# Patient Record
Sex: MALE | Race: BLACK OR AFRICAN AMERICAN | NOT HISPANIC OR LATINO
[De-identification: names, ages, dates, MRNs, and addresses within clinical notes are randomized per-mention and may not be internally consistent; named-entity substitution may affect disease eponyms.]

---

## 2020-05-17 ENCOUNTER — TRANSCRIPTION ENCOUNTER (OUTPATIENT)
Age: 55
End: 2020-05-17

## 2020-05-26 ENCOUNTER — APPOINTMENT (OUTPATIENT)
Dept: PULMONOLOGY | Facility: CLINIC | Age: 55
End: 2020-05-26

## 2020-06-10 ENCOUNTER — TRANSCRIPTION ENCOUNTER (OUTPATIENT)
Age: 55
End: 2020-06-10

## 2020-06-25 ENCOUNTER — TRANSCRIPTION ENCOUNTER (OUTPATIENT)
Age: 55
End: 2020-06-25

## 2020-07-08 ENCOUNTER — TRANSCRIPTION ENCOUNTER (OUTPATIENT)
Age: 55
End: 2020-07-08

## 2020-08-09 ENCOUNTER — TRANSCRIPTION ENCOUNTER (OUTPATIENT)
Age: 55
End: 2020-08-09

## 2020-08-11 ENCOUNTER — APPOINTMENT (OUTPATIENT)
Dept: PULMONOLOGY | Facility: CLINIC | Age: 55
End: 2020-08-11
Payer: COMMERCIAL

## 2020-08-28 ENCOUNTER — TRANSCRIPTION ENCOUNTER (OUTPATIENT)
Age: 55
End: 2020-08-28

## 2020-09-17 ENCOUNTER — LABORATORY RESULT (OUTPATIENT)
Age: 55
End: 2020-09-17

## 2020-09-17 ENCOUNTER — APPOINTMENT (OUTPATIENT)
Dept: PULMONOLOGY | Facility: CLINIC | Age: 55
End: 2020-09-17
Payer: COMMERCIAL

## 2020-09-17 VITALS
DIASTOLIC BLOOD PRESSURE: 90 MMHG | BODY MASS INDEX: 33.04 KG/M2 | WEIGHT: 218 LBS | SYSTOLIC BLOOD PRESSURE: 165 MMHG | HEIGHT: 68 IN

## 2020-09-17 VITALS — RESPIRATION RATE: 16 BRPM | HEART RATE: 98 BPM | OXYGEN SATURATION: 90 %

## 2020-09-17 DIAGNOSIS — Z87.891 PERSONAL HISTORY OF NICOTINE DEPENDENCE: ICD-10-CM

## 2020-09-17 DIAGNOSIS — Z87.39 PERSONAL HISTORY OF OTHER DISEASES OF THE MUSCULOSKELETAL SYSTEM AND CONNECTIVE TISSUE: ICD-10-CM

## 2020-09-17 PROCEDURE — 99204 OFFICE O/P NEW MOD 45 MIN: CPT

## 2020-09-17 RX ORDER — ALLOPURINOL 200 MG/1
TABLET ORAL
Refills: 0 | Status: ACTIVE | COMMUNITY

## 2020-09-17 NOTE — PHYSICAL EXAM
[No Acute Distress] : no acute distress [Low Lying Soft Palate] : low lying soft palate [Enlarged Base of the Tongue] : enlarged base of the tongue [III] : Mallampati Class: III [Normal Appearance] : normal appearance [Neck Circumference: ___] : neck circumference: [unfilled] [No Neck Mass] : no neck mass [Normal Rate/Rhythm] : normal rate/rhythm [Normal S1, S2] : normal s1, s2 [No Murmurs] : no murmurs [No Resp Distress] : no resp distress [Clear to Auscultation Bilaterally] : clear to auscultation bilaterally [No Abnormalities] : no abnormalities [Benign] : benign [Normal Gait] : normal gait [No Clubbing] : no clubbing [No Cyanosis] : no cyanosis [No Edema] : no edema [FROM] : FROM [Normal Color/ Pigmentation] : normal color/ pigmentation [No Focal Deficits] : no focal deficits [Oriented x3] : oriented x3 [Normal Affect] : normal affect [TextBox_2] : Obese [TextBox_68] : diminished bs bilat.

## 2020-09-17 NOTE — ASSESSMENT
[FreeTextEntry1] : Patient has asthma which has been out of control. I suspect he may have moderate persistent asthma that has gradually gotten worse. For the moment I told him to use the Wixela twice daily and Spiriva once daily. I gave him a course of prednisone to take if he gets worse. Pulmonary function studies have been scheduled as have asthma blood work to include allergy testing, IgE level, eosinophil count.\par \par The patient will be scheduled for a sleep study. He is at risk for significant obstructive sleep apnea which can also make his asthma worse. I will see the patient back in 3 weeks to review the lab work and the pulmonary function studies.

## 2020-09-17 NOTE — HISTORY OF PRESENT ILLNESS
[TextBox_4] : The patient is a 55-year-old male complaining of asthma that has been out of control for the past 4 months. He has had asthma for about 30 years. He denies any seasonal aspect to it and he's been in the emergency room was about 3 times. Generally he's been treated with Wixela and Spiriva. He tells me that he's been using the Wixela in the mornings and Spiriva at night.\par \par In the past 4 months he's been in urgent care 6 times requiring steroids. The last time was 3 weeks ago. Denies nasal stuffiness or GERD. He smoked in the distant past.\par \par The patient snores heavily and reports some excessive daytime sleepiness. He does not know about apneas during sleep.

## 2020-09-17 NOTE — CONSULT LETTER
Report received from BAYRON Robert.   [Dear  ___] : Dear  [unfilled], [Consult Letter:] : I had the pleasure of evaluating your patient, [unfilled]. [Please see my note below.] : Please see my note below. [Consult Closing:] : Thank you very much for allowing me to participate in the care of this patient.  If you have any questions, please do not hesitate to contact me. [Sincerely,] : Sincerely, [FreeTextEntry3] : Janeth Benavidez MD FCCP\par D-ABSM\par ABIM board certified in  Pulmonary diseases, Sleep medicine\par Internal medicine\par

## 2020-09-18 LAB
A ALTERNATA IGE QN: <0.1 KUA/L
A FUMIGATUS IGE QN: <0.1 KUA/L
BASOPHILS # BLD AUTO: 0.07 K/UL
BASOPHILS NFR BLD AUTO: 0.8 %
C ALBICANS IGE QN: <0.1 KUA/L
C HERBARUM IGE QN: 0.13 KUA/L
CAT DANDER IGE QN: <0.1 KUA/L
COMMON RAGWEED IGE QN: <0.1 KUA/L
D FARINAE IGE QN: <0.1 KUA/L
D PTERONYSS IGE QN: <0.1 KUA/L
DEPRECATED A ALTERNATA IGE RAST QL: 0
DEPRECATED A FUMIGATUS IGE RAST QL: 0
DEPRECATED C ALBICANS IGE RAST QL: 0
DEPRECATED C HERBARUM IGE RAST QL: NORMAL
DEPRECATED CAT DANDER IGE RAST QL: 0
DEPRECATED COMMON RAGWEED IGE RAST QL: 0
DEPRECATED D FARINAE IGE RAST QL: 0
DEPRECATED D PTERONYSS IGE RAST QL: 0
DEPRECATED DOG DANDER IGE RAST QL: 0
DEPRECATED M RACEMOSUS IGE RAST QL: 0
DEPRECATED ROACH IGE RAST QL: 0
DEPRECATED TIMOTHY IGE RAST QL: 1
DEPRECATED WHITE OAK IGE RAST QL: 0
DOG DANDER IGE QN: <0.1 KUA/L
EOSINOPHIL # BLD AUTO: 1.02 K/UL
EOSINOPHIL NFR BLD AUTO: 11.6 %
HCT VFR BLD CALC: 48.5 %
HGB BLD-MCNC: 15.1 G/DL
IMM GRANULOCYTES NFR BLD AUTO: 0.2 %
LYMPHOCYTES # BLD AUTO: 3.31 K/UL
LYMPHOCYTES NFR BLD AUTO: 37.7 %
M RACEMOSUS IGE QN: <0.1 KUA/L
MAN DIFF?: NORMAL
MCHC RBC-ENTMCNC: 28.2 PG
MCHC RBC-ENTMCNC: 31.1 GM/DL
MCV RBC AUTO: 90.7 FL
MONOCYTES # BLD AUTO: 0.59 K/UL
MONOCYTES NFR BLD AUTO: 6.7 %
NEUTROPHILS # BLD AUTO: 3.77 K/UL
NEUTROPHILS NFR BLD AUTO: 43 %
PLATELET # BLD AUTO: 258 K/UL
RBC # BLD: 5.35 M/UL
RBC # FLD: 13.5 %
ROACH IGE QN: <0.1 KUA/L
TIMOTHY IGE QN: 0.56 KUA/L
WBC # FLD AUTO: 8.78 K/UL
WHITE OAK IGE QN: <0.1 KUA/L

## 2020-10-03 ENCOUNTER — APPOINTMENT (OUTPATIENT)
Dept: DISASTER EMERGENCY | Facility: CLINIC | Age: 55
End: 2020-10-03

## 2020-10-08 ENCOUNTER — APPOINTMENT (OUTPATIENT)
Dept: PULMONOLOGY | Facility: CLINIC | Age: 55
End: 2020-10-08

## 2020-11-09 ENCOUNTER — TRANSCRIPTION ENCOUNTER (OUTPATIENT)
Age: 55
End: 2020-11-09

## 2020-12-18 ENCOUNTER — TRANSCRIPTION ENCOUNTER (OUTPATIENT)
Age: 55
End: 2020-12-18

## 2021-01-05 ENCOUNTER — TRANSCRIPTION ENCOUNTER (OUTPATIENT)
Age: 56
End: 2021-01-05

## 2021-01-21 ENCOUNTER — TRANSCRIPTION ENCOUNTER (OUTPATIENT)
Age: 56
End: 2021-01-21

## 2021-02-20 ENCOUNTER — TRANSCRIPTION ENCOUNTER (OUTPATIENT)
Age: 56
End: 2021-02-20

## 2021-03-10 ENCOUNTER — TRANSCRIPTION ENCOUNTER (OUTPATIENT)
Age: 56
End: 2021-03-10

## 2021-03-30 ENCOUNTER — TRANSCRIPTION ENCOUNTER (OUTPATIENT)
Age: 56
End: 2021-03-30

## 2021-04-13 ENCOUNTER — TRANSCRIPTION ENCOUNTER (OUTPATIENT)
Age: 56
End: 2021-04-13

## 2021-04-14 ENCOUNTER — APPOINTMENT (OUTPATIENT)
Dept: ORTHOPEDIC SURGERY | Facility: CLINIC | Age: 56
End: 2021-04-14
Payer: COMMERCIAL

## 2021-04-14 VITALS
BODY MASS INDEX: 33.04 KG/M2 | HEART RATE: 93 BPM | HEIGHT: 68 IN | DIASTOLIC BLOOD PRESSURE: 107 MMHG | WEIGHT: 218 LBS | SYSTOLIC BLOOD PRESSURE: 185 MMHG

## 2021-04-14 DIAGNOSIS — Z78.9 OTHER SPECIFIED HEALTH STATUS: ICD-10-CM

## 2021-04-14 DIAGNOSIS — J45.909 UNSPECIFIED ASTHMA, UNCOMPLICATED: ICD-10-CM

## 2021-04-14 PROCEDURE — 99203 OFFICE O/P NEW LOW 30 MIN: CPT

## 2021-04-14 PROCEDURE — 99072 ADDL SUPL MATRL&STAF TM PHE: CPT

## 2021-04-16 PROBLEM — Z78.9 CURRENT NON-DRINKER OF ALCOHOL: Status: ACTIVE | Noted: 2021-04-16

## 2021-04-16 PROBLEM — Z78.9 DOES NOT USE ILLICIT DRUGS: Status: ACTIVE | Noted: 2021-04-16

## 2021-04-16 PROBLEM — J45.909 ASTHMA: Status: RESOLVED | Noted: 2021-04-16 | Resolved: 2021-04-16

## 2021-04-16 PROBLEM — Z78.9 NO PERTINENT FAMILY HISTORY: Status: ACTIVE | Noted: 2021-04-16

## 2021-04-21 ENCOUNTER — TRANSCRIPTION ENCOUNTER (OUTPATIENT)
Age: 56
End: 2021-04-21

## 2021-04-27 ENCOUNTER — APPOINTMENT (OUTPATIENT)
Dept: ORTHOPEDIC SURGERY | Facility: CLINIC | Age: 56
End: 2021-04-27
Payer: COMMERCIAL

## 2021-04-27 VITALS
HEIGHT: 68 IN | DIASTOLIC BLOOD PRESSURE: 117 MMHG | SYSTOLIC BLOOD PRESSURE: 194 MMHG | HEART RATE: 88 BPM | BODY MASS INDEX: 33.04 KG/M2 | WEIGHT: 218 LBS

## 2021-04-27 DIAGNOSIS — M77.8 OTHER ENTHESOPATHIES, NOT ELSEWHERE CLASSIFIED: ICD-10-CM

## 2021-04-27 DIAGNOSIS — M25.532 PAIN IN LEFT WRIST: ICD-10-CM

## 2021-04-27 PROCEDURE — 99213 OFFICE O/P EST LOW 20 MIN: CPT

## 2021-04-27 PROCEDURE — 73110 X-RAY EXAM OF WRIST: CPT | Mod: LT

## 2021-04-27 PROCEDURE — 99072 ADDL SUPL MATRL&STAF TM PHE: CPT

## 2021-05-11 ENCOUNTER — TRANSCRIPTION ENCOUNTER (OUTPATIENT)
Age: 56
End: 2021-05-11

## 2021-05-28 ENCOUNTER — TRANSCRIPTION ENCOUNTER (OUTPATIENT)
Age: 56
End: 2021-05-28

## 2021-06-13 ENCOUNTER — TRANSCRIPTION ENCOUNTER (OUTPATIENT)
Age: 56
End: 2021-06-13

## 2021-06-14 ENCOUNTER — APPOINTMENT (OUTPATIENT)
Dept: ULTRASOUND IMAGING | Facility: CLINIC | Age: 56
End: 2021-06-14

## 2021-06-15 ENCOUNTER — APPOINTMENT (OUTPATIENT)
Dept: ORTHOPEDIC SURGERY | Facility: CLINIC | Age: 56
End: 2021-06-15

## 2021-06-23 ENCOUNTER — TRANSCRIPTION ENCOUNTER (OUTPATIENT)
Age: 56
End: 2021-06-23

## 2021-08-10 ENCOUNTER — TRANSCRIPTION ENCOUNTER (OUTPATIENT)
Age: 56
End: 2021-08-10

## 2021-09-11 ENCOUNTER — TRANSCRIPTION ENCOUNTER (OUTPATIENT)
Age: 56
End: 2021-09-11

## 2021-10-01 ENCOUNTER — TRANSCRIPTION ENCOUNTER (OUTPATIENT)
Age: 56
End: 2021-10-01

## 2021-10-24 ENCOUNTER — TRANSCRIPTION ENCOUNTER (OUTPATIENT)
Age: 56
End: 2021-10-24

## 2021-11-06 ENCOUNTER — TRANSCRIPTION ENCOUNTER (OUTPATIENT)
Age: 56
End: 2021-11-06

## 2021-12-11 ENCOUNTER — TRANSCRIPTION ENCOUNTER (OUTPATIENT)
Age: 56
End: 2021-12-11

## 2022-01-03 ENCOUNTER — EMERGENCY (EMERGENCY)
Facility: HOSPITAL | Age: 57
LOS: 1 days | Discharge: HOME | End: 2022-01-03
Attending: EMERGENCY MEDICINE
Payer: COMMERCIAL

## 2022-01-03 VITALS
HEIGHT: 68 IN | SYSTOLIC BLOOD PRESSURE: 134 MMHG | RESPIRATION RATE: 20 BRPM | DIASTOLIC BLOOD PRESSURE: 78 MMHG | HEART RATE: 124 BPM | OXYGEN SATURATION: 96 % | TEMPERATURE: 98 F | WEIGHT: 220.02 LBS

## 2022-01-03 LAB — SARS-COV-2 RNA SPEC QL NAA+PROBE: DETECTED

## 2022-01-03 PROCEDURE — U0005: CPT

## 2022-01-03 PROCEDURE — 99283 EMERGENCY DEPT VISIT LOW MDM: CPT

## 2022-01-03 PROCEDURE — 99284 EMERGENCY DEPT VISIT MOD MDM: CPT

## 2022-01-03 PROCEDURE — 94640 AIRWAY INHALATION TREATMENT: CPT

## 2022-01-03 PROCEDURE — U0003: CPT

## 2022-01-03 RX ORDER — ALBUTEROL 90 UG/1
2 AEROSOL, METERED ORAL EVERY 6 HOURS
Refills: 0 | Status: DISCONTINUED | OUTPATIENT
Start: 2022-01-03 | End: 2022-01-07

## 2022-01-03 RX ADMIN — Medication 60 MILLIGRAM(S): at 10:31

## 2022-01-03 RX ADMIN — ALBUTEROL 2 PUFF(S): 90 AEROSOL, METERED ORAL at 10:31

## 2022-01-03 NOTE — ED PROVIDER NOTE - OBJECTIVE STATEMENT
55 yo male pmh asthma comes to ed with cough , nasal congestion; pt noted possible exposure to covid; denies fever chills; ; family with similar symptoms

## 2022-01-03 NOTE — ED PROVIDER NOTE - NSFOLLOWUPINSTRUCTIONS_ED_ALL_ED_FT
prednisone 20mg by mouth 2 times a day for 5 days  proventil mdi  2 puffs every 6 hours  follow up with doctor in 3 - 5 days

## 2022-01-03 NOTE — ED PROVIDER NOTE - PATIENT PORTAL LINK FT
You can access the FollowMyHealth Patient Portal offered by [facility] by registering at the following website: [email or web address]. By joining [facility]’s FollowMyHealth portal, you will also be able to view your health information using other applications (apps) compatible with our system.

## 2022-01-13 ENCOUNTER — TRANSCRIPTION ENCOUNTER (OUTPATIENT)
Age: 57
End: 2022-01-13

## 2022-02-07 ENCOUNTER — EMERGENCY (EMERGENCY)
Facility: HOSPITAL | Age: 57
LOS: 1 days | Discharge: HOME | End: 2022-02-07
Attending: EMERGENCY MEDICINE
Payer: COMMERCIAL

## 2022-02-07 VITALS
RESPIRATION RATE: 16 BRPM | OXYGEN SATURATION: 96 % | DIASTOLIC BLOOD PRESSURE: 94 MMHG | TEMPERATURE: 98 F | SYSTOLIC BLOOD PRESSURE: 153 MMHG | HEART RATE: 96 BPM

## 2022-02-07 VITALS
RESPIRATION RATE: 16 BRPM | DIASTOLIC BLOOD PRESSURE: 82 MMHG | SYSTOLIC BLOOD PRESSURE: 158 MMHG | HEIGHT: 68 IN | HEART RATE: 112 BPM | TEMPERATURE: 99 F | OXYGEN SATURATION: 96 % | WEIGHT: 214.95 LBS

## 2022-02-07 PROCEDURE — 93971 EXTREMITY STUDY: CPT | Mod: 26,RT

## 2022-02-07 PROCEDURE — 99284 EMERGENCY DEPT VISIT MOD MDM: CPT | Mod: 25

## 2022-02-07 PROCEDURE — 99284 EMERGENCY DEPT VISIT MOD MDM: CPT

## 2022-02-07 PROCEDURE — 94640 AIRWAY INHALATION TREATMENT: CPT

## 2022-02-07 PROCEDURE — 93971 EXTREMITY STUDY: CPT

## 2022-02-07 PROCEDURE — 73564 X-RAY EXAM KNEE 4 OR MORE: CPT

## 2022-02-07 PROCEDURE — 73564 X-RAY EXAM KNEE 4 OR MORE: CPT | Mod: 26,RT

## 2022-02-07 RX ORDER — IBUPROFEN 200 MG
600 TABLET ORAL ONCE
Refills: 0 | Status: COMPLETED | OUTPATIENT
Start: 2022-02-07 | End: 2022-02-07

## 2022-02-07 RX ORDER — ALBUTEROL 90 UG/1
2 AEROSOL, METERED ORAL ONCE
Refills: 0 | Status: COMPLETED | OUTPATIENT
Start: 2022-02-07 | End: 2022-02-07

## 2022-02-07 RX ORDER — AMLODIPINE BESYLATE 2.5 MG/1
10 TABLET ORAL ONCE
Refills: 0 | Status: COMPLETED | OUTPATIENT
Start: 2022-02-07 | End: 2022-02-07

## 2022-02-07 RX ADMIN — ALBUTEROL 2 PUFF(S): 90 AEROSOL, METERED ORAL at 20:04

## 2022-02-07 RX ADMIN — Medication 600 MILLIGRAM(S): at 20:04

## 2022-02-07 RX ADMIN — AMLODIPINE BESYLATE 10 MILLIGRAM(S): 2.5 TABLET ORAL at 20:04

## 2022-02-07 NOTE — ED PROVIDER NOTE - OBJECTIVE STATEMENT
55 y/o M with PMHx arthritis, asthma, gout, HTN presents to ED c/o right knee pain for several days. Pt unable to recall specific injury but does lift and bend for work. Pt took Tylenol for pain with minimal relief.    Pt also requesting home meds (albuterol and amlodipine) because he forgot to take this morning.

## 2022-02-07 NOTE — ED PROVIDER NOTE - PATIENT PORTAL LINK FT
You can access the FollowMyHealth Patient Portal offered by [facility] by registering at the following website: [email or web address]. By joining [facility]’s FollowMyHealth portal, you will also be able to view your health information using other applications (apps) compatible with our system.

## 2022-02-07 NOTE — ED PROVIDER NOTE - CLINICAL SUMMARY MEDICAL DECISION MAKING FREE TEXT BOX
55 y/o M with PMHx arthritis, asthma, gout, HTN presents to ED c/o right knee pain for several days  -Xray knee, US duplex, motrin for pain, pt home meds for BP/asthma

## 2022-02-07 NOTE — ED PROVIDER NOTE - NSFOLLOWUPINSTRUCTIONS_ED_ALL_ED_FT
- Please follow up with your Primary Care Doctor in 1 - 2 days. If you cannot follow-up with your primary care doctor please return to the Emergency Department for any urgent issues.  - Seek immediate medical care for any new, worsening or concerning signs or symptoms.   - You were given copies of all your test results, please bring to your primary care doctor for review of any abnormal test results  - If you have difficulty following up, please call: [phone] or go to [email or web address] to obtain a [facility] doctor or specialist who takes your insurance in your area.    Feel better!       Knee Pain    WHAT YOU NEED TO KNOW:    Knee pain may start suddenly, or it may be a long-term problem. You may have pain on the side, front, or back of your knee. You may have knee stiffness and swelling. You may hear popping sounds or feel like your knee is giving way or locking up as you walk. You may feel pain when you sit, stand, walk, or climb up and down stairs. Knee pain can be caused by conditions such as obesity, inflammation, or strains or tears in ligaments or tendons.     DISCHARGE INSTRUCTIONS:    Return to the emergency department if:   •Your pain is worse, even after treatment.       •You cannot bend or straighten your leg completely.       •The swelling around your knee does not go down even with treatment.      •Your knee is painful and hot to the touch.       Contact your healthcare provider if:   •You have questions or concerns about your condition or care.           Medicines: You may need any of the following:   •NSAIDs help decrease swelling and pain or fever. This medicine is available with or without a doctor's order. NSAIDs can cause stomach bleeding or kidney problems in certain people. If you take blood thinner medicine, always ask your healthcare provider if NSAIDs are safe for you. Always read the medicine label and follow directions.      •Acetaminophen decreases pain and fever. It is available without a doctor's order. Ask how much to take and how often to take it. Follow directions. Read the labels of all other medicines you are using to see if they also contain acetaminophen, or ask your doctor or pharmacist. Acetaminophen can cause liver damage if not taken correctly. Do not use more than 4 grams (4,000 milligrams) total of acetaminophen in one day.       •Prescription pain medicine may be given. Ask your healthcare provider how to take this medicine safely. Some prescription pain medicines contain acetaminophen. Do not take other medicines that contain acetaminophen without talking to your healthcare provider. Too much acetaminophen may cause liver damage. Prescription pain medicine may cause constipation. Ask your healthcare provider how to prevent or treat constipation.       •Take your medicine as directed. Contact your healthcare provider if you think your medicine is not helping or if you have side effects. Tell him or her if you are allergic to any medicine. Keep a list of the medicines, vitamins, and herbs you take. Include the amounts, and when and why you take them. Bring the list or the pill bottles to follow-up visits. Carry your medicine list with you in case of an emergency.      What you can do to manage your symptoms:   •Rest your knee so it can heal. Limit activities that increase your pain. Do low-impact exercises, such as walking or swimming.       •Apply ice to help reduce swelling and pain. Use an ice pack, or put crushed ice in a plastic bag. Cover it with a towel before you apply it to your knee. Apply ice for 15 to 20 minutes every hour, or as directed.      •Apply compression to help reduce swelling. Use a brace or bandage only as directed.      •Elevate your knee to help decrease pain and swelling. Elevate your knee while you are sitting or lying down. Prop your leg on pillows to keep your knee above the level of your heart.      •Prevent your knee from moving as directed. Your healthcare provider may put on a cast or splint. You may need to wear a leg brace to stabilize your knee. A leg brace can be adjusted to increase your range of motion as your knee heals.  Hinged Knee Braces            What you can do to prevent knee pain:   •Maintain a healthy weight. Extra weight increases your risk for knee pain. Ask your healthcare provider how much you should weigh. He or she can help you create a safe weight loss plan if you need to lose weight.      •Exercise or train properly. Use the correct equipment for sports. Wear shoes that provide good support. Check your posture often as you exercise, play sports, or train for an event. This can help prevent stress and strain on your knees. Rest between sessions so you do not overwork your knees.      Follow up with your healthcare provider within 24 hours or as directed: You may need follow-up treatments, such as steroid injections to decrease pain. Write down your questions so you remember to ask them during your visits.     - Your blood pressure reading was high while in ED, please monitor your blood pressure at home and follow up with your Primary Doctor    Hypertension, commonly called high blood pressure, is when the force of blood pumping through your arteries is too strong. Hypertension forces your heart to work harder to pump blood. Your arteries may become narrow or stiff. Having untreated or uncontrolled hypertension for a long period of time can cause heart attack, stroke, kidney disease, and other problems. If started on a medication, take exactly as prescribed by your health care professional.     SEEK IMMEDIATE MEDICAL CARE IF YOU HAVE ANY OF THE FOLLOWING SYMPTOMS: severe headache, changes in your vision, confusion, chest pain, abdominal pain, vomiting, or shortness of breath.

## 2022-02-07 NOTE — ED PROVIDER NOTE - CARE PROVIDER_API CALL
El Tierney (DO)  Orthopaedic Surgery  [address] [place] [address], [address]  [place] [address]  Phone: [phone]  Fax: [phone]  Follow Up Time: 4-6 Days

## 2022-02-07 NOTE — ED PROVIDER NOTE - PHYSICAL EXAMINATION
Vital signs noted, see flowsheet.  General: NAD, well appearing and non-toxic.  HEENT: NC/AT. MMM. Conjunctiva and sclera clear b/l.  EOMI. PERRL.  Neck: Soft and supple  Cardiac: RRR. +S1/S2. Peripheral pulses 2+ and symmetric b/l. No LE edema.  Respiratory: Speaking in full sentences, no evidence of respiratory distress. Lungs mild exp wheeze   Abdomen: BSx4. Soft, NTND. No guarding or rebound tenderness. No suprapubic tenderness.  Back: Spine midline and non-tender. No CVAT.  Skin: Normal color for race, no evidence of rash, ecchymosis, cyanosis or jaundice.   Neuro: Awake, alert and oriented to person/place/time/situation. Moves all extremities spontaneously and symmetrically.  No focal deficit, sensation intact, strength good   Psych: Normal affect   RLE: No deformity, able to range extremity, no effusion, tenderness to right lateral knee and posterior knee, no gross ligamentous instability, able to SLR but has pain

## 2022-02-10 ENCOUNTER — APPOINTMENT (OUTPATIENT)
Dept: ORTHOPEDIC SURGERY | Facility: CLINIC | Age: 57
End: 2022-02-10
Payer: COMMERCIAL

## 2022-02-10 VITALS
DIASTOLIC BLOOD PRESSURE: 74 MMHG | WEIGHT: 220 LBS | SYSTOLIC BLOOD PRESSURE: 137 MMHG | BODY MASS INDEX: 33.34 KG/M2 | HEART RATE: 103 BPM | HEIGHT: 68 IN

## 2022-02-10 DIAGNOSIS — M25.561 PAIN IN RIGHT KNEE: ICD-10-CM

## 2022-02-10 DIAGNOSIS — M17.11 UNILATERAL PRIMARY OSTEOARTHRITIS, RIGHT KNEE: ICD-10-CM

## 2022-02-10 PROBLEM — I10 ESSENTIAL (PRIMARY) HYPERTENSION: Chronic | Status: ACTIVE | Noted: 2022-02-07

## 2022-02-10 PROCEDURE — 99214 OFFICE O/P EST MOD 30 MIN: CPT | Mod: 25

## 2022-02-10 PROCEDURE — 20610 DRAIN/INJ JOINT/BURSA W/O US: CPT | Mod: RT

## 2022-02-10 NOTE — HISTORY OF PRESENT ILLNESS
[de-identified] : 56-year-old male here today for evaluation of right knee pain has been going for the past 1 to 2 weeks.  Patient states that he was working at the [employer] overnight when he was walking around a lot.  Woke up the next morning with swelling and pain in his right knee.  States he been taking Tylenol for the pain however he was having severe pain.  Went to the emergency room where they took an x-ray and ultrasound showed no fractures.  States has been taking ibuprofen for the past 5 days and this has provided good relief of the pain in his knee.  Still has swelling and limitations in range of motion.  Denies any trauma.  Denies mechanical symptoms such as locking popping or buckling.

## 2022-02-10 NOTE — DISCUSSION/SUMMARY
[de-identified] : Patient is a 56-year-old male with degenerative arthritis of the right knee presenting today with an acute arthritis exacerbation.  I recommended conservative treatment at this time.  I gave him an injection of cortisone which he tolerated well.  Have also recommended physical therapy.  I given prescription for meloxicam 15 mg daily as needed for pain.  I will see him back in 6 to 8 weeks for repeat evaluation.  All questions were asked and answered.  He was advised he may be a candidate for total knee in the future.  We will also discuss gel injections if his pain does not improve.

## 2022-02-10 NOTE — PROCEDURE
[de-identified] : I injected his right knee.\par I discussed at length with the patient the planned steroid and lidocaine injection. The risks, benefits, convalescence and alternatives were reviewed. The possible side effects discussed included but were not limited to: pain, swelling, heat, bleeding, and redness. Symptoms are generally mild but if they are extensive then contact the office. Giving pain relievers by mouth such as NSAIDs or Tylenol can generally treat the reactions to steroid and lidocaine. Rare cases of infection have been noted. Rash, hives and itching may occur post injection. If you have muscle pain or cramps, flushing and or swelling of the face, rapid heart beat, nausea, dizziness, fever, chills, headache, difficulty breathing, swelling in the arms or legs, or have a prickly feeling of your skin, contact a health care provider immediately. Following this discussion, the knee was prepped with Alcohol and under sterile condition the 80 mg Depo-Medrol and 6 cc Lidocaine injection was performed with a 20 gauge needle through a superolateral injection site. The needle was introduced into the joint, aspiration was performed to ensure intra-articular placement and the medication was injected. Upon withdrawal of the needle the site was cleaned with alcohol and a band aid applied. The patient tolerated the injection well and there were no adverse effects. Post injection instructions included no strenuous activity for 24 hours, cryotherapy and if there are any adverse effects to contact the office.\par

## 2022-02-10 NOTE — PHYSICAL EXAM
[de-identified] : GENERAL APPEARANCE: Well nourished and hydrated, pleasant, alert, and oriented x 3. Appears their stated age. \par HEENT: Normocephalic, extraocular eye motion intact. Nasal septum midline. Oral cavity clear. External auditory canal clear. \par RESPIRATORY: Breath sounds clear and audible in all lobes. No wheezing, No accessory muscle use.\par CARDIOVASCULAR: No apparent abnormalities. No lower leg edema. No varicosities. Pedal pulses are palpable.\par NEUROLOGIC: Sensation is normal, no muscle weakness in the upper or lower extremities.\par DERMATOLOGIC: No apparent skin lesions, moist, warm, no rash.\par SPINE: Cervical spine appears normal and moves freely; thoracic spine appears normal and moves freely; lumbosacral spine appears normal and moves freely, normal, nontender.\par MUSCULOSKELETAL: Hands, wrists, and elbows are normal and move freely, shoulders are normal and move freely. \par Psychiatric: Oriented to person, place, and time, insight and judgement were intact and the affect was normal. \par Musculoskeletal:. Right knee exam shows moderate effusion, ROM is 5-95 degrees, no instability, no pain with [place], medial and lateral joint line tenderness. \par 5/5 motor strength in bilateral lower extremities. Sensory: Intact in bilateral lower extremities. DTRs: Biceps, brachioradialis, triceps, patellar, ankle and plantar 2+ and symmetric bilaterally. Pulses: dorsalis pedis, posterior tibial, femoral, popliteal, and radial 2+ and symmetric bilaterally. \par Constitutional: Alert and in no acute distress, but well-appearing. \par  [de-identified] : 3 views of the right knee brought in from the emergency room show no acute fractures or dislocations.  There is severe tricompartmental degenerative changes.

## 2022-02-11 ENCOUNTER — TRANSCRIPTION ENCOUNTER (OUTPATIENT)
Age: 57
End: 2022-02-11

## 2022-02-28 ENCOUNTER — TRANSCRIPTION ENCOUNTER (OUTPATIENT)
Age: 57
End: 2022-02-28

## 2022-03-15 ENCOUNTER — TRANSCRIPTION ENCOUNTER (OUTPATIENT)
Age: 57
End: 2022-03-15

## 2022-03-24 ENCOUNTER — APPOINTMENT (OUTPATIENT)
Dept: ORTHOPEDIC SURGERY | Facility: CLINIC | Age: 57
End: 2022-03-24

## 2022-04-16 ENCOUNTER — TRANSCRIPTION ENCOUNTER (OUTPATIENT)
Age: 57
End: 2022-04-16

## 2022-06-21 ENCOUNTER — NON-APPOINTMENT (OUTPATIENT)
Age: 57
End: 2022-06-21

## 2022-07-08 ENCOUNTER — NON-APPOINTMENT (OUTPATIENT)
Age: 57
End: 2022-07-08

## 2022-08-29 ENCOUNTER — NON-APPOINTMENT (OUTPATIENT)
Age: 57
End: 2022-08-29

## 2022-09-15 ENCOUNTER — APPOINTMENT (OUTPATIENT)
Dept: PULMONOLOGY | Facility: CLINIC | Age: 57
End: 2022-09-15

## 2022-09-15 VITALS — SYSTOLIC BLOOD PRESSURE: 140 MMHG | WEIGHT: 209 LBS | DIASTOLIC BLOOD PRESSURE: 70 MMHG | BODY MASS INDEX: 31.78 KG/M2

## 2022-09-15 VITALS — RESPIRATION RATE: 16 BRPM | OXYGEN SATURATION: 97 % | HEART RATE: 87 BPM

## 2022-09-15 PROCEDURE — 99214 OFFICE O/P EST MOD 30 MIN: CPT

## 2022-09-15 RX ORDER — DICLOFENAC SODIUM 1% 10 MG/G
1 GEL TOPICAL
Qty: 1 | Refills: 0 | Status: COMPLETED | COMMUNITY
Start: 2021-04-14 | End: 2022-09-15

## 2022-09-15 RX ORDER — TIOTROPIUM BROMIDE 18 UG/1
18 CAPSULE ORAL; RESPIRATORY (INHALATION)
Refills: 0 | Status: COMPLETED | COMMUNITY
End: 2022-09-15

## 2022-09-15 RX ORDER — MELOXICAM 15 MG/1
15 TABLET ORAL DAILY
Qty: 14 | Refills: 0 | Status: COMPLETED | COMMUNITY
Start: 2021-04-27 | End: 2022-09-15

## 2022-09-15 RX ORDER — MELOXICAM 15 MG/1
15 TABLET ORAL
Qty: 30 | Refills: 0 | Status: COMPLETED | COMMUNITY
Start: 2022-02-10 | End: 2022-09-15

## 2022-09-15 NOTE — HISTORY OF PRESENT ILLNESS
[TextBox_4] : The patient is a 57-year-old male last seen by me about 2 years ago.  At the time he had had issues with his asthma.  He has been on Wixela, Singulair, and albuterol.  He has been using the albuterol several times daily.  He was given some prednisone by his primary physician the other day.  The patient was suspicious for sleep apnea and sleep studies were ordered but he never went because of the pandemic.  He did have asthma profile and some blood work done.

## 2022-09-15 NOTE — ASSESSMENT
[FreeTextEntry1] : Patient has significant asthma that is not well controlled.  He shows eosinophilia with normal IgE level.  I am giving him a continued course of prednisone.  Wixela and Singulair will be continued.  Albuterol as needed.  Pulmonary function studies were scheduled.  Also sleep study was rescheduled.  I will see the patient back to review everything in a few weeks.  He may be a candidate for biological therapy such as Fasenra in view of his eosinophilia.

## 2022-09-21 ENCOUNTER — OUTPATIENT (OUTPATIENT)
Dept: OUTPATIENT SERVICES | Facility: HOSPITAL | Age: 57
LOS: 1 days | End: 2022-09-21
Payer: COMMERCIAL

## 2022-09-21 DIAGNOSIS — G47.33 OBSTRUCTIVE SLEEP APNEA (ADULT) (PEDIATRIC): ICD-10-CM

## 2022-09-21 PROCEDURE — 95800 SLP STDY UNATTENDED: CPT

## 2022-12-14 ENCOUNTER — APPOINTMENT (OUTPATIENT)
Dept: PULMONOLOGY | Facility: CLINIC | Age: 57
End: 2022-12-14

## 2022-12-25 ENCOUNTER — NON-APPOINTMENT (OUTPATIENT)
Age: 57
End: 2022-12-25

## 2023-01-14 ENCOUNTER — NON-APPOINTMENT (OUTPATIENT)
Age: 58
End: 2023-01-14

## 2023-03-06 ENCOUNTER — APPOINTMENT (OUTPATIENT)
Dept: PULMONOLOGY | Facility: CLINIC | Age: 58
End: 2023-03-06
Payer: COMMERCIAL

## 2023-03-06 VITALS — DIASTOLIC BLOOD PRESSURE: 80 MMHG | HEART RATE: 80 BPM | OXYGEN SATURATION: 94 % | SYSTOLIC BLOOD PRESSURE: 142 MMHG

## 2023-03-06 VITALS — WEIGHT: 218 LBS | HEIGHT: 67 IN | BODY MASS INDEX: 34.21 KG/M2

## 2023-03-06 DIAGNOSIS — J45.909 UNSPECIFIED ASTHMA, UNCOMPLICATED: ICD-10-CM

## 2023-03-06 DIAGNOSIS — G47.33 OBSTRUCTIVE SLEEP APNEA (ADULT) (PEDIATRIC): ICD-10-CM

## 2023-03-06 DIAGNOSIS — J45.50 SEVERE PERSISTENT ASTHMA, UNCOMPLICATED: ICD-10-CM

## 2023-03-06 PROCEDURE — 94010 BREATHING CAPACITY TEST: CPT

## 2023-03-06 PROCEDURE — 99215 OFFICE O/P EST HI 40 MIN: CPT | Mod: 25

## 2023-03-06 PROCEDURE — 94729 DIFFUSING CAPACITY: CPT

## 2023-03-06 PROCEDURE — 85018 HEMOGLOBIN: CPT | Mod: QW

## 2023-03-06 PROCEDURE — 94727 GAS DIL/WSHOT DETER LNG VOL: CPT

## 2023-03-06 RX ORDER — BENRALIZUMAB 30 MG/ML
30 INJECTION, SOLUTION SUBCUTANEOUS
Qty: 1 | Refills: 5 | Status: DISCONTINUED | COMMUNITY
Start: 2023-03-06 | End: 2023-03-06

## 2023-03-06 RX ORDER — MONTELUKAST SODIUM 10 MG/1
TABLET, FILM COATED ORAL
Refills: 0 | Status: DISCONTINUED | COMMUNITY
End: 2023-03-06

## 2023-03-06 RX ORDER — PREDNISONE 10 MG/1
10 TABLET ORAL
Qty: 30 | Refills: 3 | Status: DISCONTINUED | COMMUNITY
Start: 2020-09-17 | End: 2023-03-06

## 2023-03-06 NOTE — HISTORY OF PRESENT ILLNESS
[TextBox_4] : Patient with asthma with eosinophilia but normal IgE and allergy profile.  Has been on Wixela and has been complaining recently of increasing asthma symptoms with increasing need for rescue inhaler.  Came in today for pulmonary function studies and to review his sleep study. [Obstructive Sleep Apnea] : obstructive sleep apnea [Home] : home [TextBox_100] : 9/22 [TextBox_108] : 30 [TextBox_112] : 95 [TextBox_116] : 84 [TextBox_165] : I reviewed the patient's sleep study with the patient.\par

## 2023-03-06 NOTE — ASSESSMENT
[FreeTextEntry1] : Is demonstrating severe persistent asthma despite Wixela.  He has eosinophilia without environmental allergies.  He is a good candidate for Fasenra and we will start the paperwork.  In the meantime a course of prednisone was added.\par \par The patient demonstrates severe obstructive sleep apnea which may be worsening his asthma as well.  AutoPap at 6-16 cm H2O  was ordered for the patient and will be delivered to the patient's house.  Appropriate use parameters were discussed with the patient.  Patient will return after being on AutoPap for 6 to 8 weeks so that we can review compliance and efficacy and address any problems the patient may have with AutoPAP.

## 2023-03-06 NOTE — CONSULT LETTER
[Dear  ___] : Dear  [unfilled], [Courtesy Letter:] : I had the pleasure of seeing your patient, [unfilled], in my office today. [Please see my note below.] : Please see my note below. [Consult Closing:] : Thank you very much for allowing me to participate in the care of this patient.  If you have any questions, please do not hesitate to contact me. [Sincerely,] : Sincerely, [FreeTextEntry3] : Janeth Benavidez MD FCCP\par D-ABSM\par ABIM board certified in  Pulmonary diseases, Sleep medicine\par Internal medicine\par \par [facility] at [place]\par

## 2023-03-06 NOTE — PROCEDURE
[FreeTextEntry1] : Pulmonary function studies demonstrate moderate to severe obstruction with FEV1 54% predicted and vital capacity 78%.  Air-trapping is noted.  Diffusing capacity is normal.\par \par

## 2023-03-06 NOTE — PHYSICAL EXAM
[No Acute Distress] : no acute distress [Low Lying Soft Palate] : low lying soft palate [Enlarged Base of the Tongue] : enlarged base of the tongue [III] : Mallampati Class: III [Normal Appearance] : normal appearance [Neck Circumference: ___] : neck circumference: [unfilled] [No Neck Mass] : no neck mass [Normal Rate/Rhythm] : normal rate/rhythm [Normal S1, S2] : normal s1, s2 [No Murmurs] : no murmurs [No Resp Distress] : no resp distress [No Abnormalities] : no abnormalities [Benign] : benign [Normal Gait] : normal gait [No Clubbing] : no clubbing [No Cyanosis] : no cyanosis [No Edema] : no edema [FROM] : FROM [Normal Color/ Pigmentation] : normal color/ pigmentation [No Focal Deficits] : no focal deficits [Oriented x3] : oriented x3 [Normal Affect] : normal affect [TextBox_2] : Obese [TextBox_68] : diminished bs scattered expiratory wheeze

## 2023-03-20 ENCOUNTER — LABORATORY RESULT (OUTPATIENT)
Age: 58
End: 2023-03-20

## 2023-03-21 LAB
BASOPHILS # BLD AUTO: 0.03 K/UL
BASOPHILS NFR BLD AUTO: 0.3 %
EOSINOPHIL # BLD AUTO: 0.01 K/UL
EOSINOPHIL NFR BLD AUTO: 0.1 %
HCT VFR BLD CALC: 44.7 %
HGB BLD-MCNC: 14.4 G/DL
IMM GRANULOCYTES NFR BLD AUTO: 0.2 %
LYMPHOCYTES # BLD AUTO: 3.02 K/UL
LYMPHOCYTES NFR BLD AUTO: 29.5 %
MAN DIFF?: NORMAL
MCHC RBC-ENTMCNC: 28.6 PG
MCHC RBC-ENTMCNC: 32.2 GM/DL
MCV RBC AUTO: 88.7 FL
MONOCYTES # BLD AUTO: 0.76 K/UL
MONOCYTES NFR BLD AUTO: 7.4 %
NEUTROPHILS # BLD AUTO: 6.38 K/UL
NEUTROPHILS NFR BLD AUTO: 62.5 %
PLATELET # BLD AUTO: 239 K/UL
RBC # BLD: 5.04 M/UL
RBC # FLD: 13.4 %
WBC # FLD AUTO: 10.22 K/UL

## 2023-03-22 LAB
A ALTERNATA IGE QN: <0.1 KUA/L
A FUMIGATUS IGE QN: <0.1 KUA/L
C ALBICANS IGE QN: <0.1 KUA/L
C HERBARUM IGE QN: <0.1 KUA/L
CAT DANDER IGE QN: <0.1 KUA/L
COMMON RAGWEED IGE QN: <0.1 KUA/L
D FARINAE IGE QN: <0.1 KUA/L
D PTERONYSS IGE QN: <0.1 KUA/L
DEPRECATED A ALTERNATA IGE RAST QL: 0
DEPRECATED A FUMIGATUS IGE RAST QL: 0
DEPRECATED C ALBICANS IGE RAST QL: 0
DEPRECATED C HERBARUM IGE RAST QL: 0
DEPRECATED CAT DANDER IGE RAST QL: 0
DEPRECATED COMMON RAGWEED IGE RAST QL: 0
DEPRECATED D FARINAE IGE RAST QL: 0
DEPRECATED D PTERONYSS IGE RAST QL: 0
DEPRECATED DOG DANDER IGE RAST QL: 0
DEPRECATED M RACEMOSUS IGE RAST QL: 0
DEPRECATED ROACH IGE RAST QL: 0
DEPRECATED TIMOTHY IGE RAST QL: 0
DEPRECATED WHITE OAK IGE RAST QL: 0
DOG DANDER IGE QN: <0.1 KUA/L
M RACEMOSUS IGE QN: <0.1 KUA/L
ROACH IGE QN: <0.1 KUA/L
TIMOTHY IGE QN: <0.1 KUA/L
WHITE OAK IGE QN: <0.1 KUA/L

## 2023-04-10 RX ORDER — BENRALIZUMAB 30 MG/ML
30 INJECTION, SOLUTION SUBCUTANEOUS
Qty: 1 | Refills: 11 | Status: DISCONTINUED | COMMUNITY
Start: 2023-03-06 | End: 2023-04-10

## 2023-04-12 ENCOUNTER — NON-APPOINTMENT (OUTPATIENT)
Age: 58
End: 2023-04-12

## 2023-04-14 ENCOUNTER — NON-APPOINTMENT (OUTPATIENT)
Age: 58
End: 2023-04-14

## 2023-05-02 NOTE — PHYSICAL EXAM
[No Acute Distress] : no acute distress [Low Lying Soft Palate] : low lying soft palate [Enlarged Base of the Tongue] : enlarged base of the tongue [III] : Mallampati Class: III [Normal Appearance] : normal appearance [Neck Circumference: ___] : neck circumference: [unfilled] [No Neck Mass] : no neck mass [Normal Rate/Rhythm] : normal rate/rhythm [Normal S1, S2] : normal s1, s2 [No Murmurs] : no murmurs [No Resp Distress] : no resp distress 2 seconds or less [No Abnormalities] : no abnormalities [Benign] : benign [Normal Gait] : normal gait [No Clubbing] : no clubbing [No Cyanosis] : no cyanosis [No Edema] : no edema [FROM] : FROM [Normal Color/ Pigmentation] : normal color/ pigmentation [No Focal Deficits] : no focal deficits [Oriented x3] : oriented x3 [Normal Affect] : normal affect [TextBox_2] : Obese [TextBox_68] : diminished bs bilat. exp wheezes.

## 2023-05-16 ENCOUNTER — NON-APPOINTMENT (OUTPATIENT)
Age: 58
End: 2023-05-16

## 2023-07-15 ENCOUNTER — NON-APPOINTMENT (OUTPATIENT)
Age: 58
End: 2023-07-15

## 2023-08-03 ENCOUNTER — NON-APPOINTMENT (OUTPATIENT)
Age: 58
End: 2023-08-03

## 2023-08-13 ENCOUNTER — NON-APPOINTMENT (OUTPATIENT)
Age: 58
End: 2023-08-13

## 2023-09-28 ENCOUNTER — EMERGENCY (EMERGENCY)
Facility: HOSPITAL | Age: 58
LOS: 1 days | Discharge: HOME | End: 2023-09-28
Attending: EMERGENCY MEDICINE
Payer: COMMERCIAL

## 2023-09-28 VITALS
HEART RATE: 98 BPM | TEMPERATURE: 98 F | RESPIRATION RATE: 20 BRPM | WEIGHT: 220.02 LBS | HEIGHT: 68 IN | DIASTOLIC BLOOD PRESSURE: 78 MMHG | OXYGEN SATURATION: 99 % | SYSTOLIC BLOOD PRESSURE: 194 MMHG

## 2023-09-28 VITALS — DIASTOLIC BLOOD PRESSURE: 100 MMHG | SYSTOLIC BLOOD PRESSURE: 175 MMHG

## 2023-09-28 PROCEDURE — 99283 EMERGENCY DEPT VISIT LOW MDM: CPT

## 2023-09-28 PROCEDURE — 99284 EMERGENCY DEPT VISIT MOD MDM: CPT

## 2023-09-28 RX ORDER — METHOCARBAMOL 500 MG/1
1 TABLET, FILM COATED ORAL
Qty: 9 | Refills: 0
Start: 2023-09-28 | End: 2023-09-30

## 2023-09-28 RX ORDER — IBUPROFEN 200 MG
600 TABLET ORAL ONCE
Refills: 0 | Status: COMPLETED | OUTPATIENT
Start: 2023-09-28 | End: 2023-09-28

## 2023-09-28 RX ORDER — IBUPROFEN 200 MG
1 TABLET ORAL
Qty: 20 | Refills: 0
Start: 2023-09-28 | End: 2023-10-02

## 2023-09-28 RX ORDER — LIDOCAINE 4 G/100G
1 CREAM TOPICAL ONCE
Refills: 0 | Status: COMPLETED | OUTPATIENT
Start: 2023-09-28 | End: 2023-09-28

## 2023-09-28 RX ORDER — AMLODIPINE BESYLATE 2.5 MG/1
5 TABLET ORAL ONCE
Refills: 0 | Status: COMPLETED | OUTPATIENT
Start: 2023-09-28 | End: 2023-09-28

## 2023-09-28 RX ORDER — LISINOPRIL 2.5 MG/1
10 TABLET ORAL DAILY
Refills: 0 | Status: DISCONTINUED | OUTPATIENT
Start: 2023-09-28 | End: 2023-10-06

## 2023-09-28 RX ORDER — METHOCARBAMOL 500 MG/1
1000 TABLET, FILM COATED ORAL ONCE
Refills: 0 | Status: COMPLETED | OUTPATIENT
Start: 2023-09-28 | End: 2023-09-28

## 2023-09-28 RX ADMIN — LISINOPRIL 10 MILLIGRAM(S): 2.5 TABLET ORAL at 20:42

## 2023-09-28 RX ADMIN — Medication 600 MILLIGRAM(S): at 19:57

## 2023-09-28 RX ADMIN — LIDOCAINE 1 PATCH: 4 CREAM TOPICAL at 19:57

## 2023-09-28 RX ADMIN — METHOCARBAMOL 1000 MILLIGRAM(S): 500 TABLET, FILM COATED ORAL at 19:56

## 2023-09-28 RX ADMIN — AMLODIPINE BESYLATE 5 MILLIGRAM(S): 2.5 TABLET ORAL at 20:00

## 2023-09-28 NOTE — ED PROVIDER NOTE - NS ED ATTENDING STATEMENT MOD
This was a shared visit with the OLINDA. I reviewed and verified the documentation and independently performed the documented:

## 2023-09-28 NOTE — ED PROVIDER NOTE - OBJECTIVE STATEMENT
57yo M pmh htn presents to ED c/o progressive L sided lower back pain x3d. pain worse with standing/sitting from laying down position. no relief with Tylenol and Excedrin. denies injury/trauma/fall, fever, radiation of pain, numbness, weakness, dysuria, hematuria, abdominal pain, CP, SOB.

## 2023-09-28 NOTE — ED ADULT TRIAGE NOTE - CHIEF COMPLAINT QUOTE
lower back pain, more so on the left since yesterday. denies any injury or trauma. ambulating into ED

## 2023-09-28 NOTE — ED PROVIDER NOTE - ATTENDING APP SHARED VISIT CONTRIBUTION OF CARE
57 yo M presents to ED c/o atraumatic lower back pain which started 3 days ago.  Pt noted to have elevated BP and admits to running out of his BP meds 1 week ago. No assoc visual changes, CP, SOB, abd pain, N/V, focal weakness, sensory changes or syncope  On exam pt awake and alert in NAD, + L sided paraspinal L/S tenderness to palp, no m/l tenderness bony stepoff or deformity.  will medicate for elevated BP and pain and re-eval       pmh htn presents to ED c/o progressive L sided lower back pain x3d. pain worse with standing/sitting from laying down position. no relief with Tylenol and Excedrin. denies injury/trauma/fall, fever, radiation of pain, numbness, weakness, dysuria, hematuria, abdominal pain, CP, SOB.

## 2023-09-28 NOTE — ED PROVIDER NOTE - CLINICAL SUMMARY MEDICAL DECISION MAKING FREE TEXT BOX
57yo M p/w progressive L sided lower back pain x3d. no urinary sx. on eval, well appearing in no acute distress, no spinal ttp, ambulatory with limp/discomfort, neuro intact, remaind erof PE WNL. BP elevated during ED visit. pt reports running out of HTN meds- last took lotrel 3d ago. pt reports having PCP follow up and agrees to make apt for BP. ordered antihypertensive meds, robaxin, IBU, lidoacine patch- will reassess    discussed supportive care measures and return precautions. advised to f.u with PCP. pt verbalized understanding and agreement 59yo M p/w progressive L sided lower back pain x3d. no urinary sx. on eval, well appearing in no acute distress, no spinal ttp, ambulatory with limp/discomfort, neuro intact, remaind erof PE WNL. BP elevated during ED visit. pt reports running out of HTN meds- last took lotrel 3d ago. pt reports having PCP follow up and agrees to make apt for BP. ordered antihypertensive meds, robaxin, IBU, lidoacine patch- reported improvement of pain. discussed supportive care measures and return precautions. advised to f.u with PCP. pt verbalized understanding and agreement

## 2023-09-28 NOTE — ED PROVIDER NOTE - NSFOLLOWUPINSTRUCTIONS_ED_ALL_ED_FT
- Please bring all documentation from your ED visit to any related future follow up appointment.  - Please call to schedule follow up appointment with your primary care physician within 24-48 hours.  - Please seek immediate medical attention or return to the ED for any new/worsening, signs/symptoms, or concerns    Back Pain    Back pain is very common in adults. The cause of back pain is rarely dangerous and the pain often gets better over time. The cause of your back pain may not be known and may include strain of muscles or ligaments, degeneration of the spinal disks, or arthritis. Occasionally the pain may radiate down your leg(s). Over-the-counter medicines to reduce pain and inflammation are often the most helpful. Stretching and remaining active frequently helps the healing process.     SEEK IMMEDIATE MEDICAL CARE IF YOU HAVE ANY OF THE FOLLOWING SYMPTOMS: bowel or bladder control problems, unusual weakness or numbness in your arms or legs, nausea or vomiting, abdominal pain, fever, dizziness/lightheadedness.

## 2023-09-28 NOTE — ED ADULT NURSE NOTE - OBJECTIVE STATEMENT
Patient is alert and oriented X4 from home. Patient comes into the ER c/o lower back pain, more so on the left since yesterday. Patient denies any injury or trauma at this time.

## 2023-09-28 NOTE — ED PROVIDER NOTE - PATIENT PORTAL LINK FT
You can access the FollowMyHealth Patient Portal offered by [facility] by registering at the following website: [email or web address]. By joining [employer]’s FollowMyHealth portal, you will also be able to view your health information using other applications (apps) compatible with our system.

## 2023-09-28 NOTE — ED PROVIDER NOTE - PHYSICAL EXAMINATION
General: non-toxic appearing, in no acute distress, A+Ox3  CV: RRR, nl s1/s2.  Resp: CTAB, normal rate and effort  GI: Abdomen soft, NT, ND. No rebound, no guarding  : No CVAT  Neuro: sensorimotor intact without deficits   MSK: No spine tenderness to palpation, Full ROM and strength ext x 4. ambuating with limp  Skin: No rashes, lacerations, abrasions, ecchymosis. intact and perfused.

## 2023-11-06 ENCOUNTER — NON-APPOINTMENT (OUTPATIENT)
Age: 58
End: 2023-11-06

## 2024-03-13 ENCOUNTER — INPATIENT (INPATIENT)
Facility: HOSPITAL | Age: 59
LOS: 5 days | Discharge: HOME | DRG: 202 | End: 2024-03-19
Attending: STUDENT IN AN ORGANIZED HEALTH CARE EDUCATION/TRAINING PROGRAM
Payer: COMMERCIAL

## 2024-03-13 ENCOUNTER — RESULT REVIEW (OUTPATIENT)
Age: 59
End: 2024-03-13

## 2024-03-13 VITALS
OXYGEN SATURATION: 95 % | RESPIRATION RATE: 20 BRPM | TEMPERATURE: 98 F | WEIGHT: 230.16 LBS | HEART RATE: 108 BPM | SYSTOLIC BLOOD PRESSURE: 203 MMHG | DIASTOLIC BLOOD PRESSURE: 104 MMHG | HEIGHT: 67 IN

## 2024-03-13 DIAGNOSIS — R94.31 ABNORMAL ELECTROCARDIOGRAM [ECG] [EKG]: ICD-10-CM

## 2024-03-13 DIAGNOSIS — I16.1 HYPERTENSIVE EMERGENCY: ICD-10-CM

## 2024-03-13 DIAGNOSIS — I16.0 HYPERTENSIVE URGENCY: ICD-10-CM

## 2024-03-13 LAB
A1C WITH ESTIMATED AVERAGE GLUCOSE RESULT: 7 % — HIGH (ref 4–5.6)
ALBUMIN SERPL ELPH-MCNC: 3.3 G/DL — SIGNIFICANT CHANGE UP (ref 3.3–5.2)
ALP SERPL-CCNC: 64 U/L — SIGNIFICANT CHANGE UP (ref 40–120)
ALT FLD-CCNC: 43 U/L — HIGH
ANION GAP SERPL CALC-SCNC: 15 MMOL/L — SIGNIFICANT CHANGE UP (ref 5–17)
AST SERPL-CCNC: 43 U/L — HIGH
BASOPHILS # BLD AUTO: 0.02 K/UL — SIGNIFICANT CHANGE UP (ref 0–0.2)
BASOPHILS NFR BLD AUTO: 0.1 % — SIGNIFICANT CHANGE UP (ref 0–2)
BILIRUB SERPL-MCNC: 0.2 MG/DL — LOW (ref 0.4–2)
BUN SERPL-MCNC: 23.6 MG/DL — HIGH (ref 8–20)
CALCIUM SERPL-MCNC: 8.7 MG/DL — SIGNIFICANT CHANGE UP (ref 8.4–10.5)
CHLORIDE SERPL-SCNC: 102 MMOL/L — SIGNIFICANT CHANGE UP (ref 96–108)
CK MB CFR SERPL CALC: 9.8 NG/ML — HIGH (ref 0–6.7)
CK SERPL-CCNC: 845 U/L — HIGH (ref 30–200)
CO2 SERPL-SCNC: 24 MMOL/L — SIGNIFICANT CHANGE UP (ref 22–29)
CREAT SERPL-MCNC: 1.43 MG/DL — HIGH (ref 0.5–1.3)
EGFR: 56 ML/MIN/1.73M2 — LOW
EOSINOPHIL # BLD AUTO: 0 K/UL — SIGNIFICANT CHANGE UP (ref 0–0.5)
EOSINOPHIL NFR BLD AUTO: 0 % — SIGNIFICANT CHANGE UP (ref 0–6)
ESTIMATED AVERAGE GLUCOSE: 154 MG/DL — HIGH (ref 68–114)
GAS PNL BLDA: SIGNIFICANT CHANGE UP
GLUCOSE SERPL-MCNC: 159 MG/DL — HIGH (ref 70–99)
HCT VFR BLD CALC: 42.4 % — SIGNIFICANT CHANGE UP (ref 39–50)
HGB BLD-MCNC: 13.5 G/DL — SIGNIFICANT CHANGE UP (ref 13–17)
HMPV RNA SPEC QL NAA+PROBE: DETECTED
IMM GRANULOCYTES NFR BLD AUTO: 1.9 % — HIGH (ref 0–0.9)
LACTATE BLDV-MCNC: 4.6 MMOL/L — CRITICAL HIGH (ref 0.5–2)
LYMPHOCYTES # BLD AUTO: 1.16 K/UL — SIGNIFICANT CHANGE UP (ref 1–3.3)
LYMPHOCYTES # BLD AUTO: 6.2 % — LOW (ref 13–44)
MCHC RBC-ENTMCNC: 27.8 PG — SIGNIFICANT CHANGE UP (ref 27–34)
MCHC RBC-ENTMCNC: 31.8 GM/DL — LOW (ref 32–36)
MCV RBC AUTO: 87.4 FL — SIGNIFICANT CHANGE UP (ref 80–100)
MONOCYTES # BLD AUTO: 1.35 K/UL — HIGH (ref 0–0.9)
MONOCYTES NFR BLD AUTO: 7.2 % — SIGNIFICANT CHANGE UP (ref 2–14)
NEUTROPHILS # BLD AUTO: 15.88 K/UL — HIGH (ref 1.8–7.4)
NEUTROPHILS NFR BLD AUTO: 84.6 % — HIGH (ref 43–77)
NT-PROBNP SERPL-SCNC: 143 PG/ML — SIGNIFICANT CHANGE UP (ref 0–300)
PLATELET # BLD AUTO: 218 K/UL — SIGNIFICANT CHANGE UP (ref 150–400)
POTASSIUM SERPL-MCNC: 4.3 MMOL/L — SIGNIFICANT CHANGE UP (ref 3.5–5.3)
POTASSIUM SERPL-SCNC: 4.3 MMOL/L — SIGNIFICANT CHANGE UP (ref 3.5–5.3)
PROT SERPL-MCNC: 6.2 G/DL — LOW (ref 6.6–8.7)
RAPID RVP RESULT: DETECTED
RBC # BLD: 4.85 M/UL — SIGNIFICANT CHANGE UP (ref 4.2–5.8)
RBC # FLD: 15.4 % — HIGH (ref 10.3–14.5)
SARS-COV-2 RNA SPEC QL NAA+PROBE: SIGNIFICANT CHANGE UP
SODIUM SERPL-SCNC: 141 MMOL/L — SIGNIFICANT CHANGE UP (ref 135–145)
TROPONIN T, HIGH SENSITIVITY RESULT: 18 NG/L — SIGNIFICANT CHANGE UP (ref 0–51)
TROPONIN T, HIGH SENSITIVITY RESULT: 19 NG/L — SIGNIFICANT CHANGE UP (ref 0–51)
TROPONIN T, HIGH SENSITIVITY RESULT: 20 NG/L — SIGNIFICANT CHANGE UP (ref 0–51)
TSH SERPL-MCNC: 0.8 UIU/ML — SIGNIFICANT CHANGE UP (ref 0.27–4.2)
WBC # BLD: 18.76 K/UL — HIGH (ref 3.8–10.5)
WBC # FLD AUTO: 18.76 K/UL — HIGH (ref 3.8–10.5)

## 2024-03-13 PROCEDURE — 93010 ELECTROCARDIOGRAM REPORT: CPT

## 2024-03-13 PROCEDURE — 99223 1ST HOSP IP/OBS HIGH 75: CPT

## 2024-03-13 PROCEDURE — 93306 TTE W/DOPPLER COMPLETE: CPT | Mod: 26

## 2024-03-13 PROCEDURE — 99285 EMERGENCY DEPT VISIT HI MDM: CPT

## 2024-03-13 PROCEDURE — 71045 X-RAY EXAM CHEST 1 VIEW: CPT | Mod: 26

## 2024-03-13 RX ORDER — NITROGLYCERIN 6.5 MG
0.4 CAPSULE, EXTENDED RELEASE ORAL ONCE
Refills: 0 | Status: COMPLETED | OUTPATIENT
Start: 2024-03-13 | End: 2024-03-13

## 2024-03-13 RX ORDER — HYDRALAZINE HCL 50 MG
25 TABLET ORAL EVERY 8 HOURS
Refills: 0 | Status: DISCONTINUED | OUTPATIENT
Start: 2024-03-13 | End: 2024-03-15

## 2024-03-13 RX ORDER — INSULIN LISPRO 100/ML
VIAL (ML) SUBCUTANEOUS
Refills: 0 | Status: DISCONTINUED | OUTPATIENT
Start: 2024-03-13 | End: 2024-03-15

## 2024-03-13 RX ORDER — GLUCAGON INJECTION, SOLUTION 0.5 MG/.1ML
1 INJECTION, SOLUTION SUBCUTANEOUS ONCE
Refills: 0 | Status: DISCONTINUED | OUTPATIENT
Start: 2024-03-13 | End: 2024-03-19

## 2024-03-13 RX ORDER — HYDRALAZINE HCL 50 MG
10 TABLET ORAL ONCE
Refills: 0 | Status: COMPLETED | OUTPATIENT
Start: 2024-03-13 | End: 2024-03-13

## 2024-03-13 RX ORDER — BUDESONIDE AND FORMOTEROL FUMARATE DIHYDRATE 160; 4.5 UG/1; UG/1
2 AEROSOL RESPIRATORY (INHALATION)
Refills: 0 | Status: DISCONTINUED | OUTPATIENT
Start: 2024-03-13 | End: 2024-03-19

## 2024-03-13 RX ORDER — ONDANSETRON 8 MG/1
4 TABLET, FILM COATED ORAL EVERY 8 HOURS
Refills: 0 | Status: DISCONTINUED | OUTPATIENT
Start: 2024-03-13 | End: 2024-03-19

## 2024-03-13 RX ORDER — AMLODIPINE BESYLATE 2.5 MG/1
10 TABLET ORAL DAILY
Refills: 0 | Status: DISCONTINUED | OUTPATIENT
Start: 2024-03-13 | End: 2024-03-16

## 2024-03-13 RX ORDER — DEXTROSE 50 % IN WATER 50 %
25 SYRINGE (ML) INTRAVENOUS ONCE
Refills: 0 | Status: DISCONTINUED | OUTPATIENT
Start: 2024-03-13 | End: 2024-03-19

## 2024-03-13 RX ORDER — SODIUM CHLORIDE 9 MG/ML
1000 INJECTION, SOLUTION INTRAVENOUS
Refills: 0 | Status: DISCONTINUED | OUTPATIENT
Start: 2024-03-13 | End: 2024-03-19

## 2024-03-13 RX ORDER — IPRATROPIUM/ALBUTEROL SULFATE 18-103MCG
3 AEROSOL WITH ADAPTER (GRAM) INHALATION ONCE
Refills: 0 | Status: COMPLETED | OUTPATIENT
Start: 2024-03-13 | End: 2024-03-13

## 2024-03-13 RX ORDER — MAGNESIUM SULFATE 500 MG/ML
2 VIAL (ML) INJECTION ONCE
Refills: 0 | Status: COMPLETED | OUTPATIENT
Start: 2024-03-13 | End: 2024-03-13

## 2024-03-13 RX ORDER — DEXTROSE 50 % IN WATER 50 %
15 SYRINGE (ML) INTRAVENOUS ONCE
Refills: 0 | Status: DISCONTINUED | OUTPATIENT
Start: 2024-03-13 | End: 2024-03-19

## 2024-03-13 RX ORDER — DEXTROSE 50 % IN WATER 50 %
12.5 SYRINGE (ML) INTRAVENOUS ONCE
Refills: 0 | Status: DISCONTINUED | OUTPATIENT
Start: 2024-03-13 | End: 2024-03-19

## 2024-03-13 RX ORDER — MONTELUKAST 4 MG/1
10 TABLET, CHEWABLE ORAL DAILY
Refills: 0 | Status: DISCONTINUED | OUTPATIENT
Start: 2024-03-13 | End: 2024-03-19

## 2024-03-13 RX ORDER — ACETAMINOPHEN 500 MG
650 TABLET ORAL EVERY 6 HOURS
Refills: 0 | Status: DISCONTINUED | OUTPATIENT
Start: 2024-03-13 | End: 2024-03-19

## 2024-03-13 RX ORDER — HEPARIN SODIUM 5000 [USP'U]/ML
5000 INJECTION INTRAVENOUS; SUBCUTANEOUS EVERY 12 HOURS
Refills: 0 | Status: DISCONTINUED | OUTPATIENT
Start: 2024-03-13 | End: 2024-03-19

## 2024-03-13 RX ORDER — ALBUTEROL 90 UG/1
2.5 AEROSOL, METERED ORAL
Refills: 0 | Status: DISCONTINUED | OUTPATIENT
Start: 2024-03-13 | End: 2024-03-14

## 2024-03-13 RX ORDER — IPRATROPIUM/ALBUTEROL SULFATE 18-103MCG
3 AEROSOL WITH ADAPTER (GRAM) INHALATION EVERY 4 HOURS
Refills: 0 | Status: DISCONTINUED | OUTPATIENT
Start: 2024-03-13 | End: 2024-03-15

## 2024-03-13 RX ORDER — FUROSEMIDE 40 MG
40 TABLET ORAL ONCE
Refills: 0 | Status: COMPLETED | OUTPATIENT
Start: 2024-03-13 | End: 2024-03-13

## 2024-03-13 RX ORDER — LANOLIN ALCOHOL/MO/W.PET/CERES
3 CREAM (GRAM) TOPICAL AT BEDTIME
Refills: 0 | Status: DISCONTINUED | OUTPATIENT
Start: 2024-03-13 | End: 2024-03-19

## 2024-03-13 RX ADMIN — Medication 10 MILLIGRAM(S): at 20:07

## 2024-03-13 RX ADMIN — Medication 3 MILLILITER(S): at 20:20

## 2024-03-13 RX ADMIN — Medication 3 MILLILITER(S): at 16:14

## 2024-03-13 RX ADMIN — Medication 150 GRAM(S): at 16:06

## 2024-03-13 RX ADMIN — Medication 25 MILLIGRAM(S): at 22:16

## 2024-03-13 RX ADMIN — Medication 0.4 MILLIGRAM(S): at 16:03

## 2024-03-13 RX ADMIN — Medication 40 MILLIGRAM(S): at 22:16

## 2024-03-13 RX ADMIN — Medication 125 MILLIGRAM(S): at 16:06

## 2024-03-13 RX ADMIN — Medication 3 MILLILITER(S): at 17:02

## 2024-03-13 RX ADMIN — Medication 3 MILLILITER(S): at 16:44

## 2024-03-13 RX ADMIN — ALBUTEROL 2.5 MILLIGRAM(S): 90 AEROSOL, METERED ORAL at 20:19

## 2024-03-13 RX ADMIN — BUDESONIDE AND FORMOTEROL FUMARATE DIHYDRATE 2 PUFF(S): 160; 4.5 AEROSOL RESPIRATORY (INHALATION) at 22:16

## 2024-03-13 RX ADMIN — Medication 40 MILLIGRAM(S): at 16:03

## 2024-03-13 NOTE — H&P ADULT - HISTORY OF PRESENT ILLNESS
58 yr old male with hypertension, gout, severe persistent asthma, SHERI presented with complaints of 1 week of shortness of breath. States he went to urgent care last week and was given Azithromycin and a Medrol dose pack, which he completed. He persistently was wheezing after nebs at home and hence came in for evaluation. Reported his wife had a URI. He denies fever, chills. States he feels tight in his chest, no cough. He feels he has gained 15 lbs in the past 2 months from an unhealthy diet. He missed a few days of anti hypertensives last week. No leg swelling. Reports some exertional shortness of breath for the past few months on exertion. No chest pain. Denies smoking, no pets at home. Never intubated.

## 2024-03-13 NOTE — CONSULT NOTE ADULT - PROBLEM SELECTOR RECOMMENDATION 9
Admit to Tele for optimization and medical management  - check CBC, CMP, TFTs, trend CEx3, ECG and Head CT to r/o end organ findings, A1C and FLP in AM  - TTE to assess functional and structural status  - severe asymptomatic HTN occurs frequently among patients who have been nonadherent with either their chronic antihypertensive drug regimen or low Na diet  - r/o SHERI, renal parenchymal disease, 1' aldosteronism, renal artery stenosis  - end organ damage might be the sign of acute vs. subacute kidney injury  - treatment w/ short-term BP target, MAP should not be lowered by more than 25 to 30 % over the first several hours  - therapy includes Thiazide diuretics, CCB and if still not at goal start BB (Labetalol, Carvedilol)  - lifestyle modifications, DASH diet, low Na diet, daily exercise and medication adherence

## 2024-03-13 NOTE — CONSULT NOTE ADULT - NS ATTEND AMEND GEN_ALL_CORE FT
-      58M hx  HTN, severe persistent Asthma, SHERI presents to [facility] ER with complaints of shortness of breath for the past week despite using his inhaler.  Reports tightness in his chest. In ED, BP was elevated, Cardiology called for HTN urgency    Hypertensive urgency, malignant.   Chest Pain  - hs-Gemma: 18, 20, 19  - ECG: NSR w/ septal Qs  - PBNP: 143       - Telemetry monitoring  - TTE and if normal proceed with NST  - Monitor BP, avoid rapid fluctuations (MAP should not be lowered by more than 25 to 30 % over the first several hours)  - Cont meds

## 2024-03-13 NOTE — ED PROVIDER NOTE - CLINICAL SUMMARY MEDICAL DECISION MAKING FREE TEXT BOX
Has severely elevated blood pressure with shortness of breath and exertional dyspnea will rule out acute heart failure control of blood pressure and also treat for asthma and rule out pneumonia.  Will also do a troponin and proBNP

## 2024-03-13 NOTE — ED PROVIDER NOTE - PROGRESS NOTE DETAILS
Blood pressure is in the range of 160 systolic now and patient has no rales but he still has wheezing and he feels symptomatically better.  Cardiology consult called for hypertensive emergency and suspected heart failure given the weight gain.  Patient will be admitted to medicine for acute asthma and hypertensive emergency.

## 2024-03-13 NOTE — CONSULT NOTE ADULT - NS ATTEND OPT1A GEN_ALL_CORE
[facility]    Progress Note    Maynor Brasher Patient Status:  Inpatient    1937 MRN [id number]   Location [facility] 4W/SW/SE Attending Prabhakar Fernández MD   [facility] Day # 16 PCP Sheila Chiu        Subjective:   Maynor Brasher 3 in 1 TPN, , Intravenous, Continuous TPN    •  [] adult 3 in 1 TPN, , Intravenous, Continuous TPN    •  [COMPLETED] potassium chloride 40 mEq in sodium chloride 0.9% 250 mL IVPB, 40 mEq, Intravenous, Once    •  dextrose 10 % infusion, , Intravenous Oral, Daily    •  [COMPLETED] HYDROmorphone HCl (DILAUDID) 1 MG/ML injection 0.3 mg, 0.3 mg, Intravenous, Once    •  [COMPLETED] dextrose 50 % injection, , ,     •  dextrose 5 %-0.45 % NaCl infusion, , Intravenous, Continuous    •  Dorzolamide HCl-Timolol height 65\", weight 134 lb 4.8 oz (60.9 kg), SpO2 95 %, not currently breastfeeding. I/O last 3 completed shifts:  In: -   Out: 900 [Urine:900]    General: No acute distress. Awake. HEENT: Moist mucous membranes.    Abdomen: Soft, nontender, mildly dis 10. Status post cholecystectomy. 11. Hiatal hernia. Dictated by (CST): Kyle Fernández MD on 9/23/2020 at 2:16 PM     Finalized by (CST):  Kyle Fernández MD on 9/23/2020 at 2:20 PM          Xr Abdomen Obstructive Series Routine(2 Vw)(cpt=74019)    R History/Exam/Medical decision making

## 2024-03-13 NOTE — ED ADULT NURSE NOTE - NSFALLUNIVINTERV_ED_ALL_ED
Bed/Stretcher in lowest position, wheels locked, appropriate side rails in place/Call bell, personal items and telephone in reach/Instruct patient to call for assistance before getting out of bed/chair/stretcher/Non-slip footwear applied when patient is off stretcher/[place] to call system/Physically safe environment - no spills, clutter or unnecessary equipment/Purposeful proactive rounding/Room/bathroom lighting operational, light cord in reach

## 2024-03-13 NOTE — CONSULT NOTE ADULT - PROBLEM SELECTOR RECOMMENDATION 2
ECG w/ septal Qs (no old study to compare)    - Tele monitoring for acute arrhythmias, check CE x3, ECG  - continue home CCB and follow low cholesterol diet  - will schedule for TTE to assess functional/structural status  - Exercise stress test in AM to r/o ischemic source    - keep NPO after MN  - pain management as needed (NTG/MSO4)    case d/w Dr. Escoto

## 2024-03-13 NOTE — ED PROVIDER NOTE - RESPIRATORY CHEST EXAM
[FreeTextEntry1] : I last saw Ms. 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 Washington Diggs.\par \par She has not had any recent falls but has had some lightheadedness.\par  There have been no episodes of waking up with tongue bites, urinary incontinence, or unexplained muscle soreness. There have been no staring spell or lapses in time that she cannot account for.\par She does smell coffee or cake when no one else smells it.\par \par She says that her memory is the same.\par \par 
normal

## 2024-03-13 NOTE — CHART NOTE - NSCHARTNOTEFT_GEN_A_CORE
Called by RN for pt's BP of 186/93 P 88.  Pt admitted for hypertensive emergency; asymptomatic; in no distress.  Hydralazine 10mg IVPx1 ordered.  Monitor and reassess prn. Called by RN for pt's BP of 186/93 P 88.  Pt admitted for hypertensive emergency; asymptomatic; in no distress.  Hydralazine 10mg IVPx1 ordered.  Monitor and reassess prn.    20:30  Called by RN w/ critical lactate value of 4.6.  Pt admitted w/ Asthma exacerbation & +hMPV; received steroids at home and on admission, likely contributed to leukocytosis.  Pt is hypertensive currently, otherwise in no distress.  Lactate elevation likely contributed from respiratory distress.  Will hold off on antibiotics for now.  Repeat lactate ordered in am.  RN to monitor and escalate for any change in pt's status.

## 2024-03-13 NOTE — H&P ADULT - ASSESSMENT
58 yr old male with hypertension, gout, severe persistent asthma, SHERI presented with complaints of 1 week of shortness of breath. Noted to be hypertensive in ED, wheezing. RVP positive for hMPV, mild TYESHA, leucocytosis.  EKG NSR 98.    1. Acute asthma exacerbation:  Start IV steroids  leucocytosis from recent steroid use  ATC Duoneb for now  pulmonary eval  Supplemental oxygen prn  ABG noted.  monitor pulse ox    2. Hypertensive urgency:  BP improved post intervention in ED  not volume overloaded on exam  Continue Amlodipine  hold ACEI  for now given elevated creatinine  start Hydralazine for now.  check TTE, cardiology consulted by ED.    3. New diagnosis of diabetes mellitus:  A1C 7  sliding scale coverage  will need basal bolus based on FS, also on steroids    4. Gout:  Reviewed med list  not on any regimen at present    5. TYESHA likely ATN:  Monitor BP  hold ACEI  defer fluids for now  monitor BMP    6. DVT ppx:  Heparin    Discussed with patient.

## 2024-03-13 NOTE — PATIENT PROFILE ADULT - FALL HARM RISK - UNIVERSAL INTERVENTIONS
Bed in lowest position, wheels locked, appropriate side rails in place/Call bell, personal items and telephone in reach/Instruct patient to call for assistance before getting out of bed or chair/Non-slip footwear when patient is out of bed/[place] to call system/Physically safe environment - no spills, clutter or unnecessary equipment/Purposeful Proactive Rounding/Room/bathroom lighting operational, light cord in reach

## 2024-03-13 NOTE — ED PROVIDER NOTE - OBJECTIVE STATEMENT
58-year-old male with history of asthma hypertension gout presents with persistent shortness of breath and wheezing for the last 2 weeks which is progressively getting worse.  Patient has exertional dyspnea and also has increased abdominal distention.  Patient has recently gained 15 pounds he does not monitor his blood pressure at home and was found to be having high blood pressure despite taking his medications at home.  Patient is non-smoker.  Patient has never been intubated. Patient was recently given steroids and nebs without any relief and has persistent symptoms

## 2024-03-13 NOTE — ED ADULT NURSE NOTE - OBJECTIVE STATEMENT
patient who reports a PMH of HTN and asthma presents to ED reporting worsening asthma exacerbation x1 week. patient endorses using his home albuterol with no relief. patient presents hypertensive but endorses compliance with BP medication. patient denies cp, heart palpitations, fevers, chills, head ache n/v

## 2024-03-13 NOTE — H&P ADULT - NSICDXPASTSURGICALHX_GEN_ALL_CORE_FT
DISCHARGE PLANNING     Discharge to home or other facility with appropriate resources Progressing        GASTROINTESTINAL - ADULT     Minimal or absence of nausea and/or vomiting Progressing     Maintains or returns to baseline bowel function Progressing     Maintains adequate nutritional intake Progressing        HEMATOLOGIC - ADULT     Maintains hematologic stability Progressing        INFECTION - ADULT     Absence or prevention of progression during hospitalization Progressing     Absence of fever/infection during neutropenic period Progressing        Knowledge Deficit     Patient/family/caregiver demonstrates understanding of disease process, treatment plan, medications, and discharge instructions Progressing        METABOLIC, FLUID AND ELECTROLYTES - ADULT     Electrolytes maintained within normal limits Progressing     Fluid balance maintained Progressing        MUSCULOSKELETAL - ADULT     Maintain or return mobility to safest level of function Progressing     Maintain proper alignment of affected body part Progressing        PAIN - ADULT     Verbalizes/displays adequate comfort level or baseline comfort level Progressing        Potential for Falls     Patient will remain free of falls Progressing        SAFETY ADULT     Maintain or return to baseline ADL function Progressing     Maintain or return mobility status to optimal level Progressing PAST SURGICAL HISTORY:  No significant past surgical history

## 2024-03-13 NOTE — ED PROVIDER NOTE - GASTROINTESTINAL NEGATIVE STATEMENT, MLM
no abdominal pain, no bloating, no constipation, no diarrhea, no nausea and no vomiting. increased abd girth

## 2024-03-13 NOTE — ED ADULT TRIAGE NOTE - CHIEF COMPLAINT QUOTE
Pt c/o asthma exacerbation x 1 week, went to doctor yesterday prescribed steroids and breathing treatments, states he still feels tight, took BP medications this morning

## 2024-03-14 ENCOUNTER — RESULT REVIEW (OUTPATIENT)
Age: 59
End: 2024-03-14

## 2024-03-14 LAB
A1C WITH ESTIMATED AVERAGE GLUCOSE RESULT: 7.1 % — HIGH (ref 4–5.6)
ALBUMIN SERPL ELPH-MCNC: 3.5 G/DL — SIGNIFICANT CHANGE UP (ref 3.3–5.2)
ALP SERPL-CCNC: 57 U/L — SIGNIFICANT CHANGE UP (ref 40–120)
ALT FLD-CCNC: 46 U/L — HIGH
ANION GAP SERPL CALC-SCNC: 22 MMOL/L — HIGH (ref 5–17)
AST SERPL-CCNC: 39 U/L — SIGNIFICANT CHANGE UP
BASOPHILS # BLD AUTO: 0 K/UL — SIGNIFICANT CHANGE UP (ref 0–0.2)
BASOPHILS NFR BLD AUTO: 0 % — SIGNIFICANT CHANGE UP (ref 0–2)
BILIRUB SERPL-MCNC: 0.3 MG/DL — LOW (ref 0.4–2)
BUN SERPL-MCNC: 26.1 MG/DL — HIGH (ref 8–20)
CALCIUM SERPL-MCNC: 8.6 MG/DL — SIGNIFICANT CHANGE UP (ref 8.4–10.5)
CHLORIDE SERPL-SCNC: 99 MMOL/L — SIGNIFICANT CHANGE UP (ref 96–108)
CHOLEST SERPL-MCNC: 180 MG/DL — SIGNIFICANT CHANGE UP
CO2 SERPL-SCNC: 21 MMOL/L — LOW (ref 22–29)
CREAT SERPL-MCNC: 1.31 MG/DL — HIGH (ref 0.5–1.3)
CRP SERPL-MCNC: <4 MG/L — SIGNIFICANT CHANGE UP
EGFR: 62 ML/MIN/1.73M2 — SIGNIFICANT CHANGE UP
EOSINOPHIL # BLD AUTO: 0 K/UL — SIGNIFICANT CHANGE UP (ref 0–0.5)
EOSINOPHIL NFR BLD AUTO: 0 % — SIGNIFICANT CHANGE UP (ref 0–6)
ESTIMATED AVERAGE GLUCOSE: 157 MG/DL — HIGH (ref 68–114)
GLUCOSE BLDC GLUCOMTR-MCNC: 151 MG/DL — HIGH (ref 70–99)
GLUCOSE BLDC GLUCOMTR-MCNC: 167 MG/DL — HIGH (ref 70–99)
GLUCOSE BLDC GLUCOMTR-MCNC: 179 MG/DL — HIGH (ref 70–99)
GLUCOSE SERPL-MCNC: 179 MG/DL — HIGH (ref 70–99)
HCT VFR BLD CALC: 44.8 % — SIGNIFICANT CHANGE UP (ref 39–50)
HCV AB S/CO SERPL IA: 0.08 S/CO — SIGNIFICANT CHANGE UP (ref 0–0.99)
HCV AB SERPL-IMP: SIGNIFICANT CHANGE UP
HDLC SERPL-MCNC: 108 MG/DL — SIGNIFICANT CHANGE UP
HGB BLD-MCNC: 14.5 G/DL — SIGNIFICANT CHANGE UP (ref 13–17)
LACTATE BLDV-MCNC: 5.6 MMOL/L — CRITICAL HIGH (ref 0.5–2)
LACTATE BLDV-MCNC: 7.1 MMOL/L — CRITICAL HIGH (ref 0.5–2)
LACTATE SERPL-SCNC: 5.3 MMOL/L — CRITICAL HIGH (ref 0.5–2)
LACTATE SERPL-SCNC: 6.3 MMOL/L — CRITICAL HIGH (ref 0.5–2)
LIPID PNL WITH DIRECT LDL SERPL: 50 MG/DL — SIGNIFICANT CHANGE UP
LYMPHOCYTES # BLD AUTO: 1.11 K/UL — SIGNIFICANT CHANGE UP (ref 1–3.3)
LYMPHOCYTES # BLD AUTO: 6.1 % — LOW (ref 13–44)
MANUAL SMEAR VERIFICATION: SIGNIFICANT CHANGE UP
MCHC RBC-ENTMCNC: 28.2 PG — SIGNIFICANT CHANGE UP (ref 27–34)
MCHC RBC-ENTMCNC: 32.4 GM/DL — SIGNIFICANT CHANGE UP (ref 32–36)
MCV RBC AUTO: 87 FL — SIGNIFICANT CHANGE UP (ref 80–100)
MONOCYTES # BLD AUTO: 0.47 K/UL — SIGNIFICANT CHANGE UP (ref 0–0.9)
MONOCYTES NFR BLD AUTO: 2.6 % — SIGNIFICANT CHANGE UP (ref 2–14)
NEUTROPHILS # BLD AUTO: 16.31 K/UL — HIGH (ref 1.8–7.4)
NEUTROPHILS NFR BLD AUTO: 89.6 % — HIGH (ref 43–77)
NON HDL CHOLESTEROL: 72 MG/DL — SIGNIFICANT CHANGE UP
PLAT MORPH BLD: NORMAL — SIGNIFICANT CHANGE UP
PLATELET # BLD AUTO: 233 K/UL — SIGNIFICANT CHANGE UP (ref 150–400)
POLYCHROMASIA BLD QL SMEAR: SIGNIFICANT CHANGE UP
POTASSIUM SERPL-MCNC: 4.3 MMOL/L — SIGNIFICANT CHANGE UP (ref 3.5–5.3)
POTASSIUM SERPL-SCNC: 4.3 MMOL/L — SIGNIFICANT CHANGE UP (ref 3.5–5.3)
PROCALCITONIN SERPL-MCNC: 0.07 NG/ML — SIGNIFICANT CHANGE UP (ref 0.02–0.1)
PROT SERPL-MCNC: 6.7 G/DL — SIGNIFICANT CHANGE UP (ref 6.6–8.7)
RBC # BLD: 5.15 M/UL — SIGNIFICANT CHANGE UP (ref 4.2–5.8)
RBC # FLD: 15.6 % — HIGH (ref 10.3–14.5)
RBC BLD AUTO: ABNORMAL
SODIUM SERPL-SCNC: 141 MMOL/L — SIGNIFICANT CHANGE UP (ref 135–145)
TRIGL SERPL-MCNC: 110 MG/DL — SIGNIFICANT CHANGE UP
VARIANT LYMPHS # BLD: 1.7 % — SIGNIFICANT CHANGE UP (ref 0–6)
WBC # BLD: 18.2 K/UL — HIGH (ref 3.8–10.5)
WBC # FLD AUTO: 18.2 K/UL — HIGH (ref 3.8–10.5)

## 2024-03-14 PROCEDURE — 93018 CV STRESS TEST I&R ONLY: CPT

## 2024-03-14 PROCEDURE — 99233 SBSQ HOSP IP/OBS HIGH 50: CPT

## 2024-03-14 PROCEDURE — 78451 HT MUSCLE IMAGE SPECT SING: CPT | Mod: 26

## 2024-03-14 PROCEDURE — 99222 1ST HOSP IP/OBS MODERATE 55: CPT

## 2024-03-14 PROCEDURE — 93016 CV STRESS TEST SUPVJ ONLY: CPT

## 2024-03-14 RX ORDER — SODIUM CHLORIDE 9 MG/ML
1000 INJECTION, SOLUTION INTRAVENOUS ONCE
Refills: 0 | Status: COMPLETED | OUTPATIENT
Start: 2024-03-14 | End: 2024-03-14

## 2024-03-14 RX ORDER — SODIUM CHLORIDE 9 MG/ML
1000 INJECTION INTRAMUSCULAR; INTRAVENOUS; SUBCUTANEOUS ONCE
Refills: 0 | Status: COMPLETED | OUTPATIENT
Start: 2024-03-14 | End: 2024-03-14

## 2024-03-14 RX ORDER — SODIUM CHLORIDE 9 MG/ML
1000 INJECTION INTRAMUSCULAR; INTRAVENOUS; SUBCUTANEOUS ONCE
Refills: 0 | Status: DISCONTINUED | OUTPATIENT
Start: 2024-03-14 | End: 2024-03-14

## 2024-03-14 RX ORDER — SODIUM CHLORIDE 9 MG/ML
1000 INJECTION INTRAMUSCULAR; INTRAVENOUS; SUBCUTANEOUS
Refills: 0 | Status: DISCONTINUED | OUTPATIENT
Start: 2024-03-14 | End: 2024-03-15

## 2024-03-14 RX ORDER — ALBUTEROL 90 UG/1
2.5 AEROSOL, METERED ORAL
Refills: 0 | Status: DISCONTINUED | OUTPATIENT
Start: 2024-03-14 | End: 2024-03-15

## 2024-03-14 RX ORDER — PANTOPRAZOLE SODIUM 20 MG/1
40 TABLET, DELAYED RELEASE ORAL
Refills: 0 | Status: DISCONTINUED | OUTPATIENT
Start: 2024-03-14 | End: 2024-03-19

## 2024-03-14 RX ADMIN — Medication 25 MILLIGRAM(S): at 05:52

## 2024-03-14 RX ADMIN — Medication 3 MILLILITER(S): at 16:09

## 2024-03-14 RX ADMIN — HEPARIN SODIUM 5000 UNIT(S): 5000 INJECTION INTRAVENOUS; SUBCUTANEOUS at 05:52

## 2024-03-14 RX ADMIN — SODIUM CHLORIDE 1000 MILLILITER(S): 9 INJECTION INTRAMUSCULAR; INTRAVENOUS; SUBCUTANEOUS at 03:57

## 2024-03-14 RX ADMIN — Medication 25 MILLIGRAM(S): at 21:21

## 2024-03-14 RX ADMIN — Medication 3 MILLILITER(S): at 20:21

## 2024-03-14 RX ADMIN — Medication 1: at 17:02

## 2024-03-14 RX ADMIN — Medication 25 MILLIGRAM(S): at 14:08

## 2024-03-14 RX ADMIN — AMLODIPINE BESYLATE 10 MILLIGRAM(S): 2.5 TABLET ORAL at 05:52

## 2024-03-14 RX ADMIN — SODIUM CHLORIDE 100 MILLILITER(S): 9 INJECTION INTRAMUSCULAR; INTRAVENOUS; SUBCUTANEOUS at 14:08

## 2024-03-14 RX ADMIN — Medication 40 MILLIGRAM(S): at 14:08

## 2024-03-14 RX ADMIN — Medication 40 MILLIGRAM(S): at 05:52

## 2024-03-14 RX ADMIN — Medication 40 MILLIGRAM(S): at 21:21

## 2024-03-14 RX ADMIN — Medication 1: at 12:14

## 2024-03-14 RX ADMIN — Medication 3 MILLILITER(S): at 03:48

## 2024-03-14 RX ADMIN — HEPARIN SODIUM 5000 UNIT(S): 5000 INJECTION INTRAVENOUS; SUBCUTANEOUS at 17:03

## 2024-03-14 RX ADMIN — Medication 3 MILLILITER(S): at 12:21

## 2024-03-14 RX ADMIN — ALBUTEROL 2.5 MILLIGRAM(S): 90 AEROSOL, METERED ORAL at 04:20

## 2024-03-14 RX ADMIN — MONTELUKAST 10 MILLIGRAM(S): 4 TABLET, CHEWABLE ORAL at 14:08

## 2024-03-14 RX ADMIN — SODIUM CHLORIDE 1000 MILLILITER(S): 9 INJECTION, SOLUTION INTRAVENOUS at 10:47

## 2024-03-14 RX ADMIN — BUDESONIDE AND FORMOTEROL FUMARATE DIHYDRATE 2 PUFF(S): 160; 4.5 AEROSOL RESPIRATORY (INHALATION) at 20:20

## 2024-03-14 NOTE — CONSULT NOTE ADULT - ASSESSMENT
57yo M w/ HTN, Gout, severe persistent Asthma, SHERI presents to [facility] ER with complaints of shortness of breath for the past week despite using his inhaler.  Had minimal resolution of symptoms, but since yesterday has persistent wheezing after neb use and so came in for evaluation.  Reports tightness in his chest, but denies HA, cough, fever, palpitations, weakness, slurred speech, abdominal pain or leg edema.  However, reports 15 lbs weight gain in the past 2 months and missed a few days of anti hypertensive meds as well.   + shortness of breath on exertion    Denies tobacco use and no prior cardiology evaluation.  Cardiology called for HTN urgency and CAD risk assessment.  hsT-T: 18-->20-->19   ECG: NSR w/ septal Qs    pro-BNP: 143     A1C: 7.0     BP:187/108
59y/o male  non smoker    1- asthma  exacerbation with   hMVP   cxr with   left atelectasis  likely slow to improve  2- SHERI on cpap   BMI  36  3- DM/HTN/ TYESHA     REcommendations  1- IV steroids   q  8    2- procalcitonin and  crp   3- f/u  cxr   4- agrees  to overnight   BIPAP     5- blood pressure control   6- finger stick glucose and coverage  7- GI and DVT prophylaxis  8- symbicort  two inh bid  rinse after use and add spacer  9-  albuterol neb  q   4 hourprn   10 - asthma profile    allergy dion       Thank you kindly for allowing us to participate in this patients care.    Please  feel free to reach out with any questions or suggestions    CHE RODRIGUEZ    PULMONARY and CRITICAL CARE   [facility] Physician [facility]     [phone]

## 2024-03-14 NOTE — CHART NOTE - NSCHARTNOTEFT_GEN_A_CORE
PA NOTE-MEDICINE    Called by RN with repeat Lactate-7.1 from 4.6 on admission.  RN states No WOB. Pt not given fluids for previous Lactate of 4.6  M/L due to Hypertensive crisis.  Pt BP Now 160/74   Ordered 1 L NS Bolus with repeat Lactate for 7 AM    Continue to Monitor Pt   Recall PA for any changes in Pt status   Will sign out to AM Team to Follow

## 2024-03-14 NOTE — CONSULT NOTE ADULT - SUBJECTIVE AND OBJECTIVE BOX
PULMONARY CONSULT NOTE     ZAINA MILLER  MRN   [id number]    Patient is a 58y old  Male who presents with a chief complaint of shortness of breath (13 Mar 2024 21:05)      BRIEF HOSPITAL COURSE: *59y/o  pleasant male  non smoker  work  post office was  followed   in our  office by   Dr Juarez -   has  had asthma  x 20 years on wixela   and albuterol   SHERI  with cpap  -   did not follow up   in office -   presented  with one  week  sob  seen in urgent care   treated with    azithromycin and  medrol mo     wheezing persisted with sob  cough    and came to ED  with  bp  203/104    afebrile  sats 95%  on room air     -cardiology   -   seen for high bp   ECG NSR  with  septal  Qs    -a1c  7    -+ hMPV  -abg   7.47/40/75   on room air   -cr 1.43  -  -WBC 18 000  post steroids   -lactic acid    7  from  4.6  IVF   given  repeat     5.6   -treated  with  I V  steroids and    bp meds  - currenlty feeling better   -taken  for  stress test ad found to be wheezing  so aborted  - still with cough     REVIEW OF SYSTEMS     CONSTITUTIONAL   no fevers  no loss of appetite  no weight loss   HEENT  no sore throat   no ringing in ears  NECK   no pain   RESPIRATORY  see HPI   CARDIOVASCULAR  no chest  pain no palpitations   GASTROINTESTINAL no vomiting  no diarrhea    no   gerd   MUSCULOSKELETAL  no joint pains    no  back pain   SKIN   no rash   no itchiness   GENITOURINARY  no dysuria   HEME    no bleeding or bruising   ENDOCRINE     no   warmth   no  sweating   no  cold intolerance   NEUROLOGIC  no tremors  no seizures  no    weakness    PSYCHIATRIC   no mood disorder    no delirium    **    Events last 24 hours: ***    PAST MEDICAL & SURGICAL HISTORY:  Gout      Asthma      Arthritis      HTN (hypertension)      No significant past surgical history            Medications:    amLODIPine   Tablet 10 milliGRAM(s) Oral daily  hydrALAZINE 25 milliGRAM(s) Oral every 8 hours    albuterol    0.083% 2.5 milliGRAM(s) Nebulizer every 2 hours PRN  albuterol/ipratropium for Nebulization 3 milliLiter(s) Nebulizer every 4 hours  budesonide 160 MICROgram(s)/formoterol 4.5 MICROgram(s) Inhaler 2 Puff(s) Inhalation two times a day  montelukast 10 milliGRAM(s) Oral daily    acetaminophen     Tablet .. 650 milliGRAM(s) Oral every 6 hours PRN  melatonin 3 milliGRAM(s) Oral at bedtime PRN  ondansetron Injectable 4 milliGRAM(s) IV Push every 8 hours PRN      heparin   Injectable 5000 Unit(s) SubCutaneous every 12 hours    aluminum hydroxide/magnesium hydroxide/simethicone Suspension 30 milliLiter(s) Oral every 4 hours PRN      dextrose 50% Injectable 25 Gram(s) IV Push once  dextrose 50% Injectable 25 Gram(s) IV Push once  dextrose 50% Injectable 12.5 Gram(s) IV Push once  dextrose Oral Gel 15 Gram(s) Oral once PRN  glucagon  Injectable 1 milliGRAM(s) IntraMuscular once  insulin lispro (ADMELOG) corrective regimen sliding scale   SubCutaneous three times a day before meals  methylPREDNISolone sodium succinate Injectable 40 milliGRAM(s) IV Push every 8 hours    dextrose 5%. 1000 milliLiter(s) IV Continuous <Continuous>  dextrose 5%. 1000 milliLiter(s) IV Continuous <Continuous>                ICU Vital Signs Last 24 Hrs  T(C): 36.7 (14 Mar 2024 11:39), Max: 36.7 (13 Mar 2024 14:50)  T(F): 98.1 (14 Mar 2024 11:39), Max: 98.1 (14 Mar 2024 02:12)  HR: 95 (14 Mar 2024 11:39) (88 - 108)  BP: 161/78 (14 Mar 2024 11:39) (155/70 - 203/104)  BP(mean): --  ABP: --  ABP(mean): --  RR: 18 (14 Mar 2024 10:06) (16 - 20)  SpO2: 95% (14 Mar 2024 10:06) (91% - 97%)    O2 Parameters below as of 14 Mar 2024 10:06  Patient On (Oxygen Delivery Method): room air            ABG - ( 13 Mar 2024 17:50 )  pH, Arterial: 7.470 pH, Blood: x     /  pCO2: 40    /  pO2: 75    / HCO3: 29    / Base Excess: 5.4   /  SaO2: 96.3                I&O's Detail    13 Mar 2024 07:01  -  14 Mar 2024 07:00  --------------------------------------------------------  IN:  Total IN: 0 mL    OUT:    Voided (mL): 350 mL  Total OUT: 350 mL    Total NET: -350 mL            LABS:                        14.5   18.20 )-----------( 233      ( 14 Mar 2024 02:35 )             44.8     03-14    141  |  99  |  26.1<H>  ----------------------------<  179<H>  4.3   |  21.0<L>  |  1.31<H>    Ca    8.6      14 Mar 2024 02:35    TPro  6.7  /  Alb  3.5  /  TBili  0.3<L>  /  DBili  x   /  AST  39  /  ALT  46<H>  /  AlkPhos  57  03-14      CARDIAC MARKERS ( 13 Mar 2024 15:45 )  x     / x     / 845 U/L / x     / 9.8 ng/mL      CAPILLARY BLOOD GLUCOSE      POCT Blood Glucose.: 151 mg/dL (14 Mar 2024 12:02)      Urinalysis Basic - ( 14 Mar 2024 02:35 )    Color: x / Appearance: x / SG: x / pH: x  Gluc: 179 mg/dL / Ketone: x  / Bili: x / Urobili: x   Blood: x / Protein: x / Nitrite: x   Leuk Esterase: x / RBC: x / WBC x   Sq Epi: x / Non Sq Epi: x / Bacteria: x      CULTURES:  Rapid RVP Result: Detected (03-13 @ 15:45)      Physical Examination:    General: No acute distress.   speaking full sentences       HEENT: Pupils equal, reactive to light.  Symmetric. no thrush     PULM:  bilateral air entry  wheezing   NECK: Supple, no lymphadenopathy, trachea midline    CVS: Regular rate and rhythm, no murmurs, rubs, or gallops    ABD: Soft, nondistended, nontender, normoactive bowel sounds, no masses    EXT: No edema, nontender    SKIN: Warm and well perfused, no rashes noted.    NEURO: Alert, oriented, interactive, nonfocal    DEVICES:     RADIOLOGY: *ROCEDURE DATE:  03/13/2024          INTERPRETATION:  An AP portable chest x-ray was performed for shortness   of breath.    Comparison is made to 12/7/2015.    There is increased predominantly linear density in the left lower lobe,   with atelectasis favored over infiltrate. The remaining lungs are clear.   There is no pneumothorax. There are no pleural effusions. There is no   hilar or mediastinal widening. The cardiac silhouette is not enlarged.   There is no CHF. The bony thorax is grossly intact.    IMPRESSION: Atelectasis favored over infiltrate in the left lower lobe,   while the remaining lungs are clear.    --- End of Report ---            MONCHO POTTS MD; Attending Radiologist  This document has been electronically signed. Mar 13 2024  4:52PM  **    CRITICAL CARE TIME SPENT: ***  
                                             [facility]                                              CARDIOLOGY AT [facility]                                                   [address], [place] [address]                                             Telephone: [phone]. Fax:[phone]                                                       CARDIOLOGY CONSULTATION NOTE                                                                                             History obtained by: Patient and medical record  Community Cardiologist: None   obtained: Yes [  ] No [  ]  Reason for Consultation: SOB and HTN  Available out pt records reviewed: Yes [x] No [  ]    Chief complaint:  shortness of breath    HPI:  Patient is a 59yo M w/ PMHx of HTN, Gout, severe persistent Asthma, SHERI presents to [facility] ER with complaints of shortness of breath for the past week despite using his inhaler. Reports his wife had URI symptoms last week and over weekend he started exhibiting same symptoms.  He went to Urgent care last week and started on Azithromycin and Medrol pack.  Had minimal resolution of symptoms, but since yesterday has persistent wheezing after neb use and so came in for evaluation.  Reports tightness in his chest, but denies HA, cough, fever, palpitations, weakness, slurred speech, abdominal pain or leg edema.  However, reports 15 lbs weight gain in the past 2 months and missed a few days of anti hypertensive meds as well.   + shortness of breath on exertion    Denies tobacco use and no prior cardiology evaluation.  Cardiology called for HTN urgency and CAD risk assessment.  hsT-T: 18-->20-->19   ECG: NSR w/ septal Qs    pro-BNP: 143     A1C: 7.0     BP:187/108    CARDIAC TESTING   ECHO:  Pen  STRESS:  CATH:   ELECTROPHYSIOLOGY:     PAST MEDICAL HISTORY  Gout  Asthma  Arthritis  HTN (hypertension)    PAST SURGICAL HISTORY  No significant past surgical history    SOCIAL HISTORY:  Denies smoking/alcohol/drugs    FAMILY HISTORY:  Family history of diabetes mellitus type II  Family history of lung cancer    Family History of Cardiovascular Disease:  Yes [  ] No [  ]  Coronary Artery Disease in first degree relative: Yes [  ] No [  ]  Sudden Cardiac Death in First degree relative: Yes [  ] No [  ]    HOME MEDICATIONS:  amlodipine-benazepril 10 mg-20 mg oral capsule: 1 cap(s) orally once a day (13 Mar 2024 18:51)  montelukast 10 mg oral tablet: 1 tab(s) orally once a day (13 Mar 2024 18:51)  Wixela Inhub 500 mcg-50 mcg inhalation powder: 1 puff(s) inhaled once a day (13 Mar 2024 18:51)    CURRENT CARDIAC MEDICATIONS:  amLODIPine   Tablet 10 milliGRAM(s) Oral daily  hydrALAZINE 25 milliGRAM(s) Oral every 8 hours    CURRENT OTHER MEDICATIONS:  albuterol    0.083% 2.5 milliGRAM(s) Nebulizer every 2 hours  albuterol/ipratropium for Nebulization 3 milliLiter(s) Nebulizer every 4 hours  budesonide 160 MICROgram(s)/formoterol 4.5 MICROgram(s) Inhaler 2 Puff(s) Inhalation two times a day  montelukast 10 milliGRAM(s) Oral daily  acetaminophen     Tablet .. 650 milliGRAM(s) Oral every 6 hours PRN Temp greater or equal to 38C (100.4F), Mild Pain (1 - 3)  melatonin 3 milliGRAM(s) Oral at bedtime PRN Insomnia  ondansetron Injectable 4 milliGRAM(s) IV Push every 8 hours PRN Nausea and/or Vomiting  aluminum hydroxide/magnesium hydroxide/simethicone Suspension 30 milliLiter(s) Oral every 4 hours PRN Dyspepsia  dextrose Oral Gel 15 Gram(s) Oral once, Stop order after: 1 Doses PRN Blood Glucose LESS THAN 70 milliGRAM(s)/deciliter  glucagon  Injectable 1 milliGRAM(s) IntraMuscular once, Stop order after: 1 Doses  heparin   Injectable 5000 Unit(s) SubCutaneous every 12 hours  insulin lispro (ADMELOG) corrective regimen sliding scale   SubCutaneous three times a day before meals  methylPREDNISolone sodium succinate Injectable 40 milliGRAM(s) IV Push every 8 hours    ALLERGIES: No Known Allergies    REVIEW OF SYMPTOMS:   CONSTITUTIONAL: No fever, no chills, + weight gain, + fatigue   ENMT: No sinus or throat pain  NECK: No pain or stiffness  CARDIOVASCULAR: No chest pain, + dyspnea, no syncope/presyncope, no palpitations, no dizziness, no Orthopnea, no Paroxsymal nocturnal dyspnea  RESPIRATORY: + shortness of breath, no cough, no wheezing  GI: no nausea, no diarrhea, no constipation, no abdominal pain   NEURO: No headache, no slurred speech   MUSCULOSKELETAL: No joint pain or swelling  PSYCH: No agitation, no anxiety    ALL OTHER REVIEW OF SYSTEMS ARE NEGATIVE.    VITAL SIGNS:  T(C): 36.7 (03-13-24 @ 19:33), Max: 36.7 (03-13-24 @ 14:50)  T(F): 98 (03-13-24 @ 19:33), Max: 98 (03-13-24 @ 14:50)  HR: 101 (03-13-24 @ 20:28) (88 - 108)  BP: 172/88 (03-13-24 @ 20:28) (155/70 - 203/104)  RR: 18 (03-13-24 @ 20:28) (16 - 20)  SpO2: 94% (03-13-24 @ 20:28) (92% - 95%)    INTAKE AND OUTPUT:     PHYSICAL EXAM:  Constitutional: Comfortable, no acute distress   HEENT: Atraumatic, neck is supple, no JVD  CNS: A&Ox3, motor 5/5 b/l and sensory grossly intact, speech fluent, no focal deficits  Respiratory: + expiratory wheezing at apices, unlabored   Cardiovascular: RRR, S1S2, no murmur or rubs  Gastrointestinal: Soft, non-tender   Extremities: 2+ Peripheral Pulses, no cyanosis, or edema  Psychiatric: Calm, no agitation   Skin: Warm and dry, no ulcers on extremities     LABS:  ( 13 Mar 2024 15:45 )  Troponin T  X    ,  CPK  845<H>, CKMB  X    , BNP X                            13.5   18.76 )-----------( 218      ( 13 Mar 2024 15:45 )             42.4     03-13    141  |  102  |  23.6<H>  ----------------------------<  159<H>  4.3   |  24.0  |  1.43<H>    Ca    8.7      13 Mar 2024 15:45    TPro  6.2<L>  /  Alb  3.3  /  TBili  0.2<L>  /  DBili  x   /  AST  43<H>  /  ALT  43<H>  /  AlkPhos  64  03-13    Urinalysis Basic - ( 13 Mar 2024 15:45 )    Color: x / Appearance: x / SG: x / pH: x  Gluc: 159 mg/dL / Ketone: x  / Bili: x / Urobili: x   Blood: x / Protein: x / Nitrite: x   Leuk Esterase: x / RBC: x / WBC x   Sq Epi: x / Non Sq Epi: x / Bacteria: x    Thyroid Stimulating Hormone, Serum: 0.80 uIU/mL (03-13-24 @ 15:45)    INTERPRETATION OF TELEMETRY: SR  ECG: NSR@98bpm, Qs in V1-V3 (no old to compare)  Prior ECG: Yes [  ] No [x]    RADIOLOGY & ADDITIONAL STUDIES:   X-ray: IMPRESSION: Atelectasis favored over infiltrate in the left lower lobe, while the remaining lungs are clear.

## 2024-03-14 NOTE — PROGRESS NOTE ADULT - ASSESSMENT
58 yr old male with hypertension, gout, severe persistent asthma, SHERI presented with complaints of 1 week of shortness of breath. Noted to be hypertensive in ED, wheezing. RVP positive for hMPV, mild TYESHA, leucocytosis.  EKG NSR 98.    Acute asthma exacerbation 2/2 viral infection (hMPV)  - RVP noted  - Pulm consult appreciated  - Continue Solumedrol 40mg IV q8H, GI ppx  - Continue bronchodilators  - Check procalcitonin, CRP  - Supplemental oxygen PRN, currently tolerating RA    HTN urgency  - Improved  - Continue Norvasc, Hydralazine  - Holding ACEi  - Cardio consult appreciated  - NST deferred today due to wheezing  - TTE Pending    New diagnosis of diabetes mellitus  - A1C: 7.1  - SS  - DM education  - Will consider Metformin on discharge    TYESHA likely ATN  - Monitor BP  - hold ACEI  - IVFs  - Monitor BMP, correct electrolytes as needed    Lactic acidosis  - 4.6 -> 7.1 -> 5.6 -> 6.3  - likely elevated due to continuos inhaled beta agonist use  - no signs of hypoperfusion, liver dysfunction  - monitor    DVT ppx: Heparin SQ    Discussed with patient

## 2024-03-15 DIAGNOSIS — G47.33 OBSTRUCTIVE SLEEP APNEA (ADULT) (PEDIATRIC): ICD-10-CM

## 2024-03-15 DIAGNOSIS — J45.901 UNSPECIFIED ASTHMA WITH (ACUTE) EXACERBATION: ICD-10-CM

## 2024-03-15 LAB
ALBUMIN SERPL ELPH-MCNC: 3.3 G/DL — SIGNIFICANT CHANGE UP (ref 3.3–5.2)
ALBUMIN SERPL ELPH-MCNC: 3.3 G/DL — SIGNIFICANT CHANGE UP (ref 3.3–5.2)
ALP SERPL-CCNC: 54 U/L — SIGNIFICANT CHANGE UP (ref 40–120)
ALP SERPL-CCNC: 64 U/L — SIGNIFICANT CHANGE UP (ref 40–120)
ALT FLD-CCNC: 44 U/L — HIGH
ALT FLD-CCNC: 47 U/L — HIGH
ANION GAP SERPL CALC-SCNC: 19 MMOL/L — HIGH (ref 5–17)
ANION GAP SERPL CALC-SCNC: 22 MMOL/L — HIGH (ref 5–17)
AST SERPL-CCNC: 44 U/L — HIGH
AST SERPL-CCNC: 44 U/L — HIGH
BASOPHILS # BLD AUTO: 0.04 K/UL — SIGNIFICANT CHANGE UP (ref 0–0.2)
BASOPHILS NFR BLD AUTO: 0.2 % — SIGNIFICANT CHANGE UP (ref 0–2)
BILIRUB SERPL-MCNC: 0.3 MG/DL — LOW (ref 0.4–2)
BILIRUB SERPL-MCNC: 0.5 MG/DL — SIGNIFICANT CHANGE UP (ref 0.4–2)
BUN SERPL-MCNC: 28.7 MG/DL — HIGH (ref 8–20)
BUN SERPL-MCNC: 31.1 MG/DL — HIGH (ref 8–20)
CALCIUM SERPL-MCNC: 8.1 MG/DL — LOW (ref 8.4–10.5)
CALCIUM SERPL-MCNC: 8.2 MG/DL — LOW (ref 8.4–10.5)
CHLORIDE SERPL-SCNC: 100 MMOL/L — SIGNIFICANT CHANGE UP (ref 96–108)
CHLORIDE SERPL-SCNC: 103 MMOL/L — SIGNIFICANT CHANGE UP (ref 96–108)
CO2 SERPL-SCNC: 16 MMOL/L — LOW (ref 22–29)
CO2 SERPL-SCNC: 18 MMOL/L — LOW (ref 22–29)
CREAT SERPL-MCNC: 1.2 MG/DL — SIGNIFICANT CHANGE UP (ref 0.5–1.3)
CREAT SERPL-MCNC: 1.34 MG/DL — HIGH (ref 0.5–1.3)
CRP SERPL-MCNC: <4 MG/L — SIGNIFICANT CHANGE UP
EGFR: 61 ML/MIN/1.73M2 — SIGNIFICANT CHANGE UP
EGFR: 69 ML/MIN/1.73M2 — SIGNIFICANT CHANGE UP
EOSINOPHIL # BLD AUTO: 0 K/UL — SIGNIFICANT CHANGE UP (ref 0–0.5)
EOSINOPHIL NFR BLD AUTO: 0 % — SIGNIFICANT CHANGE UP (ref 0–6)
GAS PNL BLDV: SIGNIFICANT CHANGE UP
GLUCOSE BLDC GLUCOMTR-MCNC: 168 MG/DL — HIGH (ref 70–99)
GLUCOSE BLDC GLUCOMTR-MCNC: 176 MG/DL — HIGH (ref 70–99)
GLUCOSE BLDC GLUCOMTR-MCNC: 233 MG/DL — HIGH (ref 70–99)
GLUCOSE BLDC GLUCOMTR-MCNC: 237 MG/DL — HIGH (ref 70–99)
GLUCOSE SERPL-MCNC: 179 MG/DL — HIGH (ref 70–99)
GLUCOSE SERPL-MCNC: 228 MG/DL — HIGH (ref 70–99)
HCT VFR BLD CALC: 45.1 % — SIGNIFICANT CHANGE UP (ref 39–50)
HGB BLD-MCNC: 14.2 G/DL — SIGNIFICANT CHANGE UP (ref 13–17)
IMM GRANULOCYTES NFR BLD AUTO: 2.1 % — HIGH (ref 0–0.9)
LACTATE SERPL-SCNC: 5.5 MMOL/L — CRITICAL HIGH (ref 0.5–2)
LYMPHOCYTES # BLD AUTO: 0.88 K/UL — LOW (ref 1–3.3)
LYMPHOCYTES # BLD AUTO: 5.4 % — LOW (ref 13–44)
MAGNESIUM SERPL-MCNC: 2.6 MG/DL — SIGNIFICANT CHANGE UP (ref 1.6–2.6)
MCHC RBC-ENTMCNC: 27.5 PG — SIGNIFICANT CHANGE UP (ref 27–34)
MCHC RBC-ENTMCNC: 31.5 GM/DL — LOW (ref 32–36)
MCV RBC AUTO: 87.4 FL — SIGNIFICANT CHANGE UP (ref 80–100)
MONOCYTES # BLD AUTO: 0.73 K/UL — SIGNIFICANT CHANGE UP (ref 0–0.9)
MONOCYTES NFR BLD AUTO: 4.5 % — SIGNIFICANT CHANGE UP (ref 2–14)
MRSA PCR RESULT.: SIGNIFICANT CHANGE UP
NEUTROPHILS # BLD AUTO: 14.28 K/UL — HIGH (ref 1.8–7.4)
NEUTROPHILS NFR BLD AUTO: 87.8 % — HIGH (ref 43–77)
PHOSPHATE SERPL-MCNC: 4.1 MG/DL — SIGNIFICANT CHANGE UP (ref 2.4–4.7)
PLATELET # BLD AUTO: 217 K/UL — SIGNIFICANT CHANGE UP (ref 150–400)
POTASSIUM SERPL-MCNC: 4.6 MMOL/L — SIGNIFICANT CHANGE UP (ref 3.5–5.3)
POTASSIUM SERPL-MCNC: 4.7 MMOL/L — SIGNIFICANT CHANGE UP (ref 3.5–5.3)
POTASSIUM SERPL-SCNC: 4.6 MMOL/L — SIGNIFICANT CHANGE UP (ref 3.5–5.3)
POTASSIUM SERPL-SCNC: 4.7 MMOL/L — SIGNIFICANT CHANGE UP (ref 3.5–5.3)
PROCALCITONIN SERPL-MCNC: 0.08 NG/ML — SIGNIFICANT CHANGE UP (ref 0.02–0.1)
PROT SERPL-MCNC: 6 G/DL — LOW (ref 6.6–8.7)
PROT SERPL-MCNC: 6.5 G/DL — LOW (ref 6.6–8.7)
RBC # BLD: 5.16 M/UL — SIGNIFICANT CHANGE UP (ref 4.2–5.8)
RBC # FLD: 15.7 % — HIGH (ref 10.3–14.5)
S AUREUS DNA NOSE QL NAA+PROBE: SIGNIFICANT CHANGE UP
SODIUM SERPL-SCNC: 138 MMOL/L — SIGNIFICANT CHANGE UP (ref 135–145)
SODIUM SERPL-SCNC: 140 MMOL/L — SIGNIFICANT CHANGE UP (ref 135–145)
WBC # BLD: 16.27 K/UL — HIGH (ref 3.8–10.5)
WBC # FLD AUTO: 16.27 K/UL — HIGH (ref 3.8–10.5)

## 2024-03-15 PROCEDURE — 71250 CT THORAX DX C-: CPT | Mod: 26

## 2024-03-15 PROCEDURE — 99233 SBSQ HOSP IP/OBS HIGH 50: CPT

## 2024-03-15 PROCEDURE — 99232 SBSQ HOSP IP/OBS MODERATE 35: CPT

## 2024-03-15 RX ORDER — ALBUTEROL 90 UG/1
1 AEROSOL, METERED ORAL EVERY 4 HOURS
Refills: 0 | Status: DISCONTINUED | OUTPATIENT
Start: 2024-03-15 | End: 2024-03-19

## 2024-03-15 RX ORDER — LEVALBUTEROL 1.25 MG/.5ML
1.25 SOLUTION, CONCENTRATE RESPIRATORY (INHALATION) EVERY 6 HOURS
Refills: 0 | Status: COMPLETED | OUTPATIENT
Start: 2024-03-15 | End: 2024-03-17

## 2024-03-15 RX ORDER — HYDRALAZINE HCL 50 MG
10 TABLET ORAL ONCE
Refills: 0 | Status: COMPLETED | OUTPATIENT
Start: 2024-03-15 | End: 2024-03-15

## 2024-03-15 RX ORDER — IPRATROPIUM BROMIDE 0.2 MG/ML
500 SOLUTION, NON-ORAL INHALATION EVERY 6 HOURS
Refills: 0 | Status: DISCONTINUED | OUTPATIENT
Start: 2024-03-15 | End: 2024-03-19

## 2024-03-15 RX ORDER — IPRATROPIUM/ALBUTEROL SULFATE 18-103MCG
3 AEROSOL WITH ADAPTER (GRAM) INHALATION EVERY 6 HOURS
Refills: 0 | Status: DISCONTINUED | OUTPATIENT
Start: 2024-03-15 | End: 2024-03-15

## 2024-03-15 RX ORDER — CEFTRIAXONE 500 MG/1
1000 INJECTION, POWDER, FOR SOLUTION INTRAMUSCULAR; INTRAVENOUS EVERY 24 HOURS
Refills: 0 | Status: DISCONTINUED | OUTPATIENT
Start: 2024-03-15 | End: 2024-03-15

## 2024-03-15 RX ORDER — HYDRALAZINE HCL 50 MG
50 TABLET ORAL EVERY 8 HOURS
Refills: 0 | Status: DISCONTINUED | OUTPATIENT
Start: 2024-03-15 | End: 2024-03-17

## 2024-03-15 RX ORDER — ALBUTEROL 90 UG/1
2.5 AEROSOL, METERED ORAL EVERY 6 HOURS
Refills: 0 | Status: DISCONTINUED | OUTPATIENT
Start: 2024-03-15 | End: 2024-03-15

## 2024-03-15 RX ORDER — SODIUM BICARBONATE 1 MEQ/ML
0.03 SYRINGE (ML) INTRAVENOUS
Qty: 50 | Refills: 0 | Status: DISCONTINUED | OUTPATIENT
Start: 2024-03-15 | End: 2024-03-15

## 2024-03-15 RX ORDER — INSULIN LISPRO 100/ML
VIAL (ML) SUBCUTANEOUS
Refills: 0 | Status: DISCONTINUED | OUTPATIENT
Start: 2024-03-15 | End: 2024-03-19

## 2024-03-15 RX ORDER — CEFTRIAXONE 500 MG/1
1000 INJECTION, POWDER, FOR SOLUTION INTRAMUSCULAR; INTRAVENOUS EVERY 24 HOURS
Refills: 0 | Status: COMPLETED | OUTPATIENT
Start: 2024-03-15 | End: 2024-03-19

## 2024-03-15 RX ADMIN — AMLODIPINE BESYLATE 10 MILLIGRAM(S): 2.5 TABLET ORAL at 05:21

## 2024-03-15 RX ADMIN — Medication 3 MILLILITER(S): at 08:58

## 2024-03-15 RX ADMIN — MONTELUKAST 10 MILLIGRAM(S): 4 TABLET, CHEWABLE ORAL at 12:02

## 2024-03-15 RX ADMIN — LEVALBUTEROL 1.25 MILLIGRAM(S): 1.25 SOLUTION, CONCENTRATE RESPIRATORY (INHALATION) at 15:30

## 2024-03-15 RX ADMIN — SODIUM CHLORIDE 100 MILLILITER(S): 9 INJECTION INTRAMUSCULAR; INTRAVENOUS; SUBCUTANEOUS at 08:19

## 2024-03-15 RX ADMIN — PANTOPRAZOLE SODIUM 40 MILLIGRAM(S): 20 TABLET, DELAYED RELEASE ORAL at 05:21

## 2024-03-15 RX ADMIN — Medication 40 MILLIGRAM(S): at 21:11

## 2024-03-15 RX ADMIN — Medication 3 MILLILITER(S): at 04:26

## 2024-03-15 RX ADMIN — CEFTRIAXONE 1000 MILLIGRAM(S): 500 INJECTION, POWDER, FOR SOLUTION INTRAMUSCULAR; INTRAVENOUS at 17:51

## 2024-03-15 RX ADMIN — Medication 40 MILLIGRAM(S): at 13:36

## 2024-03-15 RX ADMIN — Medication 50 MILLIGRAM(S): at 21:11

## 2024-03-15 RX ADMIN — HEPARIN SODIUM 5000 UNIT(S): 5000 INJECTION INTRAVENOUS; SUBCUTANEOUS at 05:21

## 2024-03-15 RX ADMIN — BUDESONIDE AND FORMOTEROL FUMARATE DIHYDRATE 2 PUFF(S): 160; 4.5 AEROSOL RESPIRATORY (INHALATION) at 20:28

## 2024-03-15 RX ADMIN — Medication 2: at 17:09

## 2024-03-15 RX ADMIN — Medication 50 MILLIGRAM(S): at 13:36

## 2024-03-15 RX ADMIN — Medication 3 MILLILITER(S): at 00:02

## 2024-03-15 RX ADMIN — HEPARIN SODIUM 5000 UNIT(S): 5000 INJECTION INTRAVENOUS; SUBCUTANEOUS at 17:52

## 2024-03-15 RX ADMIN — BUDESONIDE AND FORMOTEROL FUMARATE DIHYDRATE 2 PUFF(S): 160; 4.5 AEROSOL RESPIRATORY (INHALATION) at 08:58

## 2024-03-15 RX ADMIN — Medication 40 MILLIGRAM(S): at 05:21

## 2024-03-15 RX ADMIN — Medication 10 MILLIGRAM(S): at 06:35

## 2024-03-15 RX ADMIN — Medication 2: at 12:03

## 2024-03-15 RX ADMIN — Medication 500 MICROGRAM(S): at 20:20

## 2024-03-15 RX ADMIN — LEVALBUTEROL 1.25 MILLIGRAM(S): 1.25 SOLUTION, CONCENTRATE RESPIRATORY (INHALATION) at 20:20

## 2024-03-15 RX ADMIN — Medication 500 MICROGRAM(S): at 15:30

## 2024-03-15 RX ADMIN — Medication 25 MILLIGRAM(S): at 05:21

## 2024-03-15 RX ADMIN — Medication 1: at 08:18

## 2024-03-15 NOTE — DIETITIAN INITIAL EVALUATION ADULT - OTHER INFO
58 yr old male with hypertension, gout, severe persistent asthma, SHERI presented with complaints of 1 week of shortness of breath. States he went to urgent care last week and was given Azithromycin and a Medrol dose pack, which he completed. He persistently was wheezing after nebs at home and hence came in for evaluation. Reported his wife had a URI. He denies fever, chills. States he feels tight in his chest, no cough. He feels he has gained 15 lbs in the past 2 months from an unhealthy diet. He missed a few days of anti hypertensives last week. No leg swelling. Reports some exertional shortness of breath for the past few months on exertion

## 2024-03-15 NOTE — PROGRESS NOTE ADULT - PROBLEM SELECTOR PLAN 2
droppled b/p and hr during stress test  Will consider [facility]  No chest pain  trops were negative droppled b/p and hr during stress test  No chest pain  trops were negative  Cardiac CTA as out patient  Needs to follow up in office    Will sign off, please call for questions Attending Only

## 2024-03-15 NOTE — PROGRESS NOTE ADULT - PROBLEM SELECTOR PLAN 1
b/p still elevated 160/80  increase hydralazine to 50 q8h  Norvasc 10mg qd  Low na diet discussed  Discussion regarding taking meds on a regular basis

## 2024-03-15 NOTE — PROGRESS NOTE ADULT - ASSESSMENT
59yo M w/ HTN, Gout, Severe persistent Asthma, SHERI presents to [facility] ER with complaints of shortness of breath for the past week despite using his inhaler.  Had minimal resolution of symptoms, but since yesterday has persistent wheezing after neb use and so came in for evaluation.  Reports tightness in his chest, but denies HA, cough, fever, palpitations, weakness, slurred speech, abdominal pain or leg edema.  However, reports 15 lbs weight gain in the past 2 months and missed a few days of anti hypertensive meds as well.   + shortness of breath on exertion    Denies tobacco use and no prior cardiology evaluation.  Cardiology called for HTN urgency and CAD risk assessment.  hsT-T: 18-->20-->19   ECG: NSR w/ septal Qs    pro-BNP: 143     A1C: 7.0     BP:187/108

## 2024-03-15 NOTE — DIETITIAN INITIAL EVALUATION ADULT - ENTER TO (G/KG)
1 Consent (Nose)/Introductory Paragraph: The rationale for Mohs was explained to the patient and consent was obtained. The risks, benefits and alternatives to therapy were discussed in detail. Specifically, the risks of nasal deformity, changes in the flow of air through the nose, infection, scarring, bleeding, prolonged wound healing, incomplete removal, allergy to anesthesia, nerve injury and recurrence were addressed. Prior to the procedure, the treatment site was clearly identified and confirmed by the patient. All components of Universal Protocol/PAUSE Rule completed.

## 2024-03-15 NOTE — DIETITIAN INITIAL EVALUATION ADULT - PERTINENT MEDS FT
MEDICATIONS  (STANDING):  amLODIPine   Tablet 10 milliGRAM(s) Oral daily  budesonide 160 MICROgram(s)/formoterol 4.5 MICROgram(s) Inhaler 2 Puff(s) Inhalation two times a day  dextrose 50% Injectable 12.5 Gram(s) IV Push once  glucagon  Injectable 1 milliGRAM(s) IntraMuscular once  heparin   Injectable 5000 Unit(s) SubCutaneous every 12 hours  hydrALAZINE 25 milliGRAM(s) Oral every 8 hours  insulin lispro (ADMELOG) corrective regimen sliding scale   SubCutaneous three times a day before meals  methylPREDNISolone sodium succinate Injectable 40 milliGRAM(s) IV Push every 8 hours  montelukast 10 milliGRAM(s) Oral daily  pantoprazole    Tablet 40 milliGRAM(s) Oral before breakfast  sodium chloride 0.9%. 1000 milliLiter(s) (100 mL/Hr) IV Continuous <Continuous>    MEDICATIONS  (PRN):  acetaminophen     Tablet .. 650 milliGRAM(s) Oral every 6 hours PRN Temp greater or equal to 38C (100.4F), Mild Pain (1 - 3)  aluminum hydroxide/magnesium hydroxide/simethicone Suspension 30 milliLiter(s) Oral every 4 hours PRN Dyspepsia  dextrose Oral Gel 15 Gram(s) Oral once PRN Blood Glucose LESS THAN 70 milliGRAM(s)/deciliter  melatonin 3 milliGRAM(s) Oral at bedtime PRN Insomnia  ondansetron Injectable 4 milliGRAM(s) IV Push every 8 hours PRN Nausea and/or Vomiting

## 2024-03-15 NOTE — PROGRESS NOTE ADULT - TIME BILLING
greater than 50% of time spent reviewing labs, notes, orders and radiographs, coordinating care  discussed with pt and family at bedside greater than 50% of time spent reviewing labs, notes, orders and radiographs, coordinating care  discussed with pt and family at bedside, Med team called

## 2024-03-15 NOTE — PROGRESS NOTE ADULT - ASSESSMENT
Asthma exacerbation, viral positive, allergic phenotype by hx  Severe SHERI ( AHI 30), not on CPAP  Slowly improving  add standing nebs  CT chest non contrast r/o pna  empiric abx  CPAP trial nocturnal, keep HOB elevated  Cardiology input noted Asthma exacerbation, viral positive, allergic phenotype by hx  Severe SHERI ( AHI 30), not on CPAP  Slowly improving  add standing nebs  CT chest non contrast r/o pna  empiric abx  Suspect lactic acidemia Type B, on beta agonists  CPAP trial nocturnal, keep HOB elevated  Cardiology input noted Asthma exacerbation, viral positive, allergic phenotype by hx  Severe persistent asthma on last PFT 3/23 office  Severe SHERI ( AHI 30), not on CPAP  Slowly improving  add standing nebs  CT chest non contrast r/o pna  empiric abx  Suspect lactic acidemia Type B, on beta agonists  CPAP trial nocturnal, keep HOB elevated  Cardiology input noted Asthma exacerbation, viral positive, allergic phenotype by hx  Severe persistent asthma on last PFT 3/23 office  Severe SHERI ( AHI 30), not on CPAP  Slowly improving  add standing nebs  CT chest non contrast r/o pna  empiric abx  Suspect lactic acidemia Type B, on beta agonists  Check venous pH  CPAP trial nocturnal, keep HOB elevated  Cardiology input noted

## 2024-03-15 NOTE — PROGRESS NOTE ADULT - ASSESSMENT
The patient is a 58 year old with a past medical history of hypertension, gout severe persistent asthma, SHERI presented with complaints of 1 week of shortness of breath. Noted to be hypertensive in ED, wheezing. RVP positive for hMPV, mild TYESHA, leucocytosis.EKG NSR 98. TTE with hyperdynamic LVSF. Started on norvasc and hydralazine for hypertension. Cardiology consulted, was unable to tolerate NST due to wheezing    Assessment/Plan:    1. Acute asthma exacerbation 2/2 viral infection (hMPV)  - Chest xray on admission with left lower lobe infiltrate  - Start empiric IV Rocephin  - CT chest non contrast  - Acidotic this morning, Check Stat VBG  - Start Xoponex with atrovent Q6 hours in the setting of tachycardia  - IV Solu medrol Q12 hours  - Pulmonary following   - Supplemental oxygen PRN, currently tolerating RA    2. HTN urgency  - BP remains elevated  - Hydralazine increased to 50mg PO Q8 hours  - On norvasc 10mg PO OD  - ACEI held for tyesha  - No BB in the setting of bronchospasm   - TTE with hyperdynamic LV  - Cardiology following was unable to tolerate NST due to wheezing     3, New diagnosis of diabetes mellitus  - A1C: 7.1  - Change to Moderate Insulin sliding scale  - May need to add lantus while on steroids  - Monitor BSL closely   - DM education  - Will consider Metformin on discharge    4. TYESHA likely ATN  - Improving  - Remains acidotic with lactic acidosis  - Check stat VBG with lytes   - hold ACEI  - Monitor BMP, correct electrolytes as needed    5. Lactic acidosis  - likely elevated due to continuos inhaled beta agonist use  - no signs of hypoperfusion, liver dysfunction  - monitor    DVT ppx: Heparin SQ       The patient is a 58 year old with a past medical history of hypertension, gout severe persistent asthma, SHERI presented with complaints of 1 week of shortness of breath. Noted to be hypertensive in ED, wheezing. RVP positive for hMPV, mild TYESHA, leucocytosis.EKG NSR 98. TTE with hyperdynamic LVSF. Started on norvasc and hydralazine for hypertension. Cardiology consulted, was unable to tolerate NST due to wheezing    Assessment/Plan:    1. Acute asthma exacerbation 2/2 viral infection (hMPV)  - Chest xray on admission with left lower lobe infiltrate  - Start empiric IV Rocephin  - CT chest non contrast  - Acidotic this morning, Check Stat VBG  - Start Xoponex with atrovent Q6 hours in the setting of tachycardia  - IV Solu medrol Q8 hours  - Singulair  - Symbicort BID   - Pulmonary following   - Supplemental oxygen PRN, currently tolerating RA    2. HTN urgency  - BP remains elevated  - Hydralazine increased to 50mg PO Q8 hours  - On norvasc 10mg PO OD  - ACEI held for tyesha  - No BB in the setting of bronchospasm   - TTE with hyperdynamic LV  - Cardiology following was unable to tolerate NST due to wheezing     3, New diagnosis of diabetes mellitus  - A1C: 7.1  - Change to Moderate Insulin sliding scale  - May need to add lantus while on steroids  - Monitor BSL closely   - DM education  - Will consider Metformin on discharge    4. TYESHA likely ATN  - Improving  - Remains acidotic with lactic acidosis  - Check stat VBG with lytes   - hold ACEI  - Monitor BMP, correct electrolytes as needed    5. Lactic acidosis  - likely elevated due to continuos inhaled beta agonist use  - no signs of hypoperfusion, liver dysfunction  - monitor    DVT ppx: Heparin SQ  GI: PPI

## 2024-03-15 NOTE — DIETITIAN INITIAL EVALUATION ADULT - PERTINENT LABORATORY DATA
03-15    138  |  100  |  28.7<H>  ----------------------------<  179<H>  4.7   |  16.0<L>  |  1.20    Ca    8.2<L>      15 Mar 2024 06:18  Phos  4.1     03-15  Mg     2.6     03-15    TPro  6.5<L>  /  Alb  3.3  /  TBili  0.5  /  DBili  x   /  AST  44<H>  /  ALT  44<H>  /  AlkPhos  54  03-15  POCT Blood Glucose.: 168 mg/dL (03-15-24 @ 08:00)  A1C with Estimated Average Glucose Result: 7.1 % (03-14-24 @ 02:35)  A1C with Estimated Average Glucose Result: 7.0 % (03-13-24 @ 15:45)

## 2024-03-15 NOTE — CHART NOTE - NSCHARTNOTEFT_GEN_A_CORE
D/w MD lactate and VBG. Lactate likely elevated due to continuos inhaled beta agonist use, pH normal. F/u repeat im AM. Reecheck prior to AM if Pt becomes unstable. RN to continue to observe and escalate as necessary.

## 2024-03-15 NOTE — DIETITIAN INITIAL EVALUATION ADULT - ORAL INTAKE PTA/DIET HISTORY
Pt reports having a good appetite in house and PTA, denies following any dietary restrictions, interested in nutrition education 2/2 newly diagnosed DM (Hgb A1c 7.1%). Pt states having gained ~15lbs due to poor food choices, confirms current weight 230lbs. Pt works overnight, eats a large meal typically before bed but little PO intake throughout the rest of his shift. Discussed importance of consistent CHO intake and meal timing, CHO counting, pairing CHO with protein/fat for improved glycemic control, high fiber, My[employer] Guidelines. Pt receptive and understanding, RD contact information provided for additional questions in house, encouraged follow-up with outpatient RD upon d/c.

## 2024-03-16 LAB
ALBUMIN SERPL ELPH-MCNC: 3.5 G/DL — SIGNIFICANT CHANGE UP (ref 3.3–5.2)
ALP SERPL-CCNC: 63 U/L — SIGNIFICANT CHANGE UP (ref 40–120)
ALT FLD-CCNC: 50 U/L — HIGH
ANION GAP SERPL CALC-SCNC: 19 MMOL/L — HIGH (ref 5–17)
AST SERPL-CCNC: 42 U/L — HIGH
BASOPHILS # BLD AUTO: 0 K/UL — SIGNIFICANT CHANGE UP (ref 0–0.2)
BASOPHILS NFR BLD AUTO: 0 % — SIGNIFICANT CHANGE UP (ref 0–2)
BILIRUB SERPL-MCNC: 0.4 MG/DL — SIGNIFICANT CHANGE UP (ref 0.4–2)
BUN SERPL-MCNC: 27.6 MG/DL — HIGH (ref 8–20)
CALCIUM SERPL-MCNC: 8.3 MG/DL — LOW (ref 8.4–10.5)
CHLORIDE SERPL-SCNC: 102 MMOL/L — SIGNIFICANT CHANGE UP (ref 96–108)
CO2 SERPL-SCNC: 19 MMOL/L — LOW (ref 22–29)
CREAT SERPL-MCNC: 1.1 MG/DL — SIGNIFICANT CHANGE UP (ref 0.5–1.3)
EGFR: 77 ML/MIN/1.73M2 — SIGNIFICANT CHANGE UP
EOSINOPHIL # BLD AUTO: 0 K/UL — SIGNIFICANT CHANGE UP (ref 0–0.5)
EOSINOPHIL NFR BLD AUTO: 0 % — SIGNIFICANT CHANGE UP (ref 0–6)
GLUCOSE BLDC GLUCOMTR-MCNC: 150 MG/DL — HIGH (ref 70–99)
GLUCOSE BLDC GLUCOMTR-MCNC: 153 MG/DL — HIGH (ref 70–99)
GLUCOSE BLDC GLUCOMTR-MCNC: 156 MG/DL — HIGH (ref 70–99)
GLUCOSE BLDC GLUCOMTR-MCNC: 241 MG/DL — HIGH (ref 70–99)
GLUCOSE SERPL-MCNC: 185 MG/DL — HIGH (ref 70–99)
HCT VFR BLD CALC: 45.2 % — SIGNIFICANT CHANGE UP (ref 39–50)
HGB BLD-MCNC: 14.6 G/DL — SIGNIFICANT CHANGE UP (ref 13–17)
LACTATE SERPL-SCNC: 4.6 MMOL/L — CRITICAL HIGH (ref 0.5–2)
LYMPHOCYTES # BLD AUTO: 0.31 K/UL — LOW (ref 1–3.3)
LYMPHOCYTES # BLD AUTO: 1.7 % — LOW (ref 13–44)
MCHC RBC-ENTMCNC: 28.1 PG — SIGNIFICANT CHANGE UP (ref 27–34)
MCHC RBC-ENTMCNC: 32.3 GM/DL — SIGNIFICANT CHANGE UP (ref 32–36)
MCV RBC AUTO: 87.1 FL — SIGNIFICANT CHANGE UP (ref 80–100)
MONOCYTES # BLD AUTO: 0.47 K/UL — SIGNIFICANT CHANGE UP (ref 0–0.9)
MONOCYTES NFR BLD AUTO: 2.6 % — SIGNIFICANT CHANGE UP (ref 2–14)
NEUTROPHILS # BLD AUTO: 17.13 K/UL — HIGH (ref 1.8–7.4)
NEUTROPHILS NFR BLD AUTO: 93.9 % — HIGH (ref 43–77)
PLATELET # BLD AUTO: 185 K/UL — SIGNIFICANT CHANGE UP (ref 150–400)
POTASSIUM SERPL-MCNC: 4.5 MMOL/L — SIGNIFICANT CHANGE UP (ref 3.5–5.3)
POTASSIUM SERPL-SCNC: 4.5 MMOL/L — SIGNIFICANT CHANGE UP (ref 3.5–5.3)
PROT SERPL-MCNC: 6.4 G/DL — LOW (ref 6.6–8.7)
RBC # BLD: 5.19 M/UL — SIGNIFICANT CHANGE UP (ref 4.2–5.8)
RBC # FLD: 15.6 % — HIGH (ref 10.3–14.5)
SODIUM SERPL-SCNC: 140 MMOL/L — SIGNIFICANT CHANGE UP (ref 135–145)
WBC # BLD: 18.24 K/UL — HIGH (ref 3.8–10.5)
WBC # FLD AUTO: 18.24 K/UL — HIGH (ref 3.8–10.5)

## 2024-03-16 PROCEDURE — 99233 SBSQ HOSP IP/OBS HIGH 50: CPT

## 2024-03-16 PROCEDURE — 99232 SBSQ HOSP IP/OBS MODERATE 35: CPT

## 2024-03-16 RX ORDER — HYDRALAZINE HCL 50 MG
10 TABLET ORAL ONCE
Refills: 0 | Status: COMPLETED | OUTPATIENT
Start: 2024-03-16 | End: 2024-03-16

## 2024-03-16 RX ORDER — LOSARTAN POTASSIUM 100 MG/1
50 TABLET, FILM COATED ORAL DAILY
Refills: 0 | Status: DISCONTINUED | OUTPATIENT
Start: 2024-03-16 | End: 2024-03-18

## 2024-03-16 RX ORDER — AMLODIPINE BESYLATE 2.5 MG/1
10 TABLET ORAL DAILY
Refills: 0 | Status: DISCONTINUED | OUTPATIENT
Start: 2024-03-16 | End: 2024-03-19

## 2024-03-16 RX ADMIN — LEVALBUTEROL 1.25 MILLIGRAM(S): 1.25 SOLUTION, CONCENTRATE RESPIRATORY (INHALATION) at 08:53

## 2024-03-16 RX ADMIN — AMLODIPINE BESYLATE 10 MILLIGRAM(S): 2.5 TABLET ORAL at 05:50

## 2024-03-16 RX ADMIN — HEPARIN SODIUM 5000 UNIT(S): 5000 INJECTION INTRAVENOUS; SUBCUTANEOUS at 17:47

## 2024-03-16 RX ADMIN — LEVALBUTEROL 1.25 MILLIGRAM(S): 1.25 SOLUTION, CONCENTRATE RESPIRATORY (INHALATION) at 20:20

## 2024-03-16 RX ADMIN — Medication 40 MILLIGRAM(S): at 21:29

## 2024-03-16 RX ADMIN — Medication 50 MILLIGRAM(S): at 12:45

## 2024-03-16 RX ADMIN — Medication 2: at 12:42

## 2024-03-16 RX ADMIN — Medication 500 MICROGRAM(S): at 20:20

## 2024-03-16 RX ADMIN — LOSARTAN POTASSIUM 50 MILLIGRAM(S): 100 TABLET, FILM COATED ORAL at 08:31

## 2024-03-16 RX ADMIN — Medication 40 MILLIGRAM(S): at 12:43

## 2024-03-16 RX ADMIN — Medication 50 MILLIGRAM(S): at 21:29

## 2024-03-16 RX ADMIN — Medication 500 MICROGRAM(S): at 02:38

## 2024-03-16 RX ADMIN — Medication 40 MILLIGRAM(S): at 05:50

## 2024-03-16 RX ADMIN — BUDESONIDE AND FORMOTEROL FUMARATE DIHYDRATE 2 PUFF(S): 160; 4.5 AEROSOL RESPIRATORY (INHALATION) at 08:53

## 2024-03-16 RX ADMIN — BUDESONIDE AND FORMOTEROL FUMARATE DIHYDRATE 2 PUFF(S): 160; 4.5 AEROSOL RESPIRATORY (INHALATION) at 20:22

## 2024-03-16 RX ADMIN — PANTOPRAZOLE SODIUM 40 MILLIGRAM(S): 20 TABLET, DELAYED RELEASE ORAL at 05:51

## 2024-03-16 RX ADMIN — Medication 50 MILLIGRAM(S): at 05:50

## 2024-03-16 RX ADMIN — LEVALBUTEROL 1.25 MILLIGRAM(S): 1.25 SOLUTION, CONCENTRATE RESPIRATORY (INHALATION) at 15:24

## 2024-03-16 RX ADMIN — Medication 500 MICROGRAM(S): at 08:53

## 2024-03-16 RX ADMIN — MONTELUKAST 10 MILLIGRAM(S): 4 TABLET, CHEWABLE ORAL at 12:42

## 2024-03-16 RX ADMIN — HEPARIN SODIUM 5000 UNIT(S): 5000 INJECTION INTRAVENOUS; SUBCUTANEOUS at 05:50

## 2024-03-16 RX ADMIN — Medication 500 MICROGRAM(S): at 15:24

## 2024-03-16 RX ADMIN — LEVALBUTEROL 1.25 MILLIGRAM(S): 1.25 SOLUTION, CONCENTRATE RESPIRATORY (INHALATION) at 02:38

## 2024-03-16 RX ADMIN — CEFTRIAXONE 1000 MILLIGRAM(S): 500 INJECTION, POWDER, FOR SOLUTION INTRAMUSCULAR; INTRAVENOUS at 17:47

## 2024-03-16 RX ADMIN — Medication 10 MILLIGRAM(S): at 04:44

## 2024-03-16 RX ADMIN — Medication 600 MILLIGRAM(S): at 17:47

## 2024-03-16 RX ADMIN — Medication 2: at 08:30

## 2024-03-16 NOTE — PROVIDER CONTACT NOTE (CRITICAL VALUE NOTIFICATION) - NAME OF MD/NP/PA/DO NOTIFIED:
TEODORO Castellanos
Dr Slaughter
Jenna
Dolly Galindo
MD Slaughter
Nocturnal LIP
TEODORO Hanson
Dolly Galindo

## 2024-03-16 NOTE — PROGRESS NOTE ADULT - TIME BILLING
greater than 50% of time spent reviewing labs, notes, orders and radiographs, coordinating care  discussed with pt at bedside, nursing

## 2024-03-16 NOTE — PROGRESS NOTE ADULT - ASSESSMENT
The patient is a 58 year old with a past medical history of hypertension, gout severe persistent asthma, SHERI presented with complaints of 1 week of shortness of breath. Noted to be hypertensive in ED, wheezing. RVP positive for hMPV, mild TYESHA, leucocytosis.EKG NSR 98. TTE with hyperdynamic LVSF. Started on norvasc and hydralazine for hypertension. Cardiology consulted, was unable to tolerate NST due to wheezing. Cardiology recommended outpatient CCTA and following for management of Elevated BP.    Assessment/Plan:    1. Acute asthma exacerbation 2/2 viral infection (hMPV)  - Chest xray on admission with left lower lobe infiltrate  - C/w empiric IV Rocephin  - CT chest non contrast- No pneumonia. and Left lower lobe bandlike atelectasis.  - Lactic acidosis likely due beta agonist side effect  - C/ w Xoponex with atrovent Q6 hours in the setting of tachycardia  - IV Solu medrol Q8 hours  - Singulair  - Symbicort BID   - Pulmonary following   - Supplemental oxygen PRN, currently tolerating RA    2. HTN urgency  - BP remains elevated  - Hydralazine increased to 50mg PO Q8 hours  - On norvasc 10mg PO OD  - Started on Losartan 50mg QD  - No BB in the setting of bronchospasm   - TTE with hyperdynamic LV  - Cardiology following was unable to tolerate NST due to wheezing and recs CCTA outpatient    3, New diagnosis of diabetes mellitus  - A1C: 7.1  - Change to Moderate Insulin sliding scale  - May need to add lantus while on steroids  - Monitor BSL closely   - DM education  - Will consider Metformin on discharge    4. TYESHA likely prerenal from poor oral intake (Resolved)  - Improving  - Lactic acidosis from beta agonist effect   - Monitor BMP, correct electrolytes as needed    5. Lactic acidosis  - likely elevated due to continuos inhaled beta agonist use  - no signs of hypoperfusion, liver dysfunction  - monitor    DVT ppx: Heparin SQ  GI: PPI    D/w patient.

## 2024-03-16 NOTE — PROVIDER CONTACT NOTE (CRITICAL VALUE NOTIFICATION) - PERSON GIVING RESULT:
lab: anais reed
Brooke SALMON
Lab
Damian Alvarado/Lab
Latonya INIGUEZ
Saúl, chemistry lab
Fabrice Medina/Jg

## 2024-03-16 NOTE — PROGRESS NOTE ADULT - ASSESSMENT
57yo M w/ HTN, Gout, Severe persistent Asthma, SHERI presents to [facility] ER with complaints of shortness of breath for the past week despite using his inhaler.  Had minimal resolution of symptoms, but since yesterday has persistent wheezing after neb use and so came in for evaluation.  Reports tightness in his chest, but denies HA, cough, fever, palpitations, weakness, slurred speech, abdominal pain or leg edema.  However, reports 15 lbs weight gain in the past 2 months and missed a few days of anti hypertensive meds as well.   + shortness of breath on exertion    Denies tobacco use and no prior cardiology evaluation.  Cardiology called for HTN urgency and CAD risk assessment.  hsT-T: 18-->20-->19   ECG: NSR w/ septal Qs    pro-BNP: 143     A1C: 7.0     BP:187/108

## 2024-03-16 NOTE — PROGRESS NOTE ADULT - ASSESSMENT
Asthma exacerbation, viral positive, allergic phenotype by hx  Severe persistent asthma on last PFT 3/23 office,Severe SHERI ( AHI 30),   Slowly improving  back on  nebs, continue medrol  CT chest no pna, thickened airways, mucus, add mucinex, short course of abx  Suspect lactic acidemia Type B, on beta agonists, pH normal, abd benign, Co2 improving- case reviewed with renal  Tolerated CPAP trial, continue nocturnal, keep HOB elevated  Cardiology input noted  OOB Asthma exacerbation, viral positive, allergic phenotype by hx  Severe persistent asthma on last PFT 3/23 office,Severe SHERI ( AHI 30),   Slowly improving  back on  nebs, continue medrol  CT chest no pna, thickened airways, mucus, add mucinex, short course of abx  Suspect lactic acidemia Type B, on beta agonists, pH normal, abd benign, Co2 improving- case reviewed with renal  Tolerated CPAP trial, continue nocturnal, keep HOB elevated  Cardiology input noted, Non contrast CT scan without sig coronary artery calcifications, will discuss with them  ? R thyroid nodule, needs sonogram can be done as out pt  OOB

## 2024-03-16 NOTE — PROGRESS NOTE ADULT - PROBLEM SELECTOR PLAN 1
.  - remains with SBP in 180s  - continue hydralazine 50mg PO q8h, will re-evaluate due to tachycardia.   - continue Norvasc 10mg qd  - added losartan 50mg PO daily  - appears euvolemic on exam, ideally would like to start chlorthalidone, however patient here with infection and also with elevated lactate, would maintain euvolemia   - Low na diet discussed  - Discussion regarding taking meds on a regular basis, patient reports non-compliance but understands risks of noncompliance  - will also need OP follow up, CCTA as OP .  - remains with SBP in 180s  - continue hydralazine 50mg PO q8h, will re-evaluate due to tachycardia.   - continue Norvasc 10mg qd  - added losartan 50mg PO daily  - appears euvolemic on exam  - will consider chlorthalidone if needed. Lactate due to inhaled corticosteroid use  - Low na diet discussed  - Discussion regarding taking meds on a regular basis, patient reports non-compliance but understands risks of noncompliance  - will also need OP follow up, CCTA as OP

## 2024-03-17 LAB
ALBUMIN SERPL ELPH-MCNC: 3.3 G/DL — SIGNIFICANT CHANGE UP (ref 3.3–5.2)
ALP SERPL-CCNC: 57 U/L — SIGNIFICANT CHANGE UP (ref 40–120)
ALT FLD-CCNC: 57 U/L — HIGH
ANION GAP SERPL CALC-SCNC: 16 MMOL/L — SIGNIFICANT CHANGE UP (ref 5–17)
AST SERPL-CCNC: 49 U/L — HIGH
BASOPHILS # BLD AUTO: 0.05 K/UL — SIGNIFICANT CHANGE UP (ref 0–0.2)
BASOPHILS NFR BLD AUTO: 0.3 % — SIGNIFICANT CHANGE UP (ref 0–2)
BILIRUB SERPL-MCNC: 0.4 MG/DL — SIGNIFICANT CHANGE UP (ref 0.4–2)
BUN SERPL-MCNC: 29.1 MG/DL — HIGH (ref 8–20)
CALCIUM SERPL-MCNC: 8.1 MG/DL — LOW (ref 8.4–10.5)
CHLORIDE SERPL-SCNC: 100 MMOL/L — SIGNIFICANT CHANGE UP (ref 96–108)
CO2 SERPL-SCNC: 22 MMOL/L — SIGNIFICANT CHANGE UP (ref 22–29)
CODFISH IGE QN: <0.1 KUA/L — SIGNIFICANT CHANGE UP
CREAT SERPL-MCNC: 1.16 MG/DL — SIGNIFICANT CHANGE UP (ref 0.5–1.3)
DEPRECATED A FUMIGATUS IGE RAST QL: 0 — SIGNIFICANT CHANGE UP (ref 0–0)
DEPRECATED CODFISH IGE RAST QL: 0 — SIGNIFICANT CHANGE UP (ref 0–0)
DEPRECATED ROACH IGE RAST QL: 0 — SIGNIFICANT CHANGE UP (ref 0–0)
DEPRECATED SOYBEAN IGE RAST QL: 0 — SIGNIFICANT CHANGE UP (ref 0–0)
EGFR: 72 ML/MIN/1.73M2 — SIGNIFICANT CHANGE UP
EOSINOPHIL # BLD AUTO: 0 K/UL — SIGNIFICANT CHANGE UP (ref 0–0.5)
EOSINOPHIL NFR BLD AUTO: 0 % — SIGNIFICANT CHANGE UP (ref 0–6)
GLUCOSE BLDC GLUCOMTR-MCNC: 152 MG/DL — HIGH (ref 70–99)
GLUCOSE BLDC GLUCOMTR-MCNC: 160 MG/DL — HIGH (ref 70–99)
GLUCOSE BLDC GLUCOMTR-MCNC: 160 MG/DL — HIGH (ref 70–99)
GLUCOSE BLDC GLUCOMTR-MCNC: 180 MG/DL — HIGH (ref 70–99)
GLUCOSE SERPL-MCNC: 189 MG/DL — HIGH (ref 70–99)
HCT VFR BLD CALC: 42.4 % — SIGNIFICANT CHANGE UP (ref 39–50)
HGB BLD-MCNC: 13.6 G/DL — SIGNIFICANT CHANGE UP (ref 13–17)
IMM GRANULOCYTES NFR BLD AUTO: 3.3 % — HIGH (ref 0–0.9)
LYMPHOCYTES # BLD AUTO: 0.86 K/UL — LOW (ref 1–3.3)
LYMPHOCYTES # BLD AUTO: 5.5 % — LOW (ref 13–44)
MAGNESIUM SERPL-MCNC: 2.7 MG/DL — HIGH (ref 1.6–2.6)
MCHC RBC-ENTMCNC: 27.9 PG — SIGNIFICANT CHANGE UP (ref 27–34)
MCHC RBC-ENTMCNC: 32.1 GM/DL — SIGNIFICANT CHANGE UP (ref 32–36)
MCV RBC AUTO: 87.1 FL — SIGNIFICANT CHANGE UP (ref 80–100)
MOLD ALLERG MIX A IGE QL: <0.1 KUA/L — SIGNIFICANT CHANGE UP
MONOCYTES # BLD AUTO: 0.64 K/UL — SIGNIFICANT CHANGE UP (ref 0–0.9)
MONOCYTES NFR BLD AUTO: 4.1 % — SIGNIFICANT CHANGE UP (ref 2–14)
NEUTROPHILS # BLD AUTO: 13.46 K/UL — HIGH (ref 1.8–7.4)
NEUTROPHILS NFR BLD AUTO: 86.8 % — HIGH (ref 43–77)
PHOSPHATE SERPL-MCNC: 3.8 MG/DL — SIGNIFICANT CHANGE UP (ref 2.4–4.7)
PLATELET # BLD AUTO: 175 K/UL — SIGNIFICANT CHANGE UP (ref 150–400)
POTASSIUM SERPL-MCNC: 4.6 MMOL/L — SIGNIFICANT CHANGE UP (ref 3.5–5.3)
POTASSIUM SERPL-SCNC: 4.6 MMOL/L — SIGNIFICANT CHANGE UP (ref 3.5–5.3)
PROT SERPL-MCNC: 6 G/DL — LOW (ref 6.6–8.7)
RBC # BLD: 4.87 M/UL — SIGNIFICANT CHANGE UP (ref 4.2–5.8)
RBC # FLD: 15.6 % — HIGH (ref 10.3–14.5)
ROACH IGE QN: <0.1 KUA/L — SIGNIFICANT CHANGE UP
SODIUM SERPL-SCNC: 138 MMOL/L — SIGNIFICANT CHANGE UP (ref 135–145)
SOYBEAN IGE QN: <0.1 KUA/L — SIGNIFICANT CHANGE UP
TOTAL IGE SMQN RAST: 18 KU/L — SIGNIFICANT CHANGE UP
TOTAL IGE SMQN RAST: 18 KU/L — SIGNIFICANT CHANGE UP
WBC # BLD: 15.53 K/UL — HIGH (ref 3.8–10.5)
WBC # FLD AUTO: 15.53 K/UL — HIGH (ref 3.8–10.5)

## 2024-03-17 PROCEDURE — 99233 SBSQ HOSP IP/OBS HIGH 50: CPT

## 2024-03-17 PROCEDURE — 99232 SBSQ HOSP IP/OBS MODERATE 35: CPT

## 2024-03-17 RX ORDER — HYDRALAZINE HCL 50 MG
75 TABLET ORAL EVERY 8 HOURS
Refills: 0 | Status: DISCONTINUED | OUTPATIENT
Start: 2024-03-17 | End: 2024-03-18

## 2024-03-17 RX ADMIN — Medication 2: at 17:13

## 2024-03-17 RX ADMIN — LOSARTAN POTASSIUM 50 MILLIGRAM(S): 100 TABLET, FILM COATED ORAL at 06:11

## 2024-03-17 RX ADMIN — LEVALBUTEROL 1.25 MILLIGRAM(S): 1.25 SOLUTION, CONCENTRATE RESPIRATORY (INHALATION) at 08:07

## 2024-03-17 RX ADMIN — Medication 600 MILLIGRAM(S): at 17:11

## 2024-03-17 RX ADMIN — Medication 40 MILLIGRAM(S): at 17:11

## 2024-03-17 RX ADMIN — Medication 500 MICROGRAM(S): at 20:45

## 2024-03-17 RX ADMIN — Medication 600 MILLIGRAM(S): at 06:10

## 2024-03-17 RX ADMIN — CEFTRIAXONE 1000 MILLIGRAM(S): 500 INJECTION, POWDER, FOR SOLUTION INTRAMUSCULAR; INTRAVENOUS at 17:11

## 2024-03-17 RX ADMIN — BUDESONIDE AND FORMOTEROL FUMARATE DIHYDRATE 2 PUFF(S): 160; 4.5 AEROSOL RESPIRATORY (INHALATION) at 08:08

## 2024-03-17 RX ADMIN — Medication 50 MILLIGRAM(S): at 06:10

## 2024-03-17 RX ADMIN — BUDESONIDE AND FORMOTEROL FUMARATE DIHYDRATE 2 PUFF(S): 160; 4.5 AEROSOL RESPIRATORY (INHALATION) at 20:45

## 2024-03-17 RX ADMIN — Medication 500 MICROGRAM(S): at 14:51

## 2024-03-17 RX ADMIN — HEPARIN SODIUM 5000 UNIT(S): 5000 INJECTION INTRAVENOUS; SUBCUTANEOUS at 06:11

## 2024-03-17 RX ADMIN — Medication 2: at 07:50

## 2024-03-17 RX ADMIN — PANTOPRAZOLE SODIUM 40 MILLIGRAM(S): 20 TABLET, DELAYED RELEASE ORAL at 06:10

## 2024-03-17 RX ADMIN — AMLODIPINE BESYLATE 10 MILLIGRAM(S): 2.5 TABLET ORAL at 06:11

## 2024-03-17 RX ADMIN — MONTELUKAST 10 MILLIGRAM(S): 4 TABLET, CHEWABLE ORAL at 07:50

## 2024-03-17 RX ADMIN — Medication 500 MICROGRAM(S): at 05:52

## 2024-03-17 RX ADMIN — Medication 75 MILLIGRAM(S): at 14:07

## 2024-03-17 RX ADMIN — Medication 2: at 12:09

## 2024-03-17 RX ADMIN — Medication 500 MICROGRAM(S): at 08:06

## 2024-03-17 RX ADMIN — LEVALBUTEROL 1.25 MILLIGRAM(S): 1.25 SOLUTION, CONCENTRATE RESPIRATORY (INHALATION) at 05:52

## 2024-03-17 RX ADMIN — Medication 75 MILLIGRAM(S): at 21:06

## 2024-03-17 RX ADMIN — Medication 40 MILLIGRAM(S): at 06:11

## 2024-03-17 NOTE — PROGRESS NOTE ADULT - ASSESSMENT
A/P: 57yo M w/ HTN, Gout, Severe persistent Asthma, SHERI presents to [facility] ER with complaints of shortness of breath for the past week despite using his inhaler.  Had minimal resolution of symptoms, but since yesterday has persistent wheezing after neb use and so came in for evaluation.  Reports tightness in his chest, but denies HA, cough, fever, palpitations, weakness, slurred speech, abdominal pain or leg edema.  However, reports 15 lbs weight gain in the past 2 months and missed a few days of anti hypertensive meds as well.   + shortness of breath on exertion    Denies tobacco use and no prior cardiology evaluation.  Cardiology called for HTN urgency and CAD risk assessment.  hsT-T: 18-->20-->19   ECG: NSR w/ septal Qs    pro-BNP: 143     A1C: 7.0     BP:187/108

## 2024-03-17 NOTE — PROGRESS NOTE ADULT - PROBLEM SELECTOR PLAN 1
- SBP continues to improve, but not at goal.  - Increase hydralazine 75mg PO q8, tachycardia improving. Likely due to nebulizers and viral prodrome.  - Continue Norvasc 10mg qd  - Continue losartan 50mg PO daily  - appears euvolemic on exam  - Low na diet discussed  - Discussion regarding taking meds on a regular basis, patient reports non-compliance but understands risks of noncompliance

## 2024-03-17 NOTE — PROGRESS NOTE ADULT - ASSESSMENT
Asthma exacerbation, viral positive, allergic phenotype by hx  Severe persistent asthma on last PFT 3/23 office,Severe SHERI ( AHI 30),   CT chest no pna, thickened airways, mucus, add mucinex, short course of abx  Much improved, continue nebs, wean medrol  Suspect lactic acidemia Type B, on beta agonists, pH normal, serum CO2 now normal  Tolerated CPAP trial, continue nocturnal, keep HOB elevated- will need sleep MD follow up in office  Cardiology input noted, Non contrast CT scan without sig coronary artery calcifications but defer w/u to them   ? R thyroid nodule, can be done as out pt, discussed with pt  OOB daily  Slow prednisone taper at d/c, add Spiriva to his [place], has home neb- pulmonary f/u out pt

## 2024-03-17 NOTE — PROGRESS NOTE ADULT - ASSESSMENT
The patient is a 58 year old with a past medical history of hypertension, gout severe persistent asthma, SHERI presented with complaints of 1 week of shortness of breath. Noted to be hypertensive in ED, wheezing. RVP positive for hMPV, mild TYESHA, leucocytosis.EKG NSR 98. TTE with hyperdynamic LVSF. Started on norvasc and hydralazine for hypertension. Cardiology consulted, was unable to tolerate NST due to wheezing. Cardiology recommended outpatient CCTA and following for management of Elevated BP.    Assessment/Plan:    1. Acute asthma exacerbation 2/2 viral infection (hMPV)  - Chest xray on admission with left lower lobe infiltrate  - C/w empiric IV Rocephin  - CT chest non contrast- No pneumonia. and Left lower lobe bandlike atelectasis.  - Lactic acidosis likely due beta agonist side effect  - C/ w Xoponex with atrovent Q6 hours in the setting of tachycardia  - IV Solu medrol Q8 hours  - Singulair  - Symbicort BID   - Pulmonary following   - Supplemental oxygen PRN, currently tolerating RA    2. HTN urgency  - BP remains better controlled today  - Hydralazine increased to 50mg PO Q8 hours  - On norvasc 10mg PO OD  - C/w Losartan 50mg QD  - No BB in the setting of bronchospasm   - TTE with hyperdynamic LV  - Cardiology following was unable to tolerate NST due to wheezing and recs CCTA outpatient    3, New diagnosis of diabetes mellitus  - A1C: 7.1  - Change to Moderate Insulin sliding scale  - May need to add lantus while on steroids  - Monitor BSL closely   - DM education  - Will consider Metformin on discharge    4. TYESHA likely prerenal from poor oral intake (Resolved)  - Improving  - Lactic acidosis from beta agonist effect   - Monitor BMP, correct electrolytes as needed    5. Lactic acidosis  - likely elevated due to continuos inhaled beta agonist use  - no signs of hypoperfusion, liver dysfunction  - monitor    DVT ppx: Heparin SQ  GI: PPI    Dispo: Pending improvement in SOB/wheeze    D/w patient

## 2024-03-18 DIAGNOSIS — I16.0 HYPERTENSIVE URGENCY: ICD-10-CM

## 2024-03-18 LAB
ALBUMIN SERPL ELPH-MCNC: 3.5 G/DL — SIGNIFICANT CHANGE UP (ref 3.3–5.2)
ALMOND IGE QN: <0.1 KUA/L — SIGNIFICANT CHANGE UP
ALP SERPL-CCNC: 58 U/L — SIGNIFICANT CHANGE UP (ref 40–120)
ALT FLD-CCNC: 80 U/L — HIGH
ANION GAP SERPL CALC-SCNC: 15 MMOL/L — SIGNIFICANT CHANGE UP (ref 5–17)
AST SERPL-CCNC: 63 U/L — HIGH
BILIRUB SERPL-MCNC: 0.5 MG/DL — SIGNIFICANT CHANGE UP (ref 0.4–2)
BRAZIL NUT IGE QN: 0 — SIGNIFICANT CHANGE UP (ref 0–0)
BRAZIL NUT IGE QN: <0.1 KUA/L — SIGNIFICANT CHANGE UP
BUN SERPL-MCNC: 29.2 MG/DL — HIGH (ref 8–20)
C ALBICANS IGE QN: <0.1 KUA/L — SIGNIFICANT CHANGE UP
CALCIUM SERPL-MCNC: 8.4 MG/DL — SIGNIFICANT CHANGE UP (ref 8.4–10.5)
CASHEW NUT IGE QN: <0.1 KUA/L — SIGNIFICANT CHANGE UP
CAT DANDER IGE QN: <0.1 KUA/L — SIGNIFICANT CHANGE UP
CHLORIDE SERPL-SCNC: 100 MMOL/L — SIGNIFICANT CHANGE UP (ref 96–108)
CLADOSPORIUM IGE QN: 0 — SIGNIFICANT CHANGE UP (ref 0–0)
CLADOSPORIUM IGE QN: <0.1 KUA/L — SIGNIFICANT CHANGE UP
CO2 SERPL-SCNC: 21 MMOL/L — LOW (ref 22–29)
COMMON RAGWEED IGE QN: <0.1 KUA/L — SIGNIFICANT CHANGE UP
CREAT SERPL-MCNC: 1.08 MG/DL — SIGNIFICANT CHANGE UP (ref 0.5–1.3)
D FARINAE IGE QN: <0.1 KUA/L — SIGNIFICANT CHANGE UP
D PTERONYSS IGE QN: <0.1 KUA/L — SIGNIFICANT CHANGE UP
DEPRECATED A ALTERNATA IGE RAST QL: <0.1 KUA/L — SIGNIFICANT CHANGE UP
DEPRECATED ALMOND IGE RAST QL: 0 — SIGNIFICANT CHANGE UP (ref 0–0)
DEPRECATED ALTERNARIA IGE RAST QL: 0 — SIGNIFICANT CHANGE UP (ref 0–0)
DEPRECATED C ALBICANS IGE RAST QL: 0 — SIGNIFICANT CHANGE UP
DEPRECATED CASHEW NUT IGE RAST QL: 0 — SIGNIFICANT CHANGE UP (ref 0–0)
DEPRECATED CAT DANDER IGE RAST QL: 0 — SIGNIFICANT CHANGE UP (ref 0–0)
DEPRECATED COMMON RAGWEED IGE RAST QL: 0 — SIGNIFICANT CHANGE UP (ref 0–0)
DEPRECATED D FARINAE IGE RAST QL: 0 — SIGNIFICANT CHANGE UP (ref 0–0)
DEPRECATED EGG WHITE IGE RAST QL: 0 — SIGNIFICANT CHANGE UP (ref 0–0)
DEPRECATED HAZELNUT IGE RAST QL: 0 — SIGNIFICANT CHANGE UP (ref 0–0)
DEPRECATED M RACEMOSUS IGE RAST QL: 0 — SIGNIFICANT CHANGE UP
DEPRECATED MILK IGE RAST QL: 0 — SIGNIFICANT CHANGE UP (ref 0–0)
DEPRECATED PEANUT IGE RAST QL: 0 — SIGNIFICANT CHANGE UP (ref 0–0)
DEPRECATED SALMON IGE RAST QL: 0 — SIGNIFICANT CHANGE UP (ref 0–0)
DEPRECATED SALMON IGE RAST QL: <0.1 KUA/L — SIGNIFICANT CHANGE UP
DEPRECATED SCALLOP IGE RAST QL: 0 — SIGNIFICANT CHANGE UP (ref 0–0)
DEPRECATED SESAME SEED IGE RAST QL: 0 — SIGNIFICANT CHANGE UP (ref 0–0)
DEPRECATED SHRIMP IGE RAST QL: 0 — SIGNIFICANT CHANGE UP (ref 0–0)
DEPRECATED TIMOTHY IGE RAST QL: 0 — SIGNIFICANT CHANGE UP (ref 0–0)
DEPRECATED WALNUT IGE RAST QL: 0 — SIGNIFICANT CHANGE UP (ref 0–0)
DEPRECATED WHEAT IGE RAST QL: 0 — SIGNIFICANT CHANGE UP (ref 0–0)
DOG DANDER IGE QN: 0 — SIGNIFICANT CHANGE UP (ref 0–0)
DOG DANDER IGE QN: <0.1 KUA/L — SIGNIFICANT CHANGE UP
DUST ALLERG MIX2 IGE RAST: 0 — SIGNIFICANT CHANGE UP (ref 0–0)
EGFR: 79 ML/MIN/1.73M2 — SIGNIFICANT CHANGE UP
EGG WHITE IGE QN: <0.1 KUA/L — SIGNIFICANT CHANGE UP
GLUCOSE BLDC GLUCOMTR-MCNC: 131 MG/DL — HIGH (ref 70–99)
GLUCOSE BLDC GLUCOMTR-MCNC: 137 MG/DL — HIGH (ref 70–99)
GLUCOSE BLDC GLUCOMTR-MCNC: 140 MG/DL — HIGH (ref 70–99)
GLUCOSE BLDC GLUCOMTR-MCNC: 147 MG/DL — HIGH (ref 70–99)
GLUCOSE SERPL-MCNC: 137 MG/DL — HIGH (ref 70–99)
HAZELNUT IGE QN: <0.1 KUA/L — SIGNIFICANT CHANGE UP
HCT VFR BLD CALC: 46.7 % — SIGNIFICANT CHANGE UP (ref 39–50)
HGB BLD-MCNC: 14.9 G/DL — SIGNIFICANT CHANGE UP (ref 13–17)
MCHC RBC-ENTMCNC: 27.6 PG — SIGNIFICANT CHANGE UP (ref 27–34)
MCHC RBC-ENTMCNC: 31.9 GM/DL — LOW (ref 32–36)
MCV RBC AUTO: 86.5 FL — SIGNIFICANT CHANGE UP (ref 80–100)
MILK IGE QN: <0.1 KUA/L — SIGNIFICANT CHANGE UP
MOLD ALLERG MIX6 IGG QL: <0.1 KUA/L — SIGNIFICANT CHANGE UP
PEANUT IGE QN: <0.1 KUA/L — SIGNIFICANT CHANGE UP
PLATELET # BLD AUTO: 186 K/UL — SIGNIFICANT CHANGE UP (ref 150–400)
POTASSIUM SERPL-MCNC: 4.9 MMOL/L — SIGNIFICANT CHANGE UP (ref 3.5–5.3)
POTASSIUM SERPL-SCNC: 4.9 MMOL/L — SIGNIFICANT CHANGE UP (ref 3.5–5.3)
PROT SERPL-MCNC: 6.5 G/DL — LOW (ref 6.6–8.7)
RBC # BLD: 5.4 M/UL — SIGNIFICANT CHANGE UP (ref 4.2–5.8)
RBC # FLD: 15.6 % — HIGH (ref 10.3–14.5)
SCALLOP IGE QN: <0.1 KUA/L — SIGNIFICANT CHANGE UP
SESAME SEED IGE QN: <0.1 KUA/L — SIGNIFICANT CHANGE UP
SHRIMP IGE QN: <0.1 KUA/L — SIGNIFICANT CHANGE UP
SODIUM SERPL-SCNC: 136 MMOL/L — SIGNIFICANT CHANGE UP (ref 135–145)
TIMOTHY IGE QN: <0.1 KUA/L — SIGNIFICANT CHANGE UP
TOTAL IGE SMQN RAST: SIGNIFICANT CHANGE UP
TREE ALLERG MIX1 IGE QL: <0.1 KUA/L — SIGNIFICANT CHANGE UP
TREE ALLERG MIX1 IGE RAST: 0 — SIGNIFICANT CHANGE UP (ref 0–0)
TUNA CLASS: 0 — SIGNIFICANT CHANGE UP (ref 0–0)
TUNA IGE QN: <0.1 KUA/L — SIGNIFICANT CHANGE UP
WALNUT IGE QN: <0.1 KUA/L — SIGNIFICANT CHANGE UP
WBC # BLD: 15.3 K/UL — HIGH (ref 3.8–10.5)
WBC # FLD AUTO: 15.3 K/UL — HIGH (ref 3.8–10.5)
WHEAT IGE QN: <0.1 KUA/L — SIGNIFICANT CHANGE UP

## 2024-03-18 PROCEDURE — 99233 SBSQ HOSP IP/OBS HIGH 50: CPT

## 2024-03-18 PROCEDURE — 99232 SBSQ HOSP IP/OBS MODERATE 35: CPT

## 2024-03-18 RX ORDER — LOSARTAN POTASSIUM 100 MG/1
50 TABLET, FILM COATED ORAL ONCE
Refills: 0 | Status: COMPLETED | OUTPATIENT
Start: 2024-03-18 | End: 2024-03-18

## 2024-03-18 RX ORDER — LOSARTAN POTASSIUM 100 MG/1
50 TABLET, FILM COATED ORAL DAILY
Refills: 0 | Status: DISCONTINUED | OUTPATIENT
Start: 2024-03-18 | End: 2024-03-18

## 2024-03-18 RX ORDER — LOSARTAN POTASSIUM 100 MG/1
100 TABLET, FILM COATED ORAL DAILY
Refills: 0 | Status: DISCONTINUED | OUTPATIENT
Start: 2024-03-19 | End: 2024-03-19

## 2024-03-18 RX ADMIN — Medication 600 MILLIGRAM(S): at 05:03

## 2024-03-18 RX ADMIN — PANTOPRAZOLE SODIUM 40 MILLIGRAM(S): 20 TABLET, DELAYED RELEASE ORAL at 05:04

## 2024-03-18 RX ADMIN — LOSARTAN POTASSIUM 50 MILLIGRAM(S): 100 TABLET, FILM COATED ORAL at 05:04

## 2024-03-18 RX ADMIN — LOSARTAN POTASSIUM 50 MILLIGRAM(S): 100 TABLET, FILM COATED ORAL at 09:17

## 2024-03-18 RX ADMIN — CEFTRIAXONE 1000 MILLIGRAM(S): 500 INJECTION, POWDER, FOR SOLUTION INTRAMUSCULAR; INTRAVENOUS at 17:17

## 2024-03-18 RX ADMIN — AMLODIPINE BESYLATE 10 MILLIGRAM(S): 2.5 TABLET ORAL at 05:03

## 2024-03-18 RX ADMIN — MONTELUKAST 10 MILLIGRAM(S): 4 TABLET, CHEWABLE ORAL at 11:14

## 2024-03-18 RX ADMIN — HEPARIN SODIUM 5000 UNIT(S): 5000 INJECTION INTRAVENOUS; SUBCUTANEOUS at 05:03

## 2024-03-18 RX ADMIN — Medication 40 MILLIGRAM(S): at 05:03

## 2024-03-18 RX ADMIN — HEPARIN SODIUM 5000 UNIT(S): 5000 INJECTION INTRAVENOUS; SUBCUTANEOUS at 17:17

## 2024-03-18 RX ADMIN — BUDESONIDE AND FORMOTEROL FUMARATE DIHYDRATE 2 PUFF(S): 160; 4.5 AEROSOL RESPIRATORY (INHALATION) at 09:36

## 2024-03-18 RX ADMIN — Medication 500 MICROGRAM(S): at 15:10

## 2024-03-18 RX ADMIN — Medication 600 MILLIGRAM(S): at 17:17

## 2024-03-18 RX ADMIN — BUDESONIDE AND FORMOTEROL FUMARATE DIHYDRATE 2 PUFF(S): 160; 4.5 AEROSOL RESPIRATORY (INHALATION) at 20:31

## 2024-03-18 RX ADMIN — Medication 500 MICROGRAM(S): at 09:35

## 2024-03-18 RX ADMIN — Medication 500 MICROGRAM(S): at 20:30

## 2024-03-18 RX ADMIN — Medication 500 MICROGRAM(S): at 05:08

## 2024-03-18 RX ADMIN — Medication 75 MILLIGRAM(S): at 05:04

## 2024-03-18 NOTE — PROGRESS NOTE ADULT - ASSESSMENT
59yo M w/ HTN, Gout, Severe persistent Asthma, SHERI presents to [facility] ER with complaints of shortness of breath for the past week despite using his inhaler.  Had minimal resolution of symptoms, but since yesterday has persistent wheezing after neb use and so came in for evaluation.  Reports tightness in his chest, but denies HA, cough, fever, palpitations, weakness, slurred speech, abdominal pain or leg edema.  However, reports 15 lbs weight gain in the past 2 months and missed a few days of anti hypertensive meds as well.   + shortness of breath on exertion    Denies tobacco use and no prior cardiology evaluation.  Cardiology called for HTN urgency and CAD risk assessment.

## 2024-03-18 NOTE — PROGRESS NOTE ADULT - PROBLEM SELECTOR PLAN 1
-Improved since admission  -Hydralazine stopped  -Losartan increased  -Added HCTZ  -SBP may be affected by breathing difficulty and IV steroids -Improved since admission  -Hydralazine stopped  -Losartan increased  -Added HCTZ  -SBP may be affected by breathing difficulty and IV steroids    Signing off

## 2024-03-18 NOTE — PROGRESS NOTE ADULT - PROBLEM SELECTOR PROBLEM 1
Acute asthma exacerbation
Hypertensive urgency
Hypertensive urgency, malignant
Acute asthma exacerbation
Hypertensive urgency, malignant
Hypertensive urgency, malignant
Acute asthma exacerbation

## 2024-03-18 NOTE — PROGRESS NOTE ADULT - SUBJECTIVE AND OBJECTIVE BOX
SUBJECTIVE / OVERNIGHT EVENTS: Seen and examined. Getting nebulizer treatment. Still has sob. BP better controlled. Denies chest pain, abd pain, N/V, fever, chills, dysuria.     MEDICATIONS  (STANDING):  albuterol    90 MICROgram(s) HFA Inhaler 1 Puff(s) Inhalation every 4 hours  amLODIPine   Tablet 10 milliGRAM(s) Oral daily  budesonide 160 MICROgram(s)/formoterol 4.5 MICROgram(s) Inhaler 2 Puff(s) Inhalation two times a day  cefTRIAXone Injectable. 1000 milliGRAM(s) IV Push every 24 hours  dextrose 5%. 1000 milliLiter(s) (100 mL/Hr) IV Continuous <Continuous>  dextrose 5%. 1000 milliLiter(s) (50 mL/Hr) IV Continuous <Continuous>  dextrose 50% Injectable 25 Gram(s) IV Push once  dextrose 50% Injectable 25 Gram(s) IV Push once  dextrose 50% Injectable 12.5 Gram(s) IV Push once  glucagon  Injectable 1 milliGRAM(s) IntraMuscular once  guaiFENesin  milliGRAM(s) Oral every 12 hours  heparin   Injectable 5000 Unit(s) SubCutaneous every 12 hours  hydrALAZINE 50 milliGRAM(s) Oral every 8 hours  insulin lispro (ADMELOG) corrective regimen sliding scale   SubCutaneous three times a day before meals  ipratropium    for Nebulization 500 MICROGram(s) Nebulizer every 6 hours  losartan 50 milliGRAM(s) Oral daily  methylPREDNISolone sodium succinate Injectable 40 milliGRAM(s) IV Push every 8 hours  montelukast 10 milliGRAM(s) Oral daily  pantoprazole    Tablet 40 milliGRAM(s) Oral before breakfast    MEDICATIONS  (PRN):  acetaminophen     Tablet .. 650 milliGRAM(s) Oral every 6 hours PRN Temp greater or equal to 38C (100.4F), Mild Pain (1 - 3)  aluminum hydroxide/magnesium hydroxide/simethicone Suspension 30 milliLiter(s) Oral every 4 hours PRN Dyspepsia  dextrose Oral Gel 15 Gram(s) Oral once PRN Blood Glucose LESS THAN 70 milliGRAM(s)/deciliter  melatonin 3 milliGRAM(s) Oral at bedtime PRN Insomnia  ondansetron Injectable 4 milliGRAM(s) IV Push every 8 hours PRN Nausea and/or Vomiting        I&O's Summary      PHYSICAL EXAM:  Vital Signs Last 24 Hrs  T(C): 36.4 (17 Mar 2024 05:14), Max: 37.1 (16 Mar 2024 09:50)  T(F): 97.6 (17 Mar 2024 05:14), Max: 98.8 (16 Mar 2024 09:50)  HR: 106 (17 Mar 2024 08:37) (102 - 124)  BP: 148/74 (17 Mar 2024 05:14) (148/74 - 164/74)  BP(mean): 109 (16 Mar 2024 21:22) (104 - 109)  RR: 18 (17 Mar 2024 05:14) (17 - 20)  SpO2: 96% (17 Mar 2024 08:37) (93% - 98%)    Parameters below as of 17 Mar 2024 08:37  Patient On (Oxygen Delivery Method): room air        PHYSICAL EXAM:    GENERAL: NAD, AOX3, obese  HEAD:  Atraumatic, Normocephalic  EYES: conjunctiva and sclera clear  ENMT: Moist mucous membranes  CHEST/LUNG: Bilateral expiratory wheezing   HEART: Tachycardic; No murmurs, rubs, or gallops  ABDOMEN: Soft, Nontender, Nondistended; Bowel sounds present  EXTREMITIES:  2+ Peripheral Pulses, No clubbing, cyanosis, or edema    LABS:                        13.6   15.53 )-----------( 175      ( 17 Mar 2024 04:44 )             42.4     03-17    138  |  100  |  29.1<H>  ----------------------------<  189<H>  4.6   |  22.0  |  1.16    Ca    8.1<L>      17 Mar 2024 04:44  Phos  3.8     03-17  Mg     2.7     03-17    TPro  6.0<L>  /  Alb  3.3  /  TBili  0.4  /  DBili  x   /  AST  49<H>  /  ALT  57<H>  /  AlkPhos  57  03-17          Urinalysis Basic - ( 17 Mar 2024 04:44 )    Color: x / Appearance: x / SG: x / pH: x  Gluc: 189 mg/dL / Ketone: x  / Bili: x / Urobili: x   Blood: x / Protein: x / Nitrite: x   Leuk Esterase: x / RBC: x / WBC x   Sq Epi: x / Non Sq Epi: x / Bacteria: x        CAPILLARY BLOOD GLUCOSE      POCT Blood Glucose.: 160 mg/dL (17 Mar 2024 07:35)  POCT Blood Glucose.: 241 mg/dL (16 Mar 2024 21:35)  POCT Blood Glucose.: 150 mg/dL (16 Mar 2024 16:38)  POCT Blood Glucose.: 156 mg/dL (16 Mar 2024 12:41)        RADIOLOGY & ADDITIONAL TESTS:  Results Reviewed:   Imaging Personally Reviewed:  Electrocardiogram Personally Reviewed:      
PULMONARY PROGRESS NOTE      ZAINA MILLER  MRN-[id number]    Patient is a 58y old  Male who presents with a chief complaint of shortness of breath (18 Mar 2024 10:53)        INTERVAL HPI/OVERNIGHT EVENTS:  Pt seen and examined at the bedside. He feels improved, able to expectorate more sputum, and feels his lungs are 'opening up.' Denying fevers or chills or chest pain.     MEDICATIONS  (STANDING):  albuterol    90 MICROgram(s) HFA Inhaler 1 Puff(s) Inhalation every 4 hours  amLODIPine   Tablet 10 milliGRAM(s) Oral daily  budesonide 160 MICROgram(s)/formoterol 4.5 MICROgram(s) Inhaler 2 Puff(s) Inhalation two times a day  cefTRIAXone Injectable. 1000 milliGRAM(s) IV Push every 24 hours  dextrose 5%. 1000 milliLiter(s) (100 mL/Hr) IV Continuous <Continuous>  dextrose 5%. 1000 milliLiter(s) (50 mL/Hr) IV Continuous <Continuous>  dextrose 50% Injectable 25 Gram(s) IV Push once  dextrose 50% Injectable 12.5 Gram(s) IV Push once  dextrose 50% Injectable 25 Gram(s) IV Push once  glucagon  Injectable 1 milliGRAM(s) IntraMuscular once  guaiFENesin  milliGRAM(s) Oral every 12 hours  heparin   Injectable 5000 Unit(s) SubCutaneous every 12 hours  hydrochlorothiazide 25 milliGRAM(s) Oral daily  insulin lispro (ADMELOG) corrective regimen sliding scale   SubCutaneous three times a day before meals  ipratropium    for Nebulization 500 MICROGram(s) Nebulizer every 6 hours  methylPREDNISolone sodium succinate Injectable 40 milliGRAM(s) IV Push every 12 hours  montelukast 10 milliGRAM(s) Oral daily  pantoprazole    Tablet 40 milliGRAM(s) Oral before breakfast    MEDICATIONS  (PRN):  acetaminophen     Tablet .. 650 milliGRAM(s) Oral every 6 hours PRN Temp greater or equal to 38C (100.4F), Mild Pain (1 - 3)  aluminum hydroxide/magnesium hydroxide/simethicone Suspension 30 milliLiter(s) Oral every 4 hours PRN Dyspepsia  dextrose Oral Gel 15 Gram(s) Oral once PRN Blood Glucose LESS THAN 70 milliGRAM(s)/deciliter  melatonin 3 milliGRAM(s) Oral at bedtime PRN Insomnia  ondansetron Injectable 4 milliGRAM(s) IV Push every 8 hours PRN Nausea and/or Vomiting    Allergies    No Known Allergies    Intolerances      PAST MEDICAL & SURGICAL HISTORY:  Gout      Asthma      Arthritis      HTN (hypertension)      No significant past surgical history            REVIEW OF SYSTEMS:  Negative except as noted in HPI      Vital Signs Last 24 Hrs  T(C): 36.5 (18 Mar 2024 11:11), Max: 36.9 (18 Mar 2024 04:55)  T(F): 97.7 (18 Mar 2024 11:11), Max: 98.4 (18 Mar 2024 04:55)  HR: 99 (18 Mar 2024 11:11) (88 - 112)  BP: 162/79 (18 Mar 2024 11:11) (144/78 - 174/92)  BP(mean): --  RR: 18 (18 Mar 2024 11:11) (18 - 20)  SpO2: 95% (18 Mar 2024 11:11) (95% - 100%)    Parameters below as of 18 Mar 2024 11:11  Patient On (Oxygen Delivery Method): room air          PHYSICAL EXAMINATION:  GEN: In no apparent distress  HEENT: NC/AT  NECK: Supple  CV: +S1, S2  RESPIRATORY: Mild B/L expiratory wheeze; no accessory muscle use  ABDOMEN: Soft, non-tender  EXTREMITIES: No pedal edema B/L  SKIN: No open wounds  PSYCH: Normal affect      LABS:                        14.9   15.30 )-----------( 186      ( 18 Mar 2024 06:03 )             46.7     03-18    136  |  100  |  29.2<H>  ----------------------------<  137<H>  4.9   |  21.0<L>  |  1.08    Ca    8.4      18 Mar 2024 06:03  Phos  3.8     03-17  Mg     2.7     03-17    TPro  6.5<L>  /  Alb  3.5  /  TBili  0.5  /  DBili  x   /  AST  63<H>  /  ALT  80<H>  /  AlkPhos  58  03-18      Urinalysis Basic - ( 18 Mar 2024 06:03 )    Color: x / Appearance: x / SG: x / pH: x  Gluc: 137 mg/dL / Ketone: x  / Bili: x / Urobili: x   Blood: x / Protein: x / Nitrite: x   Leuk Esterase: x / RBC: x / WBC x   Sq Epi: x / Non Sq Epi: x / Bacteria: x                      MICROBIOLOGY:  Respiratory Viral Panel with COVID-19 by JESSIKA (03.13.24 @ 15:45)    Rapid RVP Result: Detected   SARS-CoV-2: NotDetec: This Respiratory Panel uses polymerase chain reaction (PCR) to detect for  adenovirus; coronavirus (HKU1, NL63, 229E, OC43); human metapneumovirus  (hMPV); human enterovirus/rhinovirus (Entero/RV); influenza A; influenza  A/H1; influenza A/H3; influenza A/H1-2009; influenza B; parainfluenza  viruses 1, 2, 3, 4; respiratory syncytial virus; Mycoplasma pneumoniae;  Chlamydophila pneumoniae; and SARS-CoV-2.        RADIOLOGY & ADDITIONAL STUDIES:  < from: CT Chest No Cont (03.15.24 @ 18:28) >    IMPRESSION:  No pneumonia.    Left lower lobe bandlike atelectasis.    --- End of Report ---            CHARLA VILLEGAS M.D., Attending Radiologist  This document has been electronically signed. Mar 15 2024  8:17PM    < end of copied text >      ECHO:  < from: TTE W or WO Ultrasound Enhancing Agent (03.13.24 @ 21:00) >  _______________________________________________________________________________________     CONCLUSIONS:      1. Mild left ventricular hypertrophy.   2. Left ventricular cavity is normal in size. Left ventricular systolic function is hyperdynamic with an ejection fraction of 71 % by Cheney's method of disks with an ejection fraction visually estimated at 70 to 75 %. There are no regional wall motion abnormalities seen.   3. Normal right ventricular cavity size and normal systolic function.   4. Trileaflet aortic valve with normal systolic excursion. mild calcification of the aortic valve leaflets.   5. Structurally normal mitral valve with normal leaflet excursion.   6. The right atrium is normal in size.   7. The left atrium is normal.   8. No pericardial effusion seen.    ________________________________________________________________________________________    < end of copied text >  
                                                         [facility]                                                         CARDIOLOGY AT [facility]                                                                  [address] [facility], [place] [address]                                                         Telephone: [phone]. Fax:[phone]                                                                             PROGRESS NOTE    Reason for follow up: Hypertensive urgency  Update: remains hypertensive, added losartan 50 for further BP control. also with tachycardia likely due to neb treatments/infectious process      Review of symptoms:   Cardiac:  No chest pain. No dyspnea. No palpitations.  Respiratory: +cough. + dyspnea  Gastrointestinal: No diarrhea. No abdominal pain. No bleeding.   Neuro: No focal neuro complaints.    Vitals:  T(C): 36.4 (03-16-24 @ 04:52), Max: 36.7 (03-15-24 @ 12:34)  HR: 115 (03-16-24 @ 08:55) (81 - 115)  BP: 176/86 (03-16-24 @ 08:30) (160/84 - 194/104)  RR: 18 (03-16-24 @ 04:52) (18 - 20)  SpO2: 95% (03-16-24 @ 04:52) (93% - 99%)  Wt(kg): --  I&O's Summary    15 Mar 2024 07:01  -  16 Mar 2024 07:00  --------------------------------------------------------  IN: 900 mL / OUT: 0 mL / NET: 900 mL      Weight (kg): 104.4 (03-13 @ 14:50)    PHYSICAL EXAM:  Appearance: Comfortable. No acute distress  HEENT:  Atraumatic. Normocephalic.  Normal oral mucosa  Neurologic: A & O x 3, no gross focal deficits.  Cardiovascular: RRR, tahcycardic  S1 S2, No murmur, no rubs/gallops. No JVD  Respiratory: expiratory wheezing   Gastrointestinal:  Soft, Non-tender, + BS  Lower Extremities: 2+ Peripheral Pulses, No clubbing, cyanosis, or edema  Psychiatry: Patient is calm. No agitation.   Skin: warm and dry.    CURRENT CARDIAC MEDICATIONS:  amLODIPine   Tablet 10 milliGRAM(s) Oral daily  hydrALAZINE 50 milliGRAM(s) Oral every 8 hours  losartan 50 milliGRAM(s) Oral daily      CURRENT OTHER MEDICATIONS:  albuterol    90 MICROgram(s) HFA Inhaler 1 Puff(s) Inhalation every 4 hours  budesonide 160 MICROgram(s)/formoterol 4.5 MICROgram(s) Inhaler 2 Puff(s) Inhalation two times a day  ipratropium    for Nebulization 500 MICROGram(s) Nebulizer every 6 hours  levalbuterol Inhalation 1.25 milliGRAM(s) Inhalation every 6 hours  montelukast 10 milliGRAM(s) Oral daily  cefTRIAXone Injectable. 1000 milliGRAM(s) IV Push every 24 hours  acetaminophen     Tablet .. 650 milliGRAM(s) Oral every 6 hours PRN Temp greater or equal to 38C (100.4F), Mild Pain (1 - 3)  melatonin 3 milliGRAM(s) Oral at bedtime PRN Insomnia  ondansetron Injectable 4 milliGRAM(s) IV Push every 8 hours PRN Nausea and/or Vomiting  aluminum hydroxide/magnesium hydroxide/simethicone Suspension 30 milliLiter(s) Oral every 4 hours PRN Dyspepsia  pantoprazole    Tablet 40 milliGRAM(s) Oral before breakfast  dextrose 50% Injectable 25 Gram(s) IV Push once, Stop order after: 1 Doses  dextrose 50% Injectable 25 Gram(s) IV Push once, Stop order after: 1 Doses  dextrose 50% Injectable 12.5 Gram(s) IV Push once, Stop order after: 1 Doses  dextrose Oral Gel 15 Gram(s) Oral once, Stop order after: 1 Doses PRN Blood Glucose LESS THAN 70 milliGRAM(s)/deciliter  glucagon  Injectable 1 milliGRAM(s) IntraMuscular once, Stop order after: 1 Doses  insulin lispro (ADMELOG) corrective regimen sliding scale   SubCutaneous three times a day before meals  methylPREDNISolone sodium succinate Injectable 40 milliGRAM(s) IV Push every 8 hours  dextrose 5%. 1000 milliLiter(s) (50 mL/Hr) IV Continuous <Continuous>  dextrose 5%. 1000 milliLiter(s) (100 mL/Hr) IV Continuous <Continuous>  heparin   Injectable 5000 Unit(s) SubCutaneous every 12 hours      LABS:	 	  ( 13 Mar 2024 15:45 )  Troponin T  X    ,  CPK  845<H>, CKMB  X    , BNP X                                  14.6   18.24 )-----------( 185      ( 16 Mar 2024 05:00 )             45.2     03-16    140  |  102  |  27.6<H>  ----------------------------<  185<H>  4.5   |  19.0<L>  |  1.10    Ca    8.3<L>      16 Mar 2024 05:00  Phos  4.1     03-15  Mg     2.6     03-15    TPro  6.4<L>  /  Alb  3.5  /  TBili  0.4  /  DBili  x   /  AST  42<H>  /  ALT  50<H>  /  AlkPhos  63  03-16      Lipid Profile: Date: 03-14 @ 02:35  Total cholesterol 180; Direct LDL: --; HDL: 108; Triglycerides:110    HgA1c:   TSH: Thyroid Stimulating Hormone, Serum: 0.80 uIU/mL      TELEMETRY: ST 120s  ECG:    DIAGNOSTIC TESTING:  [ ] Echocardiogram: < from: TTE W or WO Ultrasound Enhancing Agent (03.13.24 @ 21:00) >  TRANSTHORACIC ECHOCARDIOGRAM REPORT  ________________________________________________________________________________                                      _______       Pt. Name:       ZAINA MILLER Study Date:    3/13/2024  MRN:      [id number]                  YOB: 1965  Accession #:    [id number]                   Age:           58 years  Account#:       [id number]                  Gender:        M  Heart Rate:                                 Height:        67.00 in (170.18 cm)  Rhythm:                                     Weight:        229.00 lb (103.87 kg)  Blood Pressure: 186/93 mmHg                 BSA/BMI:       2.14 m² / 35.87 kg/m²  ________________________________________________________________________________________  Referring Physician:    Nicole Aguila  Interpreting Physician: Henry Kemp MD  Primary Sonographer:    Mariam Caban    CPT:                ECHO TTE WITH CON COMP W DOPP - .m;DEFINITY ECHO                      CONTRAST PER ML - .m  Indication(s):      Heart failure, unspecified - I50.9  Procedure:          Transthoracic echocardiogram with 2-D, M-mode and complete                      spectral and color flow Doppler.  Ordering Location:  [facility]  Admission Status:ED  Contrast Injection: Verbal consent was obtained for injection of Ultrasonic                      Enhancing Agent following a discussion of risks and                      benefits.                      Endocardial visualization enhanced with 2 ml of Definity                      Ultrasound enhancing agent (Lot#:[id number] Exp.Date:02/2025).  UEA Reaction:       Patient had no adverse reaction after injection of                      Ultrasound Enhancing Agent.    _______________________________________________________________________________________     CONCLUSIONS:      1. Mild left ventricular hypertrophy.   2. Left ventricular cavity is normal in size. Left ventricular systolic function is hyperdynamic with an ejection fraction of 71 % by Cheney's method of disks with an ejection fraction visually estimated at 70 to 75 %. There are no regional wall motion abnormalities seen.   3. Normal right ventricular cavity size and normal systolic function.   4. Trileaflet aortic valve with normal systolic excursion. mild calcification of the aortic valve leaflets.   5. Structurally normal mitral valve with normal leaflet excursion.   6. The right atrium is normal in size.   7. The left atrium is normal.   8. No pericardial effusion seen.    < end of copied text >    [ ]  Catheterization:  [ ] Stress Test:    OTHER: 	      
PULMONARY PROGRESS NOTE      AMANDA MILLER-[id number]    Patient is a 58y old  Male who presents with a chief complaint of shortness of breath (15 Mar 2024 10:31)      INTERVAL HPI/OVERNIGHT EVENTS:  mild improvement noted  still feels chest congestion  uses [place] at home, has home neb  No active rhinitis, GERD  MEDICATIONS  (STANDING):  albuterol    90 MICROgram(s) HFA Inhaler 1 Puff(s) Inhalation every 4 hours  amLODIPine   Tablet 10 milliGRAM(s) Oral daily  budesonide 160 MICROgram(s)/formoterol 4.5 MICROgram(s) Inhaler 2 Puff(s) Inhalation two times a day  dextrose 5%. 1000 milliLiter(s) (50 mL/Hr) IV Continuous <Continuous>  dextrose 5%. 1000 milliLiter(s) (100 mL/Hr) IV Continuous <Continuous>  dextrose 50% Injectable 25 Gram(s) IV Push once  dextrose 50% Injectable 25 Gram(s) IV Push once  dextrose 50% Injectable 12.5 Gram(s) IV Push once  glucagon  Injectable 1 milliGRAM(s) IntraMuscular once  heparin   Injectable 5000 Unit(s) SubCutaneous every 12 hours  hydrALAZINE 50 milliGRAM(s) Oral every 8 hours  insulin lispro (ADMELOG) corrective regimen sliding scale   SubCutaneous three times a day before meals  methylPREDNISolone sodium succinate Injectable 40 milliGRAM(s) IV Push every 8 hours  montelukast 10 milliGRAM(s) Oral daily  pantoprazole    Tablet 40 milliGRAM(s) Oral before breakfast  sodium chloride 0.9%. 1000 milliLiter(s) (100 mL/Hr) IV Continuous <Continuous>      MEDICATIONS  (PRN):  acetaminophen     Tablet .. 650 milliGRAM(s) Oral every 6 hours PRN Temp greater or equal to 38C (100.4F), Mild Pain (1 - 3)  albuterol    0.083% 2.5 milliGRAM(s) Nebulizer every 6 hours PRN Shortness of Breath and/or Wheezing  aluminum hydroxide/magnesium hydroxide/simethicone Suspension 30 milliLiter(s) Oral every 4 hours PRN Dyspepsia  dextrose Oral Gel 15 Gram(s) Oral once PRN Blood Glucose LESS THAN 70 milliGRAM(s)/deciliter  melatonin 3 milliGRAM(s) Oral at bedtime PRN Insomnia  ondansetron Injectable 4 milliGRAM(s) IV Push every 8 hours PRN Nausea and/or Vomiting      Allergies    No Known Allergies    Intolerances        PAST MEDICAL & SURGICAL HISTORY:  Gout      Asthma      Arthritis      HTN (hypertension)      No significant past surgical history          SOCIAL HISTORY  Smoking History:       REVIEW OF SYSTEMS:    CONSTITUTIONAL:  No distress    HEENT:  Eyes:  No diplopia or blurred vision. ENT:  No earache, sore throat or runny nose.    CARDIOVASCULAR:  No pressure, squeezing, tightness, heaviness or aching about the chest; no palpitations.    RESPIRATORY:  see HPI    GASTROINTESTINAL:  No nausea, vomiting or diarrhea.    GENITOURINARY:  No dysuria, frequency or urgency.    NEUROLOGIC:  No paresthesias, fasciculations, seizures or weakness.    PSYCHIATRIC:  No disorder of thought or mood.    Vital Signs Last 24 Hrs  T(C): 37 (15 Mar 2024 09:13), Max: 37 (15 Mar 2024 09:13)  T(F): 98.6 (15 Mar 2024 09:13), Max: 98.6 (15 Mar 2024 09:13)  HR: 114 (15 Mar 2024 11:02) (63 - 117)  BP: 161/80 (15 Mar 2024 09:13) (147/76 - 178/89)  BP(mean): --  RR: 20 (15 Mar 2024 09:13) (18 - 20)  SpO2: 93% (15 Mar 2024 11:02) (92% - 98%)    Parameters below as of 15 Mar 2024 11:02  Patient On (Oxygen Delivery Method): room air        PHYSICAL EXAMINATION:    GENERAL: The patient is awake and alert in no apparent distress.     HEENT: Head is normocephalic and atraumatic. Extraocular muscles are intact. Mucous membranes are moist.    NECK: Supple.    LUNGS: Mod air entry bilat with exp rhonchi; respirations unlabored    HEART: Regular rate and rhythm without murmur.    ABDOMEN: Soft, nontender, and nondistended.      EXTREMITIES: Without any cyanosis, clubbing, rash, lesions or edema.    NEUROLOGIC: Grossly intact.    LABS:                        14.2   16.27 )-----------( 217      ( 15 Mar 2024 06:18 )             45.1     03-15    138  |  100  |  28.7<H>  ----------------------------<  179<H>  4.7   |  16.0<L>  |  1.20    Ca    8.2<L>      15 Mar 2024 06:18  Phos  4.1     03-15  Mg     2.6     03-15    TPro  6.5<L>  /  Alb  3.3  /  TBili  0.5  /  DBili  x   /  AST  44<H>  /  ALT  44<H>  /  AlkPhos  54  03-15      Urinalysis Basic - ( 15 Mar 2024 06:18 )    Color: x / Appearance: x / SG: x / pH: x  Gluc: 179 mg/dL / Ketone: x  / Bili: x / Urobili: x   Blood: x / Protein: x / Nitrite: x   Leuk Esterase: x / RBC: x / WBC x   Sq Epi: x / Non Sq Epi: x / Bacteria: x      ABG - ( 13 Mar 2024 17:50 )  pH, Arterial: 7.470 pH, Blood: x     /  pCO2: 40    /  pO2: 75    / HCO3: 29    / Base Excess: 5.4   /  SaO2: 96.3              CARDIAC MARKERS ( 13 Mar 2024 15:45 )  x     / x     / 845 U/L / x     / 9.8 ng/mL          Lactate, Blood: 5.5 mmol/L (03-15-24 @ 06:18)  Lactate, Blood: 5.3 mmol/L (03-14-24 @ 19:50)    Procalcitonin, Serum: 0.08 ng/mL (03-15-24 @ 06:18)  Procalcitonin, Serum: 0.07 ng/mL (03-14-24 @ 19:50)      MICROBIOLOGY:    RADIOLOGY & ADDITIONAL STUDIES:  CXR, Office notes reviewed
CC: Follow up     INTERVAL HPI/OVERNIGHT EVENTS: Patient seen and examined, off oxygen this morning. Still has significant wheezing with cough       Vital Signs Last 24 Hrs  T(C): 37 (15 Mar 2024 09:13), Max: 37 (15 Mar 2024 09:13)  T(F): 98.6 (15 Mar 2024 09:13), Max: 98.6 (15 Mar 2024 09:13)  HR: 114 (15 Mar 2024 11:02) (63 - 117)  BP: 161/80 (15 Mar 2024 09:13) (147/76 - 178/89)  BP(mean): --  RR: 20 (15 Mar 2024 09:13) (18 - 20)  SpO2: 93% (15 Mar 2024 11:02) (92% - 98%)    Parameters below as of 15 Mar 2024 11:02  Patient On (Oxygen Delivery Method): room air        PHYSICAL EXAM:    GENERAL: NAD, AOX3 obese  HEAD:  Atraumatic, Normocephalic  EYES: conjunctiva and sclera clear  ENMT: Moist mucous membranes  CHEST/LUNG: Bilateral expiratory wheezing   HEART: Regular rate and rhythm; No murmurs, rubs, or gallops  ABDOMEN: Soft, Nontender, Nondistended; Bowel sounds present  EXTREMITIES:  2+ Peripheral Pulses, No clubbing, cyanosis, or edema        MEDICATIONS  (STANDING):  albuterol    90 MICROgram(s) HFA Inhaler 1 Puff(s) Inhalation every 4 hours  albuterol/ipratropium for Nebulization 3 milliLiter(s) Nebulizer every 6 hours  amLODIPine   Tablet 10 milliGRAM(s) Oral daily  budesonide 160 MICROgram(s)/formoterol 4.5 MICROgram(s) Inhaler 2 Puff(s) Inhalation two times a day  cefTRIAXone   IVPB 1000 milliGRAM(s) IV Intermittent every 24 hours  dextrose 5%. 1000 milliLiter(s) (50 mL/Hr) IV Continuous <Continuous>  dextrose 5%. 1000 milliLiter(s) (100 mL/Hr) IV Continuous <Continuous>  dextrose 50% Injectable 25 Gram(s) IV Push once  dextrose 50% Injectable 12.5 Gram(s) IV Push once  dextrose 50% Injectable 25 Gram(s) IV Push once  glucagon  Injectable 1 milliGRAM(s) IntraMuscular once  heparin   Injectable 5000 Unit(s) SubCutaneous every 12 hours  hydrALAZINE 50 milliGRAM(s) Oral every 8 hours  insulin lispro (ADMELOG) corrective regimen sliding scale   SubCutaneous three times a day before meals  methylPREDNISolone sodium succinate Injectable 40 milliGRAM(s) IV Push every 8 hours  montelukast 10 milliGRAM(s) Oral daily  pantoprazole    Tablet 40 milliGRAM(s) Oral before breakfast  sodium bicarbonate  Infusion 0.029 mEq/kG/Hr (60 mL/Hr) IV Continuous <Continuous>    MEDICATIONS  (PRN):  acetaminophen     Tablet .. 650 milliGRAM(s) Oral every 6 hours PRN Temp greater or equal to 38C (100.4F), Mild Pain (1 - 3)  albuterol    0.083% 2.5 milliGRAM(s) Nebulizer every 6 hours PRN Shortness of Breath and/or Wheezing  aluminum hydroxide/magnesium hydroxide/simethicone Suspension 30 milliLiter(s) Oral every 4 hours PRN Dyspepsia  dextrose Oral Gel 15 Gram(s) Oral once PRN Blood Glucose LESS THAN 70 milliGRAM(s)/deciliter  melatonin 3 milliGRAM(s) Oral at bedtime PRN Insomnia  ondansetron Injectable 4 milliGRAM(s) IV Push every 8 hours PRN Nausea and/or Vomiting      Allergies    No Known Allergies    Intolerances          LABS:                          14.2   16.27 )-----------( 217      ( 15 Mar 2024 06:18 )             45.1     03-15    138  |  100  |  28.7<H>  ----------------------------<  179<H>  4.7   |  16.0<L>  |  1.20    Ca    8.2<L>      15 Mar 2024 06:18  Phos  4.1     03-15  Mg     2.6     03-15    TPro  6.5<L>  /  Alb  3.3  /  TBili  0.5  /  DBili  x   /  AST  44<H>  /  ALT  44<H>  /  AlkPhos  54  03-15      Urinalysis Basic - ( 15 Mar 2024 06:18 )    Color: x / Appearance: x / SG: x / pH: x  Gluc: 179 mg/dL / Ketone: x  / Bili: x / Urobili: x   Blood: x / Protein: x / Nitrite: x   Leuk Esterase: x / RBC: x / WBC x   Sq Epi: x / Non Sq Epi: x / Bacteria: x        RADIOLOGY & ADDITIONAL TESTS:  
                                                         [facility]                                                         CARDIOLOGY AT [facility]                                                                  [address] [facility], [place] [address]                                                         Telephone: [phone]. Fax:[phone]                                                                             PROGRESS NOTE    Reason for follow up: HTN  Update: Still with wheezing  No cp sob or palp    Review of symptoms:   Cardiac:  No chest pain. No dyspnea. No palpitations.  Respiratory: no cough. No dyspnea  Gastrointestinal: No diarrhea. No abdominal pain. No bleeding.   Neuro: No focal neuro complaints.      Vitals:  T(C): 37 (03-15-24 @ 09:13), Max: 37 (03-15-24 @ 09:13)  HR: 117 (03-15-24 @ 09:13) (63 - 117)  BP: 161/80 (03-15-24 @ 09:13) (147/76 - 178/89)  RR: 20 (03-15-24 @ 09:13) (18 - 20)  SpO2: 93% (03-15-24 @ 09:13) (92% - 98%)  Wt(kg): --  I&O's Summary    14 Mar 2024 07:01  -  15 Mar 2024 07:00  --------------------------------------------------------  IN: 1120 mL / OUT: 300 mL / NET: 820 mL  Weight (kg): 104.4 (03-13 @ 14:50)    PHYSICAL EXAM:  Appearance: Comfortable. No acute distress  HEENT:  Atraumatic. Normocephalic.  Normal oral mucosa  Neurologic: A & O x 3, no gross focal deficits.  Cardiovascular: RRR S1 S2, No murmur, no rubs/gallops. No JVD  Respiratory: Lungs wheeze bilaterally  Gastrointestinal:  Soft, Non-tender, + BS  Lower Extremities: No edema  Psychiatry: Patient is calm. No agitation.   Skin: warm and dry.      CURRENT MEDICATIONS:  MEDICATIONS  (STANDING):  albuterol    90 MICROgram(s) HFA Inhaler 1 Puff(s) Inhalation every 4 hours  amLODIPine   Tablet 10 milliGRAM(s) Oral daily  budesonide 160 MICROgram(s)/formoterol 4.5 MICROgram(s) Inhaler 2 Puff(s) Inhalation two times a day  Heparin   Injectable 5000 Unit(s) SubCutaneous every 12 hours  hydrALAZINE 25 milliGRAM(s) Oral every 8 hours  insulin lispro (ADMELOG) corrective regimen sliding scale   SubCutaneous three times a day before meals  methylPREDNISolone sodium succinate Injectable 40 milliGRAM(s) IV Push every 8 hours  montelukast 10 milliGRAM(s) Oral daily  pantoprazole    Tablet 40 milliGRAM(s) Oral before breakfast  sodium chloride 0.9%. 1000 milliLiter(s) (100 mL/Hr) IV Continuous <Continuous>    LABS:	 	  CARDIAC MARKERS ( 13 Mar 2024 15:45 )  x     / x     / 845 U/L / x     / 9.8 ng/mL  p-BNP 13 Mar 2024 15:45: x                              14.2   16.27 )-----------( 217      ( 15 Mar 2024 06:18 )             45.1     03-15    138  |  100  |  28.7<H>  ----------------------------<  179<H>  4.7   |  16.0<L>  |  1.20    Ca    8.2<L>      15 Mar 2024 06:18  Phos  4.1     03-15  Mg     2.6     03-15    TPro  6.5<L>  /  Alb  3.3  /  TBili  0.5  /  DBili  x   /  AST  44<H>  /  ALT  44<H>  /  AlkPhos  54  03-15  proBNP:   Lipid Profile: Date: 03-14 @ 02:35  Total cholesterol 180; Direct LDL: --; HDL: 108; Triglycerides:110  HgA1c:   TSH: Thyroid Stimulating Hormone, Serum: 0.80 uIU/mL    TELEMETRY: Sinus rhythmn  ECG:  	    DIAGNOSTIC TESTING:  [ ] Echocardiogram:  < from: TTE W or WO Ultrasound Enhancing Agent (03.13.24 @ 21:00) >      1. Mild left ventricular hypertrophy.   2. Left ventricular cavity is normal in size. Left ventricular systolic function is hyperdynamic with an ejection fraction of 71 % by Cheney's method of disks with an ejection fraction visually estimated at 70 to 75 %. There are no regional wall motion abnormalities seen.   3. Normal right ventricular cavity size and normal systolic function.   4. Trileaflet aortic valve with normal systolic excursion. mild calcification of the aortic valve leaflets.   5. Structurally normal mitral valve with normal leaflet excursion.   6. The right atrium is normal in size.   7. The left atrium is normal.   8. No pericardial effusion seen.    
                                                         [facility]                                                         CARDIOLOGY AT [facility]                                                                  [address] [facility], [facility] [place] [address]                                                         Telephone: [phone]. Fax:[phone]                                                                             PROGRESS NOTE    Reason for follow up: HTN Urgency  Update: Still not at goal BP. Patient's HR and wheezing have improved.       Review of symptoms:   Cardiac:  No chest pain. No dyspnea. No palpitations.  Respiratory: + cough. + dyspnea  Gastrointestinal: No diarrhea. No abdominal pain. No bleeding.   Neuro: No focal neuro complaints.    Vitals:  T(C): 36.4 (03-17-24 @ 05:14), Max: 37.1 (03-16-24 @ 09:50)  HR: 106 (03-17-24 @ 08:37) (102 - 124)  BP: 148/74 (03-17-24 @ 05:14) (148/74 - 164/74)  RR: 18 (03-17-24 @ 05:14) (17 - 20)  SpO2: 96% (03-17-24 @ 08:37) (93% - 98%)  Wt(kg): --  I&O's Summary    Weight (kg): 104.4 (03-13 @ 14:50)    PHYSICAL EXAM:  Appearance: Comfortable. No acute distress  HEENT:  Atraumatic. Normocephalic.  Normal oral mucosa  Neurologic: A & O x 3, no gross focal deficits.  Cardiovascular: Tachycardic S1 S2, No murmur, no rubs/gallops. No JVD  Respiratory: Diffuse wheezing bilaterally   Gastrointestinal:  Soft, Non-tender, + BS  Lower Extremities: 2+ Peripheral Pulses, No clubbing, cyanosis, or edema  Psychiatry: Patient is calm. No agitation.   Skin: warm and dry.    CURRENT CARDIAC MEDICATIONS:  amLODIPine   Tablet 10 milliGRAM(s) Oral daily  hydrALAZINE 75 milliGRAM(s) Oral every 8 hours  losartan 50 milliGRAM(s) Oral daily      CURRENT OTHER MEDICATIONS:  albuterol    90 MICROgram(s) HFA Inhaler 1 Puff(s) Inhalation every 4 hours  budesonide 160 MICROgram(s)/formoterol 4.5 MICROgram(s) Inhaler 2 Puff(s) Inhalation two times a day  guaiFENesin  milliGRAM(s) Oral every 12 hours  ipratropium    for Nebulization 500 MICROGram(s) Nebulizer every 6 hours  montelukast 10 milliGRAM(s) Oral daily  cefTRIAXone Injectable. 1000 milliGRAM(s) IV Push every 24 hours  acetaminophen     Tablet .. 650 milliGRAM(s) Oral every 6 hours PRN Temp greater or equal to 38C (100.4F), Mild Pain (1 - 3)  melatonin 3 milliGRAM(s) Oral at bedtime PRN Insomnia  ondansetron Injectable 4 milliGRAM(s) IV Push every 8 hours PRN Nausea and/or Vomiting  aluminum hydroxide/magnesium hydroxide/simethicone Suspension 30 milliLiter(s) Oral every 4 hours PRN Dyspepsia  pantoprazole    Tablet 40 milliGRAM(s) Oral before breakfast  dextrose 50% Injectable 25 Gram(s) IV Push once, Stop order after: 1 Doses  dextrose 50% Injectable 25 Gram(s) IV Push once, Stop order after: 1 Doses  dextrose 50% Injectable 12.5 Gram(s) IV Push once, Stop order after: 1 Doses  dextrose Oral Gel 15 Gram(s) Oral once, Stop order after: 1 Doses PRN Blood Glucose LESS THAN 70 milliGRAM(s)/deciliter  glucagon  Injectable 1 milliGRAM(s) IntraMuscular once, Stop order after: 1 Doses  insulin lispro (ADMELOG) corrective regimen sliding scale   SubCutaneous three times a day before meals  methylPREDNISolone sodium succinate Injectable 40 milliGRAM(s) IV Push every 8 hours  dextrose 5%. 1000 milliLiter(s) (50 mL/Hr) IV Continuous <Continuous>  dextrose 5%. 1000 milliLiter(s) (100 mL/Hr) IV Continuous <Continuous>  heparin   Injectable 5000 Unit(s) SubCutaneous every 12 hours      LABS:	 	  ( 13 Mar 2024 15:45 )  Troponin T  X    ,  CPK  845<H>, CKMB  X    , BNP X                                  13.6   15.53 )-----------( 175      ( 17 Mar 2024 04:44 )             42.4     03-17    138  |  100  |  29.1<H>  ----------------------------<  189<H>  4.6   |  22.0  |  1.16    Ca    8.1<L>      17 Mar 2024 04:44  Phos  3.8     03-17  Mg     2.7     03-17    TPro  6.0<L>  /  Alb  3.3  /  TBili  0.4  /  DBili  x   /  AST  49<H>  /  ALT  57<H>  /  AlkPhos  57  03-17      Lipid Profile: Date: 03-14 @ 02:35  Total cholesterol 180; Direct LDL: --; HDL: 108; Triglycerides:110    HgA1c:   TSH: Thyroid Stimulating Hormone, Serum: 0.80 uIU/mL      TELEMETRY: SR, ST  ECG:    DIAGNOSTIC TESTING:  [ ] Echocardiogram:   < from: TTE W or WO Ultrasound Enhancing Agent (03.13.24 @ 21:00) >  CONCLUSIONS:      1. Mild left ventricular hypertrophy.   2. Left ventricular cavity is normal in size. Left ventricular systolic function is hyperdynamic with an ejection fraction of 71 % by Cheeny's method of disks with an ejection fraction visually estimated at 70 to 75 %. There are no regional wall motion abnormalities seen.   3. Normal right ventricular cavity size and normal systolic function.   4. Trileaflet aortic valve with normal systolic excursion. mild calcification of the aortic valve leaflets.   5. Structurally normal mitral valve with normal leaflet excursion.   6. The right atrium is normal in size.   7. The left atrium is normal.   8. No pericardial effusion seen.      < end of copied text >      [ ]  Catheterization:  [ ] Stress Test:    OTHER: 	      
  SUBJECTIVE / OVERNIGHT EVENTS: Seen and examined. BP elevated this am with tachycardia. Called cardiology for follow up. feels well however still has significant wheezing. Denies chest pain,  abd pain, N/V, fever, chills, dysuria or any other complaints. All remainder ROS negative.     MEDICATIONS  (STANDING):  albuterol    90 MICROgram(s) HFA Inhaler 1 Puff(s) Inhalation every 4 hours  amLODIPine   Tablet 10 milliGRAM(s) Oral daily  budesonide 160 MICROgram(s)/formoterol 4.5 MICROgram(s) Inhaler 2 Puff(s) Inhalation two times a day  cefTRIAXone Injectable. 1000 milliGRAM(s) IV Push every 24 hours  glucagon  Injectable 1 milliGRAM(s) IntraMuscular once  guaiFENesin  milliGRAM(s) Oral every 12 hours  heparin   Injectable 5000 Unit(s) SubCutaneous every 12 hours  hydrALAZINE 50 milliGRAM(s) Oral every 8 hours  insulin lispro (ADMELOG) corrective regimen sliding scale   SubCutaneous three times a day before meals  ipratropium    for Nebulization 500 MICROGram(s) Nebulizer every 6 hours  levalbuterol Inhalation 1.25 milliGRAM(s) Inhalation every 6 hours  losartan 50 milliGRAM(s) Oral daily  methylPREDNISolone sodium succinate Injectable 40 milliGRAM(s) IV Push every 8 hours  montelukast 10 milliGRAM(s) Oral daily  pantoprazole    Tablet 40 milliGRAM(s) Oral before breakfast    MEDICATIONS  (PRN):  acetaminophen     Tablet .. 650 milliGRAM(s) Oral every 6 hours PRN Temp greater or equal to 38C (100.4F), Mild Pain (1 - 3)  aluminum hydroxide/magnesium hydroxide/simethicone Suspension 30 milliLiter(s) Oral every 4 hours PRN Dyspepsia  dextrose Oral Gel 15 Gram(s) Oral once PRN Blood Glucose LESS THAN 70 milliGRAM(s)/deciliter  melatonin 3 milliGRAM(s) Oral at bedtime PRN Insomnia  ondansetron Injectable 4 milliGRAM(s) IV Push every 8 hours PRN Nausea and/or Vomiting        I&O's Summary    15 Mar 2024 07:01  -  16 Mar 2024 07:00  --------------------------------------------------------  IN: 900 mL / OUT: 0 mL / NET: 900 mL        PHYSICAL EXAM:  Vital Signs Last 24 Hrs  T(C): 37.1 (16 Mar 2024 09:50), Max: 37.1 (16 Mar 2024 09:50)  T(F): 98.8 (16 Mar 2024 09:50), Max: 98.8 (16 Mar 2024 09:50)  HR: 124 (16 Mar 2024 09:50) (81 - 124)  BP: 152/66 (16 Mar 2024 09:50) (152/66 - 194/104)  BP(mean): --  RR: 18 (16 Mar 2024 09:50) (18 - 20)  SpO2: 95% (16 Mar 2024 09:50) (93% - 99%)    Parameters below as of 16 Mar 2024 09:50  Patient On (Oxygen Delivery Method): room air        PHYSICAL EXAM:    GENERAL: NAD, AOX3, obese  HEAD:  Atraumatic, Normocephalic  EYES: conjunctiva and sclera clear  ENMT: Moist mucous membranes  CHEST/LUNG: Bilateral expiratory wheezing   HEART: Tachycardic; No murmurs, rubs, or gallops  ABDOMEN: Soft, Nontender, Nondistended; Bowel sounds present  EXTREMITIES:  2+ Peripheral Pulses, No clubbing, cyanosis, or edema    LABS:                        14.6   18.24 )-----------( 185      ( 16 Mar 2024 05:00 )             45.2     03-16    140  |  102  |  27.6<H>  ----------------------------<  185<H>  4.5   |  19.0<L>  |  1.10    Ca    8.3<L>      16 Mar 2024 05:00  Phos  4.1     03-15  Mg     2.6     03-15    TPro  6.4<L>  /  Alb  3.5  /  TBili  0.4  /  DBili  x   /  AST  42<H>  /  ALT  50<H>  /  AlkPhos  63  03-16          Urinalysis Basic - ( 16 Mar 2024 05:00 )    Color: x / Appearance: x / SG: x / pH: x  Gluc: 185 mg/dL / Ketone: x  / Bili: x / Urobili: x   Blood: x / Protein: x / Nitrite: x   Leuk Esterase: x / RBC: x / WBC x   Sq Epi: x / Non Sq Epi: x / Bacteria: x        CAPILLARY BLOOD GLUCOSE      POCT Blood Glucose.: 153 mg/dL (16 Mar 2024 08:29)  POCT Blood Glucose.: 237 mg/dL (15 Mar 2024 21:10)  POCT Blood Glucose.: 176 mg/dL (15 Mar 2024 17:06)  POCT Blood Glucose.: 233 mg/dL (15 Mar 2024 11:57)        RADIOLOGY & ADDITIONAL TESTS:  Results Reviewed:   Imaging Personally Reviewed:  Electrocardiogram Personally Reviewed:      
  SUBJECTIVE / OVERNIGHT EVENTS: Seen and examined. Feels better today. sitting comfortably in chair. Still wheezing but better appearing. Denies chest pain, abd pain, N/V, fever, chills, dysuria.     MEDICATIONS  (STANDING):  albuterol    90 MICROgram(s) HFA Inhaler 1 Puff(s) Inhalation every 4 hours  amLODIPine   Tablet 10 milliGRAM(s) Oral daily  budesonide 160 MICROgram(s)/formoterol 4.5 MICROgram(s) Inhaler 2 Puff(s) Inhalation two times a day  cefTRIAXone Injectable. 1000 milliGRAM(s) IV Push every 24 hours  dextrose 5%. 1000 milliLiter(s) (50 mL/Hr) IV Continuous <Continuous>  dextrose 5%. 1000 milliLiter(s) (100 mL/Hr) IV Continuous <Continuous>  dextrose 50% Injectable 25 Gram(s) IV Push once  dextrose 50% Injectable 25 Gram(s) IV Push once  dextrose 50% Injectable 12.5 Gram(s) IV Push once  glucagon  Injectable 1 milliGRAM(s) IntraMuscular once  guaiFENesin  milliGRAM(s) Oral every 12 hours  heparin   Injectable 5000 Unit(s) SubCutaneous every 12 hours  hydrochlorothiazide 25 milliGRAM(s) Oral daily  insulin lispro (ADMELOG) corrective regimen sliding scale   SubCutaneous three times a day before meals  ipratropium    for Nebulization 500 MICROGram(s) Nebulizer every 6 hours  methylPREDNISolone sodium succinate Injectable 40 milliGRAM(s) IV Push every 12 hours  montelukast 10 milliGRAM(s) Oral daily  pantoprazole    Tablet 40 milliGRAM(s) Oral before breakfast    MEDICATIONS  (PRN):  acetaminophen     Tablet .. 650 milliGRAM(s) Oral every 6 hours PRN Temp greater or equal to 38C (100.4F), Mild Pain (1 - 3)  aluminum hydroxide/magnesium hydroxide/simethicone Suspension 30 milliLiter(s) Oral every 4 hours PRN Dyspepsia  dextrose Oral Gel 15 Gram(s) Oral once PRN Blood Glucose LESS THAN 70 milliGRAM(s)/deciliter  melatonin 3 milliGRAM(s) Oral at bedtime PRN Insomnia  ondansetron Injectable 4 milliGRAM(s) IV Push every 8 hours PRN Nausea and/or Vomiting        I&O's Summary      PHYSICAL EXAM:  Vital Signs Last 24 Hrs  T(C): 36.5 (18 Mar 2024 08:43), Max: 36.9 (18 Mar 2024 04:55)  T(F): 97.7 (18 Mar 2024 08:43), Max: 98.4 (18 Mar 2024 04:55)  HR: 102 (18 Mar 2024 09:37) (81 - 112)  BP: 144/78 (18 Mar 2024 08:43) (144/78 - 175/85)  BP(mean): --  RR: 18 (18 Mar 2024 08:43) (18 - 20)  SpO2: 97% (18 Mar 2024 09:37) (94% - 100%)    Parameters below as of 18 Mar 2024 09:37  Patient On (Oxygen Delivery Method): room air        PHYSICAL EXAM:    GENERAL: NAD, AOX3, obese  HEAD:  Atraumatic, Normocephalic  EYES: conjunctiva and sclera clear  ENMT: Moist mucous membranes  CHEST/LUNG: Bilateral expiratory wheezing   HEART: Tachycardic; No murmurs, rubs, or gallops  ABDOMEN: Soft, Nontender, Nondistended; Bowel sounds present  EXTREMITIES:  2+ Peripheral Pulses, No clubbing, cyanosis, or edema    LABS:                        14.9   15.30 )-----------( 186      ( 18 Mar 2024 06:03 )             46.7     03-18    136  |  100  |  29.2<H>  ----------------------------<  137<H>  4.9   |  21.0<L>  |  1.08    Ca    8.4      18 Mar 2024 06:03  Phos  3.8     03-17  Mg     2.7     03-17    TPro  6.5<L>  /  Alb  3.5  /  TBili  0.5  /  DBili  x   /  AST  63<H>  /  ALT  80<H>  /  AlkPhos  58  03-18          Urinalysis Basic - ( 18 Mar 2024 06:03 )    Color: x / Appearance: x / SG: x / pH: x  Gluc: 137 mg/dL / Ketone: x  / Bili: x / Urobili: x   Blood: x / Protein: x / Nitrite: x   Leuk Esterase: x / RBC: x / WBC x   Sq Epi: x / Non Sq Epi: x / Bacteria: x        CAPILLARY BLOOD GLUCOSE      POCT Blood Glucose.: 131 mg/dL (18 Mar 2024 08:04)  POCT Blood Glucose.: 180 mg/dL (17 Mar 2024 21:09)  POCT Blood Glucose.: 152 mg/dL (17 Mar 2024 16:42)  POCT Blood Glucose.: 160 mg/dL (17 Mar 2024 11:53)        RADIOLOGY & ADDITIONAL TESTS:  Results Reviewed:   Imaging Personally Reviewed:  Electrocardiogram Personally Reviewed:      
[facility] Division of Hospital Medicine  Adalid Slaughter MD    Chief Complaint:  Patient is a 58y old  Male who presents with a chief complaint of shortness of breath (14 Mar 2024 12:24)      SUBJECTIVE / OVERNIGHT EVENTS:  Patient seen and examined at bedside. No acute events reported overnight. Complaining of wheezing.     MEDICATIONS  (STANDING):  albuterol/ipratropium for Nebulization 3 milliLiter(s) Nebulizer every 4 hours  amLODIPine   Tablet 10 milliGRAM(s) Oral daily  budesonide 160 MICROgram(s)/formoterol 4.5 MICROgram(s) Inhaler 2 Puff(s) Inhalation two times a day  dextrose 5%. 1000 milliLiter(s) (50 mL/Hr) IV Continuous <Continuous>  dextrose 5%. 1000 milliLiter(s) (100 mL/Hr) IV Continuous <Continuous>  dextrose 50% Injectable 25 Gram(s) IV Push once  dextrose 50% Injectable 12.5 Gram(s) IV Push once  dextrose 50% Injectable 25 Gram(s) IV Push once  glucagon  Injectable 1 milliGRAM(s) IntraMuscular once  heparin   Injectable 5000 Unit(s) SubCutaneous every 12 hours  hydrALAZINE 25 milliGRAM(s) Oral every 8 hours  insulin lispro (ADMELOG) corrective regimen sliding scale   SubCutaneous three times a day before meals  methylPREDNISolone sodium succinate Injectable 40 milliGRAM(s) IV Push every 8 hours  montelukast 10 milliGRAM(s) Oral daily  pantoprazole    Tablet 40 milliGRAM(s) Oral before breakfast  sodium chloride 0.9%. 1000 milliLiter(s) (100 mL/Hr) IV Continuous <Continuous>    MEDICATIONS  (PRN):  acetaminophen     Tablet .. 650 milliGRAM(s) Oral every 6 hours PRN Temp greater or equal to 38C (100.4F), Mild Pain (1 - 3)  albuterol    0.083% 2.5 milliGRAM(s) Nebulizer every 2 hours PRN Shortness of Breath and/or Wheezing  aluminum hydroxide/magnesium hydroxide/simethicone Suspension 30 milliLiter(s) Oral every 4 hours PRN Dyspepsia  dextrose Oral Gel 15 Gram(s) Oral once PRN Blood Glucose LESS THAN 70 milliGRAM(s)/deciliter  melatonin 3 milliGRAM(s) Oral at bedtime PRN Insomnia  ondansetron Injectable 4 milliGRAM(s) IV Push every 8 hours PRN Nausea and/or Vomiting        I&O's Summary    13 Mar 2024 07:01  -  14 Mar 2024 07:00  --------------------------------------------------------  IN: 0 mL / OUT: 350 mL / NET: -350 mL        PHYSICAL EXAM:  Vital Signs Last 24 Hrs  T(C): 36.7 (14 Mar 2024 11:39), Max: 36.7 (13 Mar 2024 14:50)  T(F): 98.1 (14 Mar 2024 11:39), Max: 98.1 (14 Mar 2024 02:12)  HR: 95 (14 Mar 2024 11:39) (88 - 108)  BP: 161/78 (14 Mar 2024 11:39) (155/70 - 203/104)  BP(mean): --  RR: 18 (14 Mar 2024 10:06) (16 - 20)  SpO2: 95% (14 Mar 2024 10:06) (91% - 97%)    Parameters below as of 14 Mar 2024 10:06  Patient On (Oxygen Delivery Method): room air    CONSTITUTIONAL: NAD  HEENT: NC/AT, PERRL, no JVD  RESPIRATORY: B/l wheezing   CARDIOVASCULAR: RRR, S1/S2+, no m/g/r  ABDOMEN: Nontender to palpation, normoactive bowel sounds, no rebound/guarding  MUSCULOSKELETAL: No edema, cyanosis or deformities.  PSYCH: Calm, affect appropriate.  NEUROLOGY: Awake, alert, no focal neurological deficits.   SKIN: No rashes; no palpable lesions  VASC: Distal pulses palpable    LABS:                        14.5   18.20 )-----------( 233      ( 14 Mar 2024 02:35 )             44.8     03-14    141  |  99  |  26.1<H>  ----------------------------<  179<H>  4.3   |  21.0<L>  |  1.31<H>    Ca    8.6      14 Mar 2024 02:35    TPro  6.7  /  Alb  3.5  /  TBili  0.3<L>  /  DBili  x   /  AST  39  /  ALT  46<H>  /  AlkPhos  57  03-14      CARDIAC MARKERS ( 13 Mar 2024 15:45 )  x     / x     / 845 U/L / x     / 9.8 ng/mL      Urinalysis Basic - ( 14 Mar 2024 02:35 )    Color: x / Appearance: x / SG: x / pH: x  Gluc: 179 mg/dL / Ketone: x  / Bili: x / Urobili: x   Blood: x / Protein: x / Nitrite: x   Leuk Esterase: x / RBC: x / WBC x   Sq Epi: x / Non Sq Epi: x / Bacteria: x        CAPILLARY BLOOD GLUCOSE      POCT Blood Glucose.: 151 mg/dL (14 Mar 2024 12:02)        RADIOLOGY & ADDITIONAL TESTS:  Results Reviewed:   Imaging Personally Reviewed:  Electrocardiogram Personally Reviewed:                                          
                                                         [facility]                                                         CARDIOLOGY AT [facility]                                                                  [address] [facility] [place] [address]                                                         Telephone: [phone]. Fax:[phone]                                                                             PROGRESS NOTE    Reason for follow up: HTN urgency  Update: SBP improved since admission      Review of symptoms:   Cardiac:  No chest pain. No dyspnea. No palpitations.  Respiratory: no cough. No dyspnea  Gastrointestinal: No diarrhea. No abdominal pain. No bleeding.   Neuro: No focal neuro complaints.    Vitals:  T(C): 36.5 (03-18-24 @ 08:43), Max: 36.9 (03-18-24 @ 04:55)  HR: 102 (03-18-24 @ 09:37) (81 - 112)  BP: 144/78 (03-18-24 @ 08:43) (144/78 - 175/85)  RR: 18 (03-18-24 @ 08:43) (18 - 20)  SpO2: 97% (03-18-24 @ 09:37) (94% - 100%)  Wt(kg): --  I&O's Summary    Weight (kg): 104.4 (03-13 @ 14:50)    PHYSICAL EXAM:  Appearance: Comfortable. No acute distress  HEENT:  Atraumatic. Normocephalic.  Normal oral mucosa  Neurologic: A & O x 3, no gross focal deficits.  Cardiovascular: RRR S1 S2, No murmur, no rubs/gallops. No JVD  Respiratory: Lungs wheeze to auscultation, unlabored   Gastrointestinal:  Soft, Non-tender, + BS  Lower Extremities: 2+ Peripheral Pulses, No clubbing, cyanosis, or edema  Psychiatry: Patient is calm. No agitation.   Skin: warm and dry.    CURRENT CARDIAC MEDICATIONS:  amLODIPine   Tablet 10 milliGRAM(s) Oral daily  hydrochlorothiazide 25 milliGRAM(s) Oral daily      CURRENT OTHER MEDICATIONS:  albuterol    90 MICROgram(s) HFA Inhaler 1 Puff(s) Inhalation every 4 hours  budesonide 160 MICROgram(s)/formoterol 4.5 MICROgram(s) Inhaler 2 Puff(s) Inhalation two times a day  guaiFENesin  milliGRAM(s) Oral every 12 hours  ipratropium    for Nebulization 500 MICROGram(s) Nebulizer every 6 hours  montelukast 10 milliGRAM(s) Oral daily  cefTRIAXone Injectable. 1000 milliGRAM(s) IV Push every 24 hours  acetaminophen     Tablet .. 650 milliGRAM(s) Oral every 6 hours PRN Temp greater or equal to 38C (100.4F), Mild Pain (1 - 3)  melatonin 3 milliGRAM(s) Oral at bedtime PRN Insomnia  ondansetron Injectable 4 milliGRAM(s) IV Push every 8 hours PRN Nausea and/or Vomiting  aluminum hydroxide/magnesium hydroxide/simethicone Suspension 30 milliLiter(s) Oral every 4 hours PRN Dyspepsia  pantoprazole    Tablet 40 milliGRAM(s) Oral before breakfast  dextrose 50% Injectable 25 Gram(s) IV Push once, Stop order after: 1 Doses  dextrose 50% Injectable 12.5 Gram(s) IV Push once, Stop order after: 1 Doses  dextrose 50% Injectable 25 Gram(s) IV Push once, Stop order after: 1 Doses  dextrose Oral Gel 15 Gram(s) Oral once, Stop order after: 1 Doses PRN Blood Glucose LESS THAN 70 milliGRAM(s)/deciliter  glucagon  Injectable 1 milliGRAM(s) IntraMuscular once, Stop order after: 1 Doses  insulin lispro (ADMELOG) corrective regimen sliding scale   SubCutaneous three times a day before meals  methylPREDNISolone sodium succinate Injectable 40 milliGRAM(s) IV Push every 12 hours  dextrose 5%. 1000 milliLiter(s) (100 mL/Hr) IV Continuous <Continuous>  dextrose 5%. 1000 milliLiter(s) (50 mL/Hr) IV Continuous <Continuous>  heparin   Injectable 5000 Unit(s) SubCutaneous every 12 hours      LABS:	 	  ( 13 Mar 2024 15:45 )  Troponin T  X    ,  CPK  845<H>, CKMB  X    , BNP X                                  14.9   15.30 )-----------( 186      ( 18 Mar 2024 06:03 )             46.7     03-18    136  |  100  |  29.2<H>  ----------------------------<  137<H>  4.9   |  21.0<L>  |  1.08    Ca    8.4      18 Mar 2024 06:03  Phos  3.8     03-17  Mg     2.7     03-17    TPro  6.5<L>  /  Alb  3.5  /  TBili  0.5  /  DBili  x   /  AST  63<H>  /  ALT  80<H>  /  AlkPhos  58  03-18      Lipid Profile: Date: 03-14 @ 02:35  Total cholesterol 180; Direct LDL: --; HDL: 108; Triglycerides:110    HgA1c:   TSH: Thyroid Stimulating Hormone, Serum: 0.80 uIU/mL      TELEMETRY: SR      DIAGNOSTIC TESTING:  [ ] Echocardiogram:   < from: TTE W or WO Ultrasound Enhancing Agent (03.13.24 @ 21:00) >  CONCLUSIONS:      1. Mild left ventricular hypertrophy.   2. Left ventricular cavity is normal in size. Left ventricular systolic function is hyperdynamic with an ejection fraction of 71 % by Cheney's method of disks with an ejection fraction visually estimated at 70 to 75 %. There are no regional wall motion abnormalities seen.   3. Normal right ventricular cavity size and normal systolic function.   4. Trileaflet aortic valve with normal systolic excursion. mild calcification of the aortic valve leaflets.   5. Structurally normal mitral valve with normal leaflet excursion.   6. The right atrium is normal in size.   7. The left atrium is normal.   8. No pericardial effusion seen.    < end of copied text >        [ ] Stress Test:    < from: Nuclear Stress Test-Pharmacologic.. (03.14.24 @ 08:14) >  Conclusions:   1. Myocardial Perfusion: Uninterpretible Dobutamine stress test. Resting images were only performed. Test was aborted due to patient symptoms and hypotension. Stress imaging was not done.   2. The patient underwent stress testing using pharmacological IV dobutamine protocol.      The test was stopped due to drop in HR and fall in blood pressure.    < end of copied text >        
PULMONARY PROGRESS NOTE      AMANDA MILLER-[id number]    Patient is a 58y old  Male who presents with a chief complaint of shortness of breath (15 Mar 2024 12:24)      INTERVAL HPI/OVERNIGHT EVENTS:  feels better  tolerated cpap last pm  no abd pain  no cp  eating well  anxious to get OOB  MEDICATIONS  (STANDING):  albuterol    90 MICROgram(s) HFA Inhaler 1 Puff(s) Inhalation every 4 hours  amLODIPine   Tablet 10 milliGRAM(s) Oral daily  budesonide 160 MICROgram(s)/formoterol 4.5 MICROgram(s) Inhaler 2 Puff(s) Inhalation two times a day  cefTRIAXone Injectable. 1000 milliGRAM(s) IV Push every 24 hours  dextrose 5%. 1000 milliLiter(s) (50 mL/Hr) IV Continuous <Continuous>  dextrose 5%. 1000 milliLiter(s) (100 mL/Hr) IV Continuous <Continuous>  dextrose 50% Injectable 25 Gram(s) IV Push once  dextrose 50% Injectable 25 Gram(s) IV Push once  dextrose 50% Injectable 12.5 Gram(s) IV Push once  glucagon  Injectable 1 milliGRAM(s) IntraMuscular once  heparin   Injectable 5000 Unit(s) SubCutaneous every 12 hours  hydrALAZINE 50 milliGRAM(s) Oral every 8 hours  insulin lispro (ADMELOG) corrective regimen sliding scale   SubCutaneous three times a day before meals  ipratropium    for Nebulization 500 MICROGram(s) Nebulizer every 6 hours  levalbuterol Inhalation 1.25 milliGRAM(s) Inhalation every 6 hours  losartan 50 milliGRAM(s) Oral daily  methylPREDNISolone sodium succinate Injectable 40 milliGRAM(s) IV Push every 8 hours  montelukast 10 milliGRAM(s) Oral daily  pantoprazole    Tablet 40 milliGRAM(s) Oral before breakfast      MEDICATIONS  (PRN):  acetaminophen     Tablet .. 650 milliGRAM(s) Oral every 6 hours PRN Temp greater or equal to 38C (100.4F), Mild Pain (1 - 3)  aluminum hydroxide/magnesium hydroxide/simethicone Suspension 30 milliLiter(s) Oral every 4 hours PRN Dyspepsia  dextrose Oral Gel 15 Gram(s) Oral once PRN Blood Glucose LESS THAN 70 milliGRAM(s)/deciliter  melatonin 3 milliGRAM(s) Oral at bedtime PRN Insomnia  ondansetron Injectable 4 milliGRAM(s) IV Push every 8 hours PRN Nausea and/or Vomiting      Allergies    No Known Allergies    Intolerances        PAST MEDICAL & SURGICAL HISTORY:  Gout      Asthma      Arthritis      HTN (hypertension)      No significant past surgical history          SOCIAL HISTORY  Smoking History:       REVIEW OF SYSTEMS:    CONSTITUTIONAL:  No distress    HEENT:  Eyes:  No diplopia or blurred vision. ENT:  No earache, sore throat or runny nose.    CARDIOVASCULAR:  No pressure, squeezing, tightness, heaviness or aching about the chest; no palpitations.    RESPIRATORY:  see HPI    GASTROINTESTINAL:  No nausea, vomiting or diarrhea.    GENITOURINARY:  No dysuria, frequency or urgency.    NEUROLOGIC:  No paresthesias, fasciculations, seizures or weakness.    PSYCHIATRIC:  No disorder of thought or mood.    Vital Signs Last 24 Hrs  T(C): 36.4 (16 Mar 2024 04:52), Max: 36.7 (15 Mar 2024 12:34)  T(F): 97.5 (16 Mar 2024 04:52), Max: 98.1 (15 Mar 2024 15:30)  HR: 115 (16 Mar 2024 08:55) (81 - 115)  BP: 176/86 (16 Mar 2024 08:30) (160/84 - 194/104)  BP(mean): --  RR: 18 (16 Mar 2024 04:52) (18 - 20)  SpO2: 95% (16 Mar 2024 04:52) (93% - 99%)    Parameters below as of 16 Mar 2024 08:55  Patient On (Oxygen Delivery Method): room air        PHYSICAL EXAMINATION:    GENERAL: The patient is awake and alert in no apparent distress.     HEENT: Head is normocephalic and atraumatic. Extraocular muscles are intact. Mucous membranes are moist.    NECK: Supple.    LUNGS: mod air entry with exp rhonchi; respirations unlabored    HEART: Regular rate and rhythm without murmur.    ABDOMEN: Soft, nontender, and nondistended.      EXTREMITIES: Without any cyanosis, clubbing, rash, lesions or edema.    NEUROLOGIC: Grossly intact.    LABS:                        14.6   18.24 )-----------( 185      ( 16 Mar 2024 05:00 )             45.2     03-16    140  |  102  |  27.6<H>  ----------------------------<  185<H>  4.5   |  19.0<L>  |  1.10    Ca    8.3<L>      16 Mar 2024 05:00  Phos  4.1     03-15  Mg     2.6     03-15    TPro  6.4<L>  /  Alb  3.5  /  TBili  0.4  /  DBili  x   /  AST  42<H>  /  ALT  50<H>  /  AlkPhos  63  03-16      Urinalysis Basic - ( 16 Mar 2024 05:00 )    Color: x / Appearance: x / SG: x / pH: x  Gluc: 185 mg/dL / Ketone: x  / Bili: x / Urobili: x   Blood: x / Protein: x / Nitrite: x   Leuk Esterase: x / RBC: x / WBC x   Sq Epi: x / Non Sq Epi: x / Bacteria: x                Lactate, Blood: 4.6 mmol/L (03-16-24 @ 05:00)    Procalcitonin, Serum: 0.08 ng/mL (03-15-24 @ 06:18)  Procalcitonin, Serum: 0.07 ng/mL (03-14-24 @ 19:50)      MICROBIOLOGY:    RADIOLOGY & ADDITIONAL STUDIES:  CT scan reviewed
PULMONARY PROGRESS NOTE      AMANDA MILLER-[id number]    Patient is a 58y old  Male who presents with a chief complaint of shortness of breath (17 Mar 2024 09:26)      INTERVAL HPI/OVERNIGHT EVENTS:  Feels sig better  no CP  dyspnea much improved  MEDICATIONS  (STANDING):  albuterol    90 MICROgram(s) HFA Inhaler 1 Puff(s) Inhalation every 4 hours  amLODIPine   Tablet 10 milliGRAM(s) Oral daily  budesonide 160 MICROgram(s)/formoterol 4.5 MICROgram(s) Inhaler 2 Puff(s) Inhalation two times a day  cefTRIAXone Injectable. 1000 milliGRAM(s) IV Push every 24 hours  dextrose 5%. 1000 milliLiter(s) (50 mL/Hr) IV Continuous <Continuous>  dextrose 5%. 1000 milliLiter(s) (100 mL/Hr) IV Continuous <Continuous>  dextrose 50% Injectable 25 Gram(s) IV Push once  dextrose 50% Injectable 25 Gram(s) IV Push once  dextrose 50% Injectable 12.5 Gram(s) IV Push once  glucagon  Injectable 1 milliGRAM(s) IntraMuscular once  guaiFENesin  milliGRAM(s) Oral every 12 hours  heparin   Injectable 5000 Unit(s) SubCutaneous every 12 hours  hydrALAZINE 75 milliGRAM(s) Oral every 8 hours  insulin lispro (ADMELOG) corrective regimen sliding scale   SubCutaneous three times a day before meals  ipratropium    for Nebulization 500 MICROGram(s) Nebulizer every 6 hours  losartan 50 milliGRAM(s) Oral daily  methylPREDNISolone sodium succinate Injectable 40 milliGRAM(s) IV Push every 8 hours  montelukast 10 milliGRAM(s) Oral daily  pantoprazole    Tablet 40 milliGRAM(s) Oral before breakfast      MEDICATIONS  (PRN):  acetaminophen     Tablet .. 650 milliGRAM(s) Oral every 6 hours PRN Temp greater or equal to 38C (100.4F), Mild Pain (1 - 3)  aluminum hydroxide/magnesium hydroxide/simethicone Suspension 30 milliLiter(s) Oral every 4 hours PRN Dyspepsia  dextrose Oral Gel 15 Gram(s) Oral once PRN Blood Glucose LESS THAN 70 milliGRAM(s)/deciliter  melatonin 3 milliGRAM(s) Oral at bedtime PRN Insomnia  ondansetron Injectable 4 milliGRAM(s) IV Push every 8 hours PRN Nausea and/or Vomiting      Allergies    No Known Allergies    Intolerances        PAST MEDICAL & SURGICAL HISTORY:  Gout      Asthma      Arthritis      HTN (hypertension)      No significant past surgical history          SOCIAL HISTORY  Smoking History:       REVIEW OF SYSTEMS:    CONSTITUTIONAL:  No distress    HEENT:  Eyes:  No diplopia or blurred vision. ENT:  No earache, sore throat or runny nose.    CARDIOVASCULAR:  No pressure, squeezing, tightness, heaviness or aching about the chest; no palpitations.    RESPIRATORY:  see HPI    GASTROINTESTINAL:  No nausea, vomiting or diarrhea.    GENITOURINARY:  No dysuria, frequency or urgency.    NEUROLOGIC:  No paresthesias, fasciculations, seizures or weakness.    PSYCHIATRIC:  No disorder of thought or mood.    Vital Signs Last 24 Hrs  T(C): 36.4 (17 Mar 2024 05:14), Max: 37.1 (16 Mar 2024 09:50)  T(F): 97.6 (17 Mar 2024 05:14), Max: 98.8 (16 Mar 2024 09:50)  HR: 106 (17 Mar 2024 08:37) (102 - 124)  BP: 148/74 (17 Mar 2024 05:14) (148/74 - 164/74)  BP(mean): 109 (16 Mar 2024 21:22) (104 - 109)  RR: 18 (17 Mar 2024 05:14) (17 - 20)  SpO2: 96% (17 Mar 2024 08:37) (93% - 98%)    Parameters below as of 17 Mar 2024 08:37  Patient On (Oxygen Delivery Method): room air        PHYSICAL EXAMINATION:    GENERAL: The patient is awake and alert in no apparent distress.     HEENT: Head is normocephalic and atraumatic. Extraocular muscles are intact. Mucous membranes are moist.    NECK: Supple.    LUNGS: mod air entry bilat; respirations unlabored    HEART: Regular rate and rhythm without murmur.    ABDOMEN: Soft, nontender, and nondistended.      EXTREMITIES: Without any cyanosis, clubbing, rash, lesions or edema.    NEUROLOGIC: Grossly intact.    LABS:                        13.6   15.53 )-----------( 175      ( 17 Mar 2024 04:44 )             42.4     03-17    138  |  100  |  29.1<H>  ----------------------------<  189<H>  4.6   |  22.0  |  1.16    Ca    8.1<L>      17 Mar 2024 04:44  Phos  3.8     03-17  Mg     2.7     03-17    TPro  6.0<L>  /  Alb  3.3  /  TBili  0.4  /  DBili  x   /  AST  49<H>  /  ALT  57<H>  /  AlkPhos  57  03-17      Urinalysis Basic - ( 17 Mar 2024 04:44 )    Color: x / Appearance: x / SG: x / pH: x  Gluc: 189 mg/dL / Ketone: x  / Bili: x / Urobili: x   Blood: x / Protein: x / Nitrite: x   Leuk Esterase: x / RBC: x / WBC x   Sq Epi: x / Non Sq Epi: x / Bacteria: x                  Procalcitonin, Serum: 0.08 ng/mL (03-15-24 @ 06:18)  Procalcitonin, Serum: 0.07 ng/mL (03-14-24 @ 19:50)      MICROBIOLOGY:    RADIOLOGY & ADDITIONAL STUDIES:

## 2024-03-18 NOTE — PROGRESS NOTE ADULT - NS ATTEND AMEND GEN_ALL_CORE FT
As above, BP in way to get better, we will continue optimizing antihypertensive regimen.
As above, we will continue to optimize medical regimen as tolerated.
seen with above,    58M history significant for obesity, SHERI not compliant with CPAP, severe persistent asthma, HTN admitted with asthma exacerbation and with chest discomfort, Echo normal but unable to complete pharm stress Echo  -serial Trop normal, no plan for inpatient ischemic workup given still being treated for asthma exacerbation  -Cr. normalized, will stop hydralazine and transition back to 1st line antihypertensives with losartan, HCTZ and amlodipine  -reconsult if new cardiac issue arise, establish care in the office         Hesham Ribeiro DO, [facility]  Faculty Non-Invasive Cardiologist  [phone]
AS above, pt with uncontrolled HTN now better range on oral meds, with borderline abnormal ECG and subsequent stress  echo that is resulted as inconclusive. Suggest cont optimal BP regimen and have CCTA as outpt.

## 2024-03-18 NOTE — PROGRESS NOTE ADULT - ASSESSMENT
The patient is a 58 year old with a past medical history of hypertension, gout severe persistent asthma, SHERI presented with complaints of 1 week of shortness of breath. Noted to be hypertensive in ED, wheezing. RVP positive for hMPV, mild TYESHA, leucocytosis.EKG NSR 98. TTE with hyperdynamic LVSF. Started on norvasc and hydralazine for hypertension. Cardiology consulted, was unable to tolerate NST due to wheezing. Cardiology recommended outpatient CCTA and following for management of Elevated BP.    Assessment/Plan:    1. Acute asthma exacerbation 2/2 viral infection (hMPV)  - Chest xray on admission with left lower lobe infiltrate  - C/w empiric IV Rocephin  - CT chest non contrast- No pneumonia. and Left lower lobe bandlike atelectasis.  - Lactic acidosis likely due beta agonist side effect  - C/ w Xoponex with atrovent Q6 hours in the setting of tachycardia  - IV Solu medrol Q8 hours--> Q12H  - Singulair  - Symbicort BID   - Pulmonary following   - Supplemental oxygen PRN, currently tolerating RA    2. HTN urgency  - BP remains better controlled today  - Stopped Hydralazine and switched to HCTZ  - On norvasc 10mg PO OD  - C/w Losartan 50mg QD--> increased 100mg  - No BB in the setting of bronchospasm   - TTE with hyperdynamic LV  - Cardiology following was unable to tolerate NST due to wheezing and recs CCTA outpatient    3, New diagnosis of diabetes mellitus  - A1C: 7.1  - Change to Moderate Insulin sliding scale  - May need to add lantus while on steroids  - Monitor BSL closely   - DM education  - Will consider Metformin on discharge    4. TYESHA likely prerenal from poor oral intake (Resolved)  - Improving  - Lactic acidosis from beta agonist effect   - Monitor BMP, correct electrolytes as needed    5. Lactic acidosis  - likely elevated due to continuos inhaled beta agonist use  - no signs of hypoperfusion, liver dysfunction  - monitor    DVT ppx: Heparin SQ  GI: PPI    Dispo: Pending improvement in SOB/wheezing. 1-2 days    D/w patient

## 2024-03-18 NOTE — PROGRESS NOTE ADULT - PROVIDER SPECIALTY LIST ADULT
Hospitalist
Hospitalist
Pulmonology
Pulmonology
Cardiology
Hospitalist
Pulmonology
Pulmonology
Cardiology

## 2024-03-18 NOTE — PROGRESS NOTE ADULT - ASSESSMENT
Asthma - with acute exacerbation  Human metapneumovirus respiratory infection  SHERI    Recommendations:  - Wean to Prednisone 40mg starting tomorrow and taper by 10mg every 3 days  - Can D/C antibiotics  - OOBTC, ambulate, PT  - Singulair, Symbicort - continue  - F/u in pulmonary office with me within 2-4 weeks of discharge    Antonino Rene M.D.  , Pulmonary & Critical Care Medicine  [facility]  Pulmonary and Sleep Medicine at [place]  [address]  [place] [address]  T: [phone]  F: [phone]

## 2024-03-18 NOTE — PROGRESS NOTE ADULT - PROBLEM SELECTOR PROBLEM 2
Obstructive sleep apnea
R/O CAD (coronary artery disease)
ECG abnormality
Obstructive sleep apnea
ECG abnormality
Obstructive sleep apnea

## 2024-03-19 ENCOUNTER — TRANSCRIPTION ENCOUNTER (OUTPATIENT)
Age: 59
End: 2024-03-19

## 2024-03-19 VITALS
OXYGEN SATURATION: 97 % | TEMPERATURE: 100 F | DIASTOLIC BLOOD PRESSURE: 80 MMHG | HEART RATE: 111 BPM | RESPIRATION RATE: 18 BRPM | SYSTOLIC BLOOD PRESSURE: 161 MMHG

## 2024-03-19 LAB
GLUCOSE BLDC GLUCOMTR-MCNC: 123 MG/DL — HIGH (ref 70–99)
GLUCOSE BLDC GLUCOMTR-MCNC: 197 MG/DL — HIGH (ref 70–99)

## 2024-03-19 PROCEDURE — 83605 ASSAY OF LACTIC ACID: CPT

## 2024-03-19 PROCEDURE — 82330 ASSAY OF CALCIUM: CPT

## 2024-03-19 PROCEDURE — 84443 ASSAY THYROID STIM HORMONE: CPT

## 2024-03-19 PROCEDURE — 84100 ASSAY OF PHOSPHORUS: CPT

## 2024-03-19 PROCEDURE — 82947 ASSAY GLUCOSE BLOOD QUANT: CPT

## 2024-03-19 PROCEDURE — 80061 LIPID PANEL: CPT

## 2024-03-19 PROCEDURE — 82550 ASSAY OF CK (CPK): CPT

## 2024-03-19 PROCEDURE — 85018 HEMOGLOBIN: CPT

## 2024-03-19 PROCEDURE — 84484 ASSAY OF TROPONIN QUANT: CPT

## 2024-03-19 PROCEDURE — C8929: CPT

## 2024-03-19 PROCEDURE — 36415 COLL VENOUS BLD VENIPUNCTURE: CPT

## 2024-03-19 PROCEDURE — 99239 HOSP IP/OBS DSCHRG MGMT >30: CPT

## 2024-03-19 PROCEDURE — 71250 CT THORAX DX C-: CPT | Mod: MC

## 2024-03-19 PROCEDURE — 71045 X-RAY EXAM CHEST 1 VIEW: CPT

## 2024-03-19 PROCEDURE — 86140 C-REACTIVE PROTEIN: CPT

## 2024-03-19 PROCEDURE — 85027 COMPLETE CBC AUTOMATED: CPT

## 2024-03-19 PROCEDURE — 84132 ASSAY OF SERUM POTASSIUM: CPT

## 2024-03-19 PROCEDURE — 84295 ASSAY OF SERUM SODIUM: CPT

## 2024-03-19 PROCEDURE — 87641 MR-STAPH DNA AMP PROBE: CPT

## 2024-03-19 PROCEDURE — 0225U NFCT DS DNA&RNA 21 SARSCOV2: CPT

## 2024-03-19 PROCEDURE — 85025 COMPLETE CBC W/AUTO DIFF WBC: CPT

## 2024-03-19 PROCEDURE — 78451 HT MUSCLE IMAGE SPECT SING: CPT | Mod: MC

## 2024-03-19 PROCEDURE — 86803 HEPATITIS C AB TEST: CPT

## 2024-03-19 PROCEDURE — 96375 TX/PRO/DX INJ NEW DRUG ADDON: CPT

## 2024-03-19 PROCEDURE — 82435 ASSAY OF BLOOD CHLORIDE: CPT

## 2024-03-19 PROCEDURE — 94640 AIRWAY INHALATION TREATMENT: CPT

## 2024-03-19 PROCEDURE — 80053 COMPREHEN METABOLIC PANEL: CPT

## 2024-03-19 PROCEDURE — 86003 ALLG SPEC IGE CRUDE XTRC EA: CPT

## 2024-03-19 PROCEDURE — 96374 THER/PROPH/DIAG INJ IV PUSH: CPT

## 2024-03-19 PROCEDURE — 83880 ASSAY OF NATRIURETIC PEPTIDE: CPT

## 2024-03-19 PROCEDURE — 84145 PROCALCITONIN (PCT): CPT

## 2024-03-19 PROCEDURE — 82962 GLUCOSE BLOOD TEST: CPT

## 2024-03-19 PROCEDURE — 87640 STAPH A DNA AMP PROBE: CPT

## 2024-03-19 PROCEDURE — 99285 EMERGENCY DEPT VISIT HI MDM: CPT

## 2024-03-19 PROCEDURE — 82785 ASSAY OF IGE: CPT

## 2024-03-19 PROCEDURE — 93005 ELECTROCARDIOGRAM TRACING: CPT

## 2024-03-19 PROCEDURE — 83036 HEMOGLOBIN GLYCOSYLATED A1C: CPT

## 2024-03-19 PROCEDURE — 82803 BLOOD GASES ANY COMBINATION: CPT

## 2024-03-19 PROCEDURE — 93017 CV STRESS TEST TRACING ONLY: CPT

## 2024-03-19 PROCEDURE — 94660 CPAP INITIATION&MGMT: CPT

## 2024-03-19 PROCEDURE — 85014 HEMATOCRIT: CPT

## 2024-03-19 PROCEDURE — 83735 ASSAY OF MAGNESIUM: CPT

## 2024-03-19 PROCEDURE — 82553 CREATINE MB FRACTION: CPT

## 2024-03-19 PROCEDURE — A9500: CPT

## 2024-03-19 RX ORDER — LOSARTAN POTASSIUM 100 MG/1
1 TABLET, FILM COATED ORAL
Qty: 30 | Refills: 0
Start: 2024-03-19 | End: 2024-04-17

## 2024-03-19 RX ORDER — AMLODIPINE BESYLATE AND BENAZEPRIL HYDROCHLORIDE 10; 20 MG/1; MG/1
1 CAPSULE ORAL
Refills: 0 | DISCHARGE

## 2024-03-19 RX ORDER — PANTOPRAZOLE SODIUM 20 MG/1
1 TABLET, DELAYED RELEASE ORAL
Qty: 30 | Refills: 0
Start: 2024-03-19 | End: 2024-04-17

## 2024-03-19 RX ORDER — HYDROCHLOROTHIAZIDE 25 MG
1 TABLET ORAL
Qty: 30 | Refills: 0
Start: 2024-03-19 | End: 2024-04-17

## 2024-03-19 RX ORDER — AMLODIPINE BESYLATE 2.5 MG/1
1 TABLET ORAL
Qty: 30 | Refills: 0
Start: 2024-03-19 | End: 2024-04-17

## 2024-03-19 RX ORDER — LEVALBUTEROL 1.25 MG/.5ML
0.63 SOLUTION, CONCENTRATE RESPIRATORY (INHALATION) EVERY 6 HOURS
Refills: 0 | Status: DISCONTINUED | OUTPATIENT
Start: 2024-03-19 | End: 2024-03-19

## 2024-03-19 RX ADMIN — PANTOPRAZOLE SODIUM 40 MILLIGRAM(S): 20 TABLET, DELAYED RELEASE ORAL at 05:33

## 2024-03-19 RX ADMIN — BUDESONIDE AND FORMOTEROL FUMARATE DIHYDRATE 2 PUFF(S): 160; 4.5 AEROSOL RESPIRATORY (INHALATION) at 09:57

## 2024-03-19 RX ADMIN — Medication 600 MILLIGRAM(S): at 17:03

## 2024-03-19 RX ADMIN — Medication 500 MICROGRAM(S): at 01:30

## 2024-03-19 RX ADMIN — Medication 2: at 11:28

## 2024-03-19 RX ADMIN — CEFTRIAXONE 1000 MILLIGRAM(S): 500 INJECTION, POWDER, FOR SOLUTION INTRAMUSCULAR; INTRAVENOUS at 17:02

## 2024-03-19 RX ADMIN — Medication 500 MICROGRAM(S): at 15:19

## 2024-03-19 RX ADMIN — LEVALBUTEROL 0.63 MILLIGRAM(S): 1.25 SOLUTION, CONCENTRATE RESPIRATORY (INHALATION) at 15:19

## 2024-03-19 RX ADMIN — LOSARTAN POTASSIUM 100 MILLIGRAM(S): 100 TABLET, FILM COATED ORAL at 05:33

## 2024-03-19 RX ADMIN — Medication 600 MILLIGRAM(S): at 05:33

## 2024-03-19 RX ADMIN — HEPARIN SODIUM 5000 UNIT(S): 5000 INJECTION INTRAVENOUS; SUBCUTANEOUS at 05:33

## 2024-03-19 RX ADMIN — HEPARIN SODIUM 5000 UNIT(S): 5000 INJECTION INTRAVENOUS; SUBCUTANEOUS at 17:02

## 2024-03-19 RX ADMIN — AMLODIPINE BESYLATE 10 MILLIGRAM(S): 2.5 TABLET ORAL at 05:33

## 2024-03-19 RX ADMIN — Medication 500 MICROGRAM(S): at 09:55

## 2024-03-19 RX ADMIN — Medication 40 MILLIGRAM(S): at 05:33

## 2024-03-19 RX ADMIN — MONTELUKAST 10 MILLIGRAM(S): 4 TABLET, CHEWABLE ORAL at 11:33

## 2024-03-19 NOTE — DISCHARGE NOTE PROVIDER - CARE PROVIDERS DIRECT ADDRESSES
[email or web address]

## 2024-03-19 NOTE — DISCHARGE NOTE PROVIDER - ATTENDING DISCHARGE PHYSICAL EXAMINATION:
PHYSICAL EXAM:    GENERAL: NAD, AOX3, obese  HEAD:  Atraumatic, Normocephalic  EYES: conjunctiva and sclera clear  ENMT: Moist mucous membranes  CHEST/LUNG: Bilateral expiratory wheezing (improved from previous)  HEART: Tachycardic; No murmurs, rubs, or gallops  ABDOMEN: Soft, Nontender, Nondistended; Bowel sounds present  EXTREMITIES:  2+ Peripheral Pulses, No clubbing, cyanosis, or edema

## 2024-03-19 NOTE — DISCHARGE NOTE PROVIDER - NSDCMRMEDTOKEN_GEN_ALL_CORE_FT
albuterol 2.5 mg/3 mL (0.083%) inhalation solution: 3 milliliter(s) inhaled every 6 hours  amlodipine-benazepril 10 mg-20 mg oral capsule: 1 cap(s) orally once a day  montelukast 10 mg oral tablet: 1 tab(s) orally once a day  Proventil HFA CFC free 90 mcg/inh inhalation aerosol: 2 puff(s) inhaled 4 times a day  Wixela Inhub 500 mcg-50 mcg inhalation powder: 1 puff(s) inhaled once a day   albuterol 2.5 mg/3 mL (0.083%) inhalation solution: 3 milliliter(s) inhaled every 6 hours  amLODIPine 10 mg oral tablet: 1 tab(s) orally once a day  guaiFENesin 600 mg oral tablet, extended release: 1 tab(s) orally every 12 hours  hydroCHLOROthiazide 25 mg oral tablet: 1 tab(s) orally once a day  losartan 100 mg oral tablet: 1 tab(s) orally once a day  montelukast 10 mg oral tablet: 1 tab(s) orally once a day  pantoprazole 40 mg oral delayed release tablet: 1 tab(s) orally once a day (before a meal)  predniSONE 10 mg oral tablet: 1 tab(s) orally once a day  predniSONE 10 mg oral tablet: 3 tab(s) orally once a day  predniSONE 20 mg oral tablet: 1 tab(s) orally once a day  predniSONE 20 mg oral tablet: 2 tab(s) orally once a day  Proventil HFA CFC free 90 mcg/inh inhalation aerosol: 2 puff(s) inhaled 4 times a day  Wixela Inhub 500 mcg-50 mcg inhalation powder: 1 puff(s) inhaled once a day

## 2024-03-19 NOTE — CHART NOTE - NSCHARTNOTEFT_GEN_A_CORE
Work Note:    To whom it may concern:    Patient Erick Smith may return to work starting 3/22/24.       Regards,    Tenzin Goddard MD  [facility] Hospitalist

## 2024-03-19 NOTE — DISCHARGE NOTE NURSING/CASE MANAGEMENT/SOCIAL WORK - PATIENT PORTAL LINK FT
You can access the FollowMyHealth Patient Portal offered by [facility] by registering at the following website: [email or web address]. By joining [employer]’s FollowMyHealth portal, you will also be able to view your health information using other applications (apps) compatible with our system.

## 2024-03-19 NOTE — DISCHARGE NOTE PROVIDER - HOSPITAL COURSE
The patient is a 58 year old with a past medical history of hypertension, gout severe persistent asthma, SHERI presented with complaints of 1 week of shortness of breath. Noted to be hypertensive in ED, wheezing. RVP positive for hMPV, mild TYESHA, leucocytosis. CXR showed left lower lobe infiltrate. CT chest showed no pneumonia and lt lower lobe bandlike atelectasis. Started on IV steriods. Pulmonary consulted. EKG NSR 98. TTE with hyperdynamic LVSF. Started on norvasc and hydralazine for hypertension. Cardiology consulted, was unable to tolerate NST due to wheezing. Cardiology recommended outpatient CCTA and following for management of Elevated BP. Diagnosed newly with diabetes. HgA1C 7.1. Diabetic education provided. Will discharge on metformin. Pt has clinically improved and stable for discharge.                  The patient is a 58 year old with a past medical history of hypertension, gout severe persistent asthma, SHERI presented with complaints of 1 week of shortness of breath. Noted to be hypertensive in ED, wheezing. RVP positive for hMPV, mild TYESHA, leucocytosis. CXR showed left lower lobe infiltrate. CT chest showed no pneumonia and lt lower lobe bandlike atelectasis. Started on IV steriods. Pulmonary consulted. EKG NSR 98. TTE with hyperdynamic LVSF. Started on norvasc and hydralazine for hypertension. Cardiology consulted, was unable to tolerate NST due to wheezing. Cardiology recommended outpatient CCTA and following for management of Elevated BP. Diagnosed newly with diabetes. HgA1C 7.1. Diabetic education provided.. Will discharge on metformin. Pt has clinically improved and stable for discharge.                  The patient is a 58 year old with a past medical history of hypertension, gout severe persistent asthma, SHERI presented with complaints of 1 week of shortness of breath. Noted to be hypertensive in ED, wheezing. RVP positive for hMPV, mild TYESHA, leucocytosis. CXR showed left lower lobe infiltrate. CT chest showed no pneumonia and lt lower lobe bandlike atelectasis. Started on IV steriods. Pulmonary consulted. EKG NSR 98. TTE with hyperdynamic LVSF. Started on norvasc and hydralazine for hypertension. Cardiology consulted, was unable to tolerate NST due to wheezing. Cardiology recommended outpatient CCTA and following for management of Elevated BP and adjusted medications which improved BP. Diagnosed newly with diabetes. HgA1C 7.1. Diabetic education provided. Will discharge on metformin. Pt has clinically improved and stable for discharge.

## 2024-03-19 NOTE — DISCHARGE NOTE PROVIDER - NSDCCPCAREPLAN_GEN_ALL_CORE_FT
Physical Therapy Visit    Referred by: Patrice Davis MD; Medical Diagnosis (from order):    Diagnosis Information      Diagnosis    V43.65 (ICD-9-CM) - Z96.652 (ICD-10-CM) - S/P total knee arthroplasty, left              Visit: 12    Visit Type: Daily Treatment Note    SUBJECTIVE                                                                                                               Knee continues to feel really stiff. Frustrated as she's not sure what else to do.   Pain / Symptoms:  Pain rating (out of 10): Current: 4     OBJECTIVE                                                                                                                        TREATMENT                                                                                                                  Therapeutic Exercise:  Nustep workload 4 - seat 9 x 2 min, seat 8 x 2 min, seat 7 x 2 min    Seated heel slides with foot on scooter - 10x short hold   Supine heel slides - 10x  Sit to stand without UE support, lowered mat table - 10x   Standing, foot on chair, quad stretch - 1 min   Split squat with left foot on chair - 10x   Wall squat - 10x   Placing left foot on 12\" step - 10x   Foot on 12\" step, knee flexion stretch - 5x 5 sec hold  Upright bike, seat 5, working arc of motion into full revolutions backwards   Ambulation in gym, no assistive device, cuing to improve left knee flexion during swing phase and toe off at terminal stance    Manual Therapy:  Patient sitting, left LE:  - Tibiofemoral joint distraction  - Tibiofemoral joint distraction with passive knee flexion  - Tibiofemoral joint distraction with AP and PA tibiofemoral joint mobilizations in ~90 degree flexion    Patient supine:  - Education on self STM to left quadricepts and IT band with use of mobilization stick / rolling pin  - Bucket handle mobilizations, working into improved knee flexion     - Soft tissue mobilization to left knee into hamstrings and quadriceps with  therapist hands  - Patellar mobilizations in all directions  - Grade 3 AP and PA tibiofemoral joint mobilizations, moving further into flexion    Skilled input: verbal instruction/cues, tactile instruction/cues and as detailed above    Writer verbally educated and received verbal consent for hand placement, positioning of patient, and techniques to be performed today from patient for clothing adjustments for techniques, hand placement and palpation for techniques and therapist position for techniques as described above and how they are pertinent to the patient's plan of care.    Home Exercise Program/Education Materials:   Access Code: [email or web address]  URL: [email or web address]  Date: 08/10/2022  Prepared by: Irlanda Izquierdo    Exercises  Supine Heel Slide - 3 x daily - 7 x weekly - 10 reps - 2-3 sec hold  Seated Long Arc Quad - 2 x daily - 7 x weekly - 10 reps - 5 sec hold  Heel rises with counter support - 2 x daily - 7 x weekly - 15 reps  Standing Knee Flexion with Counter Support - 2 x daily - 7 x weekly - 10 reps  Prone Knee Extension Hang - 2-3 x daily - 7 x weekly - 3 sets - 2 minutes + hold  Mini Squat with Counter Support - 2 x daily - 7 x weekly - 10 reps  Prone Knee Flexion - 2 x daily - 7 x weekly - 10 reps     ASSESSMENT                                                                                                             Focus of today's therapy session on left knee ROM, specifically working into knee flexion. Completion of manual interventions, education in self STM through thigh muscles, exercises focusing on knee flexion strengthening through full available motion, and knee flexion stretching exercises. Discussion about daily functional tasks where patient can continue to incorporate knee flexion throughout her day to continue improving this motion. Even with fairly aggressive manual interventions, patient obtains ~93 degrees flexion with hard end feel. No new exercises added to  her HEP, just self STM and to continue to push her flexion.   Pain/symptoms after session (out of 10): 4  Patient Education:   Results of above outlined education: Verbalizes understanding, Demonstrates understanding and Needs reinforcement      PLAN                                                                                                                           Suggestions for next session as indicated:   Progress per plan of care  Nustep into Upright bike  Continue with manual interventions to improve knee flexion  Quad specific strengthening         Therapy procedure time and total treatment time can be found documented on the Time Entry flowsheet   PRINCIPAL DISCHARGE DIAGNOSIS  Diagnosis: Hypertensive emergency  Assessment and Plan of Treatment: Please take medications as prescribed and follow up with a Cardiologist. Recommend Cardiac CTA.      SECONDARY DISCHARGE DIAGNOSES  Diagnosis: Acute asthma exacerbation  Assessment and Plan of Treatment: Please take medications as prescribed and follow up with Primary Medical Provider or Pulmonologist.    Diagnosis: Diabetes mellitus, new onset  Assessment and Plan of Treatment: HgA1C 7.1. Please take medications as prescribed and follow up with your Primary Care Provider or Endocrinologist. Please follow a consistent carbohydrate diet.

## 2024-03-19 NOTE — DISCHARGE NOTE PROVIDER - CARE PROVIDER_API CALL
Antonino Rene  Critical Care Medicine  [address], [address]  [place] [id number]  Phone: [phone]  Fax: [phone]  Follow Up Time:     Hesham Ribeiro  Cardiovascular Disease  [address], [address] [place], [place] [address]  Phone: [phone]  Fax: [phone]  Follow Up Time:

## 2024-03-19 NOTE — DISCHARGE NOTE NURSING/CASE MANAGEMENT/SOCIAL WORK - NSDCPEFALRISK_GEN_ALL_CORE
For information on Fall & Injury Prevention, visit: [email or web address] OR  [email or web address] OR  https://www.cdc.gov/steadi/patient.html

## 2024-03-29 ENCOUNTER — INPATIENT (INPATIENT)
Facility: HOSPITAL | Age: 59
LOS: 32 days | Discharge: HOME | DRG: 684 | End: 2024-05-01
Attending: STUDENT IN AN ORGANIZED HEALTH CARE EDUCATION/TRAINING PROGRAM | Admitting: STUDENT IN AN ORGANIZED HEALTH CARE EDUCATION/TRAINING PROGRAM
Payer: COMMERCIAL

## 2024-03-29 VITALS
HEART RATE: 105 BPM | SYSTOLIC BLOOD PRESSURE: 105 MMHG | HEIGHT: 67 IN | RESPIRATION RATE: 18 BRPM | DIASTOLIC BLOOD PRESSURE: 63 MMHG | OXYGEN SATURATION: 96 % | WEIGHT: 210.1 LBS | TEMPERATURE: 98 F

## 2024-03-29 DIAGNOSIS — N17.9 ACUTE KIDNEY FAILURE, UNSPECIFIED: ICD-10-CM

## 2024-03-29 LAB
ALBUMIN SERPL ELPH-MCNC: 3.1 G/DL — LOW (ref 3.3–5.2)
ALP SERPL-CCNC: 88 U/L — SIGNIFICANT CHANGE UP (ref 40–120)
ALT FLD-CCNC: 33 U/L — SIGNIFICANT CHANGE UP
ANION GAP SERPL CALC-SCNC: 15 MMOL/L — SIGNIFICANT CHANGE UP (ref 5–17)
APPEARANCE UR: CLEAR — SIGNIFICANT CHANGE UP
AST SERPL-CCNC: 35 U/L — SIGNIFICANT CHANGE UP
BASOPHILS # BLD AUTO: 0.05 K/UL — SIGNIFICANT CHANGE UP (ref 0–0.2)
BASOPHILS NFR BLD AUTO: 0.6 % — SIGNIFICANT CHANGE UP (ref 0–2)
BILIRUB SERPL-MCNC: 0.5 MG/DL — SIGNIFICANT CHANGE UP (ref 0.4–2)
BILIRUB UR-MCNC: NEGATIVE — SIGNIFICANT CHANGE UP
BUN SERPL-MCNC: 33.5 MG/DL — HIGH (ref 8–20)
CALCIUM SERPL-MCNC: 9.2 MG/DL — SIGNIFICANT CHANGE UP (ref 8.4–10.5)
CHLORIDE SERPL-SCNC: 99 MMOL/L — SIGNIFICANT CHANGE UP (ref 96–108)
CO2 SERPL-SCNC: 23 MMOL/L — SIGNIFICANT CHANGE UP (ref 22–29)
COLOR SPEC: YELLOW — SIGNIFICANT CHANGE UP
CREAT SERPL-MCNC: 3.13 MG/DL — HIGH (ref 0.5–1.3)
DIFF PNL FLD: NEGATIVE — SIGNIFICANT CHANGE UP
EGFR: 22 ML/MIN/1.73M2 — LOW
EOSINOPHIL # BLD AUTO: 0.26 K/UL — SIGNIFICANT CHANGE UP (ref 0–0.5)
EOSINOPHIL NFR BLD AUTO: 3.4 % — SIGNIFICANT CHANGE UP (ref 0–6)
GLUCOSE SERPL-MCNC: 94 MG/DL — SIGNIFICANT CHANGE UP (ref 70–99)
GLUCOSE UR QL: NEGATIVE MG/DL — SIGNIFICANT CHANGE UP
HCT VFR BLD CALC: 37.6 % — LOW (ref 39–50)
HGB BLD-MCNC: 12.5 G/DL — LOW (ref 13–17)
IMM GRANULOCYTES NFR BLD AUTO: 0.9 % — SIGNIFICANT CHANGE UP (ref 0–0.9)
KETONES UR-MCNC: NEGATIVE MG/DL — SIGNIFICANT CHANGE UP
LEUKOCYTE ESTERASE UR-ACNC: NEGATIVE — SIGNIFICANT CHANGE UP
LYMPHOCYTES # BLD AUTO: 1.95 K/UL — SIGNIFICANT CHANGE UP (ref 1–3.3)
LYMPHOCYTES # BLD AUTO: 25.2 % — SIGNIFICANT CHANGE UP (ref 13–44)
MCHC RBC-ENTMCNC: 28.7 PG — SIGNIFICANT CHANGE UP (ref 27–34)
MCHC RBC-ENTMCNC: 33.2 GM/DL — SIGNIFICANT CHANGE UP (ref 32–36)
MCV RBC AUTO: 86.2 FL — SIGNIFICANT CHANGE UP (ref 80–100)
MONOCYTES # BLD AUTO: 0.78 K/UL — SIGNIFICANT CHANGE UP (ref 0–0.9)
MONOCYTES NFR BLD AUTO: 10.1 % — SIGNIFICANT CHANGE UP (ref 2–14)
NEUTROPHILS # BLD AUTO: 4.62 K/UL — SIGNIFICANT CHANGE UP (ref 1.8–7.4)
NEUTROPHILS NFR BLD AUTO: 59.8 % — SIGNIFICANT CHANGE UP (ref 43–77)
NITRITE UR-MCNC: NEGATIVE — SIGNIFICANT CHANGE UP
PH UR: 5.5 — SIGNIFICANT CHANGE UP (ref 5–8)
PLATELET # BLD AUTO: 255 K/UL — SIGNIFICANT CHANGE UP (ref 150–400)
POTASSIUM SERPL-MCNC: 4 MMOL/L — SIGNIFICANT CHANGE UP (ref 3.5–5.3)
POTASSIUM SERPL-SCNC: 4 MMOL/L — SIGNIFICANT CHANGE UP (ref 3.5–5.3)
PROT SERPL-MCNC: 7.1 G/DL — SIGNIFICANT CHANGE UP (ref 6.6–8.7)
PROT UR-MCNC: SIGNIFICANT CHANGE UP MG/DL
RBC # BLD: 4.36 M/UL — SIGNIFICANT CHANGE UP (ref 4.2–5.8)
RBC # FLD: 14.5 % — SIGNIFICANT CHANGE UP (ref 10.3–14.5)
SODIUM SERPL-SCNC: 137 MMOL/L — SIGNIFICANT CHANGE UP (ref 135–145)
SP GR SPEC: 1.01 — SIGNIFICANT CHANGE UP (ref 1–1.03)
UROBILINOGEN FLD QL: 0.2 MG/DL — SIGNIFICANT CHANGE UP (ref 0.2–1)
WBC # BLD: 7.73 K/UL — SIGNIFICANT CHANGE UP (ref 3.8–10.5)
WBC # FLD AUTO: 7.73 K/UL — SIGNIFICANT CHANGE UP (ref 3.8–10.5)

## 2024-03-29 PROCEDURE — 99223 1ST HOSP IP/OBS HIGH 75: CPT

## 2024-03-29 PROCEDURE — 99285 EMERGENCY DEPT VISIT HI MDM: CPT

## 2024-03-29 PROCEDURE — 76775 US EXAM ABDO BACK WALL LIM: CPT | Mod: 26

## 2024-03-29 RX ORDER — SODIUM CHLORIDE 9 MG/ML
1000 INJECTION INTRAMUSCULAR; INTRAVENOUS; SUBCUTANEOUS ONCE
Refills: 0 | Status: COMPLETED | OUTPATIENT
Start: 2024-03-29 | End: 2024-03-29

## 2024-03-29 RX ADMIN — SODIUM CHLORIDE 1000 MILLILITER(S): 9 INJECTION INTRAMUSCULAR; INTRAVENOUS; SUBCUTANEOUS at 20:33

## 2024-03-29 NOTE — ED ADULT TRIAGE NOTE - GLASGOW COMA SCALE: SCORE, MLM
"Geoff Pérez MA                          "
15

## 2024-03-29 NOTE — ED STATDOCS - PROGRESS NOTE DETAILS
called by his MD for recent lab results  Showed me on his phone that his CRE 3.76 Was in [facility] inpt approx 10 days ago had mild ajit  needs main room eval

## 2024-03-29 NOTE — ED ADULT NURSE REASSESSMENT NOTE - NS ED NURSE REASSESS COMMENT FT1
patient informed plan of care and states understanding, call bell at reach and lights dimmed for comfort.

## 2024-03-29 NOTE — H&P ADULT - TIME BILLING
Labs/imaging/notes reviewed. Prior hospitalization reviewed. Nephro consulted. med rec confirmed. orders placed.

## 2024-03-29 NOTE — H&P ADULT - HISTORY OF PRESENT ILLNESS
Patient seen/examined prior to midnight on 3/29/24.     58M with PMHX Asthma, HTN, HLD, DM2, Gout presents to [facility] ER for abnormal lab work. Patient recently hospitalizated for acute asthma exacerbation and viral URI found to have uncontrolled HTN and new onset DM2 at that time discharged home with outpatient follow-up and sent back to [facility] ER today by PCP for abnormal kidney function. ROS +Fatigue and +Malaise. No other complaints. Denies CVAT or back pain. No urinary complaints. Compliant with meds. Recently started on Losartan, HCTZ, and Metformin on discharge and by PCP. +Tylenol use at home. Denies NSAIDs.     PMHX: Asthma, HTN, HLD, DM2, Gout  PSHX: Denies  Social Hx: Denies etoh/tobacco/drug abuse  FamHx: +DM2 +Lung CA  NKDA

## 2024-03-29 NOTE — H&P ADULT - NSHPPHYSICALEXAM_GEN_ALL_CORE
Vital Signs Last 24 Hrs  T(C): 36.6 (29 Mar 2024 17:32), Max: 36.6 (29 Mar 2024 17:32)  T(F): 97.8 (29 Mar 2024 17:32), Max: 97.8 (29 Mar 2024 17:32)  HR: 105 (29 Mar 2024 17:32) (105 - 105)  BP: 105/63 (29 Mar 2024 17:32) (105/63 - 105/63)  BP(mean): --  RR: 18 (29 Mar 2024 17:32) (18 - 18)  SpO2: 96% (29 Mar 2024 17:32) (96% - 96%)    Parameters below as of 29 Mar 2024 17:32  Patient On (Oxygen Delivery Method): room air    Constitutional: Well-appearing, NAD, VSS  Head: NC/AT  Eyes: PERRL, EOMI, anicteric sclera, conjunctiva WNL  ENT: Normal Pharynx, MMM, No tonsillar exudate/erythema  Neck: Supple, Non-tender  Chest: Non-tender, no rashes  Cardio: RRR, s1/s2, no appreciable murmurs/rubs/gallops  Resp: BS CTA bilaterally, no wheezing/rhonchi/rales  Abd: Soft, Non-tender, Non-distended, no rebound/guarding/rigidity  : not examined  Rectal: not examined  MSK: moving all extremities, no motor weakness, full ROM x4  Ext: palpable distal pulses, good capillary refill, no clubbing/cyanosis/edema  Psych: appropriate, cooperative  Neuro: CN II-XII grossly intact, no focal deficits  Skin: Warm/Dry. No rashes.

## 2024-03-29 NOTE — ED PROVIDER NOTE - OBJECTIVE STATEMENT
58-year-old male, recent admission for viral URI complicated by severe persistent asthma, requiring inpatient respiratory support, and aggressive steroid treatment; returns now for increasing fatigue and malaise over the last several days.  On outpatient labs, doctor called him today and reported his kidney function had dramatically elevated, and requested that he return to the hospital.  Subjectively, patient only is describing generalized symptoms of fatigue and malaise.

## 2024-03-29 NOTE — ED ADULT TRIAGE NOTE - GLASGOW COMA SCALE: BEST VERBAL RESPONSE, MLM
(V5) oriented Opioid Pregnancy And Lactation Text: These medications can lead to premature delivery and should be avoided during pregnancy. These medications are also present in breast milk in small amounts.

## 2024-03-29 NOTE — ED ADULT TRIAGE NOTE - CHIEF COMPLAINT QUOTE
Pt seen at PCP for lab work, found to have elevated kidney function and referred to ED. Pt endorses weakness, denies pain, urinary s/s.

## 2024-03-29 NOTE — H&P ADULT - ASSESSMENT
ASSESSMENT:  58M with PMHX Asthma, HTN, HLD, DM2, Gout presents to [facility] ER for abnormal lab work admitted for ARF r/o medication induced ARF.     PLAN:  ARF  -Suspect medication induced from recent introduction of ARB/HCTZ/Metformin  -Cr 3.13 with prior Cr 1.08  -UA negative  -Renal US negative  -Hold above meds. Avoid Nephrotoxins.  -Renally dose meds  -Check Urine Studies  -Check Uric Acid and CPK  -1L IVFB NSS in ED  -Gentle Hydration IVF LR 100cc/hr  -Repeat Labs/trend BMP  -Nephro Consult ([facility])    HTN, HLD  -Amlodipine 10mg q24  -Hold ARB and HCTZ for now  -Not currently on statin therapy    DM2  -Hold Metformin 500mg BID  -ISS/Accuchecks/A1C    Asthma  -Finished steroid taper last Sunday. No current symptoms/hypoxia.  -No longer taking Montelukast  -Wixela/Duoneb PRN    Gout  -Check Uric Acid level  -Takes Allopurinol as needed. Hold for now.     VTE PPX SQH/SCDs

## 2024-03-29 NOTE — ED ADULT NURSE NOTE - OBJECTIVE STATEMENT
patient presents to the ER for  abnormal lab results. patient states that he was called by his PCP for increased creatinine, patient reports feeling weak and denies pain, nausea, vomiting or diarrhea. patient recently started taking Metformin .

## 2024-03-29 NOTE — ED PROVIDER NOTE - CLINICAL SUMMARY MEDICAL DECISION MAKING FREE TEXT BOX
3-fold rise in creatinine since recent hospitalization; assoc with weakness and fatigue; IV hydration; pending renal sono; d/w hospitalist dr garcia at 940pm, accepts admission

## 2024-03-29 NOTE — ED PROVIDER NOTE - CCCP TRG CHIEF CMPLNT
[de-identified] : SORE THROAT [FreeTextEntry6] : c/o ST  exposed to strep dec appetite no fever sx x 1 day  abnormal lab result

## 2024-03-30 LAB
ANION GAP SERPL CALC-SCNC: 15 MMOL/L — SIGNIFICANT CHANGE UP (ref 5–17)
APPEARANCE UR: CLEAR — SIGNIFICANT CHANGE UP
BILIRUB UR-MCNC: NEGATIVE — SIGNIFICANT CHANGE UP
BUN SERPL-MCNC: 29.5 MG/DL — HIGH (ref 8–20)
CALCIUM SERPL-MCNC: 8.7 MG/DL — SIGNIFICANT CHANGE UP (ref 8.4–10.5)
CHLORIDE SERPL-SCNC: 100 MMOL/L — SIGNIFICANT CHANGE UP (ref 96–108)
CK MB CFR SERPL CALC: 1.9 NG/ML — SIGNIFICANT CHANGE UP (ref 0–6.7)
CK SERPL-CCNC: 166 U/L — SIGNIFICANT CHANGE UP (ref 30–200)
CO2 SERPL-SCNC: 19 MMOL/L — LOW (ref 22–29)
COLOR SPEC: YELLOW — SIGNIFICANT CHANGE UP
CREAT ?TM UR-MCNC: 97 MG/DL — SIGNIFICANT CHANGE UP
CREAT SERPL-MCNC: 2.39 MG/DL — HIGH (ref 0.5–1.3)
DIFF PNL FLD: NEGATIVE — SIGNIFICANT CHANGE UP
EGFR: 30 ML/MIN/1.73M2 — LOW
GLUCOSE BLDC GLUCOMTR-MCNC: 93 MG/DL — SIGNIFICANT CHANGE UP (ref 70–99)
GLUCOSE BLDC GLUCOMTR-MCNC: 93 MG/DL — SIGNIFICANT CHANGE UP (ref 70–99)
GLUCOSE BLDC GLUCOMTR-MCNC: 94 MG/DL — SIGNIFICANT CHANGE UP (ref 70–99)
GLUCOSE BLDC GLUCOMTR-MCNC: 99 MG/DL — SIGNIFICANT CHANGE UP (ref 70–99)
GLUCOSE SERPL-MCNC: 85 MG/DL — SIGNIFICANT CHANGE UP (ref 70–99)
GLUCOSE UR QL: NEGATIVE MG/DL — SIGNIFICANT CHANGE UP
HCT VFR BLD CALC: 38.6 % — LOW (ref 39–50)
HGB BLD-MCNC: 12.4 G/DL — LOW (ref 13–17)
KETONES UR-MCNC: NEGATIVE MG/DL — SIGNIFICANT CHANGE UP
LEUKOCYTE ESTERASE UR-ACNC: NEGATIVE — SIGNIFICANT CHANGE UP
MAGNESIUM SERPL-MCNC: 1.9 MG/DL — SIGNIFICANT CHANGE UP (ref 1.6–2.6)
MCHC RBC-ENTMCNC: 28.3 PG — SIGNIFICANT CHANGE UP (ref 27–34)
MCHC RBC-ENTMCNC: 32.1 GM/DL — SIGNIFICANT CHANGE UP (ref 32–36)
MCV RBC AUTO: 88.1 FL — SIGNIFICANT CHANGE UP (ref 80–100)
NITRITE UR-MCNC: NEGATIVE — SIGNIFICANT CHANGE UP
OSMOLALITY UR: 280 MOSM/KG — LOW (ref 300–1000)
PH UR: 5.5 — SIGNIFICANT CHANGE UP (ref 5–8)
PHOSPHATE SERPL-MCNC: 2.9 MG/DL — SIGNIFICANT CHANGE UP (ref 2.4–4.7)
PLATELET # BLD AUTO: 219 K/UL — SIGNIFICANT CHANGE UP (ref 150–400)
POTASSIUM SERPL-MCNC: 3.8 MMOL/L — SIGNIFICANT CHANGE UP (ref 3.5–5.3)
POTASSIUM SERPL-SCNC: 3.8 MMOL/L — SIGNIFICANT CHANGE UP (ref 3.5–5.3)
POTASSIUM UR-SCNC: 10 MMOL/L — SIGNIFICANT CHANGE UP
PROT ?TM UR-MCNC: 6 MG/DL — SIGNIFICANT CHANGE UP (ref 0–12)
PROT UR-MCNC: NEGATIVE MG/DL — SIGNIFICANT CHANGE UP
PROT/CREAT UR-RTO: 0.1 RATIO — SIGNIFICANT CHANGE UP
RBC # BLD: 4.38 M/UL — SIGNIFICANT CHANGE UP (ref 4.2–5.8)
RBC # FLD: 14.4 % — SIGNIFICANT CHANGE UP (ref 10.3–14.5)
SODIUM SERPL-SCNC: 134 MMOL/L — LOW (ref 135–145)
SODIUM UR-SCNC: 47 MMOL/L — SIGNIFICANT CHANGE UP
SP GR SPEC: 1.01 — SIGNIFICANT CHANGE UP (ref 1–1.03)
URATE SERPL-MCNC: 12.2 MG/DL — HIGH (ref 3.4–7)
UROBILINOGEN FLD QL: 0.2 MG/DL — SIGNIFICANT CHANGE UP (ref 0.2–1)
WBC # BLD: 7.18 K/UL — SIGNIFICANT CHANGE UP (ref 3.8–10.5)
WBC # FLD AUTO: 7.18 K/UL — SIGNIFICANT CHANGE UP (ref 3.8–10.5)

## 2024-03-30 PROCEDURE — 99233 SBSQ HOSP IP/OBS HIGH 50: CPT

## 2024-03-30 PROCEDURE — 99223 1ST HOSP IP/OBS HIGH 75: CPT

## 2024-03-30 RX ORDER — METFORMIN HYDROCHLORIDE 850 MG/1
1 TABLET ORAL
Refills: 0 | DISCHARGE

## 2024-03-30 RX ORDER — AMLODIPINE BESYLATE 2.5 MG/1
10 TABLET ORAL DAILY
Refills: 0 | Status: DISCONTINUED | OUTPATIENT
Start: 2024-03-30 | End: 2024-05-01

## 2024-03-30 RX ORDER — PANTOPRAZOLE SODIUM 20 MG/1
40 TABLET, DELAYED RELEASE ORAL
Refills: 0 | Status: DISCONTINUED | OUTPATIENT
Start: 2024-03-30 | End: 2024-05-01

## 2024-03-30 RX ORDER — ACETAMINOPHEN 500 MG
650 TABLET ORAL EVERY 6 HOURS
Refills: 0 | Status: DISCONTINUED | OUTPATIENT
Start: 2024-03-30 | End: 2024-05-01

## 2024-03-30 RX ORDER — HEPARIN SODIUM 5000 [USP'U]/ML
5000 INJECTION INTRAVENOUS; SUBCUTANEOUS EVERY 12 HOURS
Refills: 0 | Status: COMPLETED | OUTPATIENT
Start: 2024-03-30 | End: 2024-04-25

## 2024-03-30 RX ORDER — ONDANSETRON 8 MG/1
4 TABLET, FILM COATED ORAL EVERY 8 HOURS
Refills: 0 | Status: DISCONTINUED | OUTPATIENT
Start: 2024-03-30 | End: 2024-05-01

## 2024-03-30 RX ORDER — GLUCAGON INJECTION, SOLUTION 0.5 MG/.1ML
1 INJECTION, SOLUTION SUBCUTANEOUS ONCE
Refills: 0 | Status: DISCONTINUED | OUTPATIENT
Start: 2024-03-30 | End: 2024-05-01

## 2024-03-30 RX ORDER — DEXTROSE 50 % IN WATER 50 %
25 SYRINGE (ML) INTRAVENOUS ONCE
Refills: 0 | Status: DISCONTINUED | OUTPATIENT
Start: 2024-03-30 | End: 2024-05-01

## 2024-03-30 RX ORDER — LANOLIN ALCOHOL/MO/W.PET/CERES
3 CREAM (GRAM) TOPICAL AT BEDTIME
Refills: 0 | Status: DISCONTINUED | OUTPATIENT
Start: 2024-03-30 | End: 2024-05-01

## 2024-03-30 RX ORDER — SODIUM CHLORIDE 9 MG/ML
1000 INJECTION, SOLUTION INTRAVENOUS
Refills: 0 | Status: DISCONTINUED | OUTPATIENT
Start: 2024-03-30 | End: 2024-04-06

## 2024-03-30 RX ORDER — MONTELUKAST 4 MG/1
1 TABLET, CHEWABLE ORAL
Refills: 0 | DISCHARGE

## 2024-03-30 RX ORDER — DEXTROSE 50 % IN WATER 50 %
15 SYRINGE (ML) INTRAVENOUS ONCE
Refills: 0 | Status: DISCONTINUED | OUTPATIENT
Start: 2024-03-30 | End: 2024-05-01

## 2024-03-30 RX ORDER — SODIUM CHLORIDE 9 MG/ML
1000 INJECTION, SOLUTION INTRAVENOUS
Refills: 0 | Status: DISCONTINUED | OUTPATIENT
Start: 2024-03-30 | End: 2024-05-01

## 2024-03-30 RX ORDER — ALBUTEROL 90 UG/1
2 AEROSOL, METERED ORAL EVERY 6 HOURS
Refills: 0 | Status: DISCONTINUED | OUTPATIENT
Start: 2024-03-30 | End: 2024-05-01

## 2024-03-30 RX ORDER — FLUTICASONE PROPIONATE AND SALMETEROL 50; 250 UG/1; UG/1
1 POWDER ORAL; RESPIRATORY (INHALATION)
Refills: 0 | DISCHARGE

## 2024-03-30 RX ORDER — INSULIN LISPRO 100/ML
VIAL (ML) SUBCUTANEOUS
Refills: 0 | Status: DISCONTINUED | OUTPATIENT
Start: 2024-03-30 | End: 2024-05-01

## 2024-03-30 RX ORDER — DEXTROSE 50 % IN WATER 50 %
12.5 SYRINGE (ML) INTRAVENOUS ONCE
Refills: 0 | Status: DISCONTINUED | OUTPATIENT
Start: 2024-03-30 | End: 2024-05-01

## 2024-03-30 RX ADMIN — HEPARIN SODIUM 5000 UNIT(S): 5000 INJECTION INTRAVENOUS; SUBCUTANEOUS at 18:46

## 2024-03-30 RX ADMIN — PANTOPRAZOLE SODIUM 40 MILLIGRAM(S): 20 TABLET, DELAYED RELEASE ORAL at 06:41

## 2024-03-30 RX ADMIN — SODIUM CHLORIDE 100 MILLILITER(S): 9 INJECTION, SOLUTION INTRAVENOUS at 05:38

## 2024-03-30 RX ADMIN — SODIUM CHLORIDE 100 MILLILITER(S): 9 INJECTION, SOLUTION INTRAVENOUS at 21:39

## 2024-03-30 RX ADMIN — SODIUM CHLORIDE 100 MILLILITER(S): 9 INJECTION, SOLUTION INTRAVENOUS at 11:19

## 2024-03-30 RX ADMIN — AMLODIPINE BESYLATE 10 MILLIGRAM(S): 2.5 TABLET ORAL at 05:37

## 2024-03-30 RX ADMIN — HEPARIN SODIUM 5000 UNIT(S): 5000 INJECTION INTRAVENOUS; SUBCUTANEOUS at 05:39

## 2024-03-30 RX ADMIN — SODIUM CHLORIDE 100 MILLILITER(S): 9 INJECTION, SOLUTION INTRAVENOUS at 04:56

## 2024-03-30 NOTE — ED ADULT NURSE REASSESSMENT NOTE - NS ED NURSE REASSESS COMMENT FT1
Transported by RN to cdu 15r, patient is awake, calm, RR even and unlabored, denies sob, denies chest pain, iv patent, tolerating iv fluids well, ambulates with steady gait, aware of plan of care, bed locked in lowest position, call bell placed within reach, belongings sheet handed to CDU rn.

## 2024-03-30 NOTE — CONSULT NOTE ADULT - SUBJECTIVE AND OBJECTIVE BOX
[facility] DIVISION OF KIDNEY DISEASES AND HYPERTENSION -- INITIAL CONSULT NOTE  --------------------------------------------------------------------------------  HPI:        PAST HISTORY  --------------------------------------------------------------------------------  PAST MEDICAL & SURGICAL HISTORY:  Gout      Asthma      Arthritis      HTN (hypertension)      No significant past surgical history        FAMILY HISTORY:  Family history of diabetes mellitus type II    Family history of lung cancer      PAST SOCIAL HISTORY:    ALLERGIES & MEDICATIONS  --------------------------------------------------------------------------------  Allergies    No Known Allergies    Intolerances      Standing Inpatient Medications  amLODIPine   Tablet 10 milliGRAM(s) Oral daily  dextrose 5%. 1000 milliLiter(s) IV Continuous <Continuous>  dextrose 5%. 1000 milliLiter(s) IV Continuous <Continuous>  dextrose 50% Injectable 25 Gram(s) IV Push once  dextrose 50% Injectable 25 Gram(s) IV Push once  dextrose 50% Injectable 12.5 Gram(s) IV Push once  glucagon  Injectable 1 milliGRAM(s) IntraMuscular once  heparin   Injectable 5000 Unit(s) SubCutaneous every 12 hours  insulin lispro (ADMELOG) corrective regimen sliding scale   SubCutaneous Before meals and at bedtime  lactated ringers. 1000 milliLiter(s) IV Continuous <Continuous>  pantoprazole    Tablet 40 milliGRAM(s) Oral before breakfast    PRN Inpatient Medications  acetaminophen     Tablet .. 650 milliGRAM(s) Oral every 6 hours PRN  albuterol    90 MICROgram(s) HFA Inhaler 2 Puff(s) Inhalation every 6 hours PRN  aluminum hydroxide/magnesium hydroxide/simethicone Suspension 30 milliLiter(s) Oral every 4 hours PRN  dextrose Oral Gel 15 Gram(s) Oral once PRN  melatonin 3 milliGRAM(s) Oral at bedtime PRN  ondansetron Injectable 4 milliGRAM(s) IV Push every 8 hours PRN      REVIEW OF SYSTEMS  --------------------------------------------------------------------------------  Gen: No weight changes, fatigue, fevers/chills, weakness  Skin: No rashes  Head/Eyes/Ears/Mouth: No headache; Normal hearing; Normal vision w/o blurriness; No sinus pain/discomfort, sore throat  Respiratory: No dyspnea, cough, wheezing, hemoptysis  CV: No chest pain, PND, orthopnea  GI: No abdominal pain, diarrhea, constipation, nausea, vomiting, melena, hematochezia  : No increased frequency, dysuria, hematuria, nocturia  MSK: No joint pain/swelling; no back pain; no edema  Neuro: No dizziness/lightheadedness, weakness, seizures, numbness, tingling  Heme: No easy bruising or bleeding  Endo: No heat/cold intolerance  Psych: No significant nervousness, anxiety, stress, depression    All other systems were reviewed and are negative, except as noted.    VITALS/PHYSICAL EXAM  --------------------------------------------------------------------------------  T(C): 37.3 (03-30-24 @ 10:03), Max: 37.3 (03-30-24 @ 05:17)  HR: 104 (03-30-24 @ 10:03) (104 - 109)  BP: 129/76 (03-30-24 @ 10:03) (105/63 - 129/76)  RR: 18 (03-30-24 @ 10:03) (18 - 18)  SpO2: 96% (03-30-24 @ 10:03) (91% - 96%)  Wt(kg): --  Height (cm): 170.2 (03-29-24 @ 17:32)  Weight (kg): 95.3 (03-29-24 @ 17:32)  BMI (kg/m2): 32.9 (03-29-24 @ 17:32)  BSA (m2): 2.07 (03-29-24 @ 17:32)      Physical Exam:  	Gen: NAD, well-appearing  	HEENT: PERRL, supple neck, clear oropharynx  	Pulm: CTA B/L  	CV: RRR, S1S2; no rub  	Back: No spinal or CVA tenderness; no sacral edema  	Abd: +BS, soft, nontender/nondistended  	: No suprapubic tenderness  	UE: Warm, FROM, no clubbing, intact strength; no edema; no asterixis  	LE: Warm, FROM, no clubbing, intact strength; no edema  	Neuro: No focal deficits, intact gait  	Psych: Normal affect and mood  	Skin: Warm, without rashes  	Vascular access:    LABS/STUDIES  --------------------------------------------------------------------------------              12.4   7.18  >-----------<  219      [03-30-24 @ 06:45]              38.6     134  |  100  |  29.5  ----------------------------<  85      [03-30-24 @ 06:45]  3.8   |  19.0  |  2.39        Ca     8.7     [03-30-24 @ 06:45]      Mg     1.9     [03-30-24 @ 06:45]      Phos  2.9     [03-30-24 @ 06:45]    TPro  7.1  /  Alb  3.1  /  TBili  0.5  /  DBili  x   /  AST  35  /  ALT  33  /  AlkPhos  88  [03-29-24 @ 20:22]        Uric acid 12.2      [03-30-24 @ 06:45]        [03-30-24 @ 06:45]    Creatinine Trend:  SCr 2.39 [03-30 @ 06:45]  SCr 3.13 [03-29 @ 20:22]  SCr 1.08 [03-18 @ 06:03]  SCr 1.16 [03-17 @ 04:44]  SCr 1.10 [03-16 @ 05:00]    Urinalysis - [03-30-24 @ 06:45]      Color  / Appearance  / SG  / pH       Gluc 85 / Ketone   / Bili  / Urobili        Blood  / Protein  / Leuk Est  / Nitrite       RBC  / WBC  / Hyaline  / Gran  / Sq Epi  / Non Sq Epi  / Bacteria       TSH 0.80      [03-13-24 @ 15:45]  Lipid: chol 180, , , LDL --      [03-14-24 @ 02:35]    HCV 0.08, Nonreact      [03-14-24 @ 02:35]     [facility] DIVISION OF KIDNEY DISEASES AND HYPERTENSION -- INITIAL CONSULT NOTE  --------------------------------------------------------------------------------  HPI:  58 year old M with PMH of Asthma, HTN, HLD, DM2, Gout presents to [facility] ER for abnormal lab work. Patient recently hospitalizated for acute asthma exacerbation and viral URI found to have uncontrolled HTN and new onset DM2 at that time discharged home with outpatient follow-up and sent back to [facility] ER today by PCP for abnormal kidney function. ROS +Fatigue and +Malaise. No other complaints. Denies CVAT or back pain. No urinary complaints. Compliant with meds. Recently started on Losartan, HCTZ, and Metformin on discharge and by PCP. +Tylenol use at home. Denies NSAIDs.   Nephrology consulted for ajit.  pt seen and examined; feels well at this time.  denies h/o kidney disease.      PAST HISTORY  --------------------------------------------------------------------------------  PAST MEDICAL & SURGICAL HISTORY:  Gout      Asthma      Arthritis      HTN (hypertension)      No significant past surgical history        FAMILY HISTORY:  Family history of diabetes mellitus type II    Family history of lung cancer      PAST SOCIAL HISTORY: non smoker    ALLERGIES & MEDICATIONS  --------------------------------------------------------------------------------  Allergies    No Known Allergies    Intolerances      Standing Inpatient Medications  amLODIPine   Tablet 10 milliGRAM(s) Oral daily  dextrose 5%. 1000 milliLiter(s) IV Continuous <Continuous>  dextrose 5%. 1000 milliLiter(s) IV Continuous <Continuous>  dextrose 50% Injectable 25 Gram(s) IV Push once  dextrose 50% Injectable 25 Gram(s) IV Push once  dextrose 50% Injectable 12.5 Gram(s) IV Push once  glucagon  Injectable 1 milliGRAM(s) IntraMuscular once  heparin   Injectable 5000 Unit(s) SubCutaneous every 12 hours  insulin lispro (ADMELOG) corrective regimen sliding scale   SubCutaneous Before meals and at bedtime  lactated ringers. 1000 milliLiter(s) IV Continuous <Continuous>  pantoprazole    Tablet 40 milliGRAM(s) Oral before breakfast    PRN Inpatient Medications  acetaminophen     Tablet .. 650 milliGRAM(s) Oral every 6 hours PRN  albuterol    90 MICROgram(s) HFA Inhaler 2 Puff(s) Inhalation every 6 hours PRN  aluminum hydroxide/magnesium hydroxide/simethicone Suspension 30 milliLiter(s) Oral every 4 hours PRN  dextrose Oral Gel 15 Gram(s) Oral once PRN  melatonin 3 milliGRAM(s) Oral at bedtime PRN  ondansetron Injectable 4 milliGRAM(s) IV Push every 8 hours PRN      REVIEW OF SYSTEMS  --------------------------------------------------------------------------------  Gen: No weight changes, fatigue, fevers/chills, weakness  Skin: No rashes  Head/Eyes/Ears/Mouth: No headache; Normal hearing; Normal vision w/o blurriness; No sinus pain/discomfort, sore throat  Respiratory: No dyspnea, cough, wheezing, hemoptysis  CV: No chest pain, PND, orthopnea  GI: No abdominal pain, diarrhea, constipation, nausea, vomiting, melena, hematochezia  : No increased frequency, dysuria, hematuria, nocturia  MSK: No joint pain/swelling; no back pain; no edema  Neuro: No dizziness/lightheadedness, weakness, seizures, numbness, tingling  Heme: No easy bruising or bleeding  Endo: No heat/cold intolerance  Psych: No significant nervousness, anxiety, stress, depression    All other systems were reviewed and are negative, except as noted.    VITALS/PHYSICAL EXAM  --------------------------------------------------------------------------------  T(C): 37.3 (03-30-24 @ 10:03), Max: 37.3 (03-30-24 @ 05:17)  HR: 104 (03-30-24 @ 10:03) (104 - 109)  BP: 129/76 (03-30-24 @ 10:03) (105/63 - 129/76)  RR: 18 (03-30-24 @ 10:03) (18 - 18)  SpO2: 96% (03-30-24 @ 10:03) (91% - 96%)  Wt(kg): --  Height (cm): 170.2 (03-29-24 @ 17:32)  Weight (kg): 95.3 (03-29-24 @ 17:32)  BMI (kg/m2): 32.9 (03-29-24 @ 17:32)  BSA (m2): 2.07 (03-29-24 @ 17:32)      Physical Exam:  	Gen: NAD, well-appearing  	HEENT:, supple neck, clear oropharynx  	Pulm: CTA B/L  	CV: RRR, S1S2; no rub  	Back: No spinal or CVA tenderness; no sacral edema  	Abd: +BS, soft, nontender/nondistended  	: No suprapubic tenderness  	UE: Warm,  no edema; no asterixis  	LE: Warm; no edema  	Neuro: No focal deficit  	Psych: Normal affect and mood  	Skin: Warm      LABS/STUDIES  --------------------------------------------------------------------------------              12.4   7.18  >-----------<  219      [03-30-24 @ 06:45]              38.6     134  |  100  |  29.5  ----------------------------<  85      [03-30-24 @ 06:45]  3.8   |  19.0  |  2.39        Ca     8.7     [03-30-24 @ 06:45]      Mg     1.9     [03-30-24 @ 06:45]      Phos  2.9     [03-30-24 @ 06:45]    TPro  7.1  /  Alb  3.1  /  TBili  0.5  /  DBili  x   /  AST  35  /  ALT  33  /  AlkPhos  88  [03-29-24 @ 20:22]        Uric acid 12.2      [03-30-24 @ 06:45]        [03-30-24 @ 06:45]    Creatinine Trend:  SCr 2.39 [03-30 @ 06:45]  SCr 3.13 [03-29 @ 20:22]  SCr 1.08 [03-18 @ 06:03]  SCr 1.16 [03-17 @ 04:44]  SCr 1.10 [03-16 @ 05:00]    Urinalysis - [03-30-24 @ 06:45]      Color  / Appearance  / SG  / pH       Gluc 85 / Ketone   / Bili  / Urobili        Blood  / Protein  / Leuk Est  / Nitrite       RBC  / WBC  / Hyaline  / Gran  / Sq Epi  / Non Sq Epi  / Bacteria       TSH 0.80      [03-13-24 @ 15:45]  Lipid: chol 180, , , LDL --      [03-14-24 @ 02:35]    HCV 0.08, Nonreact      [03-14-24 @ 02:35]

## 2024-03-30 NOTE — PATIENT PROFILE ADULT - FOOD INSECURITY
September 7, 2021       Liz Tran MD  [address] [place] [id number]  Via In Basket      Patient: Kim Jones   YOB: 1977   Date of Visit: 9/7/2021       Dear Dr. Tran:    Thank you for referring Kim Jones to me for evaluation. Below are my notes for this visit with her.    If you have questions, please do not hesitate to call me. I look forward to following your patient along with you.      Sincerely,        Caryl Irvin CNP        CC: No Recipients  Caryl Irvin CNP  9/7/2021  2:49 PM  Signed  Chief Complaint/Reason for Visit:   Chief Complaint   Patient presents with   • Office Visit     Pt Presents W/ Chest Pain oN & oFF FoR Past Mnth   • Chest Pain     HISTORY OF PRESENT ILLNESS: Kim Jnoes is a 43 year old female with a past medical history of goiter, tachycardia, obesity, asthma, anxiety, and GERD. She presents to the office today for evaluation of chest pain. She had a lap band removed in May of this year.     She reports that for the past 1 month she has been experiencing chest heaviness.  She mostly experiences the pain when waking up in the morning.  She has lately been sleeping on her back.  She states that she sometimes has more pain when she lies on her left side.  She reports that her asthma has been well controlled.  She also sometimes reports that sharp feeling on the left side of her chest.  The pain is relieved with ibuprofen.    REVIEW OF SYSTEMS:  Review of Systems   Constitutional: Negative for chills and fever.   HENT: Negative for nosebleeds.    Eyes: Negative for blurred vision.   Cardiovascular: Positive for chest pain. Negative for dyspnea on exertion, leg swelling, orthopnea, palpitations and paroxysmal nocturnal dyspnea.   Respiratory: Negative for cough, shortness of breath and sleep disturbances due to breathing.    Hematologic/Lymphatic: Does not bruise/bleed easily.   Skin: Negative for color change and poor wound healing.   Musculoskeletal:  Negative for arthritis and myalgias.   Gastrointestinal: Negative for hematemesis and melena.   Genitourinary: Negative for dysuria and hematuria.   Neurological: Negative for dizziness and light-headedness.   Psychiatric/Behavioral: Negative for altered mental status.   Allergic/Immunologic: Negative for environmental allergies.     PAST MEDICAL HISTORY:   Past Medical History:   Diagnosis Date   • Anxiety    • Asthma 5/9/2019    no ER visits n last 3 months   • Bilateral calf pain 5/18/2021    Patient has had intermittent calf pain in the past.     • Bronchitis    • Chest pain 5/14/2019   • Chronic cough    • Chronic lumbar radiculopathy 4/28/2021   • Constipation 5/24/2021   • Depressive disorder    • Diabetes mellitus (CMS/[facility])     pre-diabetic, managing with diet   • GERD (gastroesophageal reflux disease) 5/9/2019   • Heartburn    • Hoarseness    • Hypotension    • Intra-abdominal adhesions 5/17/2021   • Irregular heartbeat     fast heart rate   • Leukemoid reaction 5/18/2021    Has had elevated white blood cell count in the past 3 months ago   • Localized swelling, mass and lump, lower limb    • Low back pain    • Lumps on the skin    • Mild intermittent asthma    • Obesity, morbid (CMS/[facility]) 8/4/2020    Band system removal 5/17/2021.  No significant lower abdominal adhesions   • Other dietary vitamin B12 deficiency anemia     Low will need to start post op   • Panniculitis    • PONV (postoperative nausea and vomiting)     father gets nausea and vomitting   • Post-surgical hypothyroidism 1/25/2021   • Pregnancy     x2   • Spontaneous ecchymoses    • Tachycardia, unspecified    • Thyroid disease     thyroid goiter and nodules   • Trouble swallowing    • Weight loss      PAST SURGICAL HISTORY:   Past Surgical History:   Procedure Laterality Date   • ------------other-------------      endoscopy   • Abdominal adhesion surgery  05/17/2021    Robotic assisted, 40 minutes   • Bariatric laparoscopic band self pay      • Esophagogastroduodenoscopy  05/17/2021   • Laparoscopic gastric banding  2003    at [facility]   • Laparoscopic gastric banding  05/17/2021    Band removal, robotic assisted.   • Thyroidectomy  01/2021     FAMILY HISTORY:   Family History   Problem Relation Age of Onset   • Thyroid Mother    • Hypertension Mother    • Kidney disease Father    • Heart disease Father    • COPD Father    • Diabetes Father    • Hypertension Father    • Cancer Other      SOCIAL HISTORY:   Social History     Tobacco Use   • Smoking status: Former Smoker     Packs/day: 1.00     Years: 25.00     Pack years: 25.00     Quit date: 4/28/2011     Years since quitting: 10.3   • Smokeless tobacco: Never Used   Vaping Use   • Vaping Use: never used   Substance Use Topics   • Alcohol use: Not Currently   • Drug use: Never       Drug Use:    Never           ALLERGIES:   ALLERGIES:   Allergen Reactions   • Lexapro MYALGIA     MEDICATIONS:   Current Outpatient Medications   Medication Sig Dispense Refill   • metoCLOPramide (REGLAN) 5 MG tablet Take 5 mg by mouth 2 times daily.      • Dexlansoprazole (Dexilant) 60 MG capsule Take 60 mg by mouth daily.      • semaglutide,0.25 or 0.5 mg/DOSE, (Ozempic, 0.25 or 0.5 MG/DOSE,) (1.34 mg/ml) 0.25 or 0.5 MG/DOSE injection Inject 1 mg into the skin 1 day a week.      • Vitamin D, Ergocalciferol, 1.25 mg (50,000 units) capsule TAKE 1 CAPSULE BY MOUTH 1 DAY A WEEK. 12 capsule 0   • levothyroxine 150 MCG tablet Take 1 tablet by mouth 6 days a week. 90 tablet 1   • Advair Diskus 500-50 MCG/DOSE inhaler TAKE 1 PUFF BY MOUTH TWICE A DAY     • albuterol 108 (90 Base) MCG/ACT inhaler 2 puffs as needed.     • ALPRAZolam (XANAX) 0.5 MG tablet Take 0.5 mg by mouth 3 times daily as needed.        No current facility-administered medications for this visit.     PHYSICAL  EXAMINATION  Visit Vitals  /80 (BP Location: LUE - Left upper extremity, Patient Position: Sitting)   Pulse 98   Resp 18   Ht 5' 3\" (1.6 m)   Wt 125.2 kg  (276 lb)   LMP 05/15/2021 (Approximate)   SpO2 97%   BMI 48.89 kg/m²     Physical Exam  Vitals reviewed.   Constitutional:       General: She is not in acute distress.     Appearance: Normal appearance. She is well-developed. She is obese. She is not diaphoretic.   HENT:      Head: Normocephalic and atraumatic.   Eyes:      Conjunctiva/sclera: Conjunctivae normal.   Neck:      Vascular: No carotid bruit or JVD.   Cardiovascular:      Rate and Rhythm: Normal rate and regular rhythm.      Pulses: Normal pulses.      Heart sounds: S1 normal and S2 normal. No murmur heard.     Pulmonary:      Effort: Pulmonary effort is normal. No respiratory distress.      Breath sounds: Normal breath sounds. No wheezing or rales.   Chest:      Chest wall: No tenderness.   Abdominal:      General: There is no distension.      Palpations: Abdomen is soft.   Musculoskeletal:         General: Normal range of motion.      Cervical back: Normal range of motion.   Skin:     General: Skin is warm and dry.   Neurological:      Mental Status: She is alert and oriented to person, place, and time.   Psychiatric:         Mood and Affect: Mood normal.         Behavior: Behavior normal.       Diagnostic Testing:     [facility] ECHO 11/05/2020  SUMMARY:     1. Stress echo: Left ventricular ejection fraction was normal at rest and with stress. There is no evidence for stress-induced ischemia.  2. Procedure narrative: Treadmill exercise testing was performed using the Husam protocol. The patient exercised for 4 min 30 sec, to protocol stage 2, to a maximal work rate of 7mets. Exercise was terminated due to at the patient's request, achievement of target heart rate, and mild fatigue. Post-stress images obtained within 90 seconds of peak stress. The patient was positioned for image acquisition and recovery monitoring.      HOLTER 01/2021  IMPRESSION: This Holter monitor study revealed normal sinus node function with normal atrioventricular conduction. There  was almost no supraventricular ectopy. There were no episodes of atrial flutter or fibrillation. There was no ventricular ectopy.The patient did not report any symptoms.     Labs:    Recent Labs   Lab 04/23/21  0905   Cholesterol 219*   HDL 60   Triglycerides 104   CALCLDL 138*   Non-HDL Cholesterol 159     Recent Labs   Lab 06/14/21  0933   Sodium 141   Chloride 110*   BUN 11   BUN/ Creatinine Ratio 15   Potassium 4.3   Glucose 114*   Creatinine 0.72   Calcium 8.7   TSH 4.262     IMPRESSION/PLAN:    1. Chest pain, unspecified type  -Atypical chest pain  -Likely musculoskeletal  -Nonischemic stress echocardiogram November 2020  -EKG today sinus rhythm with incomplete left bundle branch block    2. Tachycardia, unspecified  -History of inappropriate sinus tachycardia  -Holter monitor reviewed  -Last TSH WNL  -Now resolved     3. Class 3 severe obesity due to excess calories with body mass index (BMI) of 50.0 to 59.9 in adult, unspecified whether serious comorbidity present (CMS/[facility])  -Lap band removal in May 2021  -Following with bariatrics    9/7/2021    Return for follow up as needed.    Caryl Irvin CNP   [facility]                no

## 2024-03-30 NOTE — PROGRESS NOTE ADULT - SUBJECTIVE AND OBJECTIVE BOX
SUBJECTIVE / OVERNIGHT EVENTS: Seen and examined. feels a little better. energy improved. Creatinine improving. Denies chest pain, SOB, abd pain, N/V, fever, chills, dysuria.     MEDICATIONS  (STANDING):  amLODIPine   Tablet 10 milliGRAM(s) Oral daily  dextrose 5%. 1000 milliLiter(s) (50 mL/Hr) IV Continuous <Continuous>  dextrose 5%. 1000 milliLiter(s) (100 mL/Hr) IV Continuous <Continuous>  dextrose 50% Injectable 25 Gram(s) IV Push once  dextrose 50% Injectable 25 Gram(s) IV Push once  dextrose 50% Injectable 12.5 Gram(s) IV Push once  glucagon  Injectable 1 milliGRAM(s) IntraMuscular once  heparin   Injectable 5000 Unit(s) SubCutaneous every 12 hours  insulin lispro (ADMELOG) corrective regimen sliding scale   SubCutaneous Before meals and at bedtime  lactated ringers. 1000 milliLiter(s) (100 mL/Hr) IV Continuous <Continuous>  pantoprazole    Tablet 40 milliGRAM(s) Oral before breakfast    MEDICATIONS  (PRN):  acetaminophen     Tablet .. 650 milliGRAM(s) Oral every 6 hours PRN Temp greater or equal to 38C (100.4F), Mild Pain (1 - 3)  albuterol    90 MICROgram(s) HFA Inhaler 2 Puff(s) Inhalation every 6 hours PRN Shortness of Breath and/or Wheezing  aluminum hydroxide/magnesium hydroxide/simethicone Suspension 30 milliLiter(s) Oral every 4 hours PRN Dyspepsia  dextrose Oral Gel 15 Gram(s) Oral once PRN Blood Glucose LESS THAN 70 milliGRAM(s)/deciliter  melatonin 3 milliGRAM(s) Oral at bedtime PRN Insomnia  ondansetron Injectable 4 milliGRAM(s) IV Push every 8 hours PRN Nausea and/or Vomiting        I&O's Summary      PHYSICAL EXAM:  Vital Signs Last 24 Hrs  T(C): 37.3 (30 Mar 2024 10:03), Max: 37.3 (30 Mar 2024 05:17)  T(F): 99.1 (30 Mar 2024 10:03), Max: 99.2 (30 Mar 2024 05:17)  HR: 104 (30 Mar 2024 10:03) (104 - 109)  BP: 129/76 (30 Mar 2024 10:03) (105/63 - 129/76)  BP(mean): --  RR: 18 (30 Mar 2024 10:03) (18 - 18)  SpO2: 96% (30 Mar 2024 10:03) (91% - 96%)    Parameters below as of 30 Mar 2024 10:03  Patient On (Oxygen Delivery Method): room air            CONSTITUTIONAL: NAD, Obese  RESPIRATORY: Normal respiratory effort; lungs are clear to auscultation bilaterally  CARDIOVASCULAR: Regular rate and rhythm, normal S1 and S2, no murmur/rub/gallop; No lower extremity edema; Peripheral pulses are 2+ bilaterally  ABDOMEN: Nontender to palpation, normoactive bowel sounds, no rebound/guarding; No hepatosplenomegaly  MUSCLOSKELETAL:  Normal gait; no clubbing or cyanosis of digits; no joint swelling or tenderness to palpation  PSYCH: A+O to person, place, and time; affect appropriate  NEUROLOGY: CN 2-12 are intact and symmetric; no gross sensory deficits;   SKIN: No rashes; no palpable lesions    LABS:                        12.4   7.18  )-----------( 219      ( 30 Mar 2024 06:45 )             38.6     03-30    134<L>  |  100  |  29.5<H>  ----------------------------<  85  3.8   |  19.0<L>  |  2.39<H>    Ca    8.7      30 Mar 2024 06:45  Phos  2.9     03-30  Mg     1.9     03-30    TPro  7.1  /  Alb  3.1<L>  /  TBili  0.5  /  DBili  x   /  AST  35  /  ALT  33  /  AlkPhos  88  03-29      CARDIAC MARKERS ( 30 Mar 2024 06:45 )  x     / x     / 166 U/L / x     / 1.9 ng/mL      Urinalysis Basic - ( 30 Mar 2024 06:45 )    Color: x / Appearance: x / SG: x / pH: x  Gluc: 85 mg/dL / Ketone: x  / Bili: x / Urobili: x   Blood: x / Protein: x / Nitrite: x   Leuk Esterase: x / RBC: x / WBC x   Sq Epi: x / Non Sq Epi: x / Bacteria: x        CAPILLARY BLOOD GLUCOSE      POCT Blood Glucose.: 94 mg/dL (30 Mar 2024 08:37)        RADIOLOGY & ADDITIONAL TESTS:  Results Reviewed:   Imaging Personally Reviewed:  Electrocardiogram Personally Reviewed:

## 2024-03-30 NOTE — PROGRESS NOTE ADULT - ASSESSMENT
ASSESSMENT:  58M with PMHX Asthma, HTN, HLD, DM2, Gout presents to [facility] ER for abnormal lab work admitted for ARF r/o medication induced ARF.     ARF (Improving)  -Suspect medication induced from recent introduction of ARB/HCTZ/Metformin  -Cr 3.13 with prior Cr 1.08  -UA negative  -Renal US negative  -Hold above meds. Avoid Nephrotoxins.  -Renally dose meds  -1L IVFB NSS in ED  -Gentle Hydration IVF LR 100cc/hr  -Nephro Consult pending    HTN, HLD  -Amlodipine 10mg q24  -Hold ARB and HCTZ for now  -Not currently on statin therapy    DM2  -Hold Metformin 500mg BID  -ISS/Accuchecks/A1C    Asthma  -Finished steroid taper last Sunday. No current symptoms/hypoxia.  -No longer taking Montelukast  -Wixela/Duoneb PRN    Gout  -Uric Acid level 12.2  -Takes Allopurinol as needed. Hold for now.     VTE PPX SQH/SCDs    Dispo: Pending improvement in creatinine

## 2024-03-31 LAB
ALBUMIN SERPL ELPH-MCNC: 2.7 G/DL — LOW (ref 3.3–5.2)
ALBUMIN SERPL ELPH-MCNC: 2.7 G/DL — LOW (ref 3.3–5.2)
ALP SERPL-CCNC: 75 U/L — SIGNIFICANT CHANGE UP (ref 40–120)
ALP SERPL-CCNC: 78 U/L — SIGNIFICANT CHANGE UP (ref 40–120)
ALT FLD-CCNC: 22 U/L — SIGNIFICANT CHANGE UP
ALT FLD-CCNC: 24 U/L — SIGNIFICANT CHANGE UP
ANION GAP SERPL CALC-SCNC: 12 MMOL/L — SIGNIFICANT CHANGE UP (ref 5–17)
ANION GAP SERPL CALC-SCNC: 14 MMOL/L — SIGNIFICANT CHANGE UP (ref 5–17)
APTT BLD: 42.3 SEC — HIGH (ref 24.5–35.6)
AST SERPL-CCNC: 21 U/L — SIGNIFICANT CHANGE UP
AST SERPL-CCNC: 25 U/L — SIGNIFICANT CHANGE UP
BASOPHILS # BLD AUTO: 0.04 K/UL — SIGNIFICANT CHANGE UP (ref 0–0.2)
BASOPHILS NFR BLD AUTO: 0.6 % — SIGNIFICANT CHANGE UP (ref 0–2)
BILIRUB SERPL-MCNC: 0.4 MG/DL — SIGNIFICANT CHANGE UP (ref 0.4–2)
BILIRUB SERPL-MCNC: 0.4 MG/DL — SIGNIFICANT CHANGE UP (ref 0.4–2)
BUN SERPL-MCNC: 16.2 MG/DL — SIGNIFICANT CHANGE UP (ref 8–20)
BUN SERPL-MCNC: 20.4 MG/DL — HIGH (ref 8–20)
CALCIUM SERPL-MCNC: 8.5 MG/DL — SIGNIFICANT CHANGE UP (ref 8.4–10.5)
CALCIUM SERPL-MCNC: 8.6 MG/DL — SIGNIFICANT CHANGE UP (ref 8.4–10.5)
CHLORIDE SERPL-SCNC: 96 MMOL/L — SIGNIFICANT CHANGE UP (ref 96–108)
CHLORIDE SERPL-SCNC: 98 MMOL/L — SIGNIFICANT CHANGE UP (ref 96–108)
CO2 SERPL-SCNC: 25 MMOL/L — SIGNIFICANT CHANGE UP (ref 22–29)
CO2 SERPL-SCNC: 25 MMOL/L — SIGNIFICANT CHANGE UP (ref 22–29)
CREAT SERPL-MCNC: 1.75 MG/DL — HIGH (ref 0.5–1.3)
CREAT SERPL-MCNC: 1.91 MG/DL — HIGH (ref 0.5–1.3)
EGFR: 40 ML/MIN/1.73M2 — LOW
EGFR: 44 ML/MIN/1.73M2 — LOW
EOSINOPHIL # BLD AUTO: 0.29 K/UL — SIGNIFICANT CHANGE UP (ref 0–0.5)
EOSINOPHIL NFR BLD AUTO: 4.2 % — SIGNIFICANT CHANGE UP (ref 0–6)
GLUCOSE BLDC GLUCOMTR-MCNC: 107 MG/DL — HIGH (ref 70–99)
GLUCOSE BLDC GLUCOMTR-MCNC: 115 MG/DL — HIGH (ref 70–99)
GLUCOSE BLDC GLUCOMTR-MCNC: 122 MG/DL — HIGH (ref 70–99)
GLUCOSE BLDC GLUCOMTR-MCNC: 87 MG/DL — SIGNIFICANT CHANGE UP (ref 70–99)
GLUCOSE BLDC GLUCOMTR-MCNC: 99 MG/DL — SIGNIFICANT CHANGE UP (ref 70–99)
GLUCOSE SERPL-MCNC: 95 MG/DL — SIGNIFICANT CHANGE UP (ref 70–99)
GLUCOSE SERPL-MCNC: 98 MG/DL — SIGNIFICANT CHANGE UP (ref 70–99)
HCT VFR BLD CALC: 32.3 % — LOW (ref 39–50)
HCT VFR BLD CALC: 34.7 % — LOW (ref 39–50)
HGB BLD-MCNC: 10.9 G/DL — LOW (ref 13–17)
HGB BLD-MCNC: 11.4 G/DL — LOW (ref 13–17)
IMM GRANULOCYTES NFR BLD AUTO: 0.7 % — SIGNIFICANT CHANGE UP (ref 0–0.9)
INR BLD: 1.16 RATIO — SIGNIFICANT CHANGE UP (ref 0.85–1.18)
LACTATE SERPL-SCNC: 0.9 MMOL/L — SIGNIFICANT CHANGE UP (ref 0.5–2)
LYMPHOCYTES # BLD AUTO: 2.88 K/UL — SIGNIFICANT CHANGE UP (ref 1–3.3)
LYMPHOCYTES # BLD AUTO: 41.3 % — SIGNIFICANT CHANGE UP (ref 13–44)
MCHC RBC-ENTMCNC: 28.4 PG — SIGNIFICANT CHANGE UP (ref 27–34)
MCHC RBC-ENTMCNC: 28.6 PG — SIGNIFICANT CHANGE UP (ref 27–34)
MCHC RBC-ENTMCNC: 32.9 GM/DL — SIGNIFICANT CHANGE UP (ref 32–36)
MCHC RBC-ENTMCNC: 33.7 GM/DL — SIGNIFICANT CHANGE UP (ref 32–36)
MCV RBC AUTO: 84.8 FL — SIGNIFICANT CHANGE UP (ref 80–100)
MCV RBC AUTO: 86.5 FL — SIGNIFICANT CHANGE UP (ref 80–100)
MONOCYTES # BLD AUTO: 0.77 K/UL — SIGNIFICANT CHANGE UP (ref 0–0.9)
MONOCYTES NFR BLD AUTO: 11 % — SIGNIFICANT CHANGE UP (ref 2–14)
NEUTROPHILS # BLD AUTO: 2.95 K/UL — SIGNIFICANT CHANGE UP (ref 1.8–7.4)
NEUTROPHILS NFR BLD AUTO: 42.2 % — LOW (ref 43–77)
PLATELET # BLD AUTO: 262 K/UL — SIGNIFICANT CHANGE UP (ref 150–400)
PLATELET # BLD AUTO: 279 K/UL — SIGNIFICANT CHANGE UP (ref 150–400)
POTASSIUM SERPL-MCNC: 3.5 MMOL/L — SIGNIFICANT CHANGE UP (ref 3.5–5.3)
POTASSIUM SERPL-MCNC: 3.6 MMOL/L — SIGNIFICANT CHANGE UP (ref 3.5–5.3)
POTASSIUM SERPL-SCNC: 3.5 MMOL/L — SIGNIFICANT CHANGE UP (ref 3.5–5.3)
POTASSIUM SERPL-SCNC: 3.6 MMOL/L — SIGNIFICANT CHANGE UP (ref 3.5–5.3)
PROCALCITONIN SERPL-MCNC: 0.17 NG/ML — HIGH (ref 0.02–0.1)
PROT SERPL-MCNC: 6.1 G/DL — LOW (ref 6.6–8.7)
PROT SERPL-MCNC: 6.2 G/DL — LOW (ref 6.6–8.7)
PROTHROM AB SERPL-ACNC: 12.8 SEC — SIGNIFICANT CHANGE UP (ref 9.5–13)
RBC # BLD: 3.81 M/UL — LOW (ref 4.2–5.8)
RBC # BLD: 4.01 M/UL — LOW (ref 4.2–5.8)
RBC # FLD: 13.8 % — SIGNIFICANT CHANGE UP (ref 10.3–14.5)
RBC # FLD: 13.9 % — SIGNIFICANT CHANGE UP (ref 10.3–14.5)
SARS-COV-2 RNA SPEC QL NAA+PROBE: SIGNIFICANT CHANGE UP
SODIUM SERPL-SCNC: 133 MMOL/L — LOW (ref 135–145)
SODIUM SERPL-SCNC: 137 MMOL/L — SIGNIFICANT CHANGE UP (ref 135–145)
UUN UR-MCNC: 356 MG/DL — SIGNIFICANT CHANGE UP
WBC # BLD: 6.98 K/UL — SIGNIFICANT CHANGE UP (ref 3.8–10.5)
WBC # BLD: 7.46 K/UL — SIGNIFICANT CHANGE UP (ref 3.8–10.5)
WBC # FLD AUTO: 6.98 K/UL — SIGNIFICANT CHANGE UP (ref 3.8–10.5)
WBC # FLD AUTO: 7.46 K/UL — SIGNIFICANT CHANGE UP (ref 3.8–10.5)

## 2024-03-31 PROCEDURE — 99232 SBSQ HOSP IP/OBS MODERATE 35: CPT

## 2024-03-31 PROCEDURE — 71045 X-RAY EXAM CHEST 1 VIEW: CPT | Mod: 26

## 2024-03-31 RX ORDER — ACETAMINOPHEN 500 MG
1000 TABLET ORAL ONCE
Refills: 0 | Status: COMPLETED | OUTPATIENT
Start: 2024-03-31 | End: 2024-03-31

## 2024-03-31 RX ORDER — SODIUM CHLORIDE 9 MG/ML
2000 INJECTION INTRAMUSCULAR; INTRAVENOUS; SUBCUTANEOUS ONCE
Refills: 0 | Status: COMPLETED | OUTPATIENT
Start: 2024-03-31 | End: 2024-03-31

## 2024-03-31 RX ADMIN — AMLODIPINE BESYLATE 10 MILLIGRAM(S): 2.5 TABLET ORAL at 05:05

## 2024-03-31 RX ADMIN — HEPARIN SODIUM 5000 UNIT(S): 5000 INJECTION INTRAVENOUS; SUBCUTANEOUS at 17:44

## 2024-03-31 RX ADMIN — SODIUM CHLORIDE 2000 MILLILITER(S): 9 INJECTION INTRAMUSCULAR; INTRAVENOUS; SUBCUTANEOUS at 22:17

## 2024-03-31 RX ADMIN — PANTOPRAZOLE SODIUM 40 MILLIGRAM(S): 20 TABLET, DELAYED RELEASE ORAL at 05:06

## 2024-03-31 RX ADMIN — HEPARIN SODIUM 5000 UNIT(S): 5000 INJECTION INTRAVENOUS; SUBCUTANEOUS at 05:05

## 2024-03-31 RX ADMIN — Medication 400 MILLIGRAM(S): at 22:24

## 2024-03-31 RX ADMIN — Medication 1000 MILLIGRAM(S): at 23:24

## 2024-03-31 NOTE — CHART NOTE - NSCHARTNOTEFT_GEN_A_CORE
Pt is a 58y old Male s/p a Code Sepsis called for fever and tachycardia.    Vital Signs:  T(F): 102.3  HR: 106  BP: 137/78  RR: 18  SpO2: 96% on room air    General: laying in bed comfortably in NAD  Lungs: CTA b/l  Heart: RRR, +S1/S2  Abd: +BS, soft, nontender, nondistended, no guarding or rigidity  Neuro: awake and alert, oriented x3, answering questions appropriately, following all commands      SUSPECTED SOURCE: TBD    STAT LABS ORDERED:   - CBC w/diff  - CMP  - Blood cultures x2  - Lactate  - Procalcitonin   - PT/PTT/INR  - UA  - Urine Culture if UA positive  - RVP    IMAGING/DIAGNOSTIC STUDIES ORDERED: CXR    ANTIBIOTICS ORDERED: TBD    FLUIDS GIVEN: 2000cc (IBW based bolus)    CONSULTS CALLED: none    PLAN OF CARE/ INTERVENTIONS PLANNED:   - abx + fluids as above  - VS q30min x 1hr, then q1hr x 6hrs  - F/u labs + imaging  - 2hr bedside follow up planned     DISPOSITION:  - Remain at current level of care

## 2024-03-31 NOTE — PROGRESS NOTE ADULT - SUBJECTIVE AND OBJECTIVE BOX
[facility] DIVISION OF KIDNEY DISEASES AND HYPERTENSION -- FOLLOW UP NOTE  --------------------------------------------------------------------------------  Chief Complaint: [place]    24 hour events/subjective:  no acute event noted  Pt seen and examined; feels well      PAST HISTORY  --------------------------------------------------------------------------------  No significant changes to PMH, PSH, FHx, SHx, unless otherwise noted    ALLERGIES & MEDICATIONS  --------------------------------------------------------------------------------  Allergies    No Known Allergies    Intolerances      Standing Inpatient Medications  amLODIPine   Tablet 10 milliGRAM(s) Oral daily  dextrose 5%. 1000 milliLiter(s) IV Continuous <Continuous>  dextrose 5%. 1000 milliLiter(s) IV Continuous <Continuous>  dextrose 50% Injectable 12.5 Gram(s) IV Push once  dextrose 50% Injectable 25 Gram(s) IV Push once  dextrose 50% Injectable 25 Gram(s) IV Push once  glucagon  Injectable 1 milliGRAM(s) IntraMuscular once  heparin   Injectable 5000 Unit(s) SubCutaneous every 12 hours  insulin lispro (ADMELOG) corrective regimen sliding scale   SubCutaneous Before meals and at bedtime  lactated ringers. 1000 milliLiter(s) IV Continuous <Continuous>  pantoprazole    Tablet 40 milliGRAM(s) Oral before breakfast    PRN Inpatient Medications  acetaminophen     Tablet .. 650 milliGRAM(s) Oral every 6 hours PRN  albuterol    90 MICROgram(s) HFA Inhaler 2 Puff(s) Inhalation every 6 hours PRN  aluminum hydroxide/magnesium hydroxide/simethicone Suspension 30 milliLiter(s) Oral every 4 hours PRN  dextrose Oral Gel 15 Gram(s) Oral once PRN  melatonin 3 milliGRAM(s) Oral at bedtime PRN  ondansetron Injectable 4 milliGRAM(s) IV Push every 8 hours PRN      REVIEW OF SYSTEMS  --------------------------------------------------------------------------------  Gen: No weight changes, fatigue, fevers/chills, weakness  Skin: No rashes  Head/Eyes/Ears/Mouth: No headache; Normal hearing; Normal vision w/o blurriness; No sinus pain/discomfort, sore throat  Respiratory: No dyspnea, cough, wheezing, hemoptysis  CV: No chest pain, PND, orthopnea  GI: No abdominal pain, diarrhea, constipation, nausea, vomiting, melena, hematochezia  : No increased frequency, dysuria, hematuria, nocturia  MSK: No joint pain/swelling; no back pain; no edema  Neuro: No dizziness/lightheadedness, weakness, seizures, numbness, tingling  Heme: No easy bruising or bleeding  Endo: No heat/cold intolerance  Psych: No significant nervousness, anxiety, stress, depression    All other systems were reviewed and are negative, except as noted.    VITALS/PHYSICAL EXAM  --------------------------------------------------------------------------------  T(C): 37.6 (03-31-24 @ 17:27), Max: 37.6 (03-31-24 @ 17:27)  HR: 101 (03-31-24 @ 17:27) (100 - 107)  BP: 145/78 (03-31-24 @ 17:27) (117/75 - 145/78)  RR: 18 (03-31-24 @ 17:27) (18 - 18)  SpO2: 92% (03-31-24 @ 17:27) (91% - 93%)  Wt(kg): --        03-30-24 @ 07:01  -  03-31-24 @ 07:00  --------------------------------------------------------  IN: 500 mL / OUT: 0 mL / NET: 500 mL      Physical Exam:  	Gen: NAD  	HEENT:  supple neck, clear oropharynx  	Pulm: CTA B/L  	CV: RRR, S1S2; no rub  	Back: No spinal or CVA tenderness;               Abdomen: +BS, soft, nontender/nondistended  	: No suprapubic tenderness  	UE: Warm, no edema  	LE: Warm, no edema  	Neuro: No focal deficit  	Psych: Normal affect and mood  	Skin: Warm    LABS/STUDIES  --------------------------------------------------------------------------------              11.4   7.46  >-----------<  262      [03-31-24 @ 06:47]              34.7     137  |  98  |  20.4  ----------------------------<  95      [03-31-24 @ 06:47]  3.6   |  25.0  |  1.91        Ca     8.6     [03-31-24 @ 06:47]      Mg     1.9     [03-30-24 @ 06:45]      Phos  2.9     [03-30-24 @ 06:45]    TPro  6.2  /  Alb  2.7  /  TBili  0.4  /  DBili  x   /  AST  21  /  ALT  24  /  AlkPhos  78  [03-31-24 @ 06:47]        Uric acid 12.2      [03-30-24 @ 06:45]        [03-30-24 @ 06:45]    Creatinine Trend:  SCr 1.91 [03-31 @ 06:47]  SCr 2.39 [03-30 @ 06:45]  SCr 3.13 [03-29 @ 20:22]  SCr 1.08 [03-18 @ 06:03]  SCr 1.16 [03-17 @ 04:44]    Urinalysis - [03-31-24 @ 06:47]      Color  / Appearance  / SG  / pH       Gluc 95 / Ketone   / Bili  / Urobili        Blood  / Protein  / Leuk Est  / Nitrite       RBC  / WBC  / Hyaline  / Gran  / Sq Epi  / Non Sq Epi  / Bacteria     Urine Creatinine 97      [03-30-24 @ 23:00]  Urine Protein 6.0      [03-30-24 @ 23:00]  Urine Sodium 47      [03-30-24 @ 23:00]  Urine Urea Nitrogen 356      [03-30-24 @ 23:00]  Urine Potassium 10      [03-30-24 @ 23:00]  Urine Osmolality 280      [03-30-24 @ 23:00]    TSH 0.80      [03-13-24 @ 15:45]  Lipid: chol 180, , , LDL --      [03-14-24 @ 02:35]    HCV 0.08, Nonreact      [03-14-24 @ 02:35]

## 2024-03-31 NOTE — PROGRESS NOTE ADULT - ASSESSMENT
ASSESSMENT:  58M with PMHX Asthma, HTN, HLD, DM2, Gout presents to [facility] ER for abnormal lab work admitted for ARF r/o medication induced ARF.     ARF (Improving)  -Suspect medication induced from recent introduction of ARB/HCTZ/Metformin  -Cr 3.13 with prior Cr 1.08  -UA negative  -Renal US negative  -Hold above meds. Avoid Nephrotoxins.  -Renally dose meds  -Gentle Hydration IVF LR 100cc/hr  -Nephro Consult note appreciated    HTN, HLD  -Amlodipine 10mg q24  -Hold ARB and HCTZ for now  -Not currently on statin therapy    DM2  -Hold Metformin 500mg BID  -ISS/Accuchecks/A1C    Asthma  -Finished steroid taper last Sunday. No current symptoms/hypoxia.  -No longer taking Montelukast  -Wixela/Duoneb PRN    Gout  -Uric Acid level 12.2  -Takes Allopurinol as needed. Hold for now.     VTE PPX SQH/SCDs    Dispo: Pending improvement in creatinine. likely home tomorrow if further improved.

## 2024-03-31 NOTE — PROGRESS NOTE ADULT - ASSESSMENT
Carroll-   pre renal+ concomitant ARB+ diuretics use  Scr 1.1 at baseline (03/16-03/18_  Scr 3.1 on presentation- now improving to 1.91  UA bland, normal appearing kidneys on renal US  Continue IV hydration    HTN  BP stable  Continue Amlodipine 10mg daily  Hold ARB and HCTZ for now    DM  Metformin on hold     Nephrology follow up out pt at [address], [place]  [place]  [phone]

## 2024-03-31 NOTE — PROGRESS NOTE ADULT - SUBJECTIVE AND OBJECTIVE BOX
SUBJECTIVE / OVERNIGHT EVENTS: Seen and examined. Feels good. Creatinine downtrending further. Denies chest pain, SOB, abd pain, N/V, fever, chills, dysuria or any other complaints. All remainder ROS negative.     MEDICATIONS  (STANDING):  amLODIPine   Tablet 10 milliGRAM(s) Oral daily  dextrose 5%. 1000 milliLiter(s) (50 mL/Hr) IV Continuous <Continuous>  dextrose 5%. 1000 milliLiter(s) (100 mL/Hr) IV Continuous <Continuous>  dextrose 50% Injectable 25 Gram(s) IV Push once  dextrose 50% Injectable 25 Gram(s) IV Push once  dextrose 50% Injectable 12.5 Gram(s) IV Push once  glucagon  Injectable 1 milliGRAM(s) IntraMuscular once  heparin   Injectable 5000 Unit(s) SubCutaneous every 12 hours  insulin lispro (ADMELOG) corrective regimen sliding scale   SubCutaneous Before meals and at bedtime  lactated ringers. 1000 milliLiter(s) (100 mL/Hr) IV Continuous <Continuous>  pantoprazole    Tablet 40 milliGRAM(s) Oral before breakfast    MEDICATIONS  (PRN):  acetaminophen     Tablet .. 650 milliGRAM(s) Oral every 6 hours PRN Temp greater or equal to 38C (100.4F), Mild Pain (1 - 3)  albuterol    90 MICROgram(s) HFA Inhaler 2 Puff(s) Inhalation every 6 hours PRN Shortness of Breath and/or Wheezing  aluminum hydroxide/magnesium hydroxide/simethicone Suspension 30 milliLiter(s) Oral every 4 hours PRN Dyspepsia  dextrose Oral Gel 15 Gram(s) Oral once PRN Blood Glucose LESS THAN 70 milliGRAM(s)/deciliter  melatonin 3 milliGRAM(s) Oral at bedtime PRN Insomnia  ondansetron Injectable 4 milliGRAM(s) IV Push every 8 hours PRN Nausea and/or Vomiting        I&O's Summary    30 Mar 2024 07:01  -  31 Mar 2024 07:00  --------------------------------------------------------  IN: 500 mL / OUT: 0 mL / NET: 500 mL        PHYSICAL EXAM:  Vital Signs Last 24 Hrs  T(C): 37.3 (31 Mar 2024 04:24), Max: 37.4 (31 Mar 2024 01:15)  T(F): 99.1 (31 Mar 2024 04:24), Max: 99.3 (31 Mar 2024 01:15)  HR: 106 (31 Mar 2024 04:24) (99 - 107)  BP: 133/79 (31 Mar 2024 04:24) (121/74 - 144/82)  BP(mean): --  RR: 18 (31 Mar 2024 04:24) (18 - 18)  SpO2: 93% (31 Mar 2024 04:24) (93% - 94%)    Parameters below as of 31 Mar 2024 04:24  Patient On (Oxygen Delivery Method): room air          CONSTITUTIONAL: NAD, Obese  RESPIRATORY: Normal respiratory effort; lungs are clear to auscultation bilaterally  CARDIOVASCULAR: Regular rate and rhythm, normal S1 and S2, no murmur/rub/gallop; No lower extremity edema; Peripheral pulses are 2+ bilaterally  ABDOMEN: Nontender to palpation, normoactive bowel sounds, no rebound/guarding; No hepatosplenomegaly  MUSCLOSKELETAL:  Normal gait; no clubbing or cyanosis of digits; no joint swelling or tenderness to palpation  PSYCH: A+O to person, place, and time; affect appropriate  NEUROLOGY: CN 2-12 are intact and symmetric; no gross sensory deficits;   SKIN: No rashes; no palpable lesions    LABS:                        11.4   7.46  )-----------( 262      ( 31 Mar 2024 06:47 )             34.7     03-31    137  |  98  |  20.4<H>  ----------------------------<  95  3.6   |  25.0  |  1.91<H>    Ca    8.6      31 Mar 2024 06:47  Phos  2.9     03-30  Mg     1.9     03-30    TPro  6.2<L>  /  Alb  2.7<L>  /  TBili  0.4  /  DBili  x   /  AST  21  /  ALT  24  /  AlkPhos  78  03-31      CARDIAC MARKERS ( 30 Mar 2024 06:45 )  x     / x     / 166 U/L / x     / 1.9 ng/mL      Urinalysis Basic - ( 31 Mar 2024 06:47 )    Color: x / Appearance: x / SG: x / pH: x  Gluc: 95 mg/dL / Ketone: x  / Bili: x / Urobili: x   Blood: x / Protein: x / Nitrite: x   Leuk Esterase: x / RBC: x / WBC x   Sq Epi: x / Non Sq Epi: x / Bacteria: x        Culture - Urine (collected 29 Mar 2024 20:22)  Source: Clean Catch Clean Catch (Midstream)  Preliminary Report (31 Mar 2024 09:59):    10,000 - 49,000 CFU/mL Gram positive organisms      CAPILLARY BLOOD GLUCOSE      POCT Blood Glucose.: 87 mg/dL (31 Mar 2024 07:36)  POCT Blood Glucose.: 99 mg/dL (30 Mar 2024 21:36)  POCT Blood Glucose.: 93 mg/dL (30 Mar 2024 18:07)  POCT Blood Glucose.: 93 mg/dL (30 Mar 2024 12:11)        RADIOLOGY & ADDITIONAL TESTS:  Results Reviewed:   Imaging Personally Reviewed:  Electrocardiogram Personally Reviewed:

## 2024-04-01 LAB
-  AMPICILLIN: SIGNIFICANT CHANGE UP
-  CIPROFLOXACIN: SIGNIFICANT CHANGE UP
-  LEVOFLOXACIN: SIGNIFICANT CHANGE UP
-  NITROFURANTOIN: SIGNIFICANT CHANGE UP
-  TETRACYCLINE: SIGNIFICANT CHANGE UP
-  VANCOMYCIN: SIGNIFICANT CHANGE UP
ANION GAP SERPL CALC-SCNC: 12 MMOL/L — SIGNIFICANT CHANGE UP (ref 5–17)
APPEARANCE UR: CLEAR — SIGNIFICANT CHANGE UP
BILIRUB UR-MCNC: NEGATIVE — SIGNIFICANT CHANGE UP
BUN SERPL-MCNC: 12.9 MG/DL — SIGNIFICANT CHANGE UP (ref 8–20)
CALCIUM SERPL-MCNC: 8.2 MG/DL — LOW (ref 8.4–10.5)
CHLORIDE SERPL-SCNC: 100 MMOL/L — SIGNIFICANT CHANGE UP (ref 96–108)
CO2 SERPL-SCNC: 23 MMOL/L — SIGNIFICANT CHANGE UP (ref 22–29)
COLOR SPEC: YELLOW — SIGNIFICANT CHANGE UP
CREAT SERPL-MCNC: 1.41 MG/DL — HIGH (ref 0.5–1.3)
CULTURE RESULTS: ABNORMAL
DIFF PNL FLD: NEGATIVE — SIGNIFICANT CHANGE UP
EGFR: 57 ML/MIN/1.73M2 — LOW
GLUCOSE BLDC GLUCOMTR-MCNC: 74 MG/DL — SIGNIFICANT CHANGE UP (ref 70–99)
GLUCOSE BLDC GLUCOMTR-MCNC: 80 MG/DL — SIGNIFICANT CHANGE UP (ref 70–99)
GLUCOSE BLDC GLUCOMTR-MCNC: 84 MG/DL — SIGNIFICANT CHANGE UP (ref 70–99)
GLUCOSE BLDC GLUCOMTR-MCNC: 99 MG/DL — SIGNIFICANT CHANGE UP (ref 70–99)
GLUCOSE SERPL-MCNC: 91 MG/DL — SIGNIFICANT CHANGE UP (ref 70–99)
GLUCOSE UR QL: NEGATIVE MG/DL — SIGNIFICANT CHANGE UP
HCT VFR BLD CALC: 34.3 % — LOW (ref 39–50)
HGB BLD-MCNC: 11.2 G/DL — LOW (ref 13–17)
KETONES UR-MCNC: NEGATIVE MG/DL — SIGNIFICANT CHANGE UP
LEUKOCYTE ESTERASE UR-ACNC: NEGATIVE — SIGNIFICANT CHANGE UP
MCHC RBC-ENTMCNC: 28.2 PG — SIGNIFICANT CHANGE UP (ref 27–34)
MCHC RBC-ENTMCNC: 32.7 GM/DL — SIGNIFICANT CHANGE UP (ref 32–36)
MCV RBC AUTO: 86.4 FL — SIGNIFICANT CHANGE UP (ref 80–100)
METHOD TYPE: SIGNIFICANT CHANGE UP
NITRITE UR-MCNC: NEGATIVE — SIGNIFICANT CHANGE UP
ORGANISM # SPEC MICROSCOPIC CNT: ABNORMAL
ORGANISM # SPEC MICROSCOPIC CNT: SIGNIFICANT CHANGE UP
PH UR: 5.5 — SIGNIFICANT CHANGE UP (ref 5–8)
PLATELET # BLD AUTO: 282 K/UL — SIGNIFICANT CHANGE UP (ref 150–400)
POTASSIUM SERPL-MCNC: 3.6 MMOL/L — SIGNIFICANT CHANGE UP (ref 3.5–5.3)
POTASSIUM SERPL-SCNC: 3.6 MMOL/L — SIGNIFICANT CHANGE UP (ref 3.5–5.3)
PROT UR-MCNC: NEGATIVE MG/DL — SIGNIFICANT CHANGE UP
RAPID RVP RESULT: SIGNIFICANT CHANGE UP
RBC # BLD: 3.97 M/UL — LOW (ref 4.2–5.8)
RBC # FLD: 14 % — SIGNIFICANT CHANGE UP (ref 10.3–14.5)
SARS-COV-2 RNA SPEC QL NAA+PROBE: SIGNIFICANT CHANGE UP
SODIUM SERPL-SCNC: 135 MMOL/L — SIGNIFICANT CHANGE UP (ref 135–145)
SP GR SPEC: 1.01 — SIGNIFICANT CHANGE UP (ref 1–1.03)
SPECIMEN SOURCE: SIGNIFICANT CHANGE UP
UROBILINOGEN FLD QL: 0.2 MG/DL — SIGNIFICANT CHANGE UP (ref 0.2–1)
WBC # BLD: 5.31 K/UL — SIGNIFICANT CHANGE UP (ref 3.8–10.5)
WBC # FLD AUTO: 5.31 K/UL — SIGNIFICANT CHANGE UP (ref 3.8–10.5)

## 2024-04-01 PROCEDURE — 71250 CT THORAX DX C-: CPT | Mod: 26

## 2024-04-01 PROCEDURE — 74176 CT ABD & PELVIS W/O CONTRAST: CPT | Mod: 26

## 2024-04-01 PROCEDURE — 99233 SBSQ HOSP IP/OBS HIGH 50: CPT

## 2024-04-01 RX ORDER — PIPERACILLIN AND TAZOBACTAM 4; .5 G/20ML; G/20ML
3.38 INJECTION, POWDER, LYOPHILIZED, FOR SOLUTION INTRAVENOUS ONCE
Refills: 0 | Status: COMPLETED | OUTPATIENT
Start: 2024-04-01 | End: 2024-04-01

## 2024-04-01 RX ORDER — PIPERACILLIN AND TAZOBACTAM 4; .5 G/20ML; G/20ML
3.38 INJECTION, POWDER, LYOPHILIZED, FOR SOLUTION INTRAVENOUS EVERY 8 HOURS
Refills: 0 | Status: DISCONTINUED | OUTPATIENT
Start: 2024-04-01 | End: 2024-04-05

## 2024-04-01 RX ORDER — PIPERACILLIN AND TAZOBACTAM 4; .5 G/20ML; G/20ML
3.38 INJECTION, POWDER, LYOPHILIZED, FOR SOLUTION INTRAVENOUS ONCE
Refills: 0 | Status: DISCONTINUED | OUTPATIENT
Start: 2024-04-01 | End: 2024-04-01

## 2024-04-01 RX ORDER — AZITHROMYCIN 500 MG/1
500 TABLET, FILM COATED ORAL ONCE
Refills: 0 | Status: COMPLETED | OUTPATIENT
Start: 2024-04-01 | End: 2024-04-01

## 2024-04-01 RX ORDER — AZITHROMYCIN 500 MG/1
TABLET, FILM COATED ORAL
Refills: 0 | Status: DISCONTINUED | OUTPATIENT
Start: 2024-04-01 | End: 2024-04-01

## 2024-04-01 RX ORDER — CEFTRIAXONE 500 MG/1
1000 INJECTION, POWDER, FOR SOLUTION INTRAMUSCULAR; INTRAVENOUS EVERY 24 HOURS
Refills: 0 | Status: DISCONTINUED | OUTPATIENT
Start: 2024-04-01 | End: 2024-04-01

## 2024-04-01 RX ADMIN — PIPERACILLIN AND TAZOBACTAM 25 GRAM(S): 4; .5 INJECTION, POWDER, LYOPHILIZED, FOR SOLUTION INTRAVENOUS at 21:59

## 2024-04-01 RX ADMIN — ALBUTEROL 2 PUFF(S): 90 AEROSOL, METERED ORAL at 21:31

## 2024-04-01 RX ADMIN — PIPERACILLIN AND TAZOBACTAM 200 GRAM(S): 4; .5 INJECTION, POWDER, LYOPHILIZED, FOR SOLUTION INTRAVENOUS at 17:12

## 2024-04-01 RX ADMIN — HEPARIN SODIUM 5000 UNIT(S): 5000 INJECTION INTRAVENOUS; SUBCUTANEOUS at 05:12

## 2024-04-01 RX ADMIN — Medication 650 MILLIGRAM(S): at 13:28

## 2024-04-01 RX ADMIN — Medication 650 MILLIGRAM(S): at 12:25

## 2024-04-01 RX ADMIN — Medication 650 MILLIGRAM(S): at 21:59

## 2024-04-01 RX ADMIN — AZITHROMYCIN 500 MILLIGRAM(S): 500 TABLET, FILM COATED ORAL at 12:23

## 2024-04-01 RX ADMIN — AMLODIPINE BESYLATE 10 MILLIGRAM(S): 2.5 TABLET ORAL at 05:11

## 2024-04-01 RX ADMIN — Medication 650 MILLIGRAM(S): at 22:59

## 2024-04-01 RX ADMIN — SODIUM CHLORIDE 100 MILLILITER(S): 9 INJECTION, SOLUTION INTRAVENOUS at 17:12

## 2024-04-01 RX ADMIN — SODIUM CHLORIDE 100 MILLILITER(S): 9 INJECTION, SOLUTION INTRAVENOUS at 07:48

## 2024-04-01 RX ADMIN — HEPARIN SODIUM 5000 UNIT(S): 5000 INJECTION INTRAVENOUS; SUBCUTANEOUS at 17:13

## 2024-04-01 RX ADMIN — PANTOPRAZOLE SODIUM 40 MILLIGRAM(S): 20 TABLET, DELAYED RELEASE ORAL at 05:12

## 2024-04-01 NOTE — PROGRESS NOTE ADULT - ASSESSMENT
ASSESSMENT:  58M with PMHX Asthma, HTN, HLD, DM2, Gout presents to [facility] ER for abnormal lab work admitted for ARF r/o medication induced ARF.     Sepsis 2/2 Unknown etiology  Tachycardia and Febrile 102.3 overnight  UA negative, no leukocytosis  CXR and Blood culture pending  Obtain CT CAP  Will hold abx unless spikes fever again or source of infection identified     ARF (Improving)  -Suspect medication induced from recent introduction of ARB/HCTZ/Metformin  -Cr 3.13 with prior Cr 1.08  -UA negative  -Renal US negative  -Hold above meds. Avoid Nephrotoxins.  -Renally dose meds  -Gentle Hydration IVF LR 100cc/hr  -Nephro Consult note appreciated    HTN, HLD  -Amlodipine 10mg q24  -Hold ARB and HCTZ for now  -Not currently on statin therapy    DM2  -Hold Metformin 500mg BID  -ISS/Accuchecks/A1C    Asthma  -Finished steroid taper last Sunday. No current symptoms/hypoxia.  -No longer taking Montelukast  -Wixela/Duoneb PRN    Gout  -Uric Acid level 12.2  -Takes Allopurinol as needed. Hold for now.     VTE PPX SQH/SCDs    Dispo: Hold off dc due to sepsis workup.

## 2024-04-01 NOTE — PROGRESS NOTE ADULT - SUBJECTIVE AND OBJECTIVE BOX
SUBJECTIVE / OVERNIGHT EVENTS: Seen and examined. Had sepsis overnight. UA negative. no leukocytosis. pending blood cultures and cxr. He feels chills. Lionel improving. Denies chest pain, SOB, abd pain, N/V, fever.     MEDICATIONS  (STANDING):  amLODIPine  Tablet 10 milliGRAM(s) Oral daily  dextrose 5%. 1000 milliLiter(s) (50 mL/Hr) IV Continuous <Continuous>  dextrose 5%. 1000 milliLiter(s) (100 mL/Hr) IV Continuous <Continuous>  dextrose 50% Injectable 12.5 Gram(s) IV Push once  dextrose 50% Injectable 25 Gram(s) IV Push once  dextrose 50% Injectable 25 Gram(s) IV Push once  glucagon  Injectable 1 milliGRAM(s) IntraMuscular once  heparin   Injectable 5000 Unit(s) SubCutaneous every 12 hours  insulin lispro (ADMELOG) corrective regimen sliding scale   SubCutaneous Before meals and at bedtime  lactated ringers. 1000 milliLiter(s) (100 mL/Hr) IV Continuous <Continuous>  pantoprazole    Tablet 40 milliGRAM(s) Oral before breakfast    MEDICATIONS  (PRN):  acetaminophen     Tablet .. 650 milliGRAM(s) Oral every 6 hours PRN Temp greater or equal to 38C (100.4F), Mild Pain (1 - 3)  albuterol    90 MICROgram(s) HFA Inhaler 2 Puff(s) Inhalation every 6 hours PRN Shortness of Breath and/or Wheezing  aluminum hydroxide/magnesium hydroxide/simethicone Suspension 30 milliLiter(s) Oral every 4 hours PRN Dyspepsia  dextrose Oral Gel 15 Gram(s) Oral once PRN Blood Glucose LESS THAN 70 milliGRAM(s)/deciliter  melatonin 3 milliGRAM(s) Oral at bedtime PRN Insomnia  ondansetron Injectable 4 milliGRAM(s) IV Push every 8 hours PRN Nausea and/or Vomiting        I&O's Summary    31 Mar 2024 07:01  -  01 Apr 2024 07:00  --------------------------------------------------------  IN: 400 mL / OUT: 900 mL / NET: -500 mL        PHYSICAL EXAM:  Vital Signs Last 24 Hrs  T(C): 37.1 (01 Apr 2024 08:35), Max: 39.1 (31 Mar 2024 21:57)  T(F): 98.8 (01 Apr 2024 08:35), Max: 102.3 (31 Mar 2024 21:57)  HR: 106 (01 Apr 2024 08:35) (91 - 110)  BP: 125/72 (01 Apr 2024 08:35) (106/70 - 145/78)  BP(mean): --  RR: 18 (01 Apr 2024 08:35) (18 - 20)  SpO2: 92% (01 Apr 2024 08:35) (91% - 99%)    Parameters below as of 01 Apr 2024 08:35  Patient On (Oxygen Delivery Method): room air        CONSTITUTIONAL: NAD, Obese  RESPIRATORY: Normal respiratory effort; lungs are clear to auscultation bilaterally  CARDIOVASCULAR: Regular rate and rhythm, normal S1 and S2, no murmur/rub/gallop; No lower extremity edema; Peripheral pulses are 2+ bilaterally  ABDOMEN: Nontender to palpation, normoactive bowel sounds, no rebound/guarding; No hepatosplenomegaly  MUSCLOSKELETAL:  Normal gait; no clubbing or cyanosis of digits; no joint swelling or tenderness to palpation  PSYCH: A+O to person, place, and time; affect appropriate  NEUROLOGY: CN 2-12 are intact and symmetric; no gross sensory deficits;   SKIN: No rashes; no palpable lesions    LABS:                        11.2   5.31  )-----------( 282      ( 01 Apr 2024 05:10 )             34.3     04-01    135  |  100  |  12.9  ----------------------------<  91  3.6   |  23.0  |  1.41<H>    Ca    8.2<L>      01 Apr 2024 05:10    TPro  6.1<L>  /  Alb  2.7<L>  /  TBili  0.4  /  DBili  x   /  AST  25  /  ALT  22  /  AlkPhos  75  03-31    PT/INR - ( 31 Mar 2024 22:18 )   PT: 12.8 sec;   INR: 1.16 ratio         PTT - ( 31 Mar 2024 22:18 )  PTT:42.3 sec      Urinalysis Basic - ( 01 Apr 2024 05:10 )    Color: x / Appearance: x / SG: x / pH: x  Gluc: 91 mg/dL / Ketone: x  / Bili: x / Urobili: x   Blood: x / Protein: x / Nitrite: x   Leuk Esterase: x / RBC: x / WBC x   Sq Epi: x / Non Sq Epi: x / Bacteria: x        Culture - Urine (collected 29 Mar 2024 20:22)  Source: Clean Catch Clean Catch (Midstream)  Preliminary Report (31 Mar 2024 19:20):    10,000 - 49,000 CFU/mL Enterococcus faecalis      CAPILLARY BLOOD GLUCOSE      POCT Blood Glucose.: 74 mg/dL (01 Apr 2024 08:55)  POCT Blood Glucose.: 107 mg/dL (31 Mar 2024 22:12)  POCT Blood Glucose.: 99 mg/dL (31 Mar 2024 21:06)  POCT Blood Glucose.: 122 mg/dL (31 Mar 2024 17:25)  POCT Blood Glucose.: 115 mg/dL (31 Mar 2024 11:21)        RADIOLOGY & ADDITIONAL TESTS:  Results Reviewed:   Imaging Personally Reviewed:  Electrocardiogram Personally Reviewed:

## 2024-04-02 LAB
ALBUMIN SERPL ELPH-MCNC: 2.5 G/DL — LOW (ref 3.3–5.2)
ALP SERPL-CCNC: 81 U/L — SIGNIFICANT CHANGE UP (ref 40–120)
ALT FLD-CCNC: 28 U/L — SIGNIFICANT CHANGE UP
ANION GAP SERPL CALC-SCNC: 12 MMOL/L — SIGNIFICANT CHANGE UP (ref 5–17)
AST SERPL-CCNC: 50 U/L — HIGH
BILIRUB SERPL-MCNC: 0.4 MG/DL — SIGNIFICANT CHANGE UP (ref 0.4–2)
BUN SERPL-MCNC: 7.5 MG/DL — LOW (ref 8–20)
CALCIUM SERPL-MCNC: 8.2 MG/DL — LOW (ref 8.4–10.5)
CHLORIDE SERPL-SCNC: 98 MMOL/L — SIGNIFICANT CHANGE UP (ref 96–108)
CO2 SERPL-SCNC: 25 MMOL/L — SIGNIFICANT CHANGE UP (ref 22–29)
CREAT SERPL-MCNC: 1.4 MG/DL — HIGH (ref 0.5–1.3)
EGFR: 58 ML/MIN/1.73M2 — LOW
GLUCOSE BLDC GLUCOMTR-MCNC: 82 MG/DL — SIGNIFICANT CHANGE UP (ref 70–99)
GLUCOSE BLDC GLUCOMTR-MCNC: 85 MG/DL — SIGNIFICANT CHANGE UP (ref 70–99)
GLUCOSE BLDC GLUCOMTR-MCNC: 85 MG/DL — SIGNIFICANT CHANGE UP (ref 70–99)
GLUCOSE BLDC GLUCOMTR-MCNC: 90 MG/DL — SIGNIFICANT CHANGE UP (ref 70–99)
GLUCOSE SERPL-MCNC: 86 MG/DL — SIGNIFICANT CHANGE UP (ref 70–99)
HCT VFR BLD CALC: 32.7 % — LOW (ref 39–50)
HGB BLD-MCNC: 10.7 G/DL — LOW (ref 13–17)
MAGNESIUM SERPL-MCNC: 1 MG/DL — CRITICAL LOW (ref 1.6–2.6)
MAGNESIUM SERPL-MCNC: 1 MG/DL — CRITICAL LOW (ref 1.6–2.6)
MCHC RBC-ENTMCNC: 28.2 PG — SIGNIFICANT CHANGE UP (ref 27–34)
MCHC RBC-ENTMCNC: 32.7 GM/DL — SIGNIFICANT CHANGE UP (ref 32–36)
MCV RBC AUTO: 86.1 FL — SIGNIFICANT CHANGE UP (ref 80–100)
PHOSPHATE SERPL-MCNC: 2.7 MG/DL — SIGNIFICANT CHANGE UP (ref 2.4–4.7)
PLATELET # BLD AUTO: 305 K/UL — SIGNIFICANT CHANGE UP (ref 150–400)
POTASSIUM SERPL-MCNC: 3.7 MMOL/L — SIGNIFICANT CHANGE UP (ref 3.5–5.3)
POTASSIUM SERPL-SCNC: 3.7 MMOL/L — SIGNIFICANT CHANGE UP (ref 3.5–5.3)
PROCALCITONIN SERPL-MCNC: 0.39 NG/ML — HIGH (ref 0.02–0.1)
PROT SERPL-MCNC: 6 G/DL — LOW (ref 6.6–8.7)
RBC # BLD: 3.8 M/UL — LOW (ref 4.2–5.8)
RBC # FLD: 13.7 % — SIGNIFICANT CHANGE UP (ref 10.3–14.5)
SODIUM SERPL-SCNC: 135 MMOL/L — SIGNIFICANT CHANGE UP (ref 135–145)
WBC # BLD: 6.32 K/UL — SIGNIFICANT CHANGE UP (ref 3.8–10.5)
WBC # FLD AUTO: 6.32 K/UL — SIGNIFICANT CHANGE UP (ref 3.8–10.5)

## 2024-04-02 PROCEDURE — 99233 SBSQ HOSP IP/OBS HIGH 50: CPT

## 2024-04-02 PROCEDURE — 99223 1ST HOSP IP/OBS HIGH 75: CPT

## 2024-04-02 PROCEDURE — 93010 ELECTROCARDIOGRAM REPORT: CPT

## 2024-04-02 RX ORDER — MAGNESIUM SULFATE 500 MG/ML
2 VIAL (ML) INJECTION ONCE
Refills: 0 | Status: COMPLETED | OUTPATIENT
Start: 2024-04-02 | End: 2024-04-02

## 2024-04-02 RX ADMIN — Medication 25 GRAM(S): at 06:34

## 2024-04-02 RX ADMIN — PANTOPRAZOLE SODIUM 40 MILLIGRAM(S): 20 TABLET, DELAYED RELEASE ORAL at 05:33

## 2024-04-02 RX ADMIN — Medication 650 MILLIGRAM(S): at 15:28

## 2024-04-02 RX ADMIN — PIPERACILLIN AND TAZOBACTAM 25 GRAM(S): 4; .5 INJECTION, POWDER, LYOPHILIZED, FOR SOLUTION INTRAVENOUS at 21:56

## 2024-04-02 RX ADMIN — PIPERACILLIN AND TAZOBACTAM 25 GRAM(S): 4; .5 INJECTION, POWDER, LYOPHILIZED, FOR SOLUTION INTRAVENOUS at 05:30

## 2024-04-02 RX ADMIN — Medication 650 MILLIGRAM(S): at 14:28

## 2024-04-02 RX ADMIN — HEPARIN SODIUM 5000 UNIT(S): 5000 INJECTION INTRAVENOUS; SUBCUTANEOUS at 17:16

## 2024-04-02 RX ADMIN — PIPERACILLIN AND TAZOBACTAM 25 GRAM(S): 4; .5 INJECTION, POWDER, LYOPHILIZED, FOR SOLUTION INTRAVENOUS at 13:01

## 2024-04-02 RX ADMIN — SODIUM CHLORIDE 100 MILLILITER(S): 9 INJECTION, SOLUTION INTRAVENOUS at 17:16

## 2024-04-02 RX ADMIN — SODIUM CHLORIDE 100 MILLILITER(S): 9 INJECTION, SOLUTION INTRAVENOUS at 05:30

## 2024-04-02 RX ADMIN — AMLODIPINE BESYLATE 10 MILLIGRAM(S): 2.5 TABLET ORAL at 05:32

## 2024-04-02 RX ADMIN — Medication 25 GRAM(S): at 08:25

## 2024-04-02 RX ADMIN — HEPARIN SODIUM 5000 UNIT(S): 5000 INJECTION INTRAVENOUS; SUBCUTANEOUS at 05:31

## 2024-04-02 NOTE — CHART NOTE - NSCHARTNOTEFT_GEN_A_CORE
Pt Mag this am 1.0 from 1.9 (3/30)  Resting comfortably in NAD  All vital signs stable  2gm IV mag ordered  EKG pending  Place on tele monitor  Repeat BMP pending

## 2024-04-02 NOTE — CONSULT NOTE ADULT - SUBJECTIVE AND OBJECTIVE BOX
[facility]  INFECTIOUS DISEASES at [place] / [place] / [place]  =======================================================                               Serg Almanza MD#   Rony Guillory MD*                             Lisy Sandhu MD*   Laila Bell MD*                              Professor Emeritus:  Dr Josr Chatman MD^            Diplomates American Board of Internal Medicine & Infectious Diseases                # [place] Office - Appt - Tel  [phone] Fax [phone]                * [place] Office - Appt - Tel [phone] Fax [phone]               ^ [place] Office - Appt - Tel  [phone] Fax [phone]                                  Hospital Consult line:  [phone]  =======================================================      [id number]  ZAINA MILLER    CC: Patient is a 58y old  Male who presents with a chief complaint of ARF (02 Apr 2024 11:17)      58y  Male with a h/o Asthma, HTN, HLD, DM2, Gout. Patient presented to [facility] ER for abnormal lab work, elevated Cr from his PMD's office. Patient recently hospitalized for acute asthma exacerbation and viral URI found to have uncontrolled HTN and new onset DM type 2 at that time discharged home with outpatient follow-up and sent back to [facility] ER today by PCP for abnormal kidney function. During his hospital course patient developed fever to 102.3F on 3/31, was a code sepsis, was administered Azithromycin x1 and Zosyn since 4/1. ID input requested.       Past Medical & Surgical Hx:  Gout  Asthma  Arthritis  HTN (hypertension)  No significant past surgical history      Social Hx:  Denies smoker       FAMILY HISTORY:  Family history of diabetes mellitus type II - Mother   Family history of lung cancer      Allergies  No Known Allergies       REVIEW OF SYSTEMS:  CONSTITUTIONAL:  No Fever or chills  HEENT:  No diplopia or blurred vision.  No earache, sore throat or runny nose.  CARDIOVASCULAR:  No chest pain  RESPIRATORY:  No cough, shortness of breath  GASTROINTESTINAL:  No nausea, vomiting or diarrhea.  GENITOURINARY:  No dysuria, frequency or urgency. No Blood in urine  MUSCULOSKELETAL:  no joint aches, no muscle pain  SKIN:  No change in skin, hair or nails.  NEUROLOGIC:  No Headaches      Physical Exam:  GEN: NAD  HEENT: normocephalic and atraumatic. EOMI. PERRL.    NECK: Supple.   LUNGS: CTA B/L.  HEART: RRR  ABDOMEN: Soft, NT, ND.  +BS.    : No CVA tenderness  EXTREMITIES: Without  edema.  MSK: No joint swelling  NEUROLOGIC: No Focal Deficits   SKIN: No rash      Vitals:  T(F): 98.5 (02 Apr 2024 09:00), Max: 101.4 (01 Apr 2024 12:28)  HR: 109 (02 Apr 2024 09:00)  BP: 154/77 (02 Apr 2024 09:00)  RR: 18 (02 Apr 2024 09:00)  SpO2: 93% (02 Apr 2024 04:02) (92% - 95%)  temp max in last 48H T(F): , Max: 102.3 (03-31-24 @ 21:57)    Current Antibiotics:  piperacillin/tazobactam IVPB.. 3.375 Gram(s) IV Intermittent every 8 hours    Other medications:  amLODIPine   Tablet 10 milliGRAM(s) Oral daily  dextrose 5%. 1000 milliLiter(s) IV Continuous <Continuous>  dextrose 5%. 1000 milliLiter(s) IV Continuous <Continuous>  dextrose 50% Injectable 25 Gram(s) IV Push once  dextrose 50% Injectable 25 Gram(s) IV Push once  dextrose 50% Injectable 12.5 Gram(s) IV Push once  glucagon  Injectable 1 milliGRAM(s) IntraMuscular once  heparin   Injectable 5000 Unit(s) SubCutaneous every 12 hours  insulin lispro (ADMELOG) corrective regimen sliding scale   SubCutaneous Before meals and at bedtime  lactated ringers. 1000 milliLiter(s) IV Continuous <Continuous>  pantoprazole    Tablet 40 milliGRAM(s) Oral before breakfast                 10.7   6.32  )-----------( 305      ( 02 Apr 2024 04:52 )             32.7     04-02    135  |  98  |  7.5<L>  ----------------------------<  86  3.7   |  25.0  |  1.40<H>    Ca    8.2<L>      02 Apr 2024 04:52  Phos  2.7     04-02  Mg     1.0     04-02    TPro  6.0<L>  /  Alb  2.5<L>  /  TBili  0.4  /  DBili  x   /  AST  50<H>  /  ALT  28  /  AlkPhos  81  04-02      RECENT CULTURES:  03-31 @ 22:32    RVP  NotDetec    03-31 @ 22:21 .Blood Blood-Peripheral     No growth at 24 hours    03-31 @ 22:18 .Blood Blood-Peripheral     No growth at 24 hours    03-29 @ 20:22 Clean Catch Clean Catch (Midstream) Enterococcus faecalis    10,000 - 49,000 CFU/mL Enterococcus faecalis      WBC Count: 6.32 K/uL (04-02-24 @ 04:52)  WBC Count: 5.31 K/uL (04-01-24 @ 05:10)  WBC Count: 6.98 K/uL (03-31-24 @ 22:18)  WBC Count: 7.46 K/uL (03-31-24 @ 06:47)  WBC Count: 7.18 K/uL (03-30-24 @ 06:45)  WBC Count: 7.73 K/uL (03-29-24 @ 20:22)    Creatinine: 1.40 mg/dL (04-02-24 @ 04:52)  Creatinine: 1.41 mg/dL (04-01-24 @ 05:10)  Creatinine: 1.75 mg/dL (03-31-24 @ 22:18)  Creatinine: 1.91 mg/dL (03-31-24 @ 06:47)  Creatinine: 2.39 mg/dL (03-30-24 @ 06:45)  Creatinine: 3.13 mg/dL (03-29-24 @ 20:22)    Procalcitonin, Serum: 0.39 ng/mL (04-02-24 @ 04:52)  Procalcitonin, Serum: 0.17 ng/mL (03-31-24 @ 22:18)     COVID-19 PCR: NotDetec (03-31-24 @ 22:32)  SARS-CoV-2: NotDetec (03-31-24 @ 22:32)  SARS-CoV-2: NotDetec (03-13-24 @ 15:45)        Urinalysis (04.01.24 @ 00:03)    pH Urine: 5.5   Blood, Urine: Negative   Glucose Qualitative, Urine: Negative mg/dL   Color: Yellow   Urine Appearance: Clear   Bilirubin: Negative   Ketone - Urine: Negative mg/dL   Specific Gravity: 1.009   Protein, Urine: Negative mg/dL   Urobilinogen: 0.2 mg/dL   Nitrite: Negative   Leukocyte Esterase Concentration: Negative    Urinalysis (03.30.24 @ 23:00)    pH Urine: 5.5   Blood, Urine: Negative   Glucose Qualitative, Urine: Negative mg/dL   Color: Yellow   Urine Appearance: Clear   Bilirubin: Negative   Ketone - Urine: Negative mg/dL   Specific Gravity: 1.010   Protein, Urine: Negative mg/dL   Urobilinogen: 0.2 mg/dL   Nitrite: Negative   Leukocyte Esterase Concentration: Negative            < from: CT Abdomen and Pelvis No Cont (04.01.24 @ 13:28) >  ACC: [id number] EXAM:  CT ABDOMEN AND PELVIS   ORDERED BY: ELDER DUQUE     ACC: [id number] EXAM:  CT CHEST   ORDERED BY: ELDER DUQUE     PROCEDURE DATE:  04/01/2024      INTERPRETATION:  CLINICAL INFORMATION: Fever    COMPARISON: CT chest 3/15/2024    CONTRAST/COMPLICATIONS:  IV Contrast: NONE  Oral Contrast: NONE  Complications: None reported at time of study completion    PROCEDURE:  CT of the Chest, Abdomen and Pelvis was performed.  Sagittal and coronal reformats were performed.    FINDINGS:  CHEST:  LUNGS AND LARGE AIRWAYS: Patent central airways. New masslike   consolidation in the right upper lobe. Nodular opacities are also noted   throughout the left upper lobe and bilateral lung bases, all of which are   new compared to CT 3/15/2024.  PLEURA: No pleural effusion.  VESSELS: Within normal limits.  HEART: Heart size is normal. No pericardial effusion.  MEDIASTINUM AND ELIECER: There are some enlarged mediastinal and hilar lymph   nodes, for example a right hilar node that measures 2.2x 1.9 cm (3-46),   nonspecific and possibly reactive.  CHEST WALL AND LOWER NECK: Within normal limits.    ABDOMEN AND PELVIS:  LIVER: Within normal limits.  BILE DUCTS: Normal caliber.  GALLBLADDER: Within normal limits.  SPLEEN: Within normal limits.  PANCREAS: Within normal limits.  ADRENALS: Within normal limits.  KIDNEYS/URETERS: Punctate nonobstructing left renal stone. No   hydronephrosis.    BLADDER: Within normal limits.  REPRODUCTIVE ORGANS: Prostate within normal limits.    BOWEL: No bowel obstruction. Appendix is normal.  PERITONEUM: No ascites.  VESSELS: Within normal limits.  RETROPERITONEUM/LYMPH NODES: No lymphadenopathy.  ABDOMINAL WALL: Within normal limits.  BONES: Within normal limits.    IMPRESSION:  Multifocal consolidations, most prominent in the right upper lobe, new   compared to CT 3/15/2024, likely representing multifocal pneumonia.    --- End of Report ---  < end of copied text >

## 2024-04-02 NOTE — PROGRESS NOTE ADULT - ASSESSMENT
ASSESSMENT:  58M with PMHX Asthma, HTN, HLD, DM2, Gout presents to [facility] ER for abnormal lab work admitted for ARF r/o medication induced ARF.     Sepsis 2/2 Right Upper Lobe PNA  Tachycardia and Febrile 102.3 overnight  UA negative, no leukocytosis  Blood culture NGTD  CT CAP 4/1 shows right upper lobe PNA  Started on Zosyn  ID consulted    ARF (Improving)  -Suspect medication induced from recent introduction of ARB/HCTZ/Metformin  -Cr 3.13 with prior Cr 1.08  -UA negative  -Renal US negative  -Hold above meds. Avoid Nephrotoxins.  -Renally dose meds  -Gentle Hydration IVF LR 100cc/hr  -Nephro Consult note appreciated    HTN, HLD  -Amlodipine 10mg q24  -Hold ARB and HCTZ for now  -Not currently on statin therapy    DM2  -Hold Metformin 500mg BID  -ISS/Accuchecks/A1C    Asthma  -Finished steroid taper last Sunday. No current symptoms/hypoxia.  -No longer taking Montelukast  -Wixela/Duoneb PRN    Gout  -Uric Acid level 12.2  -Takes Allopurinol as needed. Hold for now.     VTE PPX SQH/SCDs    Dispo: Acute. Pending ID consult and C/w IV abx for PNA

## 2024-04-02 NOTE — PROGRESS NOTE ADULT - SUBJECTIVE AND OBJECTIVE BOX
Seen for: TYESHA and hospital course complicated by Sepsis due to PNA    SUBJECTIVE / OVERNIGHT EVENTS: Seen and examined. Still feels chill and endorses a mild cough. Had low magnesium which was is being repleted. EKG shows sinus tachycardia. Denies chest pain, SOB, abd pain, N/V, fever.     MEDICATIONS  (STANDING):  amLODIPine   Tablet 10 milliGRAM(s) Oral daily  dextrose 5%. 1000 milliLiter(s) (50 mL/Hr) IV Continuous <Continuous>  dextrose 5%. 1000 milliLiter(s) (100 mL/Hr) IV Continuous <Continuous>  dextrose 50% Injectable 25 Gram(s) IV Push once  dextrose 50% Injectable 25 Gram(s) IV Push once  dextrose 50% Injectable 12.5 Gram(s) IV Push once  glucagon  Injectable 1 milliGRAM(s) IntraMuscular once  heparin   Injectable 5000 Unit(s) SubCutaneous every 12 hours  insulin lispro (ADMELOG) corrective regimen sliding scale   SubCutaneous Before meals and at bedtime  lactated ringers. 1000 milliLiter(s) (100 mL/Hr) IV Continuous <Continuous>  pantoprazole    Tablet 40 milliGRAM(s) Oral before breakfast  piperacillin/tazobactam IVPB.. 3.375 Gram(s) IV Intermittent every 8 hours    MEDICATIONS  (PRN):  acetaminophen     Tablet .. 650 milliGRAM(s) Oral every 6 hours PRN Temp greater or equal to 38C (100.4F), Mild Pain (1 - 3)  albuterol    90 MICROgram(s) HFA Inhaler 2 Puff(s) Inhalation every 6 hours PRN Shortness of Breath and/or Wheezing  aluminum hydroxide/magnesium hydroxide/simethicone Suspension 30 milliLiter(s) Oral every 4 hours PRN Dyspepsia  dextrose Oral Gel 15 Gram(s) Oral once PRN Blood Glucose LESS THAN 70 milliGRAM(s)/deciliter  melatonin 3 milliGRAM(s) Oral at bedtime PRN Insomnia  ondansetron Injectable 4 milliGRAM(s) IV Push every 8 hours PRN Nausea and/or Vomiting        I&O's Summary    01 Apr 2024 07:01  -  02 Apr 2024 07:00  --------------------------------------------------------  IN: 1100 mL / OUT: 1575 mL / NET: -475 mL    02 Apr 2024 07:01  -  02 Apr 2024 11:18  --------------------------------------------------------  IN: 0 mL / OUT: 700 mL / NET: -700 mL        PHYSICAL EXAM:  Vital Signs Last 24 Hrs  T(C): 36.9 (02 Apr 2024 09:00), Max: 38.6 (01 Apr 2024 12:28)  T(F): 98.5 (02 Apr 2024 09:00), Max: 101.4 (01 Apr 2024 12:28)  HR: 109 (02 Apr 2024 09:00) (98 - 122)  BP: 154/77 (02 Apr 2024 09:00) (114/67 - 154/77)  BP(mean): --  RR: 18 (02 Apr 2024 09:00) (18 - 18)  SpO2: 93% (02 Apr 2024 04:02) (92% - 95%)    Parameters below as of 02 Apr 2024 04:02  Patient On (Oxygen Delivery Method): room air        CONSTITUTIONAL: NAD, Obese  RESPIRATORY: Normal respiratory effort; lungs are clear to auscultation bilaterally  CARDIOVASCULAR: Regular rate and rhythm, normal S1 and S2, no murmur/rub/gallop; No lower extremity edema; Peripheral pulses are 2+ bilaterally  ABDOMEN: Nontender to palpation, normoactive bowel sounds, no rebound/guarding; No hepatosplenomegaly  MUSCLOSKELETAL:  Normal gait; no clubbing or cyanosis of digits; no joint swelling or tenderness to palpation  PSYCH: A+O to person, place, and time; affect appropriate  NEUROLOGY: CN 2-12 are intact and symmetric; no gross sensory deficits;   SKIN: No rashes; no palpable lesions    LABS:                        10.7   6.32  )-----------( 305      ( 02 Apr 2024 04:52 )             32.7     04-02    135  |  98  |  7.5<L>  ----------------------------<  86  3.7   |  25.0  |  1.40<H>    Ca    8.2<L>      02 Apr 2024 04:52  Phos  2.7     04-02  Mg     1.0     04-02    TPro  6.0<L>  /  Alb  2.5<L>  /  TBili  0.4  /  DBili  x   /  AST  50<H>  /  ALT  28  /  AlkPhos  81  04-02    PT/INR - ( 31 Mar 2024 22:18 )   PT: 12.8 sec;   INR: 1.16 ratio         PTT - ( 31 Mar 2024 22:18 )  PTT:42.3 sec      Urinalysis Basic - ( 02 Apr 2024 04:52 )    Color: x / Appearance: x / SG: x / pH: x  Gluc: 86 mg/dL / Ketone: x  / Bili: x / Urobili: x   Blood: x / Protein: x / Nitrite: x   Leuk Esterase: x / RBC: x / WBC x   Sq Epi: x / Non Sq Epi: x / Bacteria: x        Culture - Blood (collected 31 Mar 2024 22:21)  Source: .Blood Blood-Peripheral  Preliminary Report (02 Apr 2024 07:02):    No growth at 24 hours    Culture - Blood (collected 31 Mar 2024 22:18)  Source: .Blood Blood-Peripheral  Preliminary Report (02 Apr 2024 07:02):    No growth at 24 hours      CAPILLARY BLOOD GLUCOSE      POCT Blood Glucose.: 85 mg/dL (02 Apr 2024 08:23)  POCT Blood Glucose.: 99 mg/dL (01 Apr 2024 21:57)  POCT Blood Glucose.: 84 mg/dL (01 Apr 2024 17:01)  POCT Blood Glucose.: 80 mg/dL (01 Apr 2024 11:47)        RADIOLOGY & ADDITIONAL TESTS:  Results Reviewed:   Imaging Personally Reviewed:  Electrocardiogram Personally Reviewed:

## 2024-04-02 NOTE — PROVIDER CONTACT NOTE (CRITICAL VALUE NOTIFICATION) - SITUATION
notified Dr. Goddard pt repeat mag critical 1.0
notified medicine TEODORO Ruiz that pt critical mag 1.0

## 2024-04-03 LAB
ALBUMIN SERPL ELPH-MCNC: 2.5 G/DL — LOW (ref 3.3–5.2)
ALP SERPL-CCNC: 94 U/L — SIGNIFICANT CHANGE UP (ref 40–120)
ALT FLD-CCNC: 69 U/L — HIGH
ANION GAP SERPL CALC-SCNC: 15 MMOL/L — SIGNIFICANT CHANGE UP (ref 5–17)
AST SERPL-CCNC: 149 U/L — HIGH
BILIRUB SERPL-MCNC: 0.4 MG/DL — SIGNIFICANT CHANGE UP (ref 0.4–2)
BUN SERPL-MCNC: 5.1 MG/DL — LOW (ref 8–20)
CALCIUM SERPL-MCNC: 8 MG/DL — LOW (ref 8.4–10.5)
CHLORIDE SERPL-SCNC: 99 MMOL/L — SIGNIFICANT CHANGE UP (ref 96–108)
CO2 SERPL-SCNC: 23 MMOL/L — SIGNIFICANT CHANGE UP (ref 22–29)
CREAT SERPL-MCNC: 1.3 MG/DL — SIGNIFICANT CHANGE UP (ref 0.5–1.3)
EGFR: 63 ML/MIN/1.73M2 — SIGNIFICANT CHANGE UP
GLUCOSE BLDC GLUCOMTR-MCNC: 87 MG/DL — SIGNIFICANT CHANGE UP (ref 70–99)
GLUCOSE BLDC GLUCOMTR-MCNC: 92 MG/DL — SIGNIFICANT CHANGE UP (ref 70–99)
GLUCOSE BLDC GLUCOMTR-MCNC: 97 MG/DL — SIGNIFICANT CHANGE UP (ref 70–99)
GLUCOSE BLDC GLUCOMTR-MCNC: 97 MG/DL — SIGNIFICANT CHANGE UP (ref 70–99)
GLUCOSE SERPL-MCNC: 91 MG/DL — SIGNIFICANT CHANGE UP (ref 70–99)
HCT VFR BLD CALC: 33.6 % — LOW (ref 39–50)
HGB BLD-MCNC: 11.1 G/DL — LOW (ref 13–17)
LEGIONELLA AG UR QL: NEGATIVE — SIGNIFICANT CHANGE UP
MAGNESIUM SERPL-MCNC: 1.5 MG/DL — LOW (ref 1.6–2.6)
MCHC RBC-ENTMCNC: 28.2 PG — SIGNIFICANT CHANGE UP (ref 27–34)
MCHC RBC-ENTMCNC: 33 GM/DL — SIGNIFICANT CHANGE UP (ref 32–36)
MCV RBC AUTO: 85.5 FL — SIGNIFICANT CHANGE UP (ref 80–100)
PHOSPHATE SERPL-MCNC: 1.9 MG/DL — LOW (ref 2.4–4.7)
PLATELET # BLD AUTO: 381 K/UL — SIGNIFICANT CHANGE UP (ref 150–400)
POTASSIUM SERPL-MCNC: 3.6 MMOL/L — SIGNIFICANT CHANGE UP (ref 3.5–5.3)
POTASSIUM SERPL-SCNC: 3.6 MMOL/L — SIGNIFICANT CHANGE UP (ref 3.5–5.3)
PROT SERPL-MCNC: 6.3 G/DL — LOW (ref 6.6–8.7)
RBC # BLD: 3.93 M/UL — LOW (ref 4.2–5.8)
RBC # FLD: 13.6 % — SIGNIFICANT CHANGE UP (ref 10.3–14.5)
S PNEUM AG UR QL: NEGATIVE — SIGNIFICANT CHANGE UP
SODIUM SERPL-SCNC: 137 MMOL/L — SIGNIFICANT CHANGE UP (ref 135–145)
WBC # BLD: 5.93 K/UL — SIGNIFICANT CHANGE UP (ref 3.8–10.5)
WBC # FLD AUTO: 5.93 K/UL — SIGNIFICANT CHANGE UP (ref 3.8–10.5)

## 2024-04-03 PROCEDURE — 99233 SBSQ HOSP IP/OBS HIGH 50: CPT

## 2024-04-03 PROCEDURE — 71045 X-RAY EXAM CHEST 1 VIEW: CPT | Mod: 26

## 2024-04-03 RX ORDER — ACETAMINOPHEN 500 MG
1000 TABLET ORAL ONCE
Refills: 0 | Status: COMPLETED | OUTPATIENT
Start: 2024-04-03 | End: 2024-04-03

## 2024-04-03 RX ORDER — MAGNESIUM SULFATE 500 MG/ML
1 VIAL (ML) INJECTION ONCE
Refills: 0 | Status: COMPLETED | OUTPATIENT
Start: 2024-04-03 | End: 2024-04-03

## 2024-04-03 RX ADMIN — SODIUM CHLORIDE 100 MILLILITER(S): 9 INJECTION, SOLUTION INTRAVENOUS at 22:00

## 2024-04-03 RX ADMIN — SODIUM CHLORIDE 100 MILLILITER(S): 9 INJECTION, SOLUTION INTRAVENOUS at 03:03

## 2024-04-03 RX ADMIN — Medication 650 MILLIGRAM(S): at 05:54

## 2024-04-03 RX ADMIN — Medication 100 GRAM(S): at 18:03

## 2024-04-03 RX ADMIN — PIPERACILLIN AND TAZOBACTAM 25 GRAM(S): 4; .5 INJECTION, POWDER, LYOPHILIZED, FOR SOLUTION INTRAVENOUS at 22:00

## 2024-04-03 RX ADMIN — AMLODIPINE BESYLATE 10 MILLIGRAM(S): 2.5 TABLET ORAL at 06:35

## 2024-04-03 RX ADMIN — HEPARIN SODIUM 5000 UNIT(S): 5000 INJECTION INTRAVENOUS; SUBCUTANEOUS at 18:03

## 2024-04-03 RX ADMIN — HEPARIN SODIUM 5000 UNIT(S): 5000 INJECTION INTRAVENOUS; SUBCUTANEOUS at 06:36

## 2024-04-03 RX ADMIN — Medication 400 MILLIGRAM(S): at 21:24

## 2024-04-03 RX ADMIN — PANTOPRAZOLE SODIUM 40 MILLIGRAM(S): 20 TABLET, DELAYED RELEASE ORAL at 06:35

## 2024-04-03 RX ADMIN — PIPERACILLIN AND TAZOBACTAM 25 GRAM(S): 4; .5 INJECTION, POWDER, LYOPHILIZED, FOR SOLUTION INTRAVENOUS at 04:55

## 2024-04-03 RX ADMIN — Medication 650 MILLIGRAM(S): at 04:54

## 2024-04-03 RX ADMIN — PIPERACILLIN AND TAZOBACTAM 25 GRAM(S): 4; .5 INJECTION, POWDER, LYOPHILIZED, FOR SOLUTION INTRAVENOUS at 12:01

## 2024-04-03 NOTE — PROGRESS NOTE ADULT - SUBJECTIVE AND OBJECTIVE BOX
[facility] Division of Hospital Medicine    Chief Complaint:  TYESHA / PNA     SUBJECTIVE / OVERNIGHT EVENTS:  Pt examined laying in bed  States he feels a little better, able to ambulate to bathroom but extremely slowly as he gets winded very easily   No hemoptysis reported  Patient denies chest pain, SOB, abd pain, N/V, fever, chills, dysuria or any other complaints. All remainder ROS negative.     MEDICATIONS  (STANDING):  amLODIPine   Tablet 10 milliGRAM(s) Oral daily  dextrose 5%. 1000 milliLiter(s) (50 mL/Hr) IV Continuous <Continuous>  dextrose 5%. 1000 milliLiter(s) (100 mL/Hr) IV Continuous <Continuous>  dextrose 50% Injectable 12.5 Gram(s) IV Push once  dextrose 50% Injectable 25 Gram(s) IV Push once  dextrose 50% Injectable 25 Gram(s) IV Push once  glucagon  Injectable 1 milliGRAM(s) IntraMuscular once  heparin   Injectable 5000 Unit(s) SubCutaneous every 12 hours  insulin lispro (ADMELOG) corrective regimen sliding scale   SubCutaneous Before meals and at bedtime  lactated ringers. 1000 milliLiter(s) (100 mL/Hr) IV Continuous <Continuous>  pantoprazole    Tablet 40 milliGRAM(s) Oral before breakfast  piperacillin/tazobactam IVPB.. 3.375 Gram(s) IV Intermittent every 8 hours    MEDICATIONS  (PRN):  acetaminophen     Tablet .. 650 milliGRAM(s) Oral every 6 hours PRN Temp greater or equal to 38C (100.4F), Mild Pain (1 - 3)  albuterol    90 MICROgram(s) HFA Inhaler 2 Puff(s) Inhalation every 6 hours PRN Shortness of Breath and/or Wheezing  aluminum hydroxide/magnesium hydroxide/simethicone Suspension 30 milliLiter(s) Oral every 4 hours PRN Dyspepsia  dextrose Oral Gel 15 Gram(s) Oral once PRN Blood Glucose LESS THAN 70 milliGRAM(s)/deciliter  melatonin 3 milliGRAM(s) Oral at bedtime PRN Insomnia  ondansetron Injectable 4 milliGRAM(s) IV Push every 8 hours PRN Nausea and/or Vomiting        I&O's Summary    02 Apr 2024 07:01  -  03 Apr 2024 07:00  --------------------------------------------------------  IN: 800 mL / OUT: 2475 mL / NET: -1675 mL    03 Apr 2024 07:01  -  03 Apr 2024 15:43  --------------------------------------------------------  IN: 360 mL / OUT: 250 mL / NET: 110 mL        PHYSICAL EXAM:  Vital Signs Last 24 Hrs  T(C): 36.6 (03 Apr 2024 13:46), Max: 38.4 (03 Apr 2024 04:37)  T(F): 97.9 (03 Apr 2024 13:46), Max: 101.2 (03 Apr 2024 04:37)  HR: 119 (03 Apr 2024 13:46) (98 - 119)  BP: 134/79 (03 Apr 2024 13:46) (123/71 - 136/72)  BP(mean): --  RR: 18 (03 Apr 2024 13:46) (17 - 18)  SpO2: 90% (03 Apr 2024 13:46) (90% - 96%)    Parameters below as of 03 Apr 2024 13:46  Patient On (Oxygen Delivery Method): room air            CONSTITUTIONAL: Non toxic appearing, lying in bed  ENMT: Moist oral mucosa, no pharyngeal injection or exudates; normal dentition  RESPIRATORY: Normal respiratory effort; lungs are clear to auscultation bilaterally  CARDIOVASCULAR: Regular rate and rhythm, normal S1 and S2, no murmur/rub/gallop; No lower extremity edema; Peripheral pulses are 2+ bilaterally  ABDOMEN: Nontender to palpation, normoactive bowel sounds, no rebound/guarding; No hepatosplenomegaly  MUSCLOSKELETAL:  Normal gait; no clubbing or cyanosis of digits; no joint swelling or tenderness to palpation  PSYCH: A+O to person, place, and time; affect appropriate  NEUROLOGY: CN 2-12 are intact and symmetric; no gross sensory deficits;   SKIN: No rashes; no palpable lesions    LABS:                        11.1   5.93  )-----------( 381      ( 03 Apr 2024 06:20 )             33.6     04-03    137  |  99  |  5.1<L>  ----------------------------<  91  3.6   |  23.0  |  1.30    Ca    8.0<L>      03 Apr 2024 06:20  Phos  1.9     04-03  Mg     1.5     04-03    TPro  6.3<L>  /  Alb  2.5<L>  /  TBili  0.4  /  DBili  x   /  AST  149<H>  /  ALT  69<H>  /  AlkPhos  94  04-03          Urinalysis Basic - ( 03 Apr 2024 06:20 )    Color: x / Appearance: x / SG: x / pH: x  Gluc: 91 mg/dL / Ketone: x  / Bili: x / Urobili: x   Blood: x / Protein: x / Nitrite: x   Leuk Esterase: x / RBC: x / WBC x   Sq Epi: x / Non Sq Epi: x / Bacteria: x        Culture - Blood (collected 31 Mar 2024 22:21)  Source: .Blood Blood-Peripheral  Preliminary Report (03 Apr 2024 07:01):    No growth at 48 Hours    Culture - Blood (collected 31 Mar 2024 22:18)  Source: .Blood Blood-Peripheral  Preliminary Report (03 Apr 2024 07:02):    No growth at 48 Hours      CAPILLARY BLOOD GLUCOSE      POCT Blood Glucose.: 92 mg/dL (03 Apr 2024 11:39)  POCT Blood Glucose.: 97 mg/dL (03 Apr 2024 08:18)  POCT Blood Glucose.: 85 mg/dL (02 Apr 2024 21:52)  POCT Blood Glucose.: 82 mg/dL (02 Apr 2024 17:13)        RADIOLOGY & ADDITIONAL TESTS:  Results Reviewed:   Imaging Personally Reviewed:  Electrocardiogram Personally Reviewed:                                           [facility] Division of Hospital Medicine    Chief Complaint:  TYESHA / PNA     SUBJECTIVE / OVERNIGHT EVENTS:  Pt examined laying in bed  States he feels a little better, able to ambulate to bathroom but extremely slowly as he gets winded very easily   No hemoptysis reported  Patient denies chest pain, SOB, abd pain, N/V, fever, chills, dysuria or any other complaints. All remainder ROS negative.     MEDICATIONS  (STANDING):  amLODIPine   Tablet 10 milliGRAM(s) Oral daily  dextrose 5%. 1000 milliLiter(s) (50 mL/Hr) IV Continuous <Continuous>  dextrose 5%. 1000 milliLiter(s) (100 mL/Hr) IV Continuous <Continuous>  dextrose 50% Injectable 12.5 Gram(s) IV Push once  dextrose 50% Injectable 25 Gram(s) IV Push once  dextrose 50% Injectable 25 Gram(s) IV Push once  glucagon  Injectable 1 milliGRAM(s) IntraMuscular once  heparin   Injectable 5000 Unit(s) SubCutaneous every 12 hours  insulin lispro (ADMELOG) corrective regimen sliding scale   SubCutaneous Before meals and at bedtime  lactated ringers. 1000 milliLiter(s) (100 mL/Hr) IV Continuous <Continuous>  pantoprazole    Tablet 40 milliGRAM(s) Oral before breakfast  piperacillin/tazobactam IVPB.. 3.375 Gram(s) IV Intermittent every 8 hours    MEDICATIONS  (PRN):  acetaminophen     Tablet .. 650 milliGRAM(s) Oral every 6 hours PRN Temp greater or equal to 38C (100.4F), Mild Pain (1 - 3)  albuterol    90 MICROgram(s) HFA Inhaler 2 Puff(s) Inhalation every 6 hours PRN Shortness of Breath and/or Wheezing  aluminum hydroxide/magnesium hydroxide/simethicone Suspension 30 milliLiter(s) Oral every 4 hours PRN Dyspepsia  dextrose Oral Gel 15 Gram(s) Oral once PRN Blood Glucose LESS THAN 70 milliGRAM(s)/deciliter  melatonin 3 milliGRAM(s) Oral at bedtime PRN Insomnia  ondansetron Injectable 4 milliGRAM(s) IV Push every 8 hours PRN Nausea and/or Vomiting        I&O's Summary    02 Apr 2024 07:01  -  03 Apr 2024 07:00  --------------------------------------------------------  IN: 800 mL / OUT: 2475 mL / NET: -1675 mL    03 Apr 2024 07:01  -  03 Apr 2024 15:43  --------------------------------------------------------  IN: 360 mL / OUT: 250 mL / NET: 110 mL        PHYSICAL EXAM:  Vital Signs Last 24 Hrs  T(C): 36.6 (03 Apr 2024 13:46), Max: 38.4 (03 Apr 2024 04:37)  T(F): 97.9 (03 Apr 2024 13:46), Max: 101.2 (03 Apr 2024 04:37)  HR: 119 (03 Apr 2024 13:46) (98 - 119)  BP: 134/79 (03 Apr 2024 13:46) (123/71 - 136/72)  BP(mean): --  RR: 18 (03 Apr 2024 13:46) (17 - 18)  SpO2: 90% (03 Apr 2024 13:46) (90% - 96%)    Parameters below as of 03 Apr 2024 13:46  Patient On (Oxygen Delivery Method): room air            CONSTITUTIONAL: Non toxic appearing, lying in bed  ENMT: Moist oral mucosa, no pharyngeal injection or exudates; normal dentition  RESPIRATORY: Normal respiratory effort; scattered coarse / rh  ronchorous sounds b/l   CARDIOVASCULAR: Regular rate and rhythm, normal S1 and S2, no murmur/rub/gallop; No lower extremity edema; Peripheral pulses are 2+ bilaterally  ABDOMEN: Nontender to palpation, normoactive bowel sounds, no rebound/guarding; No hepatosplenomegaly  MUSCLOSKELETAL:  Normal gait; no clubbing or cyanosis of digits; no joint swelling or tenderness to palpation  PSYCH: A+O to person, place, and time; affect appropriate  NEUROLOGY: CN 2-12 are intact and symmetric; no gross sensory deficits;   SKIN: No rashes; no palpable lesions    LABS:                        11.1   5.93  )-----------( 381      ( 03 Apr 2024 06:20 )             33.6     04-03    137  |  99  |  5.1<L>  ----------------------------<  91  3.6   |  23.0  |  1.30    Ca    8.0<L>      03 Apr 2024 06:20  Phos  1.9     04-03  Mg     1.5     04-03    TPro  6.3<L>  /  Alb  2.5<L>  /  TBili  0.4  /  DBili  x   /  AST  149<H>  /  ALT  69<H>  /  AlkPhos  94  04-03          Urinalysis Basic - ( 03 Apr 2024 06:20 )    Color: x / Appearance: x / SG: x / pH: x  Gluc: 91 mg/dL / Ketone: x  / Bili: x / Urobili: x   Blood: x / Protein: x / Nitrite: x   Leuk Esterase: x / RBC: x / WBC x   Sq Epi: x / Non Sq Epi: x / Bacteria: x        Culture - Blood (collected 31 Mar 2024 22:21)  Source: .Blood Blood-Peripheral  Preliminary Report (03 Apr 2024 07:01):    No growth at 48 Hours    Culture - Blood (collected 31 Mar 2024 22:18)  Source: .Blood Blood-Peripheral  Preliminary Report (03 Apr 2024 07:02):    No growth at 48 Hours      CAPILLARY BLOOD GLUCOSE      POCT Blood Glucose.: 92 mg/dL (03 Apr 2024 11:39)  POCT Blood Glucose.: 97 mg/dL (03 Apr 2024 08:18)  POCT Blood Glucose.: 85 mg/dL (02 Apr 2024 21:52)  POCT Blood Glucose.: 82 mg/dL (02 Apr 2024 17:13)        RADIOLOGY & ADDITIONAL TESTS:    4/1/24 CT Chest  IMPRESSION:  Multifocal consolidations, most prominent in the right upper lobe, new compared to CT 3/15/2024, likely representing multifocal pneumonia.

## 2024-04-03 NOTE — CHART NOTE - NSCHARTNOTEFT_GEN_A_CORE
Pt with rectal temp 102.8, .  Also noted to be hypoxic, sating 87% on room air.  Resting comfortably without complaints.   Remainder of vital signs stable.  Has been intermittently tachycardic and febrile during admission.  Being actively tx for sepsis 2/2 right upper lobe PNA  Ofirmev x 1 for fever  Blood Cultures x 2  CXR  2L NC    Continue on current abx  ID Following

## 2024-04-03 NOTE — PROGRESS NOTE ADULT - ASSESSMENT
ASSESSMENT:  58M with PMHX Asthma, HTN, HLD, DM2, Gout presents to [facility] ER for abnormal lab work admitted for ARF r/o medication induced ARF.     Sepsis 2/2 Right Upper Lobe PNA  Tachycardia and Febrile 102.3 overnight  UA negative, no leukocytosis  Blood culture NGTD  CT CAP 4/1 shows right upper lobe PNA  Started on Zosyn  ID consulted    ARF (Improving)  -Suspect medication induced from recent introduction of ARB/HCTZ/Metformin  -Cr 3.13 with prior Cr 1.08  -UA negative  -Renal US negative  -Hold above meds. Avoid Nephrotoxins.  -Renally dose meds  -Gentle Hydration IVF LR 100cc/hr  -Nephro Consult note appreciated    HTN, HLD  -Amlodipine 10mg q24  -Hold ARB and HCTZ for now  -Not currently on statin therapy    DM2  -Hold Metformin 500mg BID  -ISS/Accuchecks/A1C    Asthma  -Finished steroid taper last Sunday. No current symptoms/hypoxia.  -No longer taking Montelukast  -Wixela/Duoneb PRN    Gout  -Uric Acid level 12.2  -Takes Allopurinol as needed. Hold for now.     VTE PPX SQH/SCDs    Dispo: Acute. Pending ID consult and C/w IV abx for PNA 58M with Asthma, HTN, HLD, DM2, Gout admitted with TYESHA suspected 2.2 medications along with MF PNA     Sepsis 2/2 Right Upper Lobe PNA  Tachycardia and Febrile 102.3 overnight  UA negative, no leukocytosis  Blood culture NGTD  Started on Zosyn, continue for now  Agree with need to r/o Legionella (Urine Ag sent, results pending)  ID on board, appreciate recs    TYESHA   Possibly 2/2 medication induced ATN   Hold ARB/HCT/Metformin   spect medication induced from recent introduction of ARB/HCTZ/Metformin  Cr (baseline 1.08) >>>3.13 >2.39>1.91>1.75>1.41>1.40>1.40  UA negative  -Renal US negative  -Hold above meds. Avoid Nephrotoxins.  -Renally dose meds  -Gentle Hydration IVF LR 100cc/hr  -Nephro Consult note appreciated    HTN, HLD  -Amlodipine 10mg q24  -Hold ARB and HCTZ for now  -Not currently on statin therapy    DM2  -Hold Metformin 500mg BID  -ISS/Accuchecks/A1C    Asthma  -Finished steroid taper last Sunday. No current symptoms/hypoxia.  -No longer taking Montelukast  -Wixela/Duoneb PRN    Gout  -Uric Acid level 12.2  -Takes Allopurinol as needed. Hold for now.     VTE PPX SQH/SCDs    Dispo: Acute. Pending ID consult and C/w IV abx for PNA 58M with Asthma, HTN, HLD, DM2, Gout admitted with TYESHA suspected 2.2 medications along with MF PNA     Sepsis 2/2 Right Upper Lobe PNA  Tachycardia and Febrile 102.3 overnight  UA negative, no leukocytosis  Blood culture NGTD  Started on Zosyn, continue for now  Agree with need to r/o Legionella (Urine Ag sent, results pending)  ID on board, appreciate recs    TYESHA   Possibly 2/2 medication induced ATN   Hold ARB/HCT/Metformin   spect medication induced from recent introduction of ARB/HCTZ/Metformin  Cr (baseline 1.08) >>>3.13 >2.39>1.91>1.75>1.41>1.40>1.40  UA negative  Renal US negative  Hold above meds. Avoid Nephrotoxins.  Renally dose meds  Gentle Hydration IVF LR 100cc/hr  Nephro Consult note appreciated    HTN, HLD  Amlodipine 10mg q24  Hold ARB and HCTZ for now  Not currently on statin therapy    DM2  Hold Metformin 500mg BID  ISS/Accuchecks/A1C    Asthma  Finished steroid taper last Sunday. No current symptoms/hypoxia.  No longer taking Montelukast  Wixela/Duoneb PRN    Gout  Uric Acid level 12.2  Takes Allopurinol as needed. Hold for now.     VTE PPX SQH/SCDs    Dispo: Acute.

## 2024-04-04 LAB
ANION GAP SERPL CALC-SCNC: 13 MMOL/L — SIGNIFICANT CHANGE UP (ref 5–17)
BUN SERPL-MCNC: 4.2 MG/DL — LOW (ref 8–20)
CALCIUM SERPL-MCNC: 8 MG/DL — LOW (ref 8.4–10.5)
CHLORIDE SERPL-SCNC: 101 MMOL/L — SIGNIFICANT CHANGE UP (ref 96–108)
CO2 SERPL-SCNC: 21 MMOL/L — LOW (ref 22–29)
CREAT SERPL-MCNC: 1.31 MG/DL — HIGH (ref 0.5–1.3)
EGFR: 62 ML/MIN/1.73M2 — SIGNIFICANT CHANGE UP
GLUCOSE BLDC GLUCOMTR-MCNC: 80 MG/DL — SIGNIFICANT CHANGE UP (ref 70–99)
GLUCOSE BLDC GLUCOMTR-MCNC: 82 MG/DL — SIGNIFICANT CHANGE UP (ref 70–99)
GLUCOSE BLDC GLUCOMTR-MCNC: 89 MG/DL — SIGNIFICANT CHANGE UP (ref 70–99)
GLUCOSE BLDC GLUCOMTR-MCNC: 90 MG/DL — SIGNIFICANT CHANGE UP (ref 70–99)
GLUCOSE SERPL-MCNC: 88 MG/DL — SIGNIFICANT CHANGE UP (ref 70–99)
HCT VFR BLD CALC: 35.1 % — LOW (ref 39–50)
HGB BLD-MCNC: 11.4 G/DL — LOW (ref 13–17)
LIDOCAIN IGE QN: 53 U/L — HIGH (ref 22–51)
MAGNESIUM SERPL-MCNC: 1.5 MG/DL — LOW (ref 1.8–2.6)
MCHC RBC-ENTMCNC: 28.4 PG — SIGNIFICANT CHANGE UP (ref 27–34)
MCHC RBC-ENTMCNC: 32.5 GM/DL — SIGNIFICANT CHANGE UP (ref 32–36)
MCV RBC AUTO: 87.5 FL — SIGNIFICANT CHANGE UP (ref 80–100)
PLATELET # BLD AUTO: 399 K/UL — SIGNIFICANT CHANGE UP (ref 150–400)
POTASSIUM SERPL-MCNC: 4.6 MMOL/L — SIGNIFICANT CHANGE UP (ref 3.5–5.3)
POTASSIUM SERPL-SCNC: 4.6 MMOL/L — SIGNIFICANT CHANGE UP (ref 3.5–5.3)
RAPID RVP RESULT: SIGNIFICANT CHANGE UP
RBC # BLD: 4.01 M/UL — LOW (ref 4.2–5.8)
RBC # FLD: 13.8 % — SIGNIFICANT CHANGE UP (ref 10.3–14.5)
SARS-COV-2 RNA SPEC QL NAA+PROBE: SIGNIFICANT CHANGE UP
SODIUM SERPL-SCNC: 135 MMOL/L — SIGNIFICANT CHANGE UP (ref 135–145)
WBC # BLD: 5.67 K/UL — SIGNIFICANT CHANGE UP (ref 3.8–10.5)
WBC # FLD AUTO: 5.67 K/UL — SIGNIFICANT CHANGE UP (ref 3.8–10.5)

## 2024-04-04 PROCEDURE — 99233 SBSQ HOSP IP/OBS HIGH 50: CPT

## 2024-04-04 RX ORDER — ACETAMINOPHEN 500 MG
1000 TABLET ORAL ONCE
Refills: 0 | Status: COMPLETED | OUTPATIENT
Start: 2024-04-04 | End: 2024-04-04

## 2024-04-04 RX ORDER — MAGNESIUM SULFATE 500 MG/ML
2 VIAL (ML) INJECTION ONCE
Refills: 0 | Status: COMPLETED | OUTPATIENT
Start: 2024-04-04 | End: 2024-04-04

## 2024-04-04 RX ADMIN — Medication 1000 MILLIGRAM(S): at 23:44

## 2024-04-04 RX ADMIN — Medication 650 MILLIGRAM(S): at 18:00

## 2024-04-04 RX ADMIN — Medication 650 MILLIGRAM(S): at 17:10

## 2024-04-04 RX ADMIN — Medication 650 MILLIGRAM(S): at 12:35

## 2024-04-04 RX ADMIN — SODIUM CHLORIDE 100 MILLILITER(S): 9 INJECTION, SOLUTION INTRAVENOUS at 09:39

## 2024-04-04 RX ADMIN — PIPERACILLIN AND TAZOBACTAM 25 GRAM(S): 4; .5 INJECTION, POWDER, LYOPHILIZED, FOR SOLUTION INTRAVENOUS at 12:36

## 2024-04-04 RX ADMIN — AMLODIPINE BESYLATE 10 MILLIGRAM(S): 2.5 TABLET ORAL at 06:06

## 2024-04-04 RX ADMIN — Medication 25 GRAM(S): at 09:39

## 2024-04-04 RX ADMIN — HEPARIN SODIUM 5000 UNIT(S): 5000 INJECTION INTRAVENOUS; SUBCUTANEOUS at 17:10

## 2024-04-04 RX ADMIN — PANTOPRAZOLE SODIUM 40 MILLIGRAM(S): 20 TABLET, DELAYED RELEASE ORAL at 06:07

## 2024-04-04 RX ADMIN — PIPERACILLIN AND TAZOBACTAM 25 GRAM(S): 4; .5 INJECTION, POWDER, LYOPHILIZED, FOR SOLUTION INTRAVENOUS at 06:05

## 2024-04-04 RX ADMIN — HEPARIN SODIUM 5000 UNIT(S): 5000 INJECTION INTRAVENOUS; SUBCUTANEOUS at 06:06

## 2024-04-04 RX ADMIN — Medication 400 MILLIGRAM(S): at 22:44

## 2024-04-04 RX ADMIN — Medication 650 MILLIGRAM(S): at 11:43

## 2024-04-04 RX ADMIN — PIPERACILLIN AND TAZOBACTAM 25 GRAM(S): 4; .5 INJECTION, POWDER, LYOPHILIZED, FOR SOLUTION INTRAVENOUS at 21:22

## 2024-04-04 NOTE — PROGRESS NOTE ADULT - SUBJECTIVE AND OBJECTIVE BOX
[facility]  INFECTIOUS DISEASES at [place] / [place] / [place]  =======================================================                               Serg Almanza MD#   Rony Guillory MD*                             Lisy Sandhu MD*   Laila Bell MD*                              Professor Emeritus:  Dr Josr Chatman MD^            Diplomates American Board of Internal Medicine & Infectious Diseases                # [place] Office - Appt - Tel  [phone] Fax [phone]                * [place] Office - Appt - Tel [phone] Fax [phone]                      ^[place] Office - Tel  [phone] Fax [phone]                                  Hospital Consult line:  [phone]  =======================================================    ZAINA MILLER [id number]    Follow up: Fever    Remains febrile. No specific complaints       Allergies:  No Known Allergies      REVIEW OF SYSTEMS:  CONSTITUTIONAL:  + Fever + chills  HEENT:  No diplopia or blurred vision.  No earache, sore throat or runny nose.  CARDIOVASCULAR:  No chest pain  RESPIRATORY:  No cough, shortness of breath  GASTROINTESTINAL:  No nausea, vomiting or diarrhea.  GENITOURINARY:  No dysuria, frequency or urgency. No Blood in urine  MUSCULOSKELETAL:  no joint aches, no muscle pain  SKIN:  No change in skin, hair or nails.  NEUROLOGIC:  No Headaches      Physical Exam:  GEN: NAD  HEENT: normocephalic and atraumatic. EOMI. PERRL.    NECK: Supple.   LUNGS: CTA B/L.  HEART: RRR  ABDOMEN: Soft, NT, ND.  +BS.    : No CVA tenderness  EXTREMITIES: Without  edema.  MSK: No joint swelling  NEUROLOGIC: No Focal Deficits   SKIN: No rash      Vitals:  T(F): 102 (04 Apr 2024 11:24), Max: 102.8 (03 Apr 2024 20:52)  HR: 108 (04 Apr 2024 11:24)  BP: 125/76 (04 Apr 2024 11:24)  RR: 18 (04 Apr 2024 11:24)  SpO2: 91% (04 Apr 2024 11:24) (90% - 98%)  temp max in last 48H T(F): , Max: 102.8 (04-03-24 @ 20:52)      Current Antibiotics:  piperacillin/tazobactam IVPB.. 3.375 Gram(s) IV Intermittent every 8 hours    Other medications:  amLODIPine   Tablet 10 milliGRAM(s) Oral daily  dextrose 5%. 1000 milliLiter(s) IV Continuous <Continuous>  dextrose 5%. 1000 milliLiter(s) IV Continuous <Continuous>  dextrose 50% Injectable 25 Gram(s) IV Push once  dextrose 50% Injectable 25 Gram(s) IV Push once  dextrose 50% Injectable 12.5 Gram(s) IV Push once  glucagon  Injectable 1 milliGRAM(s) IntraMuscular once  heparin   Injectable 5000 Unit(s) SubCutaneous every 12 hours  insulin lispro (ADMELOG) corrective regimen sliding scale   SubCutaneous Before meals and at bedtime  lactated ringers. 1000 milliLiter(s) IV Continuous <Continuous>  pantoprazole    Tablet 40 milliGRAM(s) Oral before breakfast                            11.4   5.67  )-----------( 399      ( 04 Apr 2024 05:05 )             35.1     04-04    135  |  101  |  4.2<L>  ----------------------------<  88  4.6   |  21.0<L>  |  1.31<H>    Ca    8.0<L>      04 Apr 2024 05:05  Phos  1.9     04-03  Mg     1.5     04-04    TPro  6.3<L>  /  Alb  2.5<L>  /  TBili  0.4  /  DBili  x   /  AST  149<H>  /  ALT  69<H>  /  AlkPhos  94  04-03    RECENT CULTURES:  03-31 @ 22:32    RVP  NotDete    03-31 @ 22:21 .Blood Blood-Peripheral     No growth at 72 Hours    03-31 @ 22:18 .Blood Blood-Peripheral     No growth at 72 Hours    03-29 @ 20:22 Clean Catch Clean Catch (Midstream) Enterococcus faecalis    10,000 - 49,000 CFU/mL Enterococcus faecalis      WBC Count: 5.67 K/uL (04-04-24 @ 05:05)  WBC Count: 5.93 K/uL (04-03-24 @ 06:20)  WBC Count: 6.32 K/uL (04-02-24 @ 04:52)  WBC Count: 5.31 K/uL (04-01-24 @ 05:10)  WBC Count: 6.98 K/uL (03-31-24 @ 22:18)  WBC Count: 7.46 K/uL (03-31-24 @ 06:47)    Creatinine: 1.31 mg/dL (04-04-24 @ 05:05)  Creatinine: 1.30 mg/dL (04-03-24 @ 06:20)  Creatinine: 1.40 mg/dL (04-02-24 @ 04:52)  Creatinine: 1.41 mg/dL (04-01-24 @ 05:10)  Creatinine: 1.75 mg/dL (03-31-24 @ 22:18)  Creatinine: 1.91 mg/dL (03-31-24 @ 06:47)    Procalcitonin, Serum: 0.39 ng/mL (04-02-24 @ 04:52)  Procalcitonin, Serum: 0.17 ng/mL (03-31-24 @ 22:18)     COVID-19 PCR: NotDetec (03-31-24 @ 22:32)  SARS-CoV-2: NotDetec (03-31-24 @ 22:32)  SARS-CoV-2: NotDetec (03-13-24 @ 15:45)    Legionella pneumophila Antigen, Urine (04.02.24 @ 15:51)    Legionella Antigen, Urine: Negative      Streptococcus pneumoniae Ag, Ur (04.02.24 @ 15:51)    Streptococcus pneumoniae Ag, Ur Result: Negative

## 2024-04-04 NOTE — PROGRESS NOTE ADULT - ASSESSMENT
58M with Asthma, HTN, HLD, DM2, Gout admitted with TYESHA suspected 2.2 medications along with MF PNA     Sepsis 2/2 Right Upper Lobe PNA  Remains intermittently febrile despite apoprotein coverage for typical organisms   Appreciate DI f/u  Will test for babesia  Continue PipTazo for now  Pneurococcal and Legionella neg  ID on board, appreciate recs    TYESHA   Possibly 2/2 medication induced ATN   Hold ARB/HCT/Metformin   Potentially  medication induced (in setting of poor PO intake) from recent introduction of ARB/HCTZ/Metformin  Cr (baseline 1.08) >>>3.13 >2.39>1.91>1.75>1.41>1.40>1.3>1.31  UA negative  Renal US negative  Hold above meds. Avoid Nephrotoxins.  Renally dose meds  Gentle Hydration IVF LR 100cc/hr  Nephro Consult note appreciated    HTN, HLD  Amlodipine 10mg q24  Hold ARB and HCTZ for now  Not currently on statin therapy    DM2  Hold Metformin 500mg BID  ISS/Accuchecks/A1C    Asthma  Finished steroid taper last Sunday. No current symptoms/hypoxia.  No longer taking Montelukast  Wixela/Duoneb PRN    Gout  Uric Acid level 12.2  Takes Allopurinol as needed. Hold for now.     VTE PPX SQH/SCDs    Dispo: Acute.

## 2024-04-04 NOTE — PROGRESS NOTE ADULT - ASSESSMENT
58y  Male with a h/o Asthma, HTN, HLD, DM2, Gout. Patient presented to [facility] ER for abnormal lab work, elevated Cr from his PMD's office. Patient recently hospitalized for acute asthma exacerbation and viral URI found to have uncontrolled HTN and new onset DM type 2 at that time discharged home with outpatient follow-up and sent back to [facility] ER today by PCP for abnormal kidney function. During his hospital course patient developed fever to 102.3F on 3/31, was a code sepsis, was administered Azithromycin x1 and Zosyn since 4/1.      Multifocal PNA  Fever  TYESHA  Transaminitis       - Blood cultures  3/31 no growth   - Repeat blood cultures if febrile   - RVP/COVID 19 PCR3/31 negative   - Urine for legionella negative   - CT C/A/P 4/1 reporting multifocal PNA   - UA 3/30 and 4/1 negative   - Procalcitonin level 0.39, will order repeat for AM  - Will check Lipase level  - Will check Babesia PCR Lyme PCR   - Continue Zosyn   - Hold off on PICC/Midline for now unless needed for reasons other than infectious diseases  - Follow up cultures  - Trend Fever  - Trend WBC      Will Follow    Discussed treatment plan with: Dr Smith

## 2024-04-04 NOTE — PROGRESS NOTE ADULT - SUBJECTIVE AND OBJECTIVE BOX
[facility] Division of Hospital Medicine    Chief Complaint:  TYESHA / PNA     SUBJECTIVE / OVERNIGHT EVENTS:  Sitting up in chair   Feels better but overnight again febrile to 102  Patient denies chest pain, SOB, abd pain, N/V, fever, chills, dysuria or any other complaints. All remainder ROS negative.     MEDICATIONS  (STANDING):  amLODIPine   Tablet 10 milliGRAM(s) Oral daily  dextrose 5%. 1000 milliLiter(s) (50 mL/Hr) IV Continuous <Continuous>  dextrose 5%. 1000 milliLiter(s) (100 mL/Hr) IV Continuous <Continuous>  dextrose 50% Injectable 12.5 Gram(s) IV Push once  dextrose 50% Injectable 25 Gram(s) IV Push once  dextrose 50% Injectable 25 Gram(s) IV Push once  glucagon  Injectable 1 milliGRAM(s) IntraMuscular once  heparin   Injectable 5000 Unit(s) SubCutaneous every 12 hours  insulin lispro (ADMELOG) corrective regimen sliding scale   SubCutaneous Before meals and at bedtime  lactated ringers. 1000 milliLiter(s) (100 mL/Hr) IV Continuous <Continuous>  pantoprazole    Tablet 40 milliGRAM(s) Oral before breakfast  piperacillin/tazobactam IVPB.. 3.375 Gram(s) IV Intermittent every 8 hours    MEDICATIONS  (PRN):  acetaminophen     Tablet .. 650 milliGRAM(s) Oral every 6 hours PRN Temp greater or equal to 38C (100.4F), Mild Pain (1 - 3)  albuterol    90 MICROgram(s) HFA Inhaler 2 Puff(s) Inhalation every 6 hours PRN Shortness of Breath and/or Wheezing  aluminum hydroxide/magnesium hydroxide/simethicone Suspension 30 milliLiter(s) Oral every 4 hours PRN Dyspepsia  dextrose Oral Gel 15 Gram(s) Oral once PRN Blood Glucose LESS THAN 70 milliGRAM(s)/deciliter  melatonin 3 milliGRAM(s) Oral at bedtime PRN Insomnia  ondansetron Injectable 4 milliGRAM(s) IV Push every 8 hours PRN Nausea and/or Vomiting        I&O's Summary    02 Apr 2024 07:01  -  03 Apr 2024 07:00  --------------------------------------------------------  IN: 800 mL / OUT: 2475 mL / NET: -1675 mL    03 Apr 2024 07:01  -  03 Apr 2024 15:43  --------------------------------------------------------  IN: 360 mL / OUT: 250 mL / NET: 110 mL        PHYSICAL EXAM:  Vital Signs Last 24 Hrs  T(C): 37.3 (04 Apr 2024 14:07), Max: 39.3 (03 Apr 2024 20:52)  T(F): 99.2 (04 Apr 2024 14:07), Max: 102.8 (03 Apr 2024 20:52)  HR: 104 (04 Apr 2024 14:07) (99 - 120)  BP: 120/74 (04 Apr 2024 14:07) (117/71 - 146/71)  BP(mean): --  RR: 18 (04 Apr 2024 14:07) (18 - 18)  SpO2: 93% (04 Apr 2024 14:07) (91% - 98%)    Parameters below as of 04 Apr 2024 14:07  Patient On (Oxygen Delivery Method): nasal cannula              CONSTITUTIONAL: Non toxic appearing, lying in bed  ENMT: Moist oral mucosa, no pharyngeal injection or exudates; normal dentition  RESPIRATORY: Normal respiratory effort; scattered coarse / rh  ronchorous sounds b/l   CARDIOVASCULAR: Regular rate and rhythm, normal S1 and S2, no murmur/rub/gallop; No lower extremity edema; Peripheral pulses are 2+ bilaterally  ABDOMEN: Nontender to palpation, normoactive bowel sounds, no rebound/guarding; No hepatosplenomegaly  MUSCLOSKELETAL:  Normal gait; no clubbing or cyanosis of digits; no joint swelling or tenderness to palpation  PSYCH: A+O to person, place, and time; affect appropriate  NEUROLOGY: CN 2-12 are intact and symmetric; no gross sensory deficits;   SKIN: No rashes; no palpable lesions    LABS:                        11.1   5.93  )-----------( 381      ( 03 Apr 2024 06:20 )             33.6     04-03    137  |  99  |  5.1<L>  ----------------------------<  91  3.6   |  23.0  |  1.30    Ca    8.0<L>      03 Apr 2024 06:20  Phos  1.9     04-03  Mg     1.5     04-03    TPro  6.3<L>  /  Alb  2.5<L>  /  TBili  0.4  /  DBili  x   /  AST  149<H>  /  ALT  69<H>  /  AlkPhos  94  04-03          Urinalysis Basic - ( 03 Apr 2024 06:20 )    Color: x / Appearance: x / SG: x / pH: x  Gluc: 91 mg/dL / Ketone: x  / Bili: x / Urobili: x   Blood: x / Protein: x / Nitrite: x   Leuk Esterase: x / RBC: x / WBC x   Sq Epi: x / Non Sq Epi: x / Bacteria: x        Culture - Blood (collected 31 Mar 2024 22:21)  Source: .Blood Blood-Peripheral  Preliminary Report (03 Apr 2024 07:01):    No growth at 48 Hours    Culture - Blood (collected 31 Mar 2024 22:18)  Source: .Blood Blood-Peripheral  Preliminary Report (03 Apr 2024 07:02):    No growth at 48 Hours      CAPILLARY BLOOD GLUCOSE      POCT Blood Glucose.: 92 mg/dL (03 Apr 2024 11:39)  POCT Blood Glucose.: 97 mg/dL (03 Apr 2024 08:18)  POCT Blood Glucose.: 85 mg/dL (02 Apr 2024 21:52)  POCT Blood Glucose.: 82 mg/dL (02 Apr 2024 17:13)        RADIOLOGY & ADDITIONAL TESTS:    4/1/24 CT Chest  IMPRESSION:  Multifocal consolidations, most prominent in the right upper lobe, new compared to CT 3/15/2024, likely representing multifocal pneumonia.

## 2024-04-05 LAB
ALBUMIN SERPL ELPH-MCNC: 2.4 G/DL — LOW (ref 3.3–5.2)
ALP SERPL-CCNC: 118 U/L — SIGNIFICANT CHANGE UP (ref 40–120)
ALT FLD-CCNC: 125 U/L — HIGH
ANION GAP SERPL CALC-SCNC: 16 MMOL/L — SIGNIFICANT CHANGE UP (ref 5–17)
AST SERPL-CCNC: 221 U/L — HIGH
BABESIA MICROTI PCR, BLD RESULT: SIGNIFICANT CHANGE UP
BILIRUB DIRECT SERPL-MCNC: 0.1 MG/DL — SIGNIFICANT CHANGE UP (ref 0–0.3)
BILIRUB INDIRECT FLD-MCNC: 0.3 MG/DL — SIGNIFICANT CHANGE UP (ref 0.2–1)
BILIRUB SERPL-MCNC: 0.4 MG/DL — SIGNIFICANT CHANGE UP (ref 0.4–2)
BUN SERPL-MCNC: 4.1 MG/DL — LOW (ref 8–20)
CALCIUM SERPL-MCNC: 8.2 MG/DL — LOW (ref 8.4–10.5)
CHLORIDE SERPL-SCNC: 98 MMOL/L — SIGNIFICANT CHANGE UP (ref 96–108)
CO2 SERPL-SCNC: 21 MMOL/L — LOW (ref 22–29)
CREAT SERPL-MCNC: 1.08 MG/DL — SIGNIFICANT CHANGE UP (ref 0.5–1.3)
CRP SERPL-MCNC: 141 MG/L — HIGH
EGFR: 78 ML/MIN/1.73M2 — SIGNIFICANT CHANGE UP
GLUCOSE BLDC GLUCOMTR-MCNC: 112 MG/DL — HIGH (ref 70–99)
GLUCOSE BLDC GLUCOMTR-MCNC: 90 MG/DL — SIGNIFICANT CHANGE UP (ref 70–99)
GLUCOSE BLDC GLUCOMTR-MCNC: 94 MG/DL — SIGNIFICANT CHANGE UP (ref 70–99)
GLUCOSE BLDC GLUCOMTR-MCNC: 97 MG/DL — SIGNIFICANT CHANGE UP (ref 70–99)
GLUCOSE SERPL-MCNC: 82 MG/DL — SIGNIFICANT CHANGE UP (ref 70–99)
HCT VFR BLD CALC: 36.5 % — LOW (ref 39–50)
HGB BLD-MCNC: 11.7 G/DL — LOW (ref 13–17)
HIV 1 & 2 AB SERPL IA.RAPID: SIGNIFICANT CHANGE UP
MCHC RBC-ENTMCNC: 28 PG — SIGNIFICANT CHANGE UP (ref 27–34)
MCHC RBC-ENTMCNC: 32.1 GM/DL — SIGNIFICANT CHANGE UP (ref 32–36)
MCV RBC AUTO: 87.3 FL — SIGNIFICANT CHANGE UP (ref 80–100)
PLATELET # BLD AUTO: 444 K/UL — HIGH (ref 150–400)
POTASSIUM SERPL-MCNC: 4.1 MMOL/L — SIGNIFICANT CHANGE UP (ref 3.5–5.3)
POTASSIUM SERPL-SCNC: 4.1 MMOL/L — SIGNIFICANT CHANGE UP (ref 3.5–5.3)
PROCALCITONIN SERPL-MCNC: 0.63 NG/ML — HIGH (ref 0.02–0.1)
PROCALCITONIN SERPL-MCNC: 0.64 NG/ML — HIGH (ref 0.02–0.1)
PROT SERPL-MCNC: 6.4 G/DL — LOW (ref 6.6–8.7)
RBC # BLD: 4.18 M/UL — LOW (ref 4.2–5.8)
RBC # FLD: 13.7 % — SIGNIFICANT CHANGE UP (ref 10.3–14.5)
SODIUM SERPL-SCNC: 135 MMOL/L — SIGNIFICANT CHANGE UP (ref 135–145)
WBC # BLD: 7.37 K/UL — SIGNIFICANT CHANGE UP (ref 3.8–10.5)
WBC # FLD AUTO: 7.37 K/UL — SIGNIFICANT CHANGE UP (ref 3.8–10.5)

## 2024-04-05 PROCEDURE — 99233 SBSQ HOSP IP/OBS HIGH 50: CPT

## 2024-04-05 PROCEDURE — 93970 EXTREMITY STUDY: CPT | Mod: 26

## 2024-04-05 RX ADMIN — Medication 650 MILLIGRAM(S): at 11:40

## 2024-04-05 RX ADMIN — PIPERACILLIN AND TAZOBACTAM 25 GRAM(S): 4; .5 INJECTION, POWDER, LYOPHILIZED, FOR SOLUTION INTRAVENOUS at 05:43

## 2024-04-05 RX ADMIN — HEPARIN SODIUM 5000 UNIT(S): 5000 INJECTION INTRAVENOUS; SUBCUTANEOUS at 05:43

## 2024-04-05 RX ADMIN — Medication 650 MILLIGRAM(S): at 12:10

## 2024-04-05 RX ADMIN — HEPARIN SODIUM 5000 UNIT(S): 5000 INJECTION INTRAVENOUS; SUBCUTANEOUS at 17:44

## 2024-04-05 RX ADMIN — AMLODIPINE BESYLATE 10 MILLIGRAM(S): 2.5 TABLET ORAL at 05:43

## 2024-04-05 RX ADMIN — PIPERACILLIN AND TAZOBACTAM 25 GRAM(S): 4; .5 INJECTION, POWDER, LYOPHILIZED, FOR SOLUTION INTRAVENOUS at 12:17

## 2024-04-05 RX ADMIN — PANTOPRAZOLE SODIUM 40 MILLIGRAM(S): 20 TABLET, DELAYED RELEASE ORAL at 05:41

## 2024-04-05 NOTE — PROGRESS NOTE ADULT - ASSESSMENT
58M with Asthma, HTN, HLD, DM2, Gout admitted with TYESHA suspected 2.2 medications along with MF PNA     Sepsis 2/2 Right Upper Lobe PNA  Remains intermittently febrile despite apoprotein coverage for typical organisms   Appreciate DI f/u  Will test for babesia  Continue PipTazo for now. D/w ID, will dc after tomorrow and observe off Abx (r/o drug fever)  Pneurococcal and Legionella neg  Will send HIV   LE Dopplers ordered, if pos d/w renal about potentially doing a CTA (V/Q value questionable given parenchymal disease)   ID on board, appreciate recs    TYESHA (now resolved)   Possibly 2/2 medication induced ATN   Hold ARB/HCT/Metformin   Potentially  medication induced (in setting of poor PO intake) from recent introduction of ARB/HCTZ/Metformin  Cr (baseline 1.08) >>>3.13 >2.39>1.91>1.75>1.41>1.40>1.3>1.31> 1.08  UA negative  Renal US negative  Hold above meds. Avoid Nephrotoxins.  Renally dose meds  Gentle Hydration IVF LR 100cc/hr  Nephro Consult note appreciated    HTN, HLD  Amlodipine 10mg q24  Hold ARB and HCTZ for now  Not currently on statin therapy    DM2  Hold Metformin 500mg BID  ISS/Accuchecks/A1C    Asthma  Finished steroid taper last Sunday. No current symptoms/hypoxia.  No longer taking Montelukast  Wixela/Duoneb PRN    Gout  Uric Acid level 12.2  Takes Allopurinol as needed. Hold for now.     VTE PPX SQH/SCDs    Dispo: Acute.

## 2024-04-05 NOTE — PROGRESS NOTE ADULT - ASSESSMENT
58y  Male with a h/o Asthma, HTN, HLD, DM2, Gout. Patient presented to [facility] ER for abnormal lab work, elevated Cr from his PMD's office. Patient recently hospitalized for acute asthma exacerbation and viral URI found to have uncontrolled HTN and new onset DM type 2 at that time discharged home with outpatient follow-up and sent back to [facility] ER today by PCP for abnormal kidney function. During his hospital course patient developed fever to 102.3F on 3/31, was a code sepsis, was administered Azithromycin x1 and Zosyn since 4/1.      Multifocal PNA  Fever  TYESHA  Transaminitis   Thrombocytosis       - Blood cultures  3/31 no growth   - Repeat blood cultures if febrile   - RVP/COVID 19 PCR 3/31, 4/4 negative   - Urine for legionella and strep negative   - CT C/A/P 4/1 reporting multifocal PNA   - UA 3/30 and 4/1 negative   - Procalcitonin level 0.63, will order repeat for AM  - Lipase level 53  - Will check Babesia PCR Lyme PCR  pending  - HIV negative   - Will check viral hepatitis profile   - Will check ERON and RF  - D/C Zosyn   - Hold off on PICC/Midline for now unless needed for reasons other than infectious diseases  - Follow up cultures  - Trend Fever  - Trend WBC      Will Follow    Discussed treatment plan with: Dr Smith

## 2024-04-05 NOTE — PROGRESS NOTE ADULT - SUBJECTIVE AND OBJECTIVE BOX
[facility]  INFECTIOUS DISEASES at [place] / [place] / [place]  =======================================================                               Serg Almanza MD#   Rony Guillory MD*                             Lisy Sandhu MD*   Laila Bell MD*                              Professor Emeritus:  Dr Josr Chatman MD^            Diplomates American Board of Internal Medicine & Infectious Diseases                # [place] Office - Appt - Tel  [phone] Fax [phone]                * [place] Office - Appt - Tel [phone] Fax [phone]                      ^[place] Office - Tel  [phone] Fax [phone]                                  Hospital Consult line:  [phone]  =======================================================    ZAINA MILLER [id number]    Follow up: Fever    Remains febrile. No specific complaints       Allergies:  No Known Allergies      REVIEW OF SYSTEMS:  CONSTITUTIONAL:  + Fever + chills  HEENT:  No diplopia or blurred vision.  No earache, sore throat or runny nose.  CARDIOVASCULAR:  No chest pain  RESPIRATORY:  No cough, shortness of breath  GASTROINTESTINAL:  No nausea, vomiting or diarrhea.  GENITOURINARY:  No dysuria, frequency or urgency. No Blood in urine  MUSCULOSKELETAL:  no joint aches, no muscle pain  SKIN:  No change in skin, hair or nails.  NEUROLOGIC:  No Headaches      Physical Exam:  GEN: NAD  HEENT: normocephalic and atraumatic. EOMI. PERRL.    NECK: Supple.   LUNGS: CTA B/L.  HEART: RRR  ABDOMEN: Soft, NT, ND.  +BS.    : No CVA tenderness  EXTREMITIES: Without  edema.  MSK: No joint swelling  NEUROLOGIC: No Focal Deficits   SKIN: No rash      Vitals:  T(F): 99.2 (05 Apr 2024 12:56), Max: 101.2 (04 Apr 2024 22:29)  HR: 110 (05 Apr 2024 12:56)  BP: 128/75 (05 Apr 2024 12:56)  RR: 18 (05 Apr 2024 12:56)  SpO2: 92% (05 Apr 2024 12:56) (92% - 100%)  temp max in last 48H T(F): , Max: 102.8 (04-03-24 @ 20:52)    Current Antibiotics:    Other medications:  amLODIPine   Tablet 10 milliGRAM(s) Oral daily  dextrose 5%. 1000 milliLiter(s) IV Continuous <Continuous>  dextrose 5%. 1000 milliLiter(s) IV Continuous <Continuous>  dextrose 50% Injectable 25 Gram(s) IV Push once  dextrose 50% Injectable 25 Gram(s) IV Push once  dextrose 50% Injectable 12.5 Gram(s) IV Push once  glucagon  Injectable 1 milliGRAM(s) IntraMuscular once  heparin   Injectable 5000 Unit(s) SubCutaneous every 12 hours  insulin lispro (ADMELOG) corrective regimen sliding scale   SubCutaneous Before meals and at bedtime  lactated ringers. 1000 milliLiter(s) IV Continuous <Continuous>  pantoprazole    Tablet 40 milliGRAM(s) Oral before breakfast                            11.7   7.37  )-----------( 444      ( 05 Apr 2024 06:57 )             36.5     04-05    135  |  98  |  4.1<L>  ----------------------------<  82  4.1   |  21.0<L>  |  1.08    Ca    8.2<L>      05 Apr 2024 06:57  Mg     1.5     04-04    TPro  6.4<L>  /  Alb  2.4<L>  /  TBili  0.4  /  DBili  0.1  /  AST  221<H>  /  ALT  125<H>  /  AlkPhos  118  04-05    RECENT CULTURES:  04-04 @ 13:21    [facility]    04-03 @ 21:30 .Blood Blood-Peripheral     No growth at 24 hours    04-03 @ 21:20 .Blood Blood-Peripheral     No growth at 24 hours    04-03 @ 06:26 .Blood Blood     No growth at 48 Hours    04-03 @ 06:20 .Blood Blood     No growth at 48 Hours    03-31 @ 22:32    RVP  NotDetec    03-31 @ 22:21 .Blood Blood-Peripheral     No growth at 4 days    03-31 @ 22:18 .Blood Blood-Peripheral     No growth at 4 days    03-29 @ 20:22 Clean Catch Clean Catch (Midstream) Enterococcus faecalis    10,000 - 49,000 CFU/mL Enterococcus faecalis      WBC Count: 7.37 K/uL (04-05-24 @ 06:57)  WBC Count: 5.67 K/uL (04-04-24 @ 05:05)  WBC Count: 5.93 K/uL (04-03-24 @ 06:20)  WBC Count: 6.32 K/uL (04-02-24 @ 04:52)  WBC Count: 5.31 K/uL (04-01-24 @ 05:10)  WBC Count: 6.98 K/uL (03-31-24 @ 22:18)    Creatinine: 1.08 mg/dL (04-05-24 @ 06:57)  Creatinine: 1.31 mg/dL (04-04-24 @ 05:05)  Creatinine: 1.30 mg/dL (04-03-24 @ 06:20)  Creatinine: 1.40 mg/dL (04-02-24 @ 04:52)  Creatinine: 1.41 mg/dL (04-01-24 @ 05:10)  Creatinine: 1.75 mg/dL (03-31-24 @ 22:18)    C-Reactive Protein, Serum: 141 mg/L (04-05-24 @ 08:34)    Procalcitonin, Serum: 0.63 ng/mL (04-05-24 @ 06:57)  Procalcitonin, Serum: 0.64 ng/mL (04-05-24 @ 06:56)  Procalcitonin, Serum: 0.39 ng/mL (04-02-24 @ 04:52)  Procalcitonin, Serum: 0.17 ng/mL (03-31-24 @ 22:18)     SARS-CoV-2: NotDetec (04-04-24 @ 13:21)  COVID-19 PCR: NotDetec (03-31-24 @ 22:32)  SARS-CoV-2: NotDetec (03-31-24 @ 22:32)  SARS-CoV-2: NotDetec (03-13-24 @ 15:45)    Rapid HIV-1/2 Antibody (04.05.24 @ 08:34)    Rapid HIV-1/2 Antibody: Nonreact    Legionella pneumophila Antigen, Urine (04.02.24 @ 15:51)    Legionella Antigen, Urine: Negative    Streptococcus pneumoniae Ag, Ur (04.02.24 @ 15:51)    Streptococcus pneumoniae Ag, Ur Result: Negative    Lipase (04.04.24 @ 05:05)    Lipase: 53 U/L

## 2024-04-05 NOTE — PROGRESS NOTE ADULT - SUBJECTIVE AND OBJECTIVE BOX
[facility] Division of Hospital Medicine    Chief Complaint:  TYESHA / PNA     SUBJECTIVE / OVERNIGHT EVENTS:  Sitting up in chair. Remains intermittently febrile   Patient denies chest pain, SOB, abd pain, N/V, fever, chills, dysuria or any other complaints. All remainder ROS negative.     MEDICATIONS  (STANDING):  amLODIPine   Tablet 10 milliGRAM(s) Oral daily  dextrose 5%. 1000 milliLiter(s) (50 mL/Hr) IV Continuous <Continuous>  dextrose 5%. 1000 milliLiter(s) (100 mL/Hr) IV Continuous <Continuous>  dextrose 50% Injectable 12.5 Gram(s) IV Push once  dextrose 50% Injectable 25 Gram(s) IV Push once  dextrose 50% Injectable 25 Gram(s) IV Push once  glucagon  Injectable 1 milliGRAM(s) IntraMuscular once  heparin   Injectable 5000 Unit(s) SubCutaneous every 12 hours  insulin lispro (ADMELOG) corrective regimen sliding scale   SubCutaneous Before meals and at bedtime  lactated ringers. 1000 milliLiter(s) (100 mL/Hr) IV Continuous <Continuous>  pantoprazole    Tablet 40 milliGRAM(s) Oral before breakfast  piperacillin/tazobactam IVPB.. 3.375 Gram(s) IV Intermittent every 8 hours    MEDICATIONS  (PRN):  acetaminophen     Tablet .. 650 milliGRAM(s) Oral every 6 hours PRN Temp greater or equal to 38C (100.4F), Mild Pain (1 - 3)  albuterol    90 MICROgram(s) HFA Inhaler 2 Puff(s) Inhalation every 6 hours PRN Shortness of Breath and/or Wheezing  aluminum hydroxide/magnesium hydroxide/simethicone Suspension 30 milliLiter(s) Oral every 4 hours PRN Dyspepsia  dextrose Oral Gel 15 Gram(s) Oral once PRN Blood Glucose LESS THAN 70 milliGRAM(s)/deciliter  melatonin 3 milliGRAM(s) Oral at bedtime PRN Insomnia  ondansetron Injectable 4 milliGRAM(s) IV Push every 8 hours PRN Nausea and/or Vomiting    PHYSICAL EXAM:  Vital Signs Last 24 Hrs  T(C): 37.3 (05 Apr 2024 12:56), Max: 38.4 (04 Apr 2024 22:29)  T(F): 99.2 (05 Apr 2024 12:56), Max: 101.2 (04 Apr 2024 22:29)  HR: 110 (05 Apr 2024 12:56) (104 - 116)  BP: 128/75 (05 Apr 2024 12:56) (110/69 - 139/79)  BP(mean): --  RR: 18 (05 Apr 2024 12:56) (18 - 18)  SpO2: 92% (05 Apr 2024 12:56) (92% - 100%)    Parameters below as of 05 Apr 2024 12:56  Patient On (Oxygen Delivery Method): nasal cannula  O2 Flow (L/min): 2      CONSTITUTIONAL: Non toxic appearing, lying in bed  ENMT: Moist oral mucosa, no pharyngeal injection or exudates; normal dentition  RESPIRATORY: Normal respiratory effort; scattered coarse / rh  ronchorous sounds b/l   CARDIOVASCULAR: Regular rate and rhythm, normal S1 and S2, no murmur/rub/gallop; No lower extremity edema; Peripheral pulses are 2+ bilaterally  ABDOMEN: Nontender to palpation, normoactive bowel sounds, no rebound/guarding; No hepatosplenomegaly  MUSCLOSKELETAL:  Normal gait; no clubbing or cyanosis of digits; no joint swelling or tenderness to palpation  PSYCH: A+O to person, place, and time; affect appropriate  NEUROLOGY: CN 2-12 are intact and symmetric; no gross sensory deficits;   SKIN: No rashes; no palpable lesions    LABS:                                   11.7   7.37  )-----------( 444      ( 05 Apr 2024 06:57 )             36.5   04-05    135  |  98  |  4.1<L>  ----------------------------<  82  4.1   |  21.0<L>  |  1.08    Ca    8.2<L>      05 Apr 2024 06:57  Mg     1.5     04-04    TPro  6.4<L>  /  Alb  2.4<L>  /  TBili  0.4  /  DBili  0.1  /  AST  221<H>  /  ALT  125<H>  /  AlkPhos  118  04-05        Urinalysis Basic - ( 03 Apr 2024 06:20 )    Color: x / Appearance: x / SG: x / pH: x  Gluc: 91 mg/dL / Ketone: x  / Bili: x / Urobili: x   Blood: x / Protein: x / Nitrite: x   Leuk Esterase: x / RBC: x / WBC x   Sq Epi: x / Non Sq Epi: x / Bacteria: x        Culture - Blood (collected 31 Mar 2024 22:21)  Source: .Blood Blood-Peripheral  Preliminary Report (03 Apr 2024 07:01):    No growth at 48 Hours    Culture - Blood (collected 31 Mar 2024 22:18)  Source: .Blood Blood-Peripheral  Preliminary Report (03 Apr 2024 07:02):    No growth at 48 Hours        CAPILLARY BLOOD GLUCOSE      POCT Blood Glucose.: 97 mg/dL (05 Apr 2024 11:45)  POCT Blood Glucose.: 90 mg/dL (05 Apr 2024 07:54)  POCT Blood Glucose.: 82 mg/dL (04 Apr 2024 21:23)  POCT Blood Glucose.: 80 mg/dL (04 Apr 2024 16:43)      RADIOLOGY & ADDITIONAL TESTS:    4/1/24 CT Chest  IMPRESSION:  Multifocal consolidations, most prominent in the right upper lobe, new compared to CT 3/15/2024, likely representing multifocal pneumonia.

## 2024-04-06 LAB
ALBUMIN SERPL ELPH-MCNC: 2.3 G/DL — LOW (ref 3.3–5.2)
ALP SERPL-CCNC: 100 U/L — SIGNIFICANT CHANGE UP (ref 40–120)
ALT FLD-CCNC: 121 U/L — HIGH
ANION GAP SERPL CALC-SCNC: 13 MMOL/L — SIGNIFICANT CHANGE UP (ref 5–17)
AST SERPL-CCNC: 206 U/L — HIGH
BASOPHILS # BLD AUTO: 0.05 K/UL — SIGNIFICANT CHANGE UP (ref 0–0.2)
BASOPHILS NFR BLD AUTO: 0.6 % — SIGNIFICANT CHANGE UP (ref 0–2)
BILIRUB SERPL-MCNC: 0.3 MG/DL — LOW (ref 0.4–2)
BUN SERPL-MCNC: 3.1 MG/DL — LOW (ref 8–20)
CALCIUM SERPL-MCNC: 7.6 MG/DL — LOW (ref 8.4–10.5)
CHLORIDE SERPL-SCNC: 102 MMOL/L — SIGNIFICANT CHANGE UP (ref 96–108)
CO2 SERPL-SCNC: 23 MMOL/L — SIGNIFICANT CHANGE UP (ref 22–29)
CREAT SERPL-MCNC: 1.04 MG/DL — SIGNIFICANT CHANGE UP (ref 0.5–1.3)
CRP SERPL-MCNC: 163 MG/L — HIGH
CULTURE RESULTS: SIGNIFICANT CHANGE UP
CULTURE RESULTS: SIGNIFICANT CHANGE UP
EGFR: 82 ML/MIN/1.73M2 — SIGNIFICANT CHANGE UP
EOSINOPHIL # BLD AUTO: 0.58 K/UL — HIGH (ref 0–0.5)
EOSINOPHIL NFR BLD AUTO: 6.6 % — HIGH (ref 0–6)
GLUCOSE BLDC GLUCOMTR-MCNC: 81 MG/DL — SIGNIFICANT CHANGE UP (ref 70–99)
GLUCOSE BLDC GLUCOMTR-MCNC: 87 MG/DL — SIGNIFICANT CHANGE UP (ref 70–99)
GLUCOSE BLDC GLUCOMTR-MCNC: 91 MG/DL — SIGNIFICANT CHANGE UP (ref 70–99)
GLUCOSE BLDC GLUCOMTR-MCNC: 97 MG/DL — SIGNIFICANT CHANGE UP (ref 70–99)
GLUCOSE SERPL-MCNC: 83 MG/DL — SIGNIFICANT CHANGE UP (ref 70–99)
HAV IGM SER-ACNC: SIGNIFICANT CHANGE UP
HBV CORE IGM SER-ACNC: SIGNIFICANT CHANGE UP
HBV SURFACE AG SER-ACNC: SIGNIFICANT CHANGE UP
HCT VFR BLD CALC: 27.9 % — LOW (ref 39–50)
HCV AB S/CO SERPL IA: 0.13 S/CO — SIGNIFICANT CHANGE UP (ref 0–0.99)
HCV AB SERPL-IMP: SIGNIFICANT CHANGE UP
HGB BLD-MCNC: 9.1 G/DL — LOW (ref 13–17)
IMM GRANULOCYTES NFR BLD AUTO: 0.8 % — SIGNIFICANT CHANGE UP (ref 0–0.9)
LYMPHOCYTES # BLD AUTO: 4.01 K/UL — HIGH (ref 1–3.3)
LYMPHOCYTES # BLD AUTO: 45.9 % — HIGH (ref 13–44)
MAGNESIUM SERPL-MCNC: 1.2 MG/DL — LOW (ref 1.6–2.6)
MCHC RBC-ENTMCNC: 28.3 PG — SIGNIFICANT CHANGE UP (ref 27–34)
MCHC RBC-ENTMCNC: 32.6 GM/DL — SIGNIFICANT CHANGE UP (ref 32–36)
MCV RBC AUTO: 86.6 FL — SIGNIFICANT CHANGE UP (ref 80–100)
MONOCYTES # BLD AUTO: 1.03 K/UL — HIGH (ref 0–0.9)
MONOCYTES NFR BLD AUTO: 11.8 % — SIGNIFICANT CHANGE UP (ref 2–14)
NEUTROPHILS # BLD AUTO: 3 K/UL — SIGNIFICANT CHANGE UP (ref 1.8–7.4)
NEUTROPHILS NFR BLD AUTO: 34.3 % — LOW (ref 43–77)
PLATELET # BLD AUTO: 457 K/UL — HIGH (ref 150–400)
POTASSIUM SERPL-MCNC: 3.7 MMOL/L — SIGNIFICANT CHANGE UP (ref 3.5–5.3)
POTASSIUM SERPL-SCNC: 3.7 MMOL/L — SIGNIFICANT CHANGE UP (ref 3.5–5.3)
PROCALCITONIN SERPL-MCNC: 0.63 NG/ML — HIGH (ref 0.02–0.1)
PROT SERPL-MCNC: 5.3 G/DL — LOW (ref 6.6–8.7)
RBC # BLD: 3.22 M/UL — LOW (ref 4.2–5.8)
RBC # FLD: 13.6 % — SIGNIFICANT CHANGE UP (ref 10.3–14.5)
RHEUMATOID FACT SERPL-ACNC: 11 IU/ML — SIGNIFICANT CHANGE UP (ref 0–13)
SODIUM SERPL-SCNC: 137 MMOL/L — SIGNIFICANT CHANGE UP (ref 135–145)
SPECIMEN SOURCE: SIGNIFICANT CHANGE UP
SPECIMEN SOURCE: SIGNIFICANT CHANGE UP
WBC # BLD: 8.74 K/UL — SIGNIFICANT CHANGE UP (ref 3.8–10.5)
WBC # FLD AUTO: 8.74 K/UL — SIGNIFICANT CHANGE UP (ref 3.8–10.5)

## 2024-04-06 PROCEDURE — 99232 SBSQ HOSP IP/OBS MODERATE 35: CPT

## 2024-04-06 PROCEDURE — 99233 SBSQ HOSP IP/OBS HIGH 50: CPT

## 2024-04-06 RX ADMIN — AMLODIPINE BESYLATE 10 MILLIGRAM(S): 2.5 TABLET ORAL at 05:28

## 2024-04-06 RX ADMIN — Medication 650 MILLIGRAM(S): at 22:00

## 2024-04-06 RX ADMIN — HEPARIN SODIUM 5000 UNIT(S): 5000 INJECTION INTRAVENOUS; SUBCUTANEOUS at 17:11

## 2024-04-06 RX ADMIN — PANTOPRAZOLE SODIUM 40 MILLIGRAM(S): 20 TABLET, DELAYED RELEASE ORAL at 05:28

## 2024-04-06 RX ADMIN — Medication 650 MILLIGRAM(S): at 05:14

## 2024-04-06 RX ADMIN — HEPARIN SODIUM 5000 UNIT(S): 5000 INJECTION INTRAVENOUS; SUBCUTANEOUS at 05:28

## 2024-04-06 RX ADMIN — Medication 650 MILLIGRAM(S): at 04:13

## 2024-04-06 RX ADMIN — Medication 650 MILLIGRAM(S): at 21:43

## 2024-04-06 NOTE — DIETITIAN INITIAL EVALUATION ADULT - ETIOLOGY
related to decreased appetite since admission  Saucerization Excision Additional Text (Leave Blank If You Do Not Want): The margin was drawn around the clinically apparent lesion.  Incisions were then made along these lines, in a tangential fashion, to the appropriate tissue plane and the lesion was extirpated.

## 2024-04-06 NOTE — DIETITIAN INITIAL EVALUATION ADULT - PERTINENT LABORATORY DATA
04-06 Na137 mmol/L Glu 83 mg/dL K+ 3.7 mmol/L Cr  1.04 mg/dL BUN 3.1 mg/dL<L>     POCT Blood Glucose.: 81 mg/dL (04-06-24 @ 11:32)  A1C with Estimated Average Glucose Result: 7.1 % (03-14-24 @ 02:35)  A1C with Estimated Average Glucose Result: 7.0 % (03-13-24 @ 15:45)

## 2024-04-06 NOTE — CHART NOTE - NSCHARTNOTEFT_GEN_A_CORE
Medicine PA-   Pt T-100.4 oral this AM, asymptomatic.  Tylenol 650mg given and blood cx x2 added to AM labs.  ID following.

## 2024-04-06 NOTE — DIETITIAN INITIAL EVALUATION ADULT - OTHER INFO
58y  Male with a h/o Asthma, HTN, HLD, DM2, Gout. Patient presented to [facility] ER for abnormal lab work, elevated Cr from his PMD's office. Patient recently hospitalized for acute asthma exacerbation and viral URI found to have uncontrolled HTN and new onset DM type 2 at that time discharged home with outpatient follow-up and sent back to [facility] ER today by PCP for abnormal kidney function. During his hospital course patient developed fever to 102.3F on 3/31, was a code sepsis, was administered Azithromycin x1 and Zosyn since 4/1.

## 2024-04-06 NOTE — PROGRESS NOTE ADULT - ASSESSMENT
58M with Asthma, HTN, HLD, DM2, Gout admitted with TYESHA suspected 2.2 medications along with MF PNA     Sepsis 2/2 Right Upper Lobe PNA  Remains intermittently febrile despite apoprotein coverage for typical organisms   Appreciate ID f/u  As per ID Dr. Brand, already received 7 days of Abx. hold further  Pneurococcal and Legionella neg, HIV negative  Doppler negative for DVT  Now on room air    TYESHA (now resolved)   Possibly 2/2 medication induced ATN   Hold ARB/HCT/Metformin   Potentially  medication induced (in setting of poor PO intake) from recent introduction of ARB/HCTZ/Metformin  Cr (baseline 1.08) >>>3.13 >2.39>1.91>1.75>1.41>1.40>1.3>1.31> 1.08  UA negative  Renal US negative  Hold above meds. Avoid Nephrotoxins.  Renally dose meds  Gentle Hydration IVF LR 100cc/hr  Nephro Consult note appreciated    HTN, HLD  Amlodipine 10mg q24  Hold ARB and HCTZ for now  Not currently on statin therapy    DM2  Hold Metformin 500mg BID  ISS/Accuchecks/A1C    Asthma  Finished steroid taper last Sunday. No current symptoms/hypoxia.  No longer taking Montelukast  Wixela/Duoneb PRN    Gout  Uric Acid level 12.2  Takes Allopurinol as needed. Hold for now.     VTE PPX SQH/SCDs    Dispo: Acute.

## 2024-04-06 NOTE — DIETITIAN INITIAL EVALUATION ADULT - PERTINENT MEDS FT
MEDICATIONS  (STANDING):  amLODIPine   Tablet 10 milliGRAM(s) Oral daily  dextrose 5%. 1000 milliLiter(s) (50 mL/Hr) IV Continuous <Continuous>  dextrose 50% Injectable 25 Gram(s) IV Push once  glucagon  Injectable 1 milliGRAM(s) IntraMuscular once  insulin lispro (ADMELOG) corrective regimen sliding scale   SubCutaneous Before meals and at bedtime  lactated ringers. 1000 milliLiter(s) (100 mL/Hr) IV Continuous <Continuous>  pantoprazole    Tablet 40 milliGRAM(s) Oral before breakfast    MEDICATIONS  (PRN):  dextrose Oral Gel 15 Gram(s) Oral once PRN Blood Glucose LESS THAN 70 milliGRAM(s)/deciliter  ondansetron Injectable 4 milliGRAM(s) IV Push every 8 hours PRN Nausea and/or Vomiting

## 2024-04-06 NOTE — PROGRESS NOTE ADULT - SUBJECTIVE AND OBJECTIVE BOX
[facility]  INFECTIOUS DISEASES at [place] / [place] / [place]  =======================================================                               Serg Almanza MD#   Rony Guillory MD*                             Lisy Sandhu MD*   Laila Bell MD*                              Professor Emeritus:  Dr Josr Chatman MD^            Diplomates American Board of Internal Medicine & Infectious Diseases                # [place] Office - Appt - Tel  [phone] Fax [phone]                * [place] Office - Appt - Tel [phone] Fax [phone]                      ^[place] Office - Tel  [phone] Fax [phone]                                  Hospital Consult line:  [phone]  =======================================================    ZAINA MILLER [id number]    Follow up: Fever    Fevers are improved      Allergies:  No Known Allergies      REVIEW OF SYSTEMS:  CONSTITUTIONAL:  improving Fever improving chills  HEENT:  No diplopia or blurred vision.  No earache, sore throat or runny nose.  CARDIOVASCULAR:  No chest pain  RESPIRATORY:  No cough, shortness of breath  GASTROINTESTINAL:  No nausea, vomiting or diarrhea.  GENITOURINARY:  No dysuria, frequency or urgency. No Blood in urine  MUSCULOSKELETAL:  no joint aches, no muscle pain  SKIN:  No change in skin, hair or nails.  NEUROLOGIC:  No Headaches      Physical Exam:  GEN: NAD  HEENT: normocephalic and atraumatic. EOMI. PERRL.    NECK: Supple.   LUNGS: CTA B/L.  HEART: RRR  ABDOMEN: Soft, NT, ND.  +BS.    : No CVA tenderness  EXTREMITIES: Without  edema.  MSK: No joint swelling  NEUROLOGIC: No Focal Deficits   SKIN: No rash      Vitals:  T(F): 99.2 (05 Apr 2024 12:56), Max: 101.2 (04 Apr 2024 22:29)  HR: 110 (05 Apr 2024 12:56)  BP: 128/75 (05 Apr 2024 12:56)  RR: 18 (05 Apr 2024 12:56)  SpO2: 92% (05 Apr 2024 12:56) (92% - 100%)  temp max in last 48H T(F): , Max: 102.8 (04-03-24 @ 20:52)    Current Antibiotics:    Other medications:  amLODIPine   Tablet 10 milliGRAM(s) Oral daily  dextrose 5%. 1000 milliLiter(s) IV Continuous <Continuous>  dextrose 5%. 1000 milliLiter(s) IV Continuous <Continuous>  dextrose 50% Injectable 25 Gram(s) IV Push once  dextrose 50% Injectable 25 Gram(s) IV Push once  dextrose 50% Injectable 12.5 Gram(s) IV Push once  glucagon  Injectable 1 milliGRAM(s) IntraMuscular once  heparin   Injectable 5000 Unit(s) SubCutaneous every 12 hours  insulin lispro (ADMELOG) corrective regimen sliding scale   SubCutaneous Before meals and at bedtime  lactated ringers. 1000 milliLiter(s) IV Continuous <Continuous>  pantoprazole    Tablet 40 milliGRAM(s) Oral before breakfast                            11.7   7.37  )-----------( 444      ( 05 Apr 2024 06:57 )             36.5     04-05    135  |  98  |  4.1<L>  ----------------------------<  82  4.1   |  21.0<L>  |  1.08    Ca    8.2<L>      05 Apr 2024 06:57  Mg     1.5     04-04    TPro  6.4<L>  /  Alb  2.4<L>  /  TBili  0.4  /  DBili  0.1  /  AST  221<H>  /  ALT  125<H>  /  AlkPhos  118  04-05    RECENT CULTURES:  04-04 @ 13:21    [facility]    04-03 @ 21:30 .Blood Blood-Peripheral     No growth at 24 hours    04-03 @ 21:20 .Blood Blood-Peripheral     No growth at 24 hours    04-03 @ 06:26 .Blood Blood     No growth at 48 Hours    04-03 @ 06:20 .Blood Blood     No growth at 48 Hours    03-31 @ 22:32    RVP  NotDetec    03-31 @ 22:21 .Blood Blood-Peripheral     No growth at 4 days    03-31 @ 22:18 .Blood Blood-Peripheral     No growth at 4 days    03-29 @ 20:22 Clean Catch Clean Catch (Midstream) Enterococcus faecalis    10,000 - 49,000 CFU/mL Enterococcus faecalis      WBC Count: 7.37 K/uL (04-05-24 @ 06:57)  WBC Count: 5.67 K/uL (04-04-24 @ 05:05)  WBC Count: 5.93 K/uL (04-03-24 @ 06:20)  WBC Count: 6.32 K/uL (04-02-24 @ 04:52)  WBC Count: 5.31 K/uL (04-01-24 @ 05:10)  WBC Count: 6.98 K/uL (03-31-24 @ 22:18)    Creatinine: 1.08 mg/dL (04-05-24 @ 06:57)  Creatinine: 1.31 mg/dL (04-04-24 @ 05:05)  Creatinine: 1.30 mg/dL (04-03-24 @ 06:20)  Creatinine: 1.40 mg/dL (04-02-24 @ 04:52)  Creatinine: 1.41 mg/dL (04-01-24 @ 05:10)  Creatinine: 1.75 mg/dL (03-31-24 @ 22:18)    C-Reactive Protein, Serum: 141 mg/L (04-05-24 @ 08:34)    Procalcitonin, Serum: 0.63 ng/mL (04-05-24 @ 06:57)  Procalcitonin, Serum: 0.64 ng/mL (04-05-24 @ 06:56)  Procalcitonin, Serum: 0.39 ng/mL (04-02-24 @ 04:52)  Procalcitonin, Serum: 0.17 ng/mL (03-31-24 @ 22:18)     SARS-CoV-2: NotDetec (04-04-24 @ 13:21)  COVID-19 PCR: NotDetec (03-31-24 @ 22:32)  SARS-CoV-2: NotDetec (03-31-24 @ 22:32)  SARS-CoV-2: NotDetec (03-13-24 @ 15:45)    Rapid HIV-1/2 Antibody (04.05.24 @ 08:34)    Rapid HIV-1/2 Antibody: Nonreact    Legionella pneumophila Antigen, Urine (04.02.24 @ 15:51)    Legionella Antigen, Urine: Negative    Streptococcus pneumoniae Ag, Ur (04.02.24 @ 15:51)    Streptococcus pneumoniae Ag, Ur Result: Negative    Lipase (04.04.24 @ 05:05)    Lipase: 53 U/L

## 2024-04-06 NOTE — PROGRESS NOTE ADULT - SUBJECTIVE AND OBJECTIVE BOX
[facility] Division of Hospital Medicine  Dorcas Gomez MD   I'm reachable on [employer] Teams      Patient is a 59y old  Male who presents with a chief complaint of Acute renal failure     (06 Apr 2024 12:20)      SUBJECTIVE / OVERNIGHT EVENTS:   Patient was seen and examined during rounds    He had fever last night and one episode in morning. Blood culture has been obtained  Patient was evaluated by ID Dr. Uribe and recommended to watch off IV ABx. Personally discussed with Dr. Uribe  Reports loose stool yesterday. Stool PCR ordered  Reports leg swelling. IV fluid discotinued  Creatinine normalized        12 points ROS negative except above    MEDICATIONS  (STANDING):  amLODIPine   Tablet 10 milliGRAM(s) Oral daily  dextrose 5%. 1000 milliLiter(s) (50 mL/Hr) IV Continuous <Continuous>  dextrose 5%. 1000 milliLiter(s) (100 mL/Hr) IV Continuous <Continuous>  dextrose 50% Injectable 25 Gram(s) IV Push once  dextrose 50% Injectable 25 Gram(s) IV Push once  dextrose 50% Injectable 12.5 Gram(s) IV Push once  glucagon  Injectable 1 milliGRAM(s) IntraMuscular once  heparin   Injectable 5000 Unit(s) SubCutaneous every 12 hours  insulin lispro (ADMELOG) corrective regimen sliding scale   SubCutaneous Before meals and at bedtime  pantoprazole    Tablet 40 milliGRAM(s) Oral before breakfast    MEDICATIONS  (PRN):  acetaminophen     Tablet .. 650 milliGRAM(s) Oral every 6 hours PRN Temp greater or equal to 38C (100.4F), Mild Pain (1 - 3)  albuterol    90 MICROgram(s) HFA Inhaler 2 Puff(s) Inhalation every 6 hours PRN Shortness of Breath and/or Wheezing  aluminum hydroxide/magnesium hydroxide/simethicone Suspension 30 milliLiter(s) Oral every 4 hours PRN Dyspepsia  dextrose Oral Gel 15 Gram(s) Oral once PRN Blood Glucose LESS THAN 70 milliGRAM(s)/deciliter  melatonin 3 milliGRAM(s) Oral at bedtime PRN Insomnia  ondansetron Injectable 4 milliGRAM(s) IV Push every 8 hours PRN Nausea and/or Vomiting        I&O's Summary    05 Apr 2024 07:01  -  06 Apr 2024 07:00  --------------------------------------------------------  IN: 1200 mL / OUT: 200 mL / NET: 1000 mL        PHYSICAL EXAM:  Vital Signs Last 24 Hrs  T(C): 37.1 (06 Apr 2024 13:00), Max: 38 (06 Apr 2024 04:08)  T(F): 98.8 (06 Apr 2024 13:00), Max: 100.4 (06 Apr 2024 04:08)  HR: 117 (06 Apr 2024 13:00) (99 - 117)  BP: 151/76 (06 Apr 2024 13:00) (116/72 - 151/76)  BP(mean): --  RR: 18 (06 Apr 2024 13:00) (18 - 18)  SpO2: 93% (06 Apr 2024 13:00) (93% - 100%)    Parameters below as of 06 Apr 2024 13:00  Patient On (Oxygen Delivery Method): nasal cannula  O2 Flow (L/min): 2      CONSTITUTIONAL: NAD, Not in acute distress  EYES:  EOMI, conjunctiva and sclera clear  ENMT: , neck supple , non-tender  RESPIRATORY: Normal respiratory effort; lungs are clear to auscultation bilaterally  CARDIOVASCULAR: S1S2 present  ABDOMEN: soft. bowel sound present  MUSCLOSKELETAL: ; no clubbing or cyanosis of digits;   PSYCH: A+O to person, place, and time; affect appropriate  NEUROLOGY: CN, motor and sensory intact   SKIN: No rashes; no palpable lesions  Extremity: No bilateral edema      LABS:                        9.1    8.74  )-----------( 457      ( 06 Apr 2024 05:25 )             27.9     04-06    137  |  102  |  3.1<L>  ----------------------------<  83  3.7   |  23.0  |  1.04    Ca    7.6<L>      06 Apr 2024 05:25  Mg     1.2     04-06    TPro  5.3<L>  /  Alb  2.3<L>  /  TBili  0.3<L>  /  DBili  x   /  AST  206<H>  /  ALT  121<H>  /  AlkPhos  100  04-06          Urinalysis Basic - ( 06 Apr 2024 05:25 )    Color: x / Appearance: x / SG: x / pH: x  Gluc: 83 mg/dL / Ketone: x  / Bili: x / Urobili: x   Blood: x / Protein: x / Nitrite: x   Leuk Esterase: x / RBC: x / WBC x   Sq Epi: x / Non Sq Epi: x / Bacteria: x        Babesia microti PCR, Bld (collected 05 Apr 2024 06:56)    Culture - Blood (collected 03 Apr 2024 21:30)  Source: .Blood Blood-Peripheral  Preliminary Report (06 Apr 2024 03:02):    No growth at 48 Hours    Culture - Blood (collected 03 Apr 2024 21:20)  Source: .Blood Blood-Peripheral  Preliminary Report (06 Apr 2024 03:02):    No growth at 48 Hours      CAPILLARY BLOOD GLUCOSE      POCT Blood Glucose.: 81 mg/dL (06 Apr 2024 11:32)  POCT Blood Glucose.: 97 mg/dL (06 Apr 2024 08:33)  POCT Blood Glucose.: 94 mg/dL (05 Apr 2024 21:26)  POCT Blood Glucose.: 112 mg/dL (05 Apr 2024 16:50)      Labs reviewed:    RADIOLOGY & ADDITIONAL TESTS:  Imaging Personally Reviewed:  Electrocardiogram Personally Reviewed:

## 2024-04-06 NOTE — PROGRESS NOTE ADULT - ASSESSMENT
58y  Male with a h/o Asthma, HTN, HLD, DM2, Gout. Patient presented to [facility] ER for abnormal lab work, elevated Cr from his PMD's office. Patient recently hospitalized for acute asthma exacerbation and viral URI found to have uncontrolled HTN and new onset DM type 2 at that time discharged home with outpatient follow-up and sent back to [facility] ER today by PCP for abnormal kidney function. During his hospital course patient developed fever to 102.3F on 3/31, was a code sepsis, was administered Azithromycin x1 and Zosyn since 4/1.      Multifocal PNA  Fever  TYESHA  Transaminitis   Thrombocytosis       - Blood cultures  3/31 no growth   - Repeat blood cultures if febrile   - RVP/COVID 19 PCR 3/31, 4/4 negative   - Urine for legionella and strep negative   - CT C/A/P 4/1 reporting multifocal PNA   - UA 3/30 and 4/1 negative   - Procalcitonin level 0.63, will order repeat for AM  - Lipase level 53  - Will check   - Babesia PCR is negative   - Lyme PCR  pending  - HIV negative   - viral hepatitis profile pending  - Will check ERON and RF  - Monitor off antibiotics  - Hold off on PICC/Midline for now unless needed for reasons other than infectious diseases  - Follow up cultures  - Trend Fever  - Trend WBC      Will Follow    Discussed treatment plan with: Dr Gomez

## 2024-04-06 NOTE — DIETITIAN INITIAL EVALUATION ADULT - ORAL INTAKE PTA/DIET HISTORY
Nutrition assessment completed. Pt tolerating current diet well with fair/poor PO intake. Pt states although he is not feeling hungry, he has been forcing himself to eat at least 50% of meals. Pt with no c/o N/V/C at this time. States he has been having diarrhea since admission after every meal. Reports this may be due to medications. BRAT diet foods discussed and encouraged - pt receptive of the same and questions addressed. States his UBW is about 210 lbs, current weight 232 lbs. Mild edema noted which may influence weights - questions accuracy of bed scale. Pt does not meet criteria for malnutrition at this time, will continue to monitor. Pt with hx of DM - labs reviewed. POCT glucose WNL, HgbA1c 7.1%. Consider addition of consistent carbohydrate diet. Pt provided with heart healthy diet education verbally and with written materials. Pt's questions addressed and with good understanding. RD contact information provided for further nutrition-related questions or concerns. Will continue to monitor and follow up as needed. RD remains available.

## 2024-04-07 LAB
ALBUMIN SERPL ELPH-MCNC: 2.5 G/DL — LOW (ref 3.3–5.2)
ALP SERPL-CCNC: 115 U/L — SIGNIFICANT CHANGE UP (ref 40–120)
ALT FLD-CCNC: 150 U/L — HIGH
ANION GAP SERPL CALC-SCNC: 16 MMOL/L — SIGNIFICANT CHANGE UP (ref 5–17)
AST SERPL-CCNC: 239 U/L — HIGH
BILIRUB DIRECT SERPL-MCNC: 0.1 MG/DL — SIGNIFICANT CHANGE UP (ref 0–0.3)
BILIRUB INDIRECT FLD-MCNC: 0.3 MG/DL — SIGNIFICANT CHANGE UP (ref 0.2–1)
BILIRUB SERPL-MCNC: 0.4 MG/DL — SIGNIFICANT CHANGE UP (ref 0.4–2)
BUN SERPL-MCNC: 3.4 MG/DL — LOW (ref 8–20)
CALCIUM SERPL-MCNC: 8 MG/DL — LOW (ref 8.4–10.5)
CHLORIDE SERPL-SCNC: 102 MMOL/L — SIGNIFICANT CHANGE UP (ref 96–108)
CO2 SERPL-SCNC: 20 MMOL/L — LOW (ref 22–29)
CREAT SERPL-MCNC: 0.93 MG/DL — SIGNIFICANT CHANGE UP (ref 0.5–1.3)
EGFR: 93 ML/MIN/1.73M2 — SIGNIFICANT CHANGE UP
GI PCR PANEL: SIGNIFICANT CHANGE UP
GLUCOSE BLDC GLUCOMTR-MCNC: 85 MG/DL — SIGNIFICANT CHANGE UP (ref 70–99)
GLUCOSE BLDC GLUCOMTR-MCNC: 90 MG/DL — SIGNIFICANT CHANGE UP (ref 70–99)
GLUCOSE BLDC GLUCOMTR-MCNC: 96 MG/DL — SIGNIFICANT CHANGE UP (ref 70–99)
GLUCOSE BLDC GLUCOMTR-MCNC: 97 MG/DL — SIGNIFICANT CHANGE UP (ref 70–99)
GLUCOSE SERPL-MCNC: 83 MG/DL — SIGNIFICANT CHANGE UP (ref 70–99)
HCT VFR BLD CALC: 32.5 % — LOW (ref 39–50)
HGB BLD-MCNC: 10.7 G/DL — LOW (ref 13–17)
MAGNESIUM SERPL-MCNC: 1.4 MG/DL — LOW (ref 1.8–2.6)
MCHC RBC-ENTMCNC: 28.2 PG — SIGNIFICANT CHANGE UP (ref 27–34)
MCHC RBC-ENTMCNC: 32.9 GM/DL — SIGNIFICANT CHANGE UP (ref 32–36)
MCV RBC AUTO: 85.5 FL — SIGNIFICANT CHANGE UP (ref 80–100)
PLATELET # BLD AUTO: 495 K/UL — HIGH (ref 150–400)
POTASSIUM SERPL-MCNC: 3.8 MMOL/L — SIGNIFICANT CHANGE UP (ref 3.5–5.3)
POTASSIUM SERPL-SCNC: 3.8 MMOL/L — SIGNIFICANT CHANGE UP (ref 3.5–5.3)
PROT SERPL-MCNC: 6.2 G/DL — LOW (ref 6.6–8.7)
RBC # BLD: 3.8 M/UL — LOW (ref 4.2–5.8)
RBC # FLD: 13.9 % — SIGNIFICANT CHANGE UP (ref 10.3–14.5)
SODIUM SERPL-SCNC: 138 MMOL/L — SIGNIFICANT CHANGE UP (ref 135–145)
WBC # BLD: 8.56 K/UL — SIGNIFICANT CHANGE UP (ref 3.8–10.5)
WBC # FLD AUTO: 8.56 K/UL — SIGNIFICANT CHANGE UP (ref 3.8–10.5)

## 2024-04-07 PROCEDURE — 99232 SBSQ HOSP IP/OBS MODERATE 35: CPT

## 2024-04-07 PROCEDURE — 71046 X-RAY EXAM CHEST 2 VIEWS: CPT | Mod: 26

## 2024-04-07 RX ORDER — SODIUM CHLORIDE 9 MG/ML
1000 INJECTION, SOLUTION INTRAVENOUS ONCE
Refills: 0 | Status: COMPLETED | OUTPATIENT
Start: 2024-04-07 | End: 2024-04-07

## 2024-04-07 RX ADMIN — AMLODIPINE BESYLATE 10 MILLIGRAM(S): 2.5 TABLET ORAL at 05:40

## 2024-04-07 RX ADMIN — HEPARIN SODIUM 5000 UNIT(S): 5000 INJECTION INTRAVENOUS; SUBCUTANEOUS at 17:13

## 2024-04-07 RX ADMIN — SODIUM CHLORIDE 1000 MILLILITER(S): 9 INJECTION, SOLUTION INTRAVENOUS at 11:23

## 2024-04-07 RX ADMIN — Medication 650 MILLIGRAM(S): at 18:18

## 2024-04-07 RX ADMIN — PANTOPRAZOLE SODIUM 40 MILLIGRAM(S): 20 TABLET, DELAYED RELEASE ORAL at 05:40

## 2024-04-07 RX ADMIN — Medication 650 MILLIGRAM(S): at 18:48

## 2024-04-07 RX ADMIN — HEPARIN SODIUM 5000 UNIT(S): 5000 INJECTION INTRAVENOUS; SUBCUTANEOUS at 05:40

## 2024-04-07 NOTE — PROGRESS NOTE ADULT - SUBJECTIVE AND OBJECTIVE BOX
[facility] Division of Hospital Medicine  Dorcas Gomez MD   I'm reachable on [employer] Teams      Patient is a 59y old  Male who presents with a chief complaint of ARF (06 Apr 2024 16:21)      SUBJECTIVE / OVERNIGHT EVENTS:   Patient was seen and examined during rounds  -He had another episode of fever today. ordered one time tylenol and bolus.   -Still reports diarrhea. GI PCR negative  -Ordered repeat cxr  -ID is following      12 points ROS negative except above    MEDICATIONS  (STANDING):  amLODIPine   Tablet 10 milliGRAM(s) Oral daily  dextrose 5%. 1000 milliLiter(s) (100 mL/Hr) IV Continuous <Continuous>  dextrose 5%. 1000 milliLiter(s) (50 mL/Hr) IV Continuous <Continuous>  dextrose 50% Injectable 25 Gram(s) IV Push once  dextrose 50% Injectable 25 Gram(s) IV Push once  dextrose 50% Injectable 12.5 Gram(s) IV Push once  glucagon  Injectable 1 milliGRAM(s) IntraMuscular once  heparin   Injectable 5000 Unit(s) SubCutaneous every 12 hours  insulin lispro (ADMELOG) corrective regimen sliding scale   SubCutaneous Before meals and at bedtime  pantoprazole    Tablet 40 milliGRAM(s) Oral before breakfast    MEDICATIONS  (PRN):  acetaminophen     Tablet .. 650 milliGRAM(s) Oral every 6 hours PRN Temp greater or equal to 38C (100.4F), Mild Pain (1 - 3)  albuterol    90 MICROgram(s) HFA Inhaler 2 Puff(s) Inhalation every 6 hours PRN Shortness of Breath and/or Wheezing  aluminum hydroxide/magnesium hydroxide/simethicone Suspension 30 milliLiter(s) Oral every 4 hours PRN Dyspepsia  dextrose Oral Gel 15 Gram(s) Oral once PRN Blood Glucose LESS THAN 70 milliGRAM(s)/deciliter  melatonin 3 milliGRAM(s) Oral at bedtime PRN Insomnia  ondansetron Injectable 4 milliGRAM(s) IV Push every 8 hours PRN Nausea and/or Vomiting        I&O's Summary      PHYSICAL EXAM:  Vital Signs Last 24 Hrs  T(C): 37 (07 Apr 2024 09:06), Max: 38.7 (06 Apr 2024 22:29)  T(F): 98.6 (07 Apr 2024 09:06), Max: 101.6 (06 Apr 2024 22:29)  HR: 113 (07 Apr 2024 09:06) (99 - 113)  BP: 125/66 (07 Apr 2024 09:06) (112/70 - 130/72)  BP(mean): --  RR: 18 (07 Apr 2024 09:06) (18 - 18)  SpO2: 99% (07 Apr 2024 09:06) (94% - 99%)    Parameters below as of 07 Apr 2024 09:06  Patient On (Oxygen Delivery Method): nasal cannula  O2 Flow (L/min): 2      CONSTITUTIONAL: NAD, Not in acute distress  EYES:  EOMI, conjunctiva and sclera clear  ENMT: , neck supple , non-tender  RESPIRATORY: Normal respiratory effort; lungs are clear to auscultation bilaterally  CARDIOVASCULAR: S1S2 present  ABDOMEN: soft. bowel sound present  MUSCLOSKELETAL: ; no clubbing or cyanosis of digits;   PSYCH: A+O to person, place, and time; affect appropriate  NEUROLOGY: CN, motor and sensory intact   SKIN: No rashes; no palpable lesions  Extremity: No bilateral edema      LABS:                        10.7   8.56  )-----------( 495      ( 07 Apr 2024 05:00 )             32.5     04-07    138  |  102  |  3.4<L>  ----------------------------<  83  3.8   |  20.0<L>  |  0.93    Ca    8.0<L>      07 Apr 2024 05:00  Mg     1.4     04-07    TPro  6.2<L>  /  Alb  2.5<L>  /  TBili  0.4  /  DBili  0.1  /  AST  239<H>  /  ALT  150<H>  /  AlkPhos  115  04-07          Urinalysis Basic - ( 07 Apr 2024 05:00 )    Color: x / Appearance: x / SG: x / pH: x  Gluc: 83 mg/dL / Ketone: x  / Bili: x / Urobili: x   Blood: x / Protein: x / Nitrite: x   Leuk Esterase: x / RBC: x / WBC x   Sq Epi: x / Non Sq Epi: x / Bacteria: x        Culture - Blood (collected 06 Apr 2024 05:25)  Source: .Blood Blood  Preliminary Report (07 Apr 2024 13:02):    No growth at 24 hours    Culture - Blood (collected 06 Apr 2024 05:19)  Source: .Blood Blood  Preliminary Report (07 Apr 2024 13:02):    No growth at 24 hours    Babesia microti PCR, Bld (collected 05 Apr 2024 06:56)      CAPILLARY BLOOD GLUCOSE      POCT Blood Glucose.: 97 mg/dL (07 Apr 2024 11:24)  POCT Blood Glucose.: 85 mg/dL (07 Apr 2024 08:22)  POCT Blood Glucose.: 87 mg/dL (06 Apr 2024 23:09)      Labs reviewed:    RADIOLOGY & ADDITIONAL TESTS:  Imaging Personally Reviewed:  Electrocardiogram Personally Reviewed:

## 2024-04-07 NOTE — PROGRESS NOTE ADULT - ASSESSMENT
58y  Male with a h/o Asthma, HTN, HLD, DM2, Gout. Patient presented to [facility] ER for abnormal lab work, elevated Cr from his PMD's office. Patient recently hospitalized for acute asthma exacerbation and viral URI found to have uncontrolled HTN and new onset DM type 2 at that time discharged home with outpatient follow-up and sent back to [facility] ER today by PCP for abnormal kidney function. During his hospital course patient developed fever to 102.3F on 3/31, was a code sepsis, was administered Azithromycin x1 and Zosyn since 4/1.      Multifocal PNA  Fever  TYESHA  Transaminitis   Thrombocytosis       - Blood cultures  3/31 no growth   - Repeat blood cultures if febrile   - RVP/COVID 19 PCR 3/31, 4/4 negative   - Urine for legionella and strep negative   - CT C/A/P 4/1 reporting multifocal PNA   - UA 3/30 and 4/1 negative   - Procalcitonin level 0.63, will order repeat for AM  - Lipase level 53  - Babesia PCR is negative   - Lyme PCR  pending  - HIV negative   - viral hepatitis profile negative  - ERON pending  - RF is 11  - Monitor off antibiotics  - Hold off on PICC/Midline for now unless needed for reasons other than infectious diseases  - Follow up cultures  - Trend Fever  - Trend WBC      Will Follow    Discussed treatment plan with: Dr Gomez

## 2024-04-07 NOTE — PROGRESS NOTE ADULT - ASSESSMENT
58M with Asthma, HTN, HLD, DM2, Gout admitted with TYESHA suspected 2.2 medications along with MF PNA     Sepsis 2/2 Right Upper Lobe PNA  Remains intermittently febrile despite apoprotein coverage for typical organisms   Appreciate ID f/u  As per ID Dr. Brand, already received 7 days of Abx. hold further  Pneurococcal and Legionella neg, HIV negative, stool PCR negative  Doppler negative for DVT  Now on room air  Repeat cxr pending    TYESHA (now resolved)   Possibly 2/2 medication induced ATN   Hold ARB/HCT/Metformin   Potentially  medication induced (in setting of poor PO intake) from recent introduction of ARB/HCTZ/Metformin  Cr (baseline 1.08) >>>3.13 >2.39>1.91>1.75>1.41>1.40>1.3>1.31> 1.08  UA negative  Renal US negative  Hold above meds. Avoid Nephrotoxins.  Renally dose meds  Nephro Consult note appreciated    HTN, HLD  Amlodipine 10mg q24  Hold ARB and HCTZ for now  Not currently on statin therapy    DM2  Hold Metformin 500mg BID  ISS/Accuchecks/A1C    Asthma  Finished steroid taper last Sunday. No current symptoms/hypoxia.  No longer taking Montelukast  Wixela/Duoneb PRN    Gout  Uric Acid level 12.2  Takes Allopurinol as needed. Hold for now.     VTE PPX SQH/SCDs    Dispo: Acute.

## 2024-04-07 NOTE — PROGRESS NOTE ADULT - SUBJECTIVE AND OBJECTIVE BOX
[facility]  INFECTIOUS DISEASES at [place] / [place] / [place]  =======================================================                               Serg Almanza MD#   Rony Guillory MD*                             Lisy Sandhu MD*   Laila Bell MD*                              Professor Emeritus:  Dr Josr Chatman MD^            Diplomates American Board of Internal Medicine & Infectious Diseases                # [place] Office - Appt - Tel  [phone] Fax [phone]                * [place] Office - Appt - Tel [phone] Fax [phone]                      ^[place] Office - Tel  [phone] Fax [phone]                                  Hospital Consult line:  [phone]  =======================================================    ZAINA MILLER [id number]    Follow up: Fever    Fevers are improved, less frequent, last fever was 101.6 on 4/6      Allergies:  No Known Allergies      REVIEW OF SYSTEMS:  CONSTITUTIONAL:  improving Fever improving chills  HEENT:  No diplopia or blurred vision.  No earache, sore throat or runny nose.  CARDIOVASCULAR:  No chest pain  RESPIRATORY:  No cough, shortness of breath  GASTROINTESTINAL:  No nausea, vomiting or diarrhea.  GENITOURINARY:  No dysuria, frequency or urgency. No Blood in urine  MUSCULOSKELETAL:  no joint aches, no muscle pain  SKIN:  No change in skin, hair or nails.  NEUROLOGIC:  No Headaches      Physical Exam:  GEN: NAD  HEENT: normocephalic and atraumatic. EOMI. PERRL.    NECK: Supple.   LUNGS: CTA B/L.  HEART: RRR  ABDOMEN: Soft, NT, ND.  +BS.    : No CVA tenderness  EXTREMITIES: Without  edema.  MSK: No joint swelling  NEUROLOGIC: No Focal Deficits   SKIN: No rash      Vitals:  T(F): 98.6 (07 Apr 2024 09:06), Max: 101.6 (06 Apr 2024 22:29)  HR: 113 (07 Apr 2024 09:06)  BP: 125/66 (07 Apr 2024 09:06)  RR: 18 (07 Apr 2024 09:06)  SpO2: 99% (07 Apr 2024 09:06) (93% - 99%)  temp max in last 48H T(F): , Max: 101.6 (04-06-24 @ 22:29)    Current Antibiotics:    Other medications:  amLODIPine   Tablet 10 milliGRAM(s) Oral daily  dextrose 5%. 1000 milliLiter(s) IV Continuous <Continuous>  dextrose 5%. 1000 milliLiter(s) IV Continuous <Continuous>  dextrose 50% Injectable 25 Gram(s) IV Push once  dextrose 50% Injectable 25 Gram(s) IV Push once  dextrose 50% Injectable 12.5 Gram(s) IV Push once  glucagon  Injectable 1 milliGRAM(s) IntraMuscular once  heparin   Injectable 5000 Unit(s) SubCutaneous every 12 hours  insulin lispro (ADMELOG) corrective regimen sliding scale   SubCutaneous Before meals and at bedtime  pantoprazole    Tablet 40 milliGRAM(s) Oral before breakfast                            10.7   8.56  )-----------( 495      ( 07 Apr 2024 05:00 )             32.5     04-07    138  |  102  |  3.4<L>  ----------------------------<  83  3.8   |  20.0<L>  |  0.93    Ca    8.0<L>      07 Apr 2024 05:00  Mg     1.4     04-07    TPro  6.2<L>  /  Alb  2.5<L>  /  TBili  0.4  /  DBili  0.1  /  AST  239<H>  /  ALT  150<H>  /  AlkPhos  115  04-07    RECENT CULTURES:  04-04 @ 13:21    [facility]    04-03 @ 21:30 .Blood Blood-Peripheral     No growth at 72 Hours    04-03 @ 21:20 .Blood Blood-Peripheral     No growth at 72 Hours    04-03 @ 06:26 .Blood Blood     No growth at 72 Hours    04-03 @ 06:20 .Blood Blood     No growth at 72 Hours    03-31 @ 22:32    RVP  NotDetec    03-31 @ 22:21 .Blood Blood-Peripheral     No growth at 5 days    03-31 @ 22:18 .Blood Blood-Peripheral     No growth at 5 days    03-29 @ 20:22 Clean Catch Clean Catch (Midstream) Enterococcus faecalis    10,000 - 49,000 CFU/mL Enterococcus faecalis      WBC Count: 8.56 K/uL (04-07-24 @ 05:00)  WBC Count: 8.74 K/uL (04-06-24 @ 05:25)  WBC Count: 7.37 K/uL (04-05-24 @ 06:57)  WBC Count: 5.67 K/uL (04-04-24 @ 05:05)  WBC Count: 5.93 K/uL (04-03-24 @ 06:20)    Creatinine: 0.93 mg/dL (04-07-24 @ 05:00)  Creatinine: 1.04 mg/dL (04-06-24 @ 05:25)  Creatinine: 1.08 mg/dL (04-05-24 @ 06:57)  Creatinine: 1.31 mg/dL (04-04-24 @ 05:05)  Creatinine: 1.30 mg/dL (04-03-24 @ 06:20)    C-Reactive Protein, Serum: 163 mg/L (04-06-24 @ 05:25)  C-Reactive Protein, Serum: 141 mg/L (04-05-24 @ 08:34)    Procalcitonin, Serum: 0.63 ng/mL (04-06-24 @ 05:25)  Procalcitonin, Serum: 0.63 ng/mL (04-05-24 @ 06:57)  Procalcitonin, Serum: 0.64 ng/mL (04-05-24 @ 06:56)  Procalcitonin, Serum: 0.39 ng/mL (04-02-24 @ 04:52)  Procalcitonin, Serum: 0.17 ng/mL (03-31-24 @ 22:18)     SARS-CoV-2: NotDetec (04-04-24 @ 13:21)  COVID-19 PCR: NotDetec (03-31-24 @ 22:32)  SARS-CoV-2: NotDetec (03-31-24 @ 22:32)  SARS-CoV-2: NotDetec (03-13-24 @ 15:45)      Rapid HIV-1/2 Antibody (04.05.24 @ 08:34)    Rapid HIV-1/2 Antibody: Nonreact    Legionella pneumophila Antigen, Urine (04.02.24 @ 15:51)    Legionella Antigen, Urine: Negative    Streptococcus pneumoniae Ag, Ur (04.02.24 @ 15:51)    Streptococcus pneumoniae Ag, Ur Result: Negative    Lipase (04.04.24 @ 05:05)    Lipase: 53 U/L    Acute Hepatitis Panel (04.06.24 @ 05:25)    Hepatitis C Virus Interpretation: Nonreact   Hepatitis C Virus S/CO Ratio: 0.13 S/CO   Hepatitis B Core IgM Antibody: Nonreact   Hepatitis B Surface Antigen: Nonreact   Hepatitis A IgM Antibody: Nonreact    Rheumatoid Factor Quant, Serum or Plasma in AM (04.06.24 @ 05:25)    Rheumatoid Factor Quant, Serum or Plasma: 11 IU/mL

## 2024-04-08 LAB
ALBUMIN SERPL ELPH-MCNC: 2.5 G/DL — LOW (ref 3.3–5.2)
ALP SERPL-CCNC: 105 U/L — SIGNIFICANT CHANGE UP (ref 40–120)
ALT FLD-CCNC: 113 U/L — HIGH
ANION GAP SERPL CALC-SCNC: 13 MMOL/L — SIGNIFICANT CHANGE UP (ref 5–17)
AST SERPL-CCNC: 134 U/L — HIGH
B BURGDOR DNA SPEC QL NAA+PROBE: NEGATIVE — SIGNIFICANT CHANGE UP
BILIRUB DIRECT SERPL-MCNC: 0.1 MG/DL — SIGNIFICANT CHANGE UP (ref 0–0.3)
BILIRUB INDIRECT FLD-MCNC: 0.2 MG/DL — SIGNIFICANT CHANGE UP (ref 0.2–1)
BILIRUB SERPL-MCNC: 0.3 MG/DL — LOW (ref 0.4–2)
BUN SERPL-MCNC: <3 MG/DL — LOW (ref 8–20)
CALCIUM SERPL-MCNC: 7.9 MG/DL — LOW (ref 8.4–10.5)
CHLORIDE SERPL-SCNC: 104 MMOL/L — SIGNIFICANT CHANGE UP (ref 96–108)
CO2 SERPL-SCNC: 23 MMOL/L — SIGNIFICANT CHANGE UP (ref 22–29)
CREAT SERPL-MCNC: 1 MG/DL — SIGNIFICANT CHANGE UP (ref 0.5–1.3)
CULTURE RESULTS: SIGNIFICANT CHANGE UP
CULTURE RESULTS: SIGNIFICANT CHANGE UP
EGFR: 86 ML/MIN/1.73M2 — SIGNIFICANT CHANGE UP
GLUCOSE BLDC GLUCOMTR-MCNC: 92 MG/DL — SIGNIFICANT CHANGE UP (ref 70–99)
GLUCOSE BLDC GLUCOMTR-MCNC: 94 MG/DL — SIGNIFICANT CHANGE UP (ref 70–99)
GLUCOSE BLDC GLUCOMTR-MCNC: 95 MG/DL — SIGNIFICANT CHANGE UP (ref 70–99)
GLUCOSE BLDC GLUCOMTR-MCNC: 96 MG/DL — SIGNIFICANT CHANGE UP (ref 70–99)
GLUCOSE SERPL-MCNC: 86 MG/DL — SIGNIFICANT CHANGE UP (ref 70–99)
HCT VFR BLD CALC: 29.8 % — LOW (ref 39–50)
HGB BLD-MCNC: 9.8 G/DL — LOW (ref 13–17)
MCHC RBC-ENTMCNC: 28.3 PG — SIGNIFICANT CHANGE UP (ref 27–34)
MCHC RBC-ENTMCNC: 32.9 GM/DL — SIGNIFICANT CHANGE UP (ref 32–36)
MCV RBC AUTO: 86.1 FL — SIGNIFICANT CHANGE UP (ref 80–100)
PLATELET # BLD AUTO: 502 K/UL — HIGH (ref 150–400)
POTASSIUM SERPL-MCNC: 3.8 MMOL/L — SIGNIFICANT CHANGE UP (ref 3.5–5.3)
POTASSIUM SERPL-SCNC: 3.8 MMOL/L — SIGNIFICANT CHANGE UP (ref 3.5–5.3)
PROT SERPL-MCNC: 6.2 G/DL — LOW (ref 6.6–8.7)
RBC # BLD: 3.46 M/UL — LOW (ref 4.2–5.8)
RBC # FLD: 13.8 % — SIGNIFICANT CHANGE UP (ref 10.3–14.5)
SODIUM SERPL-SCNC: 140 MMOL/L — SIGNIFICANT CHANGE UP (ref 135–145)
SPECIMEN SOURCE: SIGNIFICANT CHANGE UP
SPECIMEN SOURCE: SIGNIFICANT CHANGE UP
URATE SERPL-MCNC: 6.7 MG/DL — SIGNIFICANT CHANGE UP (ref 3.4–7)
WBC # BLD: 9.37 K/UL — SIGNIFICANT CHANGE UP (ref 3.8–10.5)
WBC # FLD AUTO: 9.37 K/UL — SIGNIFICANT CHANGE UP (ref 3.8–10.5)

## 2024-04-08 PROCEDURE — 99232 SBSQ HOSP IP/OBS MODERATE 35: CPT

## 2024-04-08 RX ADMIN — PANTOPRAZOLE SODIUM 40 MILLIGRAM(S): 20 TABLET, DELAYED RELEASE ORAL at 05:46

## 2024-04-08 RX ADMIN — AMLODIPINE BESYLATE 10 MILLIGRAM(S): 2.5 TABLET ORAL at 05:46

## 2024-04-08 RX ADMIN — HEPARIN SODIUM 5000 UNIT(S): 5000 INJECTION INTRAVENOUS; SUBCUTANEOUS at 05:46

## 2024-04-08 RX ADMIN — HEPARIN SODIUM 5000 UNIT(S): 5000 INJECTION INTRAVENOUS; SUBCUTANEOUS at 17:38

## 2024-04-08 NOTE — PROGRESS NOTE ADULT - ASSESSMENT
58M with Asthma, HTN, HLD, DM2, Gout admitted with TYESHA suspected 2.2 medications along with MF PNA     Sepsis 2/2 Right Upper Lobe PNA  Fever of unknown origin  Remains intermittently febrile despite apoprotein coverage for typical organisms   Appreciate ID f/u  As per ID Dr. Brand, already received 7 days of Abx. hold further  Pneurococcal and Legionella neg, HIV negative, stool PCR negative  Doppler negative for DVT  Now on room air  Repeat cxr persistent infiltrate    TYESHA (now resolved)   Possibly 2/2 medication induced ATN   Hold ARB/HCT/Metformin   Potentially  medication induced (in setting of poor PO intake) from recent introduction of ARB/HCTZ/Metformin  Cr (baseline 1.08) >>>3.13 >2.39>1.91>1.75>1.41>1.40>1.3>1.31> 1.08  UA negative  Renal US negative  Hold above meds. Avoid Nephrotoxins.  Renally dose meds  Nephro Consult note appreciated    HTN, HLD  Amlodipine 10mg q24  Hold ARB and HCTZ for now  Not currently on statin therapy    DM2  Hold Metformin 500mg BID  ISS/Accuchecks/A1C    Asthma  Finished steroid taper last Sunday. No current symptoms/hypoxia.  No longer taking Montelukast  Wixela/Duoneb PRN    Gout  Uric Acid level 12.2  Takes Allopurinol as needed. Hold for now.     VTE PPX SQH/SCDs    Dispo: Acute.

## 2024-04-08 NOTE — PROGRESS NOTE ADULT - SUBJECTIVE AND OBJECTIVE BOX
[facility]  INFECTIOUS DISEASES at [place] / [place] / [place]  =======================================================                               Serg Almanza MD#   Rony Guillory MD*                             Lisy Sandhu MD*   Laila Bell MD*                              Professor Emeritus:  Dr Josr Chatman MD^            Diplomates American Board of Internal Medicine & Infectious Diseases                # [place] Office - Appt - Tel  [phone] Fax [phone]                * [place] Office - Appt - Tel [phone] Fax [phone]                      ^[place] Office - Tel  [phone] Fax [phone]                                  Hospital Consult line:  [phone]  =======================================================    ZAINA MILLER [id number]    Follow up: Fever    Fevers are improved, less frequent, last fever was 100.5 on 4/7      Allergies:  No Known Allergies      REVIEW OF SYSTEMS:  CONSTITUTIONAL:  improving Fever improving chills  HEENT:  No diplopia or blurred vision.  No earache, sore throat or runny nose.  CARDIOVASCULAR:  No chest pain  RESPIRATORY:  No cough, shortness of breath  GASTROINTESTINAL:  No nausea, vomiting or diarrhea.  GENITOURINARY:  No dysuria, frequency or urgency. No Blood in urine  MUSCULOSKELETAL:  no joint aches, no muscle pain  SKIN:  No change in skin, hair or nails.  NEUROLOGIC:  No Headaches      Physical Exam:  GEN: NAD  HEENT: normocephalic and atraumatic. EOMI. PERRL.    NECK: Supple.   LUNGS: CTA B/L.  HEART: RRR  ABDOMEN: Soft, NT, ND.  +BS.    : No CVA tenderness  EXTREMITIES: Without  edema.  MSK: No joint swelling  NEUROLOGIC: No Focal Deficits   SKIN: No rash      Vitals:  T(F): 99.2 (08 Apr 2024 04:40), Max: 100.5 (07 Apr 2024 17:54)  HR: 107 (08 Apr 2024 04:40)  BP: 127/74 (08 Apr 2024 04:40)  RR: 18 (08 Apr 2024 04:40)  SpO2: 92% (08 Apr 2024 04:40) (92% - 100%)  temp max in last 48H T(F): , Max: 101.6 (04-06-24 @ 22:29)    Current Antibiotics:    Other medications:  amLODIPine   Tablet 10 milliGRAM(s) Oral daily  dextrose 5%. 1000 milliLiter(s) IV Continuous <Continuous>  dextrose 5%. 1000 milliLiter(s) IV Continuous <Continuous>  dextrose 50% Injectable 25 Gram(s) IV Push once  dextrose 50% Injectable 25 Gram(s) IV Push once  dextrose 50% Injectable 12.5 Gram(s) IV Push once  glucagon  Injectable 1 milliGRAM(s) IntraMuscular once  heparin   Injectable 5000 Unit(s) SubCutaneous every 12 hours  insulin lispro (ADMELOG) corrective regimen sliding scale   SubCutaneous Before meals and at bedtime  pantoprazole    Tablet 40 milliGRAM(s) Oral before breakfast                 9.8    9.37  )-----------( 502      ( 08 Apr 2024 06:12 )             29.8     04-08    140  |  104  |  <3.0<L>  ----------------------------<  86  3.8   |  23.0  |  1.00    Ca    7.9<L>      08 Apr 2024 06:12  Mg     1.4     04-07    TPro  6.2<L>  /  Alb  2.5<L>  /  TBili  0.4  /  DBili  0.1  /  AST  239<H>  /  ALT  150<H>  /  AlkPhos  115  04-07      RECENT CULTURES:  04-06 @ 05:25 .Blood Blood     No growth at 24 hours    04-06 @ 05:19 .Blood Blood     No growth at 24 hours    04-04 @ 13:21    RVP  NotDetec    04-03 @ 21:30 .Blood Blood-Peripheral     No growth at 4 days    04-03 @ 21:20 .Blood Blood-Peripheral     No growth at 4 days    04-03 @ 06:26 .Blood Blood     No growth at 4 days    04-03 @ 06:20 .Blood Blood     No growth at 4 days    03-31 @ 22:32    RVP  NotDetec    03-31 @ 22:21 .Blood Blood-Peripheral     No growth at 5 days    03-31 @ 22:18 .Blood Blood-Peripheral     No growth at 5 days    03-29 @ 20:22 Clean Catch Clean Catch (Midstream) Enterococcus faecalis    10,000 - 49,000 CFU/mL Enterococcus faecalis      WBC Count: 9.37 K/uL (04-08-24 @ 06:12)  WBC Count: 8.56 K/uL (04-07-24 @ 05:00)  WBC Count: 8.74 K/uL (04-06-24 @ 05:25)  WBC Count: 7.37 K/uL (04-05-24 @ 06:57)  WBC Count: 5.67 K/uL (04-04-24 @ 05:05)    Creatinine: 1.00 mg/dL (04-08-24 @ 06:12)  Creatinine: 0.93 mg/dL (04-07-24 @ 05:00)  Creatinine: 1.04 mg/dL (04-06-24 @ 05:25)  Creatinine: 1.08 mg/dL (04-05-24 @ 06:57)  Creatinine: 1.31 mg/dL (04-04-24 @ 05:05)    C-Reactive Protein, Serum: 163 mg/L (04-06-24 @ 05:25)  C-Reactive Protein, Serum: 141 mg/L (04-05-24 @ 08:34)    Procalcitonin, Serum: 0.63 ng/mL (04-06-24 @ 05:25)  Procalcitonin, Serum: 0.63 ng/mL (04-05-24 @ 06:57)  Procalcitonin, Serum: 0.64 ng/mL (04-05-24 @ 06:56)  Procalcitonin, Serum: 0.39 ng/mL (04-02-24 @ 04:52)  Procalcitonin, Serum: 0.17 ng/mL (03-31-24 @ 22:18)     SARS-CoV-2: NotDetec (04-04-24 @ 13:21)  COVID-19 PCR: NotDetec (03-31-24 @ 22:32)  SARS-CoV-2: NotDetec (03-31-24 @ 22:32)  SARS-CoV-2: NotDetec (03-13-24 @ 15:45)      Rapid HIV-1/2 Antibody (04.05.24 @ 08:34)    Rapid HIV-1/2 Antibody: Nonreact    Legionella pneumophila Antigen, Urine (04.02.24 @ 15:51)    Legionella Antigen, Urine: Negative    Streptococcus pneumoniae Ag, Ur (04.02.24 @ 15:51)    Streptococcus pneumoniae Ag, Ur Result: Negative    Lipase (04.04.24 @ 05:05)    Lipase: 53 U/L    Acute Hepatitis Panel (04.06.24 @ 05:25)    Hepatitis C Virus Interpretation: Nonreact   Hepatitis C Virus S/CO Ratio: 0.13 S/CO   Hepatitis B Core IgM Antibody: Nonreact   Hepatitis B Surface Antigen: Nonreact   Hepatitis A IgM Antibody: Nonreact    Rheumatoid Factor Quant, Serum or Plasma in AM (04.06.24 @ 05:25)    Rheumatoid Factor Quant, Serum or Plasma: 11 IU/mL

## 2024-04-08 NOTE — PROGRESS NOTE ADULT - SUBJECTIVE AND OBJECTIVE BOX
[facility]  Dorcas Gomez MD   I'm reachable on [employer] Teams      Patient is a 59y old  Male who presents with a chief complaint of ARF (07 Apr 2024 17:57)      SUBJECTIVE / OVERNIGHT EVENTS:   Patient was seen and examined during rounds  -Still spiking fever but lower than before  -ID recommended to continue to hold abx  -CXR still reports persistent RUL inflitrate      12 points ROS negative except above    MEDICATIONS  (STANDING):  amLODIPine   Tablet 10 milliGRAM(s) Oral daily  dextrose 5%. 1000 milliLiter(s) (50 mL/Hr) IV Continuous <Continuous>  dextrose 5%. 1000 milliLiter(s) (100 mL/Hr) IV Continuous <Continuous>  dextrose 50% Injectable 25 Gram(s) IV Push once  dextrose 50% Injectable 25 Gram(s) IV Push once  dextrose 50% Injectable 12.5 Gram(s) IV Push once  glucagon  Injectable 1 milliGRAM(s) IntraMuscular once  heparin   Injectable 5000 Unit(s) SubCutaneous every 12 hours  insulin lispro (ADMELOG) corrective regimen sliding scale   SubCutaneous Before meals and at bedtime  pantoprazole    Tablet 40 milliGRAM(s) Oral before breakfast    MEDICATIONS  (PRN):  acetaminophen     Tablet .. 650 milliGRAM(s) Oral every 6 hours PRN Temp greater or equal to 38C (100.4F), Mild Pain (1 - 3)  albuterol    90 MICROgram(s) HFA Inhaler 2 Puff(s) Inhalation every 6 hours PRN Shortness of Breath and/or Wheezing  aluminum hydroxide/magnesium hydroxide/simethicone Suspension 30 milliLiter(s) Oral every 4 hours PRN Dyspepsia  dextrose Oral Gel 15 Gram(s) Oral once PRN Blood Glucose LESS THAN 70 milliGRAM(s)/deciliter  melatonin 3 milliGRAM(s) Oral at bedtime PRN Insomnia  ondansetron Injectable 4 milliGRAM(s) IV Push every 8 hours PRN Nausea and/or Vomiting        I&O's Summary    07 Apr 2024 07:01  -  08 Apr 2024 07:00  --------------------------------------------------------  IN: 0 mL / OUT: 850 mL / NET: -850 mL    08 Apr 2024 07:01  -  08 Apr 2024 19:37  --------------------------------------------------------  IN: 0 mL / OUT: 950 mL / NET: -950 mL        PHYSICAL EXAM:  Vital Signs Last 24 Hrs  T(C): 37.1 (08 Apr 2024 17:35), Max: 37.6 (08 Apr 2024 00:23)  T(F): 98.7 (08 Apr 2024 17:35), Max: 99.6 (08 Apr 2024 00:23)  HR: 105 (08 Apr 2024 17:35) (98 - 115)  BP: 138/75 (08 Apr 2024 17:35) (126/72 - 138/75)  BP(mean): --  RR: 18 (08 Apr 2024 17:35) (18 - 18)  SpO2: 96% (08 Apr 2024 17:35) (92% - 100%)    Parameters below as of 08 Apr 2024 17:35  Patient On (Oxygen Delivery Method): nasal cannula  O2 Flow (L/min): 2      CONSTITUTIONAL: NAD, Not in acute distress  EYES:  EOMI, conjunctiva and sclera clear  ENMT: , neck supple , non-tender  RESPIRATORY: Normal respiratory effort; lungs are clear to auscultation bilaterally  CARDIOVASCULAR: S1S2 present  ABDOMEN: soft. bowel sound present  MUSCLOSKELETAL: ; no clubbing or cyanosis of digits;   PSYCH: A+O to person, place, and time; affect appropriate  NEUROLOGY: CN, motor and sensory intact   SKIN: No rashes; no palpable lesions  Extremity: No bilateral edema      LABS:                        9.8    9.37  )-----------( 502      ( 08 Apr 2024 06:12 )             29.8     04-08    140  |  104  |  <3.0<L>  ----------------------------<  86  3.8   |  23.0  |  1.00    Ca    7.9<L>      08 Apr 2024 06:12  Mg     1.4     04-07    TPro  6.2<L>  /  Alb  2.5<L>  /  TBili  0.3<L>  /  DBili  0.1  /  AST  134<H>  /  ALT  113<H>  /  AlkPhos  105  04-08          Urinalysis Basic - ( 08 Apr 2024 06:12 )    Color: x / Appearance: x / SG: x / pH: x  Gluc: 86 mg/dL / Ketone: x  / Bili: x / Urobili: x   Blood: x / Protein: x / Nitrite: x   Leuk Esterase: x / RBC: x / WBC x   Sq Epi: x / Non Sq Epi: x / Bacteria: x        Culture - Blood (collected 06 Apr 2024 05:25)  Source: .Blood Blood  Preliminary Report (08 Apr 2024 13:02):    No growth at 48 Hours    Culture - Blood (collected 06 Apr 2024 05:19)  Source: .Blood Blood  Preliminary Report (08 Apr 2024 13:01):    No growth at 48 Hours      CAPILLARY BLOOD GLUCOSE      POCT Blood Glucose.: 96 mg/dL (08 Apr 2024 16:55)  POCT Blood Glucose.: 94 mg/dL (08 Apr 2024 12:16)  POCT Blood Glucose.: 92 mg/dL (08 Apr 2024 07:51)  POCT Blood Glucose.: 96 mg/dL (07 Apr 2024 23:05)      Labs reviewed:    RADIOLOGY & ADDITIONAL TESTS:  Imaging Personally Reviewed:  Electrocardiogram Personally Reviewed:

## 2024-04-08 NOTE — PROGRESS NOTE ADULT - ASSESSMENT
58y  Male with a h/o Asthma, HTN, HLD, DM2, Gout. Patient presented to [facility] ER for abnormal lab work, elevated Cr from his PMD's office. Patient recently hospitalized for acute asthma exacerbation and viral URI found to have uncontrolled HTN and new onset DM type 2 at that time discharged home with outpatient follow-up and sent back to [facility] ER today by PCP for abnormal kidney function. During his hospital course patient developed fever to 102.3F on 3/31, was a code sepsis, was administered Azithromycin x1 and Zosyn since 4/1.      Multifocal PNA  Fever  TYESHA  Transaminitis   Thrombocytosis       - Blood cultures  3/31, 4/3, 4/6 no growth   - Repeat blood cultures if febrile   - RVP/COVID 19 PCR 3/31, 4/4 negative   - Urine for legionella and strep negative   - CT C/A/P 4/1 reporting multifocal PNA   - UA 3/30 and 4/1 negative   - Procalcitonin level 0.63, will order repeat for AM  - Lipase level 53  - Babesia PCR is negative   - Lyme PCR  is negative   - HIV negative   - viral hepatitis profile negative  - ERON pending  - Will add LFTs to AM labs   - RF is 11  - Monitor off antibiotics  - Hold off on PICC/Midline for now unless needed for reasons other than infectious diseases  - Follow up cultures  - Trend Fever  - Trend WBC      Will Follow    Discussed treatment plan with: Dr Gomez

## 2024-04-09 LAB
ANION GAP SERPL CALC-SCNC: 13 MMOL/L — SIGNIFICANT CHANGE UP (ref 5–17)
BUN SERPL-MCNC: 3.5 MG/DL — LOW (ref 8–20)
CALCIUM SERPL-MCNC: 7.9 MG/DL — LOW (ref 8.4–10.5)
CHLORIDE SERPL-SCNC: 104 MMOL/L — SIGNIFICANT CHANGE UP (ref 96–108)
CO2 SERPL-SCNC: 22 MMOL/L — SIGNIFICANT CHANGE UP (ref 22–29)
CREAT SERPL-MCNC: 0.97 MG/DL — SIGNIFICANT CHANGE UP (ref 0.5–1.3)
CRP SERPL-MCNC: 162 MG/L — HIGH
CULTURE RESULTS: SIGNIFICANT CHANGE UP
CULTURE RESULTS: SIGNIFICANT CHANGE UP
EGFR: 89 ML/MIN/1.73M2 — SIGNIFICANT CHANGE UP
GLUCOSE BLDC GLUCOMTR-MCNC: 101 MG/DL — HIGH (ref 70–99)
GLUCOSE BLDC GLUCOMTR-MCNC: 110 MG/DL — HIGH (ref 70–99)
GLUCOSE BLDC GLUCOMTR-MCNC: 88 MG/DL — SIGNIFICANT CHANGE UP (ref 70–99)
GLUCOSE BLDC GLUCOMTR-MCNC: 94 MG/DL — SIGNIFICANT CHANGE UP (ref 70–99)
GLUCOSE SERPL-MCNC: 90 MG/DL — SIGNIFICANT CHANGE UP (ref 70–99)
HCT VFR BLD CALC: 30.1 % — LOW (ref 39–50)
HGB BLD-MCNC: 9.8 G/DL — LOW (ref 13–17)
MCHC RBC-ENTMCNC: 28.1 PG — SIGNIFICANT CHANGE UP (ref 27–34)
MCHC RBC-ENTMCNC: 32.6 GM/DL — SIGNIFICANT CHANGE UP (ref 32–36)
MCV RBC AUTO: 86.2 FL — SIGNIFICANT CHANGE UP (ref 80–100)
PLATELET # BLD AUTO: 525 K/UL — HIGH (ref 150–400)
POTASSIUM SERPL-MCNC: 3.7 MMOL/L — SIGNIFICANT CHANGE UP (ref 3.5–5.3)
POTASSIUM SERPL-SCNC: 3.7 MMOL/L — SIGNIFICANT CHANGE UP (ref 3.5–5.3)
PROCALCITONIN SERPL-MCNC: 0.25 NG/ML — HIGH (ref 0.02–0.1)
RBC # BLD: 3.49 M/UL — LOW (ref 4.2–5.8)
RBC # FLD: 14.1 % — SIGNIFICANT CHANGE UP (ref 10.3–14.5)
SODIUM SERPL-SCNC: 139 MMOL/L — SIGNIFICANT CHANGE UP (ref 135–145)
SPECIMEN SOURCE: SIGNIFICANT CHANGE UP
SPECIMEN SOURCE: SIGNIFICANT CHANGE UP
WBC # BLD: 10.27 K/UL — SIGNIFICANT CHANGE UP (ref 3.8–10.5)
WBC # FLD AUTO: 10.27 K/UL — SIGNIFICANT CHANGE UP (ref 3.8–10.5)

## 2024-04-09 PROCEDURE — 99232 SBSQ HOSP IP/OBS MODERATE 35: CPT

## 2024-04-09 PROCEDURE — 99233 SBSQ HOSP IP/OBS HIGH 50: CPT

## 2024-04-09 PROCEDURE — 93010 ELECTROCARDIOGRAM REPORT: CPT

## 2024-04-09 RX ORDER — SODIUM CHLORIDE 9 MG/ML
2000 INJECTION, SOLUTION INTRAVENOUS
Refills: 0 | Status: DISCONTINUED | OUTPATIENT
Start: 2024-04-09 | End: 2024-04-12

## 2024-04-09 RX ORDER — SODIUM CHLORIDE 9 MG/ML
1000 INJECTION, SOLUTION INTRAVENOUS ONCE
Refills: 0 | Status: COMPLETED | OUTPATIENT
Start: 2024-04-09 | End: 2024-04-09

## 2024-04-09 RX ADMIN — SODIUM CHLORIDE 100 MILLILITER(S): 9 INJECTION, SOLUTION INTRAVENOUS at 12:43

## 2024-04-09 RX ADMIN — PANTOPRAZOLE SODIUM 40 MILLIGRAM(S): 20 TABLET, DELAYED RELEASE ORAL at 05:24

## 2024-04-09 RX ADMIN — Medication 650 MILLIGRAM(S): at 11:49

## 2024-04-09 RX ADMIN — HEPARIN SODIUM 5000 UNIT(S): 5000 INJECTION INTRAVENOUS; SUBCUTANEOUS at 05:24

## 2024-04-09 RX ADMIN — Medication 650 MILLIGRAM(S): at 12:54

## 2024-04-09 RX ADMIN — HEPARIN SODIUM 5000 UNIT(S): 5000 INJECTION INTRAVENOUS; SUBCUTANEOUS at 17:13

## 2024-04-09 RX ADMIN — SODIUM CHLORIDE 100 MILLILITER(S): 9 INJECTION, SOLUTION INTRAVENOUS at 11:41

## 2024-04-09 RX ADMIN — Medication 650 MILLIGRAM(S): at 18:54

## 2024-04-09 RX ADMIN — AMLODIPINE BESYLATE 10 MILLIGRAM(S): 2.5 TABLET ORAL at 05:24

## 2024-04-09 RX ADMIN — SODIUM CHLORIDE 1000 MILLILITER(S): 9 INJECTION, SOLUTION INTRAVENOUS at 12:43

## 2024-04-09 NOTE — PROGRESS NOTE ADULT - SUBJECTIVE AND OBJECTIVE BOX
[facility] Division of Hospital Medicine  Dorcas Gomez MD   I'm reachable on [employer] Teams      Patient is a 59y old  Male who presents with a chief complaint of ARF (08 Apr 2024 14:36)      SUBJECTIVE / OVERNIGHT EVENTS:   Patient was seen and examined during rounds  -Fever trending down  -Still has tachycardia. Ordered one time IV fluid bolus, Started infusion and ordered one time tylenol  -Personally discussed with Dr. Preston. Plan to monitor off Abx  -Reports diarrhea is improving      12 points ROS negative except above    MEDICATIONS  (STANDING):  amLODIPine   Tablet 10 milliGRAM(s) Oral daily  dextrose 5%. 1000 milliLiter(s) (50 mL/Hr) IV Continuous <Continuous>  dextrose 5%. 1000 milliLiter(s) (100 mL/Hr) IV Continuous <Continuous>  dextrose 50% Injectable 12.5 Gram(s) IV Push once  dextrose 50% Injectable 25 Gram(s) IV Push once  dextrose 50% Injectable 25 Gram(s) IV Push once  glucagon  Injectable 1 milliGRAM(s) IntraMuscular once  heparin   Injectable 5000 Unit(s) SubCutaneous every 12 hours  insulin lispro (ADMELOG) corrective regimen sliding scale   SubCutaneous Before meals and at bedtime  lactated ringers. 2000 milliLiter(s) (100 mL/Hr) IV Continuous <Continuous>  pantoprazole    Tablet 40 milliGRAM(s) Oral before breakfast    MEDICATIONS  (PRN):  acetaminophen     Tablet .. 650 milliGRAM(s) Oral every 6 hours PRN Temp greater or equal to 38C (100.4F), Mild Pain (1 - 3)  albuterol    90 MICROgram(s) HFA Inhaler 2 Puff(s) Inhalation every 6 hours PRN Shortness of Breath and/or Wheezing  aluminum hydroxide/magnesium hydroxide/simethicone Suspension 30 milliLiter(s) Oral every 4 hours PRN Dyspepsia  dextrose Oral Gel 15 Gram(s) Oral once PRN Blood Glucose LESS THAN 70 milliGRAM(s)/deciliter  melatonin 3 milliGRAM(s) Oral at bedtime PRN Insomnia  ondansetron Injectable 4 milliGRAM(s) IV Push every 8 hours PRN Nausea and/or Vomiting        I&O's Summary    08 Apr 2024 07:01  -  09 Apr 2024 07:00  --------------------------------------------------------  IN: 0 mL / OUT: 950 mL / NET: -950 mL        PHYSICAL EXAM:  Vital Signs Last 24 Hrs  T(C): 37.2 (09 Apr 2024 11:26), Max: 37.7 (09 Apr 2024 00:05)  T(F): 99 (09 Apr 2024 11:26), Max: 99.8 (09 Apr 2024 00:05)  HR: 118 (09 Apr 2024 11:26) (104 - 118)  BP: 161/80 (09 Apr 2024 11:26) (132/73 - 161/80)  BP(mean): --  RR: 18 (09 Apr 2024 11:26) (18 - 18)  SpO2: 92% (09 Apr 2024 11:26) (92% - 100%)    Parameters below as of 09 Apr 2024 11:26  Patient On (Oxygen Delivery Method): room air        CONSTITUTIONAL: NAD, Not in acute distress  EYES:  EOMI, conjunctiva and sclera clear  ENMT: , neck supple , non-tender  RESPIRATORY: Normal respiratory effort; lungs are clear to auscultation bilaterally  CARDIOVASCULAR: S1S2 present, tachycardia  ABDOMEN: soft. bowel sound present  MUSCLOSKELETAL: ; no clubbing or cyanosis of digits;   PSYCH: A+O to person, place, and time; affect appropriate  NEUROLOGY: CN, motor and sensory intact   SKIN: No rashes; no palpable lesions  Extremity: No bilateral edema      LABS:                        9.8    10.27 )-----------( 525      ( 09 Apr 2024 04:00 )             30.1     04-09    139  |  104  |  3.5<L>  ----------------------------<  90  3.7   |  22.0  |  0.97    Ca    7.9<L>      09 Apr 2024 04:00    TPro  6.2<L>  /  Alb  2.5<L>  /  TBili  0.3<L>  /  DBili  0.1  /  AST  134<H>  /  ALT  113<H>  /  AlkPhos  105  04-08          Urinalysis Basic - ( 09 Apr 2024 04:00 )    Color: x / Appearance: x / SG: x / pH: x  Gluc: 90 mg/dL / Ketone: x  / Bili: x / Urobili: x   Blood: x / Protein: x / Nitrite: x   Leuk Esterase: x / RBC: x / WBC x   Sq Epi: x / Non Sq Epi: x / Bacteria: x        CAPILLARY BLOOD GLUCOSE      POCT Blood Glucose.: 94 mg/dL (09 Apr 2024 11:40)  POCT Blood Glucose.: 101 mg/dL (09 Apr 2024 07:57)  POCT Blood Glucose.: 95 mg/dL (08 Apr 2024 21:13)  POCT Blood Glucose.: 96 mg/dL (08 Apr 2024 16:55)      Labs reviewed:    RADIOLOGY & ADDITIONAL TESTS:  Imaging Personally Reviewed:  Electrocardiogram Personally Reviewed:

## 2024-04-09 NOTE — PROGRESS NOTE ADULT - ASSESSMENT
58M with Asthma, HTN, HLD, DM2, Gout admitted with TYESHA suspected 2.2 medications along with MF PNA     Sepsis 2/2 Right Upper Lobe PNA  Fever of unknown origin  Remains intermittently febrile despite apoprotein coverage for typical organisms   Fever curve trending down  Appreciate ID f/u .As per ID Dr. Brand, already received 7 days of Abx. hold further  Pneurococcal and Legionella neg, HIV negative, stool PCR negative. Doppler negative for DVT  Now on room air  Repeat cxr persistent infiltrate    #Sinus Tachycardia  -Most likely reactive  -WOrk up including US doppler negative. Echo unremarkable  -Follow up after IV fluid    TYESHA (now resolved)   -Possibly 2/2 medication induced ATN   -Hold ARB/HCTZ/Metformin   -Potentially  medication induced (in setting of poor PO intake) from recent introduction of ARB/HCTZ/Metformin  -Cr (baseline 1.08) >>>3.13 >2.39>1.91>1.75>1.41>1.40>1.3>1.31> 1.08  -UA negative,Renal US negative  -Hold above meds. Avoid Nephrotoxins.Renally dose meds  -Nephro Consult note appreciated    HTN, HLD  Amlodipine 10mg q24  Hold ARB and HCTZ for now  Not currently on statin therapy    DM2  Hold Metformin 500mg BID  ISS/Accuchecks/A1C    Asthma  Finished steroid taper last Sunday. No current symptoms/hypoxia.  No longer taking Montelukast  Wixela/Duoneb PRN    Gout  Uric Acid level 12.2  Takes Allopurinol as needed. Hold for now.     VTE PPX SQH/SCDs    Dispo: Acute.

## 2024-04-09 NOTE — PROGRESS NOTE ADULT - ASSESSMENT
58y  Male with a h/o Asthma, HTN, HLD, DM2, Gout. Patient presented to [facility] ER for abnormal lab work, elevated Cr from his PMD's office. Patient recently hospitalized for acute asthma exacerbation and viral URI found to have uncontrolled HTN and new onset DM type 2 at that time discharged home with outpatient follow-up and sent back to [facility] ER today by PCP for abnormal kidney function. During his hospital course patient developed fever to 102.3F on 3/31, was a code sepsis, was administered Azithromycin x1 and Zosyn since 4/1.      Multifocal PNA  Fever  TYESHA  Transaminitis   Thrombocytosis       - Blood cultures  3/31, 4/3, 4/6 no growth   - Repeat blood cultures if febrile   - RVP/COVID 19 PCR 3/31, 4/4 negative   - Urine for legionella and strep negative   - CT C/A/P 4/1 reporting multifocal PNA   - UA 3/30 and 4/1 negative   - Procalcitonin level 0.63, will order repeat for AM  - Lipase level 53  - Babesia PCR is negative   - Lyme PCR  is negative   - HIV negative   - viral hepatitis profile negative  - ERON pending  - LFTs improving   - RF is 11  - Monitor off antibiotics  - Hold off on PICC/Midline for now unless needed for reasons other than infectious diseases  - Follow up cultures  - Trend Fever  - Trend WBC      Will Follow    Discussed treatment plan with: Dr Gomez

## 2024-04-10 DIAGNOSIS — R00.0 TACHYCARDIA, UNSPECIFIED: ICD-10-CM

## 2024-04-10 LAB
ALBUMIN SERPL ELPH-MCNC: 2.1 G/DL — LOW (ref 3.3–5.2)
ALBUMIN SERPL ELPH-MCNC: 2.3 G/DL — LOW (ref 3.3–5.2)
ALP SERPL-CCNC: 85 U/L — SIGNIFICANT CHANGE UP (ref 40–120)
ALP SERPL-CCNC: 97 U/L — SIGNIFICANT CHANGE UP (ref 40–120)
ALT FLD-CCNC: 51 U/L — HIGH
ALT FLD-CCNC: 60 U/L — HIGH
ANA TITR SER: NEGATIVE — SIGNIFICANT CHANGE UP
ANION GAP SERPL CALC-SCNC: 14 MMOL/L — SIGNIFICANT CHANGE UP (ref 5–17)
ANION GAP SERPL CALC-SCNC: 14 MMOL/L — SIGNIFICANT CHANGE UP (ref 5–17)
APPEARANCE UR: CLEAR — SIGNIFICANT CHANGE UP
APTT BLD: 41.1 SEC — HIGH (ref 24.5–35.6)
AST SERPL-CCNC: 45 U/L — HIGH
AST SERPL-CCNC: 50 U/L — HIGH
BASOPHILS # BLD AUTO: 0.05 K/UL — SIGNIFICANT CHANGE UP (ref 0–0.2)
BASOPHILS # BLD AUTO: 0.06 K/UL — SIGNIFICANT CHANGE UP (ref 0–0.2)
BASOPHILS NFR BLD AUTO: 0.4 % — SIGNIFICANT CHANGE UP (ref 0–2)
BASOPHILS NFR BLD AUTO: 0.5 % — SIGNIFICANT CHANGE UP (ref 0–2)
BILIRUB SERPL-MCNC: 0.4 MG/DL — SIGNIFICANT CHANGE UP (ref 0.4–2)
BILIRUB SERPL-MCNC: 0.4 MG/DL — SIGNIFICANT CHANGE UP (ref 0.4–2)
BILIRUB UR-MCNC: NEGATIVE — SIGNIFICANT CHANGE UP
BUN SERPL-MCNC: 3.9 MG/DL — LOW (ref 8–20)
BUN SERPL-MCNC: 4.1 MG/DL — LOW (ref 8–20)
CALCIUM SERPL-MCNC: 7.5 MG/DL — LOW (ref 8.4–10.5)
CALCIUM SERPL-MCNC: 7.9 MG/DL — LOW (ref 8.4–10.5)
CHLORIDE SERPL-SCNC: 103 MMOL/L — SIGNIFICANT CHANGE UP (ref 96–108)
CHLORIDE SERPL-SCNC: 99 MMOL/L — SIGNIFICANT CHANGE UP (ref 96–108)
CO2 SERPL-SCNC: 21 MMOL/L — LOW (ref 22–29)
CO2 SERPL-SCNC: 23 MMOL/L — SIGNIFICANT CHANGE UP (ref 22–29)
COLOR SPEC: YELLOW — SIGNIFICANT CHANGE UP
CREAT SERPL-MCNC: 1.04 MG/DL — SIGNIFICANT CHANGE UP (ref 0.5–1.3)
CREAT SERPL-MCNC: 1.04 MG/DL — SIGNIFICANT CHANGE UP (ref 0.5–1.3)
DIFF PNL FLD: NEGATIVE — SIGNIFICANT CHANGE UP
EGFR: 82 ML/MIN/1.73M2 — SIGNIFICANT CHANGE UP
EGFR: 82 ML/MIN/1.73M2 — SIGNIFICANT CHANGE UP
EOSINOPHIL # BLD AUTO: 0.36 K/UL — SIGNIFICANT CHANGE UP (ref 0–0.5)
EOSINOPHIL # BLD AUTO: 0.38 K/UL — SIGNIFICANT CHANGE UP (ref 0–0.5)
EOSINOPHIL NFR BLD AUTO: 3.2 % — SIGNIFICANT CHANGE UP (ref 0–6)
EOSINOPHIL NFR BLD AUTO: 3.2 % — SIGNIFICANT CHANGE UP (ref 0–6)
GLUCOSE BLDC GLUCOMTR-MCNC: 101 MG/DL — HIGH (ref 70–99)
GLUCOSE BLDC GLUCOMTR-MCNC: 101 MG/DL — HIGH (ref 70–99)
GLUCOSE BLDC GLUCOMTR-MCNC: 111 MG/DL — HIGH (ref 70–99)
GLUCOSE BLDC GLUCOMTR-MCNC: 97 MG/DL — SIGNIFICANT CHANGE UP (ref 70–99)
GLUCOSE BLDC GLUCOMTR-MCNC: 97 MG/DL — SIGNIFICANT CHANGE UP (ref 70–99)
GLUCOSE SERPL-MCNC: 90 MG/DL — SIGNIFICANT CHANGE UP (ref 70–99)
GLUCOSE SERPL-MCNC: 96 MG/DL — SIGNIFICANT CHANGE UP (ref 70–99)
GLUCOSE UR QL: NEGATIVE MG/DL — SIGNIFICANT CHANGE UP
HCT VFR BLD CALC: 28.7 % — LOW (ref 39–50)
HCT VFR BLD CALC: 33.4 % — LOW (ref 39–50)
HGB BLD-MCNC: 10.7 G/DL — LOW (ref 13–17)
HGB BLD-MCNC: 9.3 G/DL — LOW (ref 13–17)
IMM GRANULOCYTES NFR BLD AUTO: 1.6 % — HIGH (ref 0–0.9)
IMM GRANULOCYTES NFR BLD AUTO: 1.7 % — HIGH (ref 0–0.9)
INR BLD: 1.36 RATIO — HIGH (ref 0.85–1.18)
KETONES UR-MCNC: NEGATIVE MG/DL — SIGNIFICANT CHANGE UP
LACTATE BLDV-MCNC: 0.9 MMOL/L — SIGNIFICANT CHANGE UP (ref 0.5–2)
LEUKOCYTE ESTERASE UR-ACNC: NEGATIVE — SIGNIFICANT CHANGE UP
LYMPHOCYTES # BLD AUTO: 4.53 K/UL — HIGH (ref 1–3.3)
LYMPHOCYTES # BLD AUTO: 4.83 K/UL — HIGH (ref 1–3.3)
LYMPHOCYTES # BLD AUTO: 40.4 % — SIGNIFICANT CHANGE UP (ref 13–44)
LYMPHOCYTES # BLD AUTO: 40.6 % — SIGNIFICANT CHANGE UP (ref 13–44)
MAGNESIUM SERPL-MCNC: 1.3 MG/DL — LOW (ref 1.6–2.6)
MCHC RBC-ENTMCNC: 27.7 PG — SIGNIFICANT CHANGE UP (ref 27–34)
MCHC RBC-ENTMCNC: 28.4 PG — SIGNIFICANT CHANGE UP (ref 27–34)
MCHC RBC-ENTMCNC: 32 GM/DL — SIGNIFICANT CHANGE UP (ref 32–36)
MCHC RBC-ENTMCNC: 32.4 GM/DL — SIGNIFICANT CHANGE UP (ref 32–36)
MCV RBC AUTO: 86.5 FL — SIGNIFICANT CHANGE UP (ref 80–100)
MCV RBC AUTO: 87.8 FL — SIGNIFICANT CHANGE UP (ref 80–100)
MONOCYTES # BLD AUTO: 1.07 K/UL — HIGH (ref 0–0.9)
MONOCYTES # BLD AUTO: 1.12 K/UL — HIGH (ref 0–0.9)
MONOCYTES NFR BLD AUTO: 9.4 % — SIGNIFICANT CHANGE UP (ref 2–14)
MONOCYTES NFR BLD AUTO: 9.6 % — SIGNIFICANT CHANGE UP (ref 2–14)
NEUTROPHILS # BLD AUTO: 4.97 K/UL — SIGNIFICANT CHANGE UP (ref 1.8–7.4)
NEUTROPHILS # BLD AUTO: 5.39 K/UL — SIGNIFICANT CHANGE UP (ref 1.8–7.4)
NEUTROPHILS NFR BLD AUTO: 44.5 % — SIGNIFICANT CHANGE UP (ref 43–77)
NEUTROPHILS NFR BLD AUTO: 44.9 % — SIGNIFICANT CHANGE UP (ref 43–77)
NITRITE UR-MCNC: NEGATIVE — SIGNIFICANT CHANGE UP
PH UR: 7.5 — SIGNIFICANT CHANGE UP (ref 5–8)
PHOSPHATE SERPL-MCNC: 2 MG/DL — LOW (ref 2.4–4.7)
PLATELET # BLD AUTO: 536 K/UL — HIGH (ref 150–400)
PLATELET # BLD AUTO: 566 K/UL — HIGH (ref 150–400)
POTASSIUM SERPL-MCNC: 3.5 MMOL/L — SIGNIFICANT CHANGE UP (ref 3.5–5.3)
POTASSIUM SERPL-MCNC: 3.6 MMOL/L — SIGNIFICANT CHANGE UP (ref 3.5–5.3)
POTASSIUM SERPL-SCNC: 3.5 MMOL/L — SIGNIFICANT CHANGE UP (ref 3.5–5.3)
POTASSIUM SERPL-SCNC: 3.6 MMOL/L — SIGNIFICANT CHANGE UP (ref 3.5–5.3)
PROCALCITONIN SERPL-MCNC: 0.3 NG/ML — HIGH (ref 0.02–0.1)
PROT SERPL-MCNC: 5.7 G/DL — LOW (ref 6.6–8.7)
PROT SERPL-MCNC: 6.2 G/DL — LOW (ref 6.6–8.7)
PROT UR-MCNC: NEGATIVE MG/DL — SIGNIFICANT CHANGE UP
PROTHROM AB SERPL-ACNC: 15 SEC — HIGH (ref 9.5–13)
RBC # BLD: 3.27 M/UL — LOW (ref 4.2–5.8)
RBC # BLD: 3.86 M/UL — LOW (ref 4.2–5.8)
RBC # FLD: 13.9 % — SIGNIFICANT CHANGE UP (ref 10.3–14.5)
RBC # FLD: 13.9 % — SIGNIFICANT CHANGE UP (ref 10.3–14.5)
SODIUM SERPL-SCNC: 136 MMOL/L — SIGNIFICANT CHANGE UP (ref 135–145)
SODIUM SERPL-SCNC: 138 MMOL/L — SIGNIFICANT CHANGE UP (ref 135–145)
SP GR SPEC: 1.01 — SIGNIFICANT CHANGE UP (ref 1–1.03)
T4 AB SER-ACNC: 8.2 UG/DL — SIGNIFICANT CHANGE UP (ref 4.5–12)
TSH SERPL-MCNC: 3.65 UIU/ML — SIGNIFICANT CHANGE UP (ref 0.27–4.2)
UROBILINOGEN FLD QL: 1 MG/DL — SIGNIFICANT CHANGE UP (ref 0.2–1)
WBC # BLD: 11.17 K/UL — HIGH (ref 3.8–10.5)
WBC # BLD: 11.97 K/UL — HIGH (ref 3.8–10.5)
WBC # FLD AUTO: 11.17 K/UL — HIGH (ref 3.8–10.5)
WBC # FLD AUTO: 11.97 K/UL — HIGH (ref 3.8–10.5)

## 2024-04-10 PROCEDURE — 99233 SBSQ HOSP IP/OBS HIGH 50: CPT

## 2024-04-10 PROCEDURE — 99232 SBSQ HOSP IP/OBS MODERATE 35: CPT

## 2024-04-10 PROCEDURE — 99222 1ST HOSP IP/OBS MODERATE 55: CPT

## 2024-04-10 PROCEDURE — 93971 EXTREMITY STUDY: CPT | Mod: 26,RT

## 2024-04-10 PROCEDURE — 73201 CT UPPER EXTREMITY W/DYE: CPT | Mod: 26,RT

## 2024-04-10 PROCEDURE — 71045 X-RAY EXAM CHEST 1 VIEW: CPT | Mod: 26

## 2024-04-10 RX ORDER — PIPERACILLIN AND TAZOBACTAM 4; .5 G/20ML; G/20ML
3.38 INJECTION, POWDER, LYOPHILIZED, FOR SOLUTION INTRAVENOUS ONCE
Refills: 0 | Status: COMPLETED | OUTPATIENT
Start: 2024-04-10 | End: 2024-04-10

## 2024-04-10 RX ORDER — MAGNESIUM SULFATE 500 MG/ML
2 VIAL (ML) INJECTION ONCE
Refills: 0 | Status: COMPLETED | OUTPATIENT
Start: 2024-04-10 | End: 2024-04-10

## 2024-04-10 RX ORDER — SODIUM CHLORIDE 9 MG/ML
2000 INJECTION INTRAMUSCULAR; INTRAVENOUS; SUBCUTANEOUS ONCE
Refills: 0 | Status: COMPLETED | OUTPATIENT
Start: 2024-04-10 | End: 2024-04-10

## 2024-04-10 RX ORDER — VANCOMYCIN HCL 1 G
1500 VIAL (EA) INTRAVENOUS ONCE
Refills: 0 | Status: COMPLETED | OUTPATIENT
Start: 2024-04-10 | End: 2024-04-10

## 2024-04-10 RX ORDER — POTASSIUM PHOSPHATE, MONOBASIC POTASSIUM PHOSPHATE, DIBASIC 236; 224 MG/ML; MG/ML
30 INJECTION, SOLUTION INTRAVENOUS ONCE
Refills: 0 | Status: COMPLETED | OUTPATIENT
Start: 2024-04-10 | End: 2024-04-10

## 2024-04-10 RX ORDER — ACETAMINOPHEN 500 MG
650 TABLET ORAL ONCE
Refills: 0 | Status: COMPLETED | OUTPATIENT
Start: 2024-04-10 | End: 2024-04-10

## 2024-04-10 RX ORDER — ACETAMINOPHEN 500 MG
1000 TABLET ORAL ONCE
Refills: 0 | Status: COMPLETED | OUTPATIENT
Start: 2024-04-10 | End: 2024-04-10

## 2024-04-10 RX ORDER — PIPERACILLIN AND TAZOBACTAM 4; .5 G/20ML; G/20ML
3.38 INJECTION, POWDER, LYOPHILIZED, FOR SOLUTION INTRAVENOUS EVERY 8 HOURS
Refills: 0 | Status: DISCONTINUED | OUTPATIENT
Start: 2024-04-10 | End: 2024-04-12

## 2024-04-10 RX ORDER — MAGNESIUM SULFATE 500 MG/ML
2 VIAL (ML) INJECTION EVERY 4 HOURS
Refills: 0 | Status: COMPLETED | OUTPATIENT
Start: 2024-04-10 | End: 2024-04-10

## 2024-04-10 RX ADMIN — HEPARIN SODIUM 5000 UNIT(S): 5000 INJECTION INTRAVENOUS; SUBCUTANEOUS at 06:21

## 2024-04-10 RX ADMIN — SODIUM CHLORIDE 2000 MILLILITER(S): 9 INJECTION INTRAMUSCULAR; INTRAVENOUS; SUBCUTANEOUS at 05:49

## 2024-04-10 RX ADMIN — PIPERACILLIN AND TAZOBACTAM 200 GRAM(S): 4; .5 INJECTION, POWDER, LYOPHILIZED, FOR SOLUTION INTRAVENOUS at 06:19

## 2024-04-10 RX ADMIN — Medication 400 MILLIGRAM(S): at 05:48

## 2024-04-10 RX ADMIN — HEPARIN SODIUM 5000 UNIT(S): 5000 INJECTION INTRAVENOUS; SUBCUTANEOUS at 17:28

## 2024-04-10 RX ADMIN — Medication 300 MILLIGRAM(S): at 06:59

## 2024-04-10 RX ADMIN — SODIUM CHLORIDE 100 MILLILITER(S): 9 INJECTION, SOLUTION INTRAVENOUS at 21:48

## 2024-04-10 RX ADMIN — PIPERACILLIN AND TAZOBACTAM 25 GRAM(S): 4; .5 INJECTION, POWDER, LYOPHILIZED, FOR SOLUTION INTRAVENOUS at 21:49

## 2024-04-10 RX ADMIN — Medication 1000 MILLIGRAM(S): at 06:48

## 2024-04-10 RX ADMIN — Medication 25 GRAM(S): at 15:37

## 2024-04-10 RX ADMIN — PIPERACILLIN AND TAZOBACTAM 25 GRAM(S): 4; .5 INJECTION, POWDER, LYOPHILIZED, FOR SOLUTION INTRAVENOUS at 11:42

## 2024-04-10 RX ADMIN — Medication 650 MILLIGRAM(S): at 23:41

## 2024-04-10 RX ADMIN — POTASSIUM PHOSPHATE, MONOBASIC POTASSIUM PHOSPHATE, DIBASIC 83.33 MILLIMOLE(S): 236; 224 INJECTION, SOLUTION INTRAVENOUS at 08:17

## 2024-04-10 RX ADMIN — PANTOPRAZOLE SODIUM 40 MILLIGRAM(S): 20 TABLET, DELAYED RELEASE ORAL at 06:21

## 2024-04-10 RX ADMIN — Medication 650 MILLIGRAM(S): at 22:41

## 2024-04-10 RX ADMIN — PIPERACILLIN AND TAZOBACTAM 25 GRAM(S): 4; .5 INJECTION, POWDER, LYOPHILIZED, FOR SOLUTION INTRAVENOUS at 15:38

## 2024-04-10 RX ADMIN — Medication 25 GRAM(S): at 06:31

## 2024-04-10 RX ADMIN — Medication 25 GRAM(S): at 11:42

## 2024-04-10 RX ADMIN — AMLODIPINE BESYLATE 10 MILLIGRAM(S): 2.5 TABLET ORAL at 06:21

## 2024-04-10 NOTE — CONSULT NOTE ADULT - SUBJECTIVE AND OBJECTIVE BOX
[facility]                                              CARDIOLOGY AT [place] [address]                                             Telephone: [phone]. Fax:[phone]                                                       CARDIOLOGY CONSULTATION NOTE                                                                                             History obtained by: Patient and medical record  Community Cardiologist:    obtained: Yes [  ] No [x  ]  Reason for Consultation: tachycardia   Available out pt records reviewed: Yes [x  ] No [  ]    Chief complaint:    Patient is a 60 YO Male who presents with a chief complaint of elevated creatinine.       HPI:  This is a 57YO M with PMHX Asthma, HTN, HLD, DM2, Gout presents to [facility] ER for abnormal lab work. Patient recently hospitalized for acute asthma exacerbation and viral URI found to have uncontrolled HTN and new onset DM2 at that time discharged home with outpatient follow-up and sent back to [facility] ER today by PCP for abnormal kidney function. ROS +Fatigue and +Malaise. No other complaints. Denies CVAT or back pain. No urinary complaints. Compliant with meds. Recently started on Losartan, HCTZ, and Metformin on discharge and by PCP. +Tylenol use at home. Denies NSAID use. Cardiology consulted for episodes of tachycardia.     PMHX: Asthma, HTN, HLD, DM2, Gout  PSHX: Denies  Social Hx: Denies etoh/tobacco/drug abuse  FamHx: +DM2 +Lung CA  NKDA (29 Mar 2024 23:47)      CARDIAC TESTING   ECHO:< from: TTE W or WO Ultrasound Enhancing Agent (03.13.24 @ 21:00) >  TRANSTHORACIC ECHOCARDIOGRAM REPORT  ________________________________________________________________________________                                      _______       Pt. Name:       ZAINA ANTOINE ANGELA Study Date:    3/13/2024  MRN:      [id number]                  YOB: 1965  Accession #:    [id number]                   Age:           58 years  Account#:       [id number]                  Gender:        M  Heart Rate:                                 Height:        67.00 in (170.18 cm)  Rhythm:                                     Weight:        229.00 lb (103.87 kg)  Blood Pressure: 186/93 mmHg                 BSA/BMI:       2.14 m² / 35.87 kg/m²  ________________________________________________________________________________________  Referring Physician:    Nicole Aguila  Interpreting Physician: Henry Kemp MD  Primary Sonographer:    Mariam Caban    CPT:                ECHO TTE WITH CON COMP W DOPP - .m;DEFINITY ECHO                      CONTRAST PER ML - .m  Indication(s):      Heart failure, unspecified - I50.9  Procedure:          Transthoracic echocardiogram with 2-D, M-mode and complete                      spectral and color flow Doppler.  Ordering Location:  [facility]  Admission Status:ED  Contrast Injection: Verbal consent was obtained for injection of Ultrasonic                      Enhancing Agent following a discussion of risks and                      benefits.                      Endocardial visualization enhanced with 2 ml of Definity                      Ultrasound enhancing agent (Lot#:[id number] Exp.Date:02/2025).  UEA Reaction:       Patient had no adverse reaction after injection of                      Ultrasound Enhancing Agent.    _______________________________________________________________________________________     CONCLUSIONS:      1. Mild left ventricular hypertrophy.   2. Left ventricular cavity is normal in size. Left ventricular systolic function is hyperdynamic with an ejection fraction of 71 % by Cheney's method of disks with an ejection fraction visually estimated at 70 to 75 %. There are no regional wall motion abnormalities seen.   3. Normal right ventricular cavity size and normal systolic function.   4. Trileaflet aortic valve with normal systolic excursion. mild calcification of the aortic valve leaflets.   5. Structurally normal mitral valve with normal leaflet excursion.   6. The right atrium is normal in size.   7. The left atrium is normal.   8. No pericardial effusion seen.    ________________________________________________________________________________________  FINDINGS:     Left Ventricle:  The left ventricular cavity is normal in size. Left ventricular systolic function is hyperdynamic with a calculated ejection fraction of 71 % by the Cheney's biplane method of disks with an ejection fraction visually estimated at 70 to 75%. There are no regional wall motion abnormalities seen. There is normal left ventricular diastolic function. Mild left ventricular hypertrophy.    Right Ventricle:  The right ventricular cavity is normal in size and normal systolic function. Tricuspid annular plane systolic excursion (TAPSE) is 3.1 cm (normal >=1.7 cm).     Left Atrium:  The left atrium is normal with an indexed volume of 17.13 ml/m².     Right Atrium:  The right atrium is normal in size with an indexed volume of 12.45 ml/m² and an indexed area of 5.46 cm²/m².     Interatrial Septum:  The interatrial septum appears intact.     Aortic Valve:  The aortic valve appears trileaflet with normal systolic excursion. There is mild calcification of the aortic valve leaflets. There is no evidence of aortic regurgitation.     Mitral Valve:  Structurally normal mitral valve with normal leaflet excursion.     Tricuspid Valve:  Structurally normal tricuspid valve with normal leaflet excursion. There is insufficient tricuspid regurgitation detected to calculate pulmonary artery systolic pressure.     Pulmonic Valve:  The pulmonic valve was not well visualized.     Pulmonary Artery:  The branch pulmonary arteries are not well visualized.     Aorta:  The aortic root at the sinuses of Valsalva is normal in size, measuring 2.80 cm (indexed 1.31 cm/m²). The ascending aorta diameter is normal in size.     Pericardium:  No pericardial effusion seen. Pericardial fat pad is seen anterior to the right ventricle.     Systemic Veins:  The inferior vena cava is normal in size measuring 1.18 cm in diameter, (normal <2.1cm) with normal inspiratory collapse (normal >50%) consistent with normal right atrial pressure (~3, range 0-5mmHg).  _Electronically signed on 3/14/2024 at 8:25:17 AM by Henry Kemp MD         *** Final ***    < end of copied text >      STRESS:< from: Nuclear Stress Test-Pharmacologic.. (03.14.24 @ 08:14) >  Nuclear Pharmacologic Stress Test Report         Patient: ZAINA MILLER     Study Date: 3/14/2024           MRN: [id number]                  Birth Date/Age: 1965 Age: 58                                                            years      Access #: [id number]                           Gender: M     Order Loc: [facility]                                Height: 170.2 cm/ 67.0 in  Request Phys: [id number] Adalid Azalexis                Weight: 104.33 kg/ 230.00 lb     Procedure: Rest SESTAMIBI                    BSA/ BMI: 2.15 m²/ 36.02 kg/m²    Indication: Shortness of breath -        Admiss Status:                R06.02    Fellow/ACP: Wanda Green NP              Fellow/ACP:    ---------------------------------------------------------------------------------------------------------------------------------------------------------  Procedure Code: STRESS TEST TRACING ONLY - 49290.m;TC 99M SESTAMIBI PER DOSE -                  .m;DOBUTAMINE INJ PER 250MG - .m;MYOCARDIAL SPECT                  SINGLE - 44610.m    ---------------------------------------------------------------------------------------------------------------------------------------------------------  History:       57 yo male with shortness of breath, hypertensive urgency, URI,                 excaerbation of asthma.  Image Quality: Uninterpretable  Artifact:      Increased GI uptake of tracer located inferiorly.  Medications:   Amlodipine, heparin, hydralazine, insulin, methylpred.,                 montelukast.  NDC Number(s): 34296-897-55  Allergies:     None    --------------------------------------------------------------------------------------------------------------------------------------------------------Conclusions:   1. Myocardial Perfusion: Uninterpretible Dobutamine stress test. Resting images were only performed. Test was aborted due to patient symptoms and hypotension. Stress imaging was not done.   2. The patient underwent stress testing using pharmacological IV dobutamine protocol.      The test was stopped due to drop in HR and fall in blood pressure.         3. Baseline electrocardiogram: sinus rhythm with evidence of prior septal infarct.   4. Arrhythmias: patient had a decrease in HR and fall in BP associated with lightheadedness and lethargy, test aborted. D/W Dr. Rowell.   5. Hemodynamic Instability.   6. Patient history: 57 yo male with shortness of breath, hypertensive urgency, URI, excaerbation of asthma.    ---------------------------------------------------------------------------------------------------------------------------------------------------------     Stress Test Results:  Pretest Chest Pain: None.       Pharmacological Stress Test Results:                Protocol: IV dobutamine        Dose: 30mcg/Kg/min  Symptoms During Stress: Shortness of breath and fatigue.  Reason for Termination: Drop in HR and fall in blood pressure.       Protocol: Regadenoson Injection  +--------+--------------+----------------+--------------+  |  Time  |              |Heart Rate (bpm)|Blood Pressure|  +--------+--------------+----------------+--------------+  |Baseline|Pre-Injection |      102       |   180/111    |  +--------+--------------+----------------+--------------+  | 03:00  |  Injection   |      114       |   185/101    |  +--------+--------------+----------------+--------------+  | 06:00  |  Injection   |       92       |    179/91    |  +--------+--------------+----------------+--------------+  | 07:00  |Post Injection|       76     |    146/64    |  +--------+--------------+----------------+--------------+  | 05:00  |   Recovery   |       69       |              |  +--------+--------------+----------------+--------------+  | 06:00  |   Recovery   |       69       |     |  +--------+--------------+----------------+--------------+  | 07:00  |   Recovery   |       77       |              |  +--------+--------------+----------------+--------------+  | 10:00  |   Recovery   |       92       |    148/88    |  +--------+--------------+----------------+--------------+  | 12:00  |   Recovery   |       86       |    143/78    |  +--------+--------------+----------------+--------------+    ---------------------------------------------------------------------------------------------------------------------------------------------------------Electrocardiogram:  Baseline electrocardiogram: Sinus rhythm with evidence of prior septal infarct.  Stress electrocardiogram: No ischemic ST segment changes.  Arrhythmias: No arrhythmia associated with stress. Patient had a decrease in HR  and fall in BP associated with lightheadedness and lethargy, test aborted. D/W  Dr. Rowell.    ---------------------------------------------------------------------------------------------------------------------------------------------------------  Procedure:  The patient was given 10.7 mCi of TC-99M Sestamibi intravenously at rest on 3/14/2024 at 7:55:00 AM. Approximately 45 minutes later, SPECT images were obtained, with patient in supine position. Images were reacquired with the patient in a prone position.  ---------------------------------------------------------------------------------------------------------------------------------------------------------    Interpreting Provider: Electronically signed on 3/14/2024 at 3:17:47 PM by Jasbir Burgess         *** Final ***    < end of copied text >      CATH:     ELECTROPHYSIOLOGY:     PAST MEDICAL HISTORY  Gout    Asthma    Arthritis    HTN (hypertension)        PAST SURGICAL HISTORY  No significant past surgical history    No significant past surgical history        SOCIAL HISTORY:  Denies smoking/alcohol/drugs  CIGARETTES:     ALCOHOL:  DRUGS:    FAMILY HISTORY:  Family history of diabetes mellitus type II    Family history of lung cancer      Family History of Cardiovascular Disease:  Yes [  ] No [  ]  Coronary Artery Disease in first degree relative: Yes [  ] No [  ]  Sudden Cardiac Death in First degree relative: Yes [  ] No [  ]    HOME MEDICATIONS:  metFORMIN 500 mg oral tablet: 1 tab(s) orally 2 times a day (30 Mar 2024 02:19)  Wixela Inhub 500 mcg-50 mcg inhalation powder: 1 puff(s) inhaled once a day (30 Mar 2024 02:19)      CURRENT CARDIAC MEDICATIONS:  amLODIPine   Tablet 10 milliGRAM(s) Oral daily      CURRENT OTHER MEDICATIONS:  albuterol    90 MICROgram(s) HFA Inhaler 2 Puff(s) Inhalation every 6 hours PRN Shortness of Breath and/or Wheezing  acetaminophen     Tablet .. 650 milliGRAM(s) Oral every 6 hours PRN Temp greater or equal to 38C (100.4F), Mild Pain (1 - 3)  melatonin 3 milliGRAM(s) Oral at bedtime PRN Insomnia  ondansetron Injectable 4 milliGRAM(s) IV Push every 8 hours PRN Nausea and/or Vomiting  aluminum hydroxide/magnesium hydroxide/simethicone Suspension 30 milliLiter(s) Oral every 4 hours PRN Dyspepsia  pantoprazole    Tablet 40 milliGRAM(s) Oral before breakfast  dextrose 5%. 1000 milliLiter(s) (50 mL/Hr) IV Continuous <Continuous>  dextrose 5%. 1000 milliLiter(s) (100 mL/Hr) IV Continuous <Continuous>  dextrose 50% Injectable 25 Gram(s) IV Push once, Stop order after: 1 Doses  dextrose 50% Injectable 25 Gram(s) IV Push once, Stop order after: 1 Doses  dextrose 50% Injectable 12.5 Gram(s) IV Push once, Stop order after: 1 Doses  dextrose Oral Gel 15 Gram(s) Oral once, Stop order after: 1 Doses PRN Blood Glucose LESS THAN 70 milliGRAM(s)/deciliter  glucagon  Injectable 1 milliGRAM(s) IntraMuscular once, Stop order after: 1 Doses  heparin   Injectable 5000 Unit(s) SubCutaneous every 12 hours  insulin lispro (ADMELOG) corrective regimen sliding scale   SubCutaneous Before meals and at bedtime  lactated ringers. 2000 milliLiter(s) (100 mL/Hr) IV Continuous <Continuous>  magnesium sulfate  IVPB 2 Gram(s) IV Intermittent every 4 hours, Stop order after: 2 Doses  piperacillin/tazobactam IVPB.- 3.375 Gram(s) IV Intermittent once  piperacillin/tazobactam IVPB.- 3.375 Gram(s) IV Intermittent once  piperacillin/tazobactam IVPB.. 3.375 Gram(s) IV Intermittent every 8 hours, Stop order after: 7 Days      ALLERGIES:   No Known Allergies      REVIEW OF SYMPTOMS:   CONSTITUTIONAL: No fever, no chills, no weight loss, no weight gain, no fatigue   ENMT:  No vertigo; No sinus or throat pain  NECK: No pain or stiffness  CARDIOVASCULAR: No chest pain, no dyspnea, no syncope/presyncope, no palpitations, no dizziness, no Orthopnea, no Paroxsymal nocturnal dyspnea  RESPIRATORY: no Shortness of breath, no cough, no wheezing  : No dysuria, no hematuria   GI: No dark color stool, no nausea, no diarrhea, no constipation, no abdominal pain   NEURO: No headache, no slurred speech   MUSCULOSKELETAL: No joint pain or swelling; No muscle, back, or extremity pain  PSYCH: No agitation, no anxiety.    ALL OTHER REVIEW OF SYSTEMS ARE NEGATIVE.    VITAL SIGNS:  T(C): 36.8 (04-10-24 @ 09:32), Max: 39.4 (04-10-24 @ 05:40)  T(F): 98.2 (04-10-24 @ 09:32), Max: 102.9 (04-10-24 @ 05:40)  HR: 102 (04-10-24 @ 09:32) (81 - 122)  BP: 130/70 (04-10-24 @ 09:32) (130/70 - 161/80)  RR: 18 (04-10-24 @ 09:32) (18 - 18)  SpO2: 96% (04-10-24 @ 09:32) (92% - 96%)    INTAKE AND OUTPUT:     04-09 @ 07:01  -  04-10 @ 07:00  --------------------------------------------------------  IN: 100 mL / OUT: 0 mL / NET: 100 mL        PHYSICAL EXAM:  Constitutional: Comfortable . No acute distress.   HEENT: Atraumatic and normocephalic , neck is supple . no JVD. No carotid bruit.  CNS: A&Ox3. No focal deficits.   Respiratory: CTAB, unlabored   Cardiovascular: RRR normal s1 s2. No murmur. No rubs or gallop. (+) sinus tachycardia  Gastrointestinal: Soft, non-tender. +Bowel sounds.   Extremities: 2+ Peripheral Pulses, No clubbing, cyanosis. (+) RUE edema.   Psychiatric: Calm . no agitation.   Skin: Warm and dry, no ulcers on extremities     LABS:  ( 30 Mar 2024 06:45 )  Troponin T  X    ,  CPK  166  , CKMB  X    , BNP X                                  9.3    11.97 )-----------( 536      ( 10 Apr 2024 05:55 )             28.7     04-10    138  |  103  |  3.9<L>  ----------------------------<  96  3.5   |  21.0<L>  |  1.04    Ca    7.5<L>      10 Apr 2024 07:04  Phos  2.0     04-10  Mg     1.3     04-10    TPro  5.7<L>  /  Alb  2.1<L>  /  TBili  0.4  /  DBili  x   /  AST  45<H>  /  ALT  51<H>  /  AlkPhos  85  04-10    PT/INR - ( 10 Apr 2024 05:55 )   PT: 15.0 sec;   INR: 1.36 ratio         PTT - ( 10 Apr 2024 05:55 )  PTT:41.1 sec  Urinalysis Basic - ( 10 Apr 2024 07:04 )    Color: x / Appearance: x / SG: x / pH: x  Gluc: 96 mg/dL / Ketone: x  / Bili: x / Urobili: x   Blood: x / Protein: x / Nitrite: x   Leuk Esterase: x / RBC: x / WBC x   Sq Epi: x / Non Sq Epi: x / Bacteria: x          Thyroid Stimulating Hormone, Serum: 3.65 uIU/mL (04-10-24 @ 04:01)      INTERPRETATION OF TELEMETRY:     ECG:   Prior ECG: Yes [  ] No [  ]    RADIOLOGY & ADDITIONAL STUDIES:    X-ray:    CT scan:   MRI:   US:                                                [facility]                                              CARDIOLOGY AT [place] [address]                                             Telephone: [phone]. Fax:[phone]                                                       CARDIOLOGY CONSULTATION NOTE                                                                                             History obtained by: Patient and medical record  Community Cardiologist:    obtained: Yes [  ] No [x  ]  Reason for Consultation: tachycardia   Available out pt records reviewed: Yes [x  ] No [  ]    Chief complaint:    Patient is a 60 YO Male who presents with a chief complaint of elevated creatinine.       HPI:  This is a 57YO M with PMHX Asthma, HTN, HLD, DM2, Gout presents to [facility] ER for abnormal lab work. Patient recently hospitalized for acute asthma exacerbation and viral URI found to have uncontrolled HTN and new onset DM2 at that time discharged home with outpatient follow-up and sent back to [facility] ER today by PCP for abnormal kidney function. ROS +Fatigue and +Malaise. No other complaints. Denies CVAT or back pain. No urinary complaints. Compliant with meds. Recently started on Losartan, HCTZ, and Metformin on discharge and by PCP. +Tylenol use at home. Denies NSAID use.     PMHX: Asthma, HTN, HLD, DM2, Gout  PSHX: Denies  Social Hx: Denies etoh/tobacco/drug abuse  FamHx: +DM2 +Lung CA  NKDA (29 Mar 2024 23:47)      CARDIAC TESTING   ECHO:< from: TTE W or WO Ultrasound Enhancing Agent (03.13.24 @ 21:00) >  TRANSTHORACIC ECHOCARDIOGRAM REPORT  ________________________________________________________________________________                                      _______       Pt. Name:       ZAINA MILLER Study Date:    3/13/2024  MRN:      [id number]                  YOB: 1965  Accession #:    [id number]                   Age:           58 years  Account#:       [id number]                  Gender:        M  Heart Rate:                                 Height:        67.00 in (170.18 cm)  Rhythm:                                     Weight:        229.00 lb (103.87 kg)  Blood Pressure: 186/93 mmHg                 BSA/BMI:       2.14 m² / 35.87 kg/m²  ________________________________________________________________________________________  Referring Physician:    Nicole Aguila  Interpreting Physician: Henry Kemp MD  Primary Sonographer:    Mariam Caban    CPT:                ECHO TTE WITH CON COMP W DOPP - .m;DEFINITY ECHO                      CONTRAST PER ML - .m  Indication(s):      Heart failure, unspecified - I50.9  Procedure:          Transthoracic echocardiogram with 2-D, M-mode and complete                      spectral and color flow Doppler.  Ordering Location:  [facility]  Admission Status:ED  Contrast Injection: Verbal consent was obtained for injection of Ultrasonic                      Enhancing Agent following a discussion of risks and                      benefits.                      Endocardial visualization enhanced with 2 ml of Definity                      Ultrasound enhancing agent (Lot#:[id number] Exp.Date:02/2025).  UEA Reaction:       Patient had no adverse reaction after injection of                      Ultrasound Enhancing Agent.    _______________________________________________________________________________________     CONCLUSIONS:      1. Mild left ventricular hypertrophy.   2. Left ventricular cavity is normal in size. Left ventricular systolic function is hyperdynamic with an ejection fraction of 71 % by Cheney's method of disks with an ejection fraction visually estimated at 70 to 75 %. There are no regional wall motion abnormalities seen.   3. Normal right ventricular cavity size and normal systolic function.   4. Trileaflet aortic valve with normal systolic excursion. mild calcification of the aortic valve leaflets.   5. Structurally normal mitral valve with normal leaflet excursion.   6. The right atrium is normal in size.   7. The left atrium is normal.   8. No pericardial effusion seen.    ________________________________________________________________________________________  FINDINGS:     Left Ventricle:  The left ventricular cavity is normal in size. Left ventricular systolic function is hyperdynamic with a calculated ejection fraction of 71 % by the Cheney's biplane method of disks with an ejection fraction visually estimated at 70 to 75%. There are no regional wall motion abnormalities seen. There is normal left ventricular diastolic function. Mild left ventricular hypertrophy.    Right Ventricle:  The right ventricular cavity is normal in size and normal systolic function. Tricuspid annular plane systolic excursion (TAPSE) is 3.1 cm (normal >=1.7 cm).     Left Atrium:  The left atrium is normal with an indexed volume of 17.13 ml/m².     Right Atrium:  The right atrium is normal in size with an indexed volume of 12.45 ml/m² and an indexed area of 5.46 cm²/m².     Interatrial Septum:  The interatrial septum appears intact.     Aortic Valve:  The aortic valve appears trileaflet with normal systolic excursion. There is mild calcification of the aortic valve leaflets. There is no evidence of aortic regurgitation.     Mitral Valve:  Structurally normal mitral valve with normal leaflet excursion.     Tricuspid Valve:  Structurally normal tricuspid valve with normal leaflet excursion. There is insufficient tricuspid regurgitation detected to calculate pulmonary artery systolic pressure.     Pulmonic Valve:  The pulmonic valve was not well visualized.     Pulmonary Artery:  The branch pulmonary arteries are not well visualized.     Aorta:  The aortic root at the sinuses of Valsalva is normal in size, measuring 2.80 cm (indexed 1.31 cm/m²). The ascending aorta diameter is normal in size.     Pericardium:  No pericardial effusion seen. Pericardial fat pad is seen anterior to the right ventricle.     Systemic Veins:  The inferior vena cava is normal in size measuring 1.18 cm in diameter, (normal <2.1cm) with normal inspiratory collapse (normal >50%) consistent with normal right atrial pressure (~3, range 0-5mmHg).  _Electronically signed on 3/14/2024 at 8:25:17 AM by Henry Kemp MD         *** Final ***    < end of copied text >      STRESS:< from: Nuclear Stress Test-Pharmacologic.. (03.14.24 @ 08:14) >  Nuclear Pharmacologic Stress Test Report         Patient: ZAINA MILLER     Study Date: 3/14/2024           MRN: [id number]                  Birth Date/Age: 1965 Age: 58                                                            years      Access #: [id number]                           Gender: M     Order Loc: [facility]                                Height: 170.2 cm/ 67.0 in  Request Phys: [id number] Adalid Slaughter                Weight: 104.33 kg/ 230.00 lb     Procedure: Rest SESTAMIBI                    BSA/ BMI: 2.15 m²/ 36.02 kg/m²    Indication: Shortness of breath -        Admiss Status:                R06.02    Fellow/ACP: Wanda Green NP              Fellow/ACP:    ---------------------------------------------------------------------------------------------------------------------------------------------------------  Procedure Code: STRESS TEST TRACING ONLY - 48714.m;TC 99M SESTAMIBI PER DOSE -                  .m;DOBUTAMINE INJ PER 250MG - .m;MYOCARDIAL SPECT                  SINGLE - 00294.m    ---------------------------------------------------------------------------------------------------------------------------------------------------------  History:       59 yo male with shortness of breath, hypertensive urgency, URI,                 excaerbation of asthma.  Image Quality: Uninterpretable  Artifact:      Increased GI uptake of tracer located inferiorly.  Medications:   Amlodipine, heparin, hydralazine, insulin, methylpred.,                 montelukast.  NDC Number(s): 84728-820-20  Allergies:     None    --------------------------------------------------------------------------------------------------------------------------------------------------------Conclusions:   1. Myocardial Perfusion: Uninterpretible Dobutamine stress test. Resting images were only performed. Test was aborted due to patient symptoms and hypotension. Stress imaging was not done.   2. The patient underwent stress testing using pharmacological IV dobutamine protocol.      The test was stopped due to drop in HR and fall in blood pressure.         3. Baseline electrocardiogram: sinus rhythm with evidence of prior septal infarct.   4. Arrhythmias: patient had a decrease in HR and fall in BP associated with lightheadedness and lethargy, test aborted. D/W Dr. Rowell.   5. Hemodynamic Instability.   6. Patient history: 59 yo male with shortness of breath, hypertensive urgency, URI, excaerbation of asthma.    ---------------------------------------------------------------------------------------------------------------------------------------------------------     Stress Test Results:  Pretest Chest Pain: None.       Pharmacological Stress Test Results:                Protocol: IV dobutamine        Dose: 30mcg/Kg/min  Symptoms During Stress: Shortness of breath and fatigue.  Reason for Termination: Drop in HR and fall in blood pressure.       Protocol: Regadenoson Injection  +--------+--------------+----------------+--------------+  |  Time  |              |Heart Rate (bpm)|Blood Pressure|  +--------+--------------+----------------+--------------+  |Baseline|Pre-Injection |      102       |   180/111    |  +--------+--------------+----------------+--------------+  | 03:00  |  Injection   |      114       |   185/101    |  +--------+--------------+----------------+--------------+  | 06:00  |  Injection   |       92       |    179/91    |  +--------+--------------+----------------+--------------+  | 07:00  |Post Injection|       76     |    146/64    |  +--------+--------------+----------------+--------------+  | 05:00  |   Recovery   |       69       |              |  +--------+--------------+----------------+--------------+  | 06:00  |   Recovery   |       69       |     |  +--------+--------------+----------------+--------------+  | 07:00  |   Recovery   |       77       |              |  +--------+--------------+----------------+--------------+  | 10:00  |   Recovery   |       92       |    148/88    |  +--------+--------------+----------------+--------------+  | 12:00  |   Recovery   |       86       |    143/78    |  +--------+--------------+----------------+--------------+    ---------------------------------------------------------------------------------------------------------------------------------------------------------Electrocardiogram:  Baseline electrocardiogram: Sinus rhythm with evidence of prior septal infarct.  Stress electrocardiogram: No ischemic ST segment changes.  Arrhythmias: No arrhythmia associated with stress. Patient had a decrease in HR  and fall in BP associated with lightheadedness and lethargy, test aborted. D/W  Dr. Rowell.    ---------------------------------------------------------------------------------------------------------------------------------------------------------  Procedure:  The patient was given 10.7 mCi of TC-99M Sestamibi intravenously at rest on 3/14/2024 at 7:55:00 AM. Approximately 45 minutes later, SPECT images were obtained, with patient in supine position. Images were reacquired with the patient in a prone position.  ---------------------------------------------------------------------------------------------------------------------------------------------------------    Interpreting Provider: Electronically signed on 3/14/2024 at 3:17:47 PM by Jasbir Burgess         *** Final ***    < end of copied text >      CATH:     ELECTROPHYSIOLOGY:     PAST MEDICAL HISTORY  Gout    Asthma    Arthritis    HTN (hypertension)        PAST SURGICAL HISTORY  No significant past surgical history    No significant past surgical history        SOCIAL HISTORY:  Denies smoking/alcohol/drugs  CIGARETTES:     ALCOHOL:  DRUGS:    FAMILY HISTORY:  Family history of diabetes mellitus type II    Family history of lung cancer      Family History of Cardiovascular Disease:  Yes [  ] No [  ]  Coronary Artery Disease in first degree relative: Yes [  ] No [  ]  Sudden Cardiac Death in First degree relative: Yes [  ] No [  ]    HOME MEDICATIONS:  metFORMIN 500 mg oral tablet: 1 tab(s) orally 2 times a day (30 Mar 2024 02:19)  Wixela Inhub 500 mcg-50 mcg inhalation powder: 1 puff(s) inhaled once a day (30 Mar 2024 02:19)      CURRENT CARDIAC MEDICATIONS:  amLODIPine   Tablet 10 milliGRAM(s) Oral daily      CURRENT OTHER MEDICATIONS:  albuterol    90 MICROgram(s) HFA Inhaler 2 Puff(s) Inhalation every 6 hours PRN Shortness of Breath and/or Wheezing  acetaminophen     Tablet .. 650 milliGRAM(s) Oral every 6 hours PRN Temp greater or equal to 38C (100.4F), Mild Pain (1 - 3)  melatonin 3 milliGRAM(s) Oral at bedtime PRN Insomnia  ondansetron Injectable 4 milliGRAM(s) IV Push every 8 hours PRN Nausea and/or Vomiting  aluminum hydroxide/magnesium hydroxide/simethicone Suspension 30 milliLiter(s) Oral every 4 hours PRN Dyspepsia  pantoprazole    Tablet 40 milliGRAM(s) Oral before breakfast  dextrose 5%. 1000 milliLiter(s) (50 mL/Hr) IV Continuous <Continuous>  dextrose 5%. 1000 milliLiter(s) (100 mL/Hr) IV Continuous <Continuous>  dextrose 50% Injectable 25 Gram(s) IV Push once, Stop order after: 1 Doses  dextrose 50% Injectable 25 Gram(s) IV Push once, Stop order after: 1 Doses  dextrose 50% Injectable 12.5 Gram(s) IV Push once, Stop order after: 1 Doses  dextrose Oral Gel 15 Gram(s) Oral once, Stop order after: 1 Doses PRN Blood Glucose LESS THAN 70 milliGRAM(s)/deciliter  glucagon  Injectable 1 milliGRAM(s) IntraMuscular once, Stop order after: 1 Doses  heparin   Injectable 5000 Unit(s) SubCutaneous every 12 hours  insulin lispro (ADMELOG) corrective regimen sliding scale   SubCutaneous Before meals and at bedtime  lactated ringers. 2000 milliLiter(s) (100 mL/Hr) IV Continuous <Continuous>  magnesium sulfate  IVPB 2 Gram(s) IV Intermittent every 4 hours, Stop order after: 2 Doses  piperacillin/tazobactam IVPB.- 3.375 Gram(s) IV Intermittent once  piperacillin/tazobactam IVPB.- 3.375 Gram(s) IV Intermittent once  piperacillin/tazobactam IVPB.. 3.375 Gram(s) IV Intermittent every 8 hours, Stop order after: 7 Days      ALLERGIES:   No Known Allergies      REVIEW OF SYMPTOMS:   CONSTITUTIONAL: No fever, no chills, no weight loss, no weight gain, no fatigue   ENMT:  No vertigo; No sinus or throat pain  NECK: No pain or stiffness  CARDIOVASCULAR: No chest pain, no dyspnea, no syncope/presyncope, no palpitations, no dizziness, no Orthopnea, no Paroxsymal nocturnal dyspnea  RESPIRATORY: no Shortness of breath, no cough, no wheezing  : No dysuria, no hematuria   GI: No dark color stool, no nausea, no diarrhea, no constipation, no abdominal pain   NEURO: No headache, no slurred speech   MUSCULOSKELETAL: No joint pain or swelling; No muscle, back, or extremity pain  PSYCH: No agitation, no anxiety.    ALL OTHER REVIEW OF SYSTEMS ARE NEGATIVE.    VITAL SIGNS:  T(C): 36.8 (04-10-24 @ 09:32), Max: 39.4 (04-10-24 @ 05:40)  T(F): 98.2 (04-10-24 @ 09:32), Max: 102.9 (04-10-24 @ 05:40)  HR: 102 (04-10-24 @ 09:32) (81 - 122)  BP: 130/70 (04-10-24 @ 09:32) (130/70 - 161/80)  RR: 18 (04-10-24 @ 09:32) (18 - 18)  SpO2: 96% (04-10-24 @ 09:32) (92% - 96%)    INTAKE AND OUTPUT:     04-09 @ 07:01  -  04-10 @ 07:00  --------------------------------------------------------  IN: 100 mL / OUT: 0 mL / NET: 100 mL        PHYSICAL EXAM:  Constitutional: Comfortable . No acute distress.   HEENT: Atraumatic and normocephalic , neck is supple . no JVD. No carotid bruit.  CNS: A&Ox3. No focal deficits.   Respiratory: CTAB, unlabored   Cardiovascular: RRR normal s1 s2. No murmur. No rubs or gallop. (+) sinus tachycardia  Gastrointestinal: Soft, non-tender. +Bowel sounds.   Extremities: 2+ Peripheral Pulses, No clubbing, cyanosis. (+) RUE edema.   Psychiatric: Calm . no agitation.   Skin: Warm and dry, no ulcers on extremities     LABS:  ( 30 Mar 2024 06:45 )  Troponin T  X    ,  CPK  166  , CKMB  X    , BNP X                                  9.3    11.97 )-----------( 536      ( 10 Apr 2024 05:55 )             28.7     04-10    138  |  103  |  3.9<L>  ----------------------------<  96  3.5   |  21.0<L>  |  1.04    Ca    7.5<L>      10 Apr 2024 07:04  Phos  2.0     04-10  Mg     1.3     04-10    TPro  5.7<L>  /  Alb  2.1<L>  /  TBili  0.4  /  DBili  x   /  AST  45<H>  /  ALT  51<H>  /  AlkPhos  85  04-10    PT/INR - ( 10 Apr 2024 05:55 )   PT: 15.0 sec;   INR: 1.36 ratio         PTT - ( 10 Apr 2024 05:55 )  PTT:41.1 sec  Urinalysis Basic - ( 10 Apr 2024 07:04 )    Color: x / Appearance: x / SG: x / pH: x  Gluc: 96 mg/dL / Ketone: x  / Bili: x / Urobili: x   Blood: x / Protein: x / Nitrite: x   Leuk Esterase: x / RBC: x / WBC x   Sq Epi: x / Non Sq Epi: x / Bacteria: x          Thyroid Stimulating Hormone, Serum: 3.65 uIU/mL (04-10-24 @ 04:01)      INTERPRETATION OF TELEMETRY:     ECG:   Prior ECG: Yes [  ] No [  ]    RADIOLOGY & ADDITIONAL STUDIES:    X-ray:    CT scan:   MRI:   US:                                                [facility]                                              CARDIOLOGY AT [place] [address]                                             Telephone: [phone]. Fax:[phone]                                                       CARDIOLOGY CONSULTATION NOTE                                                                                             History obtained by: Patient and medical record  Community Cardiologist: [facility] (has not yet set up OP follow up)   obtained: Yes [  ] No [x  ]  Reason for Consultation: tachycardia   Available out pt records reviewed: Yes [x  ] No [  ]    Chief complaint:    Patient is a 60 YO Male who presents with a chief complaint of elevated creatinine.       HPI:  This is a 59 y/o M with PMHX Asthma, HTN, HLD, DM2, Gout presents to [facility] ER for abnormal lab work. Patient recently hospitalized for acute asthma exacerbation and viral URI found to have uncontrolled HTN and new onset DM2 at that time discharged home with outpatient follow-up and sent back to [facility] ER today by PCP for abnormal kidney function. ROS +Fatigue and +Malaise. No other complaints. Denies CVAT or back pain. No urinary complaints. Compliant with meds. Recently started on Losartan, HCTZ, and Metformin on discharge and by PCP. +Tylenol use at home. Denies NSAID use. Cardiology consulted due to sinus tachycardia. Patient was recently treated for and completed antibiotics for pneumonia.         CARDIAC TESTING   ECHO:< from: TTE W or WO Ultrasound Enhancing Agent (03.13.24 @ 21:00) >  TRANSTHORACIC ECHOCARDIOGRAM REPORT  ________________________________________________________________________________                                      _______       Pt. Name:       ZAINA MILLER Study Date:    3/13/2024  MRN:      [id number]                  YOB: 1965  Accession #:    [id number]                   Age:           58 years  Account#:       [id number]                  Gender:        M  Heart Rate:                                 Height:        67.00 in (170.18 cm)  Rhythm:                                     Weight:        229.00 lb (103.87 kg)  Blood Pressure: 186/93 mmHg                 BSA/BMI:       2.14 m² / 35.87 kg/m²  ________________________________________________________________________________________  Referring Physician:    Nicole Aguila  Interpreting Physician: Henry Kemp MD  Primary Sonographer:    Mariam Caban    CPT:                ECHO TTE WITH CON COMP W DOPP - .m;DEFINITY ECHO                      CONTRAST PER ML - .m  Indication(s):      Heart failure, unspecified - I50.9  Procedure:          Transthoracic echocardiogram with 2-D, M-mode and complete                      spectral and color flow Doppler.  Ordering Location:  [facility]  Admission Status:ED  Contrast Injection: Verbal consent was obtained for injection of Ultrasonic                      Enhancing Agent following a discussion of risks and                      benefits.                      Endocardial visualization enhanced with 2 ml of Definity                      Ultrasound enhancing agent (Lot#:[id number] Exp.Date:02/2025).  UEA Reaction:       Patient had no adverse reaction after injection of                      Ultrasound Enhancing Agent.    _______________________________________________________________________________________     CONCLUSIONS:      1. Mild left ventricular hypertrophy.   2. Left ventricular cavity is normal in size. Left ventricular systolic function is hyperdynamic with an ejection fraction of 71 % by Cheney's method of disks with an ejection fraction visually estimated at 70 to 75 %. There are no regional wall motion abnormalities seen.   3. Normal right ventricular cavity size and normal systolic function.   4. Trileaflet aortic valve with normal systolic excursion. mild calcification of the aortic valve leaflets.   5. Structurally normal mitral valve with normal leaflet excursion.   6. The right atrium is normal in size.   7. The left atrium is normal.   8. No pericardial effusion seen.    ________________________________________________________________________________________  FINDINGS:     Left Ventricle:  The left ventricular cavity is normal in size. Left ventricular systolic function is hyperdynamic with a calculated ejection fraction of 71 % by the Cheney's biplane method of disks with an ejection fraction visually estimated at 70 to 75%. There are no regional wall motion abnormalities seen. There is normal left ventricular diastolic function. Mild left ventricular hypertrophy.    Right Ventricle:  The right ventricular cavity is normal in size and normal systolic function. Tricuspid annular plane systolic excursion (TAPSE) is 3.1 cm (normal >=1.7 cm).     Left Atrium:  The left atrium is normal with an indexed volume of 17.13 ml/m².     Right Atrium:  The right atrium is normal in size with an indexed volume of 12.45 ml/m² and an indexed area of 5.46 cm²/m².     Interatrial Septum:  The interatrial septum appears intact.     Aortic Valve:  The aortic valve appears trileaflet with normal systolic excursion. There is mild calcification of the aortic valve leaflets. There is no evidence of aortic regurgitation.     Mitral Valve:  Structurally normal mitral valve with normal leaflet excursion.     Tricuspid Valve:  Structurally normal tricuspid valve with normal leaflet excursion. There is insufficient tricuspid regurgitation detected to calculate pulmonary artery systolic pressure.     Pulmonic Valve:  The pulmonic valve was not well visualized.     Pulmonary Artery:  The branch pulmonary arteries are not well visualized.     Aorta:  The aortic root at the sinuses of Valsalva is normal in size, measuring 2.80 cm (indexed 1.31 cm/m²). The ascending aorta diameter is normal in size.     Pericardium:  No pericardial effusion seen. Pericardial fat pad is seen anterior to the right ventricle.     Systemic Veins:  The inferior vena cava is normal in size measuring 1.18 cm in diameter, (normal <2.1cm) with normal inspiratory collapse (normal >50%) consistent with normal right atrial pressure (~3, range 0-5mmHg).  _Electronically signed on 3/14/2024 at 8:25:17 AM by Henry Kemp MD         *** Final ***    < end of copied text >      STRESS:< from: Nuclear Stress Test-Pharmacologic.. (03.14.24 @ 08:14) >  Nuclear Pharmacologic Stress Test Report         Patient: ZAINA MILLER     Study Date: 3/14/2024           MRN: [id number]                  Birth Date/Age: 1965 Age: 58                                                            years      Access #: [id number]                           Gender: M     Order Loc: [facility]                                Height: 170.2 cm/ 67.0 in  Request Phys: [id number] Adalid Slaughter                Weight: 104.33 kg/ 230.00 lb     Procedure: Rest SESTAMIBI                    BSA/ BMI: 2.15 m²/ 36.02 kg/m²    Indication: Shortness of breath -        Admiss Status:                R06.02    Fellow/ACP: Wanda Green NP              Fellow/ACP:    ---------------------------------------------------------------------------------------------------------------------------------------------------------  Procedure Code: STRESS TEST TRACING ONLY - 14222.m;TC 99M SESTAMIBI PER DOSE -                  .m;DOBUTAMINE INJ PER 250MG - .m;MYOCARDIAL SPECT                  SINGLE - 56379.m    ---------------------------------------------------------------------------------------------------------------------------------------------------------  History:       57 yo male with shortness of breath, hypertensive urgency, URI,                 excaerbation of asthma.  Image Quality: Uninterpretable  Artifact:      Increased GI uptake of tracer located inferiorly.  Medications:   Amlodipine, heparin, hydralazine, insulin, methylpred.,                 montelukast.  NDC Number(s): [id number]  Allergies:     None    --------------------------------------------------------------------------------------------------------------------------------------------------------Conclusions:   1. Myocardial Perfusion: Uninterpretible Dobutamine stress test. Resting images were only performed. Test was aborted due to patient symptoms and hypotension. Stress imaging was not done.   2. The patient underwent stress testing using pharmacological IV dobutamine protocol.      The test was stopped due to drop in HR and fall in blood pressure.         3. Baseline electrocardiogram: sinus rhythm with evidence of prior septal infarct.   4. Arrhythmias: patient had a decrease in HR and fall in BP associated with lightheadedness and lethargy, test aborted. D/W Dr. Rowell.   5. Hemodynamic Instability.   6. Patient history: 57 yo male with shortness of breath, hypertensive urgency, URI, excaerbation of asthma.    ---------------------------------------------------------------------------------------------------------------------------------------------------------     Stress Test Results:  Pretest Chest Pain: None.       Pharmacological Stress Test Results:                Protocol: IV dobutamine        Dose: 30mcg/Kg/min  Symptoms During Stress: Shortness of breath and fatigue.  Reason for Termination: Drop in HR and fall in blood pressure.       Protocol: Regadenoson Injection  +--------+--------------+----------------+--------------+  |  Time  |              |Heart Rate (bpm)|Blood Pressure|  +--------+--------------+----------------+--------------+  |Baseline|Pre-Injection |      102       |   180/111    |  +--------+--------------+----------------+--------------+  | 03:00  |  Injection   |      114       |   185/101    |  +--------+--------------+----------------+--------------+  | 06:00  |  Injection   |       92       |    179/91    |  +--------+--------------+----------------+--------------+  | 07:00  |Post Injection|       76     |    146/64    |  +--------+--------------+----------------+--------------+  | 05:00  |   Recovery   |       69       |              |  +--------+--------------+----------------+--------------+  | 06:00  |   Recovery   |       69       |     |  +--------+--------------+----------------+--------------+  | 07:00  |   Recovery   |       77       |              |  +--------+--------------+----------------+--------------+  | 10:00  |   Recovery   |       92       |    148/88    |  +--------+--------------+----------------+--------------+  | 12:00  |   Recovery   |       86       |    143/78    |  +--------+--------------+----------------+--------------+    ---------------------------------------------------------------------------------------------------------------------------------------------------------Electrocardiogram:  Baseline electrocardiogram: Sinus rhythm with evidence of prior septal infarct.  Stress electrocardiogram: No ischemic ST segment changes.  Arrhythmias: No arrhythmia associated with stress. Patient had a decrease in HR  and fall in BP associated with lightheadedness and lethargy, test aborted. D/W  Dr. Rowell.    ---------------------------------------------------------------------------------------------------------------------------------------------------------  Procedure:  The patient was given 10.7 mCi of TC-99M Sestamibi intravenously at rest on 3/14/2024 at 7:55:00 AM. Approximately 45 minutes later, SPECT images were obtained, with patient in supine position. Images were reacquired with the patient in a prone position.  ---------------------------------------------------------------------------------------------------------------------------------------------------------    Interpreting Provider: Electronically signed on 3/14/2024 at 3:17:47 PM by Jasbir Burgess         *** Final ***    < end of copied text >      CATH:     ELECTROPHYSIOLOGY:     PAST MEDICAL HISTORY  Gout    Asthma    Arthritis    HTN (hypertension)        PAST SURGICAL HISTORY  No significant past surgical history    No significant past surgical history        SOCIAL HISTORY:  Denies smoking/alcohol/drugs  CIGARETTES:     ALCOHOL:  DRUGS:    FAMILY HISTORY:  Family history of diabetes mellitus type II    Family history of lung cancer      Family History of Cardiovascular Disease:  Yes [  ] No [  ]  Coronary Artery Disease in first degree relative: Yes [  ] No [  ]  Sudden Cardiac Death in First degree relative: Yes [  ] No [  ]    HOME MEDICATIONS:  metFORMIN 500 mg oral tablet: 1 tab(s) orally 2 times a day (30 Mar 2024 02:19)  Wixela Inhub 500 mcg-50 mcg inhalation powder: 1 puff(s) inhaled once a day (30 Mar 2024 02:19)      CURRENT CARDIAC MEDICATIONS:  amLODIPine   Tablet 10 milliGRAM(s) Oral daily      CURRENT OTHER MEDICATIONS:  albuterol    90 MICROgram(s) HFA Inhaler 2 Puff(s) Inhalation every 6 hours PRN Shortness of Breath and/or Wheezing  acetaminophen     Tablet .. 650 milliGRAM(s) Oral every 6 hours PRN Temp greater or equal to 38C (100.4F), Mild Pain (1 - 3)  melatonin 3 milliGRAM(s) Oral at bedtime PRN Insomnia  ondansetron Injectable 4 milliGRAM(s) IV Push every 8 hours PRN Nausea and/or Vomiting  aluminum hydroxide/magnesium hydroxide/simethicone Suspension 30 milliLiter(s) Oral every 4 hours PRN Dyspepsia  pantoprazole    Tablet 40 milliGRAM(s) Oral before breakfast  dextrose 5%. 1000 milliLiter(s) (50 mL/Hr) IV Continuous <Continuous>  dextrose 5%. 1000 milliLiter(s) (100 mL/Hr) IV Continuous <Continuous>  dextrose 50% Injectable 25 Gram(s) IV Push once, Stop order after: 1 Doses  dextrose 50% Injectable 25 Gram(s) IV Push once, Stop order after: 1 Doses  dextrose 50% Injectable 12.5 Gram(s) IV Push once, Stop order after: 1 Doses  dextrose Oral Gel 15 Gram(s) Oral once, Stop order after: 1 Doses PRN Blood Glucose LESS THAN 70 milliGRAM(s)/deciliter  glucagon  Injectable 1 milliGRAM(s) IntraMuscular once, Stop order after: 1 Doses  heparin   Injectable 5000 Unit(s) SubCutaneous every 12 hours  insulin lispro (ADMELOG) corrective regimen sliding scale   SubCutaneous Before meals and at bedtime  lactated ringers. 2000 milliLiter(s) (100 mL/Hr) IV Continuous <Continuous>  magnesium sulfate  IVPB 2 Gram(s) IV Intermittent every 4 hours, Stop order after: 2 Doses  piperacillin/tazobactam IVPB.- 3.375 Gram(s) IV Intermittent once  piperacillin/tazobactam IVPB.- 3.375 Gram(s) IV Intermittent once  piperacillin/tazobactam IVPB.. 3.375 Gram(s) IV Intermittent every 8 hours, Stop order after: 7 Days      ALLERGIES:   No Known Allergies      REVIEW OF SYMPTOMS:   CONSTITUTIONAL: No fever, no chills, no weight loss, no weight gain, no fatigue   ENMT:  No vertigo; No sinus or throat pain  NECK: No pain or stiffness  CARDIOVASCULAR: No chest pain, no dyspnea, no syncope/presyncope, no palpitations, no dizziness, no Orthopnea, no Paroxsymal nocturnal dyspnea  RESPIRATORY: no Shortness of breath, no cough, no wheezing  : No dysuria, no hematuria   GI: No dark color stool, no nausea, no diarrhea, no constipation, no abdominal pain   NEURO: No headache, no slurred speech   MUSCULOSKELETAL: No joint pain or swelling; No muscle, back, or extremity pain  PSYCH: No agitation, no anxiety.    ALL OTHER REVIEW OF SYSTEMS ARE NEGATIVE.    VITAL SIGNS:  T(C): 36.8 (04-10-24 @ 09:32), Max: 39.4 (04-10-24 @ 05:40)  T(F): 98.2 (04-10-24 @ 09:32), Max: 102.9 (04-10-24 @ 05:40)  HR: 102 (04-10-24 @ 09:32) (81 - 122)  BP: 130/70 (04-10-24 @ 09:32) (130/70 - 161/80)  RR: 18 (04-10-24 @ 09:32) (18 - 18)  SpO2: 96% (04-10-24 @ 09:32) (92% - 96%)    INTAKE AND OUTPUT:     04-09 @ 07:01  -  04-10 @ 07:00  --------------------------------------------------------  IN: 100 mL / OUT: 0 mL / NET: 100 mL        PHYSICAL EXAM:  Constitutional: Comfortable . No acute distress.   HEENT: Atraumatic and normocephalic , neck is supple . no JVD. No carotid bruit.  CNS: A&Ox3. No focal deficits.   Respiratory: CTAB, unlabored   Cardiovascular: RRR normal s1 s2. No murmur. No rubs or gallop. (+) sinus tachycardia  Gastrointestinal: Soft, non-tender. +Bowel sounds.   Extremities: 2+ Peripheral Pulses, No clubbing, cyanosis. (+) RUE edema.   Psychiatric: Calm . no agitation.   Skin: Warm and dry, no ulcers on extremities     LABS:  ( 30 Mar 2024 06:45 )  Troponin T  X    ,  CPK  166  , CKMB  X    , BNP X                                  9.3    11.97 )-----------( 536      ( 10 Apr 2024 05:55 )             28.7     04-10    138  |  103  |  3.9<L>  ----------------------------<  96  3.5   |  21.0<L>  |  1.04    Ca    7.5<L>      10 Apr 2024 07:04  Phos  2.0     04-10  Mg     1.3     04-10    TPro  5.7<L>  /  Alb  2.1<L>  /  TBili  0.4  /  DBili  x   /  AST  45<H>  /  ALT  51<H>  /  AlkPhos  85  04-10    PT/INR - ( 10 Apr 2024 05:55 )   PT: 15.0 sec;   INR: 1.36 ratio         PTT - ( 10 Apr 2024 05:55 )  PTT:41.1 sec  Urinalysis Basic - ( 10 Apr 2024 07:04 )    Color: x / Appearance: x / SG: x / pH: x  Gluc: 96 mg/dL / Ketone: x  / Bili: x / Urobili: x   Blood: x / Protein: x / Nitrite: x   Leuk Esterase: x / RBC: x / WBC x   Sq Epi: x / Non Sq Epi: x / Bacteria: x          Thyroid Stimulating Hormone, Serum: 3.65 uIU/mL (04-10-24 @ 04:01)      INTERPRETATION OF TELEMETRY:     ECG: Sinus Tachycardia to 115, overnight to 150s  Prior ECG: Yes [  ] No [  ]    RADIOLOGY & ADDITIONAL STUDIES:    X-ray:    CT scan:   MRI:   US:

## 2024-04-10 NOTE — PROGRESS NOTE ADULT - ASSESSMENT
58M with Asthma, HTN, HLD, DM2, Gout admitted with TYESHA suspected 2.2 medications along with MF PNA. Now complicated as FUO. concern for right elbow osteomyelitis    SIRS  Fever of unknown origin  ?Right elbow osteomyelitis  Remains intermittently febrile despite completed 7 days of zosyn  persitently febrile. Started again on Zosyn on 04/10/2024  Appreciate ID f/u .As per ID Dr. Brand, already received 7 days of Abx.   Pneurococcal and Legionella neg, HIV negative, stool PCR negative. Doppler negative for DVT  Now on room air  Repeat cxr persistent infiltrate  CT right UE suggestive of right elbow osteomyelitis. ordered MRI    #Sinus Tachycardia  -Most likely reactive  -WOrk up including US doppler negative. Echo unremarkable  -Cardiology did not recommended any intervention    TYESHA (now resolved)   -Possibly 2/2 medication induced ATN   -Hold ARB/HCTZ/Metformin   -Potentially  medication induced (in setting of poor PO intake) from recent introduction of ARB/HCTZ/Metformin  -Cr (baseline 1.08) >>>3.13 >2.39>1.91>1.75>1.41>1.40>1.3>1.31> 1.08  -UA negative,Renal US negative  -Hold above meds. Avoid Nephrotoxins.Renally dose meds  -Nephro Consult note appreciated    HTN, HLD  Amlodipine 10mg q24  Hold ARB and HCTZ for now  Not currently on statin therapy    DM2  Hold Metformin 500mg BID  ISS/Accuchecks/A1C    Asthma  Finished steroid taper last Sunday. No current symptoms/hypoxia.  No longer taking Montelukast  Wixela/Duoneb PRN    Gout  Uric Acid level 12.2  Takes Allopurinol as needed. Hold for now.     VTE PPX SQH/SCDs    Dispo: Acute.

## 2024-04-10 NOTE — CONSULT NOTE ADULT - SUBJECTIVE AND OBJECTIVE BOX
60 yo man with history of severe eosinophilic asthma, SHERI, HLD, gout, HTN and new DM was sent to hospital for ARF.  Patient was previously int hospital for asthma exaccerbation and URI.  during that hospitalization he was found to have HTN urgency and DM.  Patient hospitalized 3/29 and developed fever inpatient 3/31.  on CCT he is noted to have multi lobe PNA and has been on antibiotics without resolustion of fevers.  Patient has RT arm swelling and had a CT today which suggest elbow OM.             PMH: as above  Surgery:  allergy:  meds: zosyn  FH:   SH:no alcohol,smoking or illicit drugs       TTE: LVH, LVEF 70-75% hyperdynamic, normal RV, normal LA and RA , no pericardial effusion   Stress TEST; aborted due to drop in HR and BP   CCT: multi lobe PNA  CT A/P:   CT arm:     Labs:      procalcito 0.3   A1C 7.1   Bcx 3/31, 4/3, 4/6 neg  RVP/covid neg  legionella/strep   lyme and babesia negative  HIV neg  viral hep neg  UA 3/30 , 4/1 neg   ERON neg   RF 11    PE:   GEN:   HEENT:  CVS:  PULM:  MSK:  skin:  back:     A/P 60 yo man with severe eosinophilic asthma, SHERI, HLD, HTN, DM< gout presnts for ARF s/p recent hospital discharge for asthma exaccerbation and URI.  readmiited 3/29 and developed feveres 3/31.  CCT with multi lobe PNA.  despite antibiotics continues to have fever.  Rught arm swelling and CT suggestive of elbow OM    --consider ortho consultation and MRI   --check ANCA , repeat CPK      58 yo man with history of severe eosinophilic asthma, SHERI, HLD, gout, HTN and new DM was sent to hospital for ARF.  Patient was recently  in the hospital for asthma exacerbation and URI.  during that hospitalization he was found to have HTN urgency and DM.  Patient hospitalized 3/29 and developed fever inpatient 3/31.  on CCT he is noted to have multi lobe PNA and has been on antibiotics without resolustion of fevers.  Patient has RT arm swelling and had a CT today which suggest elbow OM.             PMH: as above  Surgery:  allergy:  meds: zosyn  FH:   SH:no alcohol,smoking or illicit drugs       TTE: LVH, LVEF 70-75% hyperdynamic, normal RV, normal LA and RA , no pericardial effusion   Stress TEST; aborted due to drop in HR and BP   CCT: multi lobe PNA  CT A/P:   CT arm:     Labs:      procalcito 0.3   A1C 7.1   Bcx 3/31, 4/3, 4/6 neg  RVP/covid neg  legionella/strep   lyme and babesia negative  HIV neg  viral hep neg  UA 3/30 , 4/1 neg   ERON neg   RF 11    PE:   GEN:   HEENT:  CVS:  PULM:  MSK:  skin:  back:     A/P 58 yo man with severe eosinophilic asthma, SHERI, HLD, HTN, DM< gout presnts for ARF s/p recent hospital discharge for asthma exaccerbation and URI.  readmiited 3/29 and developed feveres 3/31.  CCT with multi lobe PNA.  despite antibiotics continues to have fever.  Rught arm swelling and CT suggestive of elbow OM    --consider ortho consultation and MRI   --check ANCA , repeat CPK      58 yo man with history of severe eosinophilic asthma, SHERI, HLD, gout, HTN and new DM was sent to hospital for ARF.  Patient was recently  3/13 the hospital for asthma exacerbation and URI ( positive human metapneumovirus) .  During that hospitalization he was found to have HTN urgency and new DM.  Patient ws hospitalized 3/29 and developed fever inpatient 3/31.  on CCT he is noted to have multi lobe PNA and has been on antibiotics without resolution of fevers.  Patient has RT arm swelling and had a CT today which suggest elbow OM. Elbow is FROM without any pain.    Patient is noted to have pain and swelling of the right hand.  pain over the 4th and 5th digit.  states that it feels like his gout.  Patient admits to being non compliant with allopurinol as an out patient.      Patient stated to have asthma after the millitary at age 27.  His has has been difficult to control and his out patient pulmonologist has requested Tezspire.  Patient denies any allergic rhinitis and apprently he had allergy testing which was negative.      Patient denies any rashes , acute red painful eye, recurrent sinus/ear infections, oral lesions, facial rashes, body rashes, serositis , hx low counts, blood in the stool, abdominal pain , freq diarrhea, CP, raynauds, hx DVT/PE, CVA or TIA, numbness/tinging         PMH: as above  Surgery:none  allergy:NKDA  meds: zosyn, etformin, advair, albuterol, atrobvastatin, amlodipine/benazepril   FH: [facility]   SH:no alcohol,smoking or illicit drugs, lives with wife, [place]       TTE: LVH, LVEF 70-75% hyperdynamic, normal RV, normal LA and RA , no pericardial effusion   Stress TEST; aborted due to bradycardia and hypotension  CCT: multi lobe PNA  CT A/P:   CT arm:   US right UE: No DVT  US LE b/l: no DVT 4/5/2024      Labs:      procalcito 0.3   A1C 7.1   Bcx 3/31, 4/3, 4/6 neg  RVP/covid neg  legionella/strep   lyme and babesia negative  HIV neg  viral hep neg  UA 3/30 , 4/1 neg   ERON neg   RF 11    PE:   GEN: laying in bed, talks in full sentence,   HEENT:clear sclera   CVS:  PULM:  MSK:  skin:  back:     A/P 58 yo man with severe eosinophilic asthma, SHERI, HLD, HTN, DM< gout presnts for ARF s/p recent hospital discharge for asthma exaccerbation and URI.  readmiited 3/29 and developed feveres 3/31.  CCT with multi lobe PNA.  despite antibiotics continues to have fever.  Rught arm swelling and CT suggestive of elbow OM    --consider ortho consultation and MRI of the elbow   --check ANCA , repeat CPK   --most likley gout of the hand:      58 yo man with history of severe eosinophilic asthma, SHERI, HLD, gout, HTN and new DM was sent to hospital for ARF.  Patient was recently  3/13 the hospital for asthma exacerbation and URI ( positive human metapneumovirus) .  During that hospitalization he was found to have HTN urgency and new DM.  Patient ws hospitalized 3/29 and developed fever inpatient 3/31.  on CCT he is noted to have multi lobe PNA and has been on antibiotics without resolution of fevers.  Patient has RT arm swelling and had a CT today which suggest elbow OM. Elbow is FROM without any pain.    Patient is noted to have pain and swelling of the right hand.  pain over the 4th and 5th digit.  states that it feels like his gout.  Patient admits to being non compliant with allopurinol as an out patient.      Patient stated to have asthma after the millitary at age 27.  His has has been difficult to control and his out patient pulmonologist has requested Tezspire.  Patient denies any allergic rhinitis and apprently he had allergy testing which was negative.      Patient denies any rashes , acute red painful eye, recurrent sinus/ear infections, oral lesions, facial rashes, body rashes, serositis , hx low counts, blood in the stool, abdominal pain , freq diarrhea, CP, raynauds, hx DVT/PE, CVA or TIA, numbness/tinging         PMH: as above  Surgery:none  allergy:NKDA  meds: zosyn, etformin, advair, albuterol, atrobvastatin, amlodipine/benazepril   FH: No RA/SLE/thyroid dx lung cancer in [facility] whi was a smoker   SH:no alcohol,smoking or illicit drugs, lives with wife, works in [place]       TTE: LVH, LVEF 70-75% hyperdynamic, normal RV, normal LA and RA , no pericardial effusion   Stress TEST; aborted due to bradycardia and hypotension  CCT: multi lobe PNA  CT A/P:   CT arm:   US right UE: No DVT  US LE b/l: no DVT 4/5/2024      Labs:   from 845    CR 3.13 now 1.09    procalcito 0.3   A1C 7.1   Bcx 3/31, 4/3, 4/6 neg  RVP/covid neg  legionella/strep   lyme and babesia negative  HIV neg  viral hep neg  UA 3/30 , 4/1 neg   ERON neg   RF 11    PE:   GEN: laying in bed, talks in full sentence,   HEENT: clear scler, no facial rashes, parotid enlargement   CVS: tachy ,   PULM: mild crackle  MSK: right 4th and 5th digit pain and swelling, right wrist pain and swelling, elbows normal and FROM, right foot pitting   skin: normal       A/P 58 yo man with severe eosinophilic asthma, SHERI, HLD, HTN, DM< gout presents for ARF s/p recent hospital discharge for asthma exacerbation and URI.  readmiited 3/29 and developed feveres 3/31.  CCT with multi lobe PNA.  despite antibiotics continues to have fever.  Right arm swelling and CT suggestive of elbow OM    --consider ortho consultation and MRI of the elbow   --consider lung biopsy   --check ANCA ,   --most will gout of the hand:      60 yo man with history of severe eosinophilic asthma ( well know to pulm: they were considering starting Fasenra or tezspire) , SHERI, HLD, gout, HTN and new DM was sent to hospital for ARF.  Patient was recently  (3/13/2024) in the hospital for asthma exacerbation and URI ( positive human metapneumovirus) .  During that hospitalization he was found to have HTN urgency and new DM. His initial CCT 3/15 did not show inflitrates.   Patient was hospitalized again 3/29 and developed fever inpatient 3/31.  on repeat CCT he is noted to have multi lobe PNA ( new mass like consolidation in RUL, nodular opacities throughpout LIAM and bilateral bases)  and mediastinal/Hilar LAD.   He was on steroids and on antibiotics without resolution of fevers.  Patient developed RT arm swelling and had a CT today which suggest elbow OM. Elbow is FROM without any pain.    Patient is noted to have pain and swelling of the right hand and right wrist .  pain over the 4th and 5th digit.  states that it feels like his gout.  Patient admits to being non compliant with allopurinol as an out patient.      Patient started to have asthma after the  service at age 27.  His asthma has been difficult to control and his out patient pulmonologist has requested Tezspire.  Patient denies any allergic rhinitis and apprently he had allergy testing which was negative.      Patient denies any rashes , acute red painful eye, recurrent sinus/ear infections, rhinitis, oral lesions, facial rashes, body rashes, serositis , hx low counts, blood in the stool, abdominal pain , freq diarrhea, CP, raynauds, hx DVT/PE, CVA or TIA, numbness/tinging       PMH: as above  Surgery: none  allergy: NKDA  meds: zosyn, metformin, advair, albuterol, atorvastatin amlodipine/benazepril   FH: No RA/SLE/thyroid dx lung [facility] whi was a smoker   SH: no alcohol,smoking or illicit drugs, lives with wife, works in [place]       TTE: LVH, LVEF 70-75% hyperdynamic, normal RV, normal LA and RA , no pericardial effusion   Stress TEST; aborted due to bradycardia and hypotension  CA/P normal-  CCT: new mass like consolidation in RUL, nodular opacities throughout the LIAM and bilateral lung bases-all new since 3/15.2024.  enlarged medialstinal and hilar LAD.  2.2x 1.9 cm  CT arm:   US right UE: No DVT  US LE b/l: no DVT 4/5/2024  US renal normal      Labs:  cbc with new throbocytosis ( plts 541) , Hg 10 eosionophils 0.38 previous 0.58   cmp Cr 3/13 at admission and quickly improved 1.09 today    from 845 ( at 3/13 admission)     procalcitonin 0.3   tsh/t4 normal  uric 6.7   UA multiple times with no protein or blood   A1C 7.1   Bcx 3/31, 4/3, 4/6 neg  RVP/covid neg  legionella/strep   lyme and babesia negative  HIV neg  viral hep neg  UA 3/30 , 4/1 neg   ERON neg   RF 11    PE:   GEN: laying in bed, talks in full sentence,   HEENT: clear scler, no facial rashes, parotid enlargement   CVS: tachy ,   PULM: mild crackle  MSK: right 4th and 5th digit pain and swelling, right wrist pain and swelling, elbows normal and FROM, right foot pitting   skin: normal       A/P 60 yo man with severe eosinophilic asthma, SHERI, HLD, HTN, DM< gout presents for ARF s/p recent hospital discharge for asthma exacerbation and URI.  readmiited 3/29 and developed feveres 3/31.  CCT with multi lobe PNA.  despite antibiotics continues to have fever.  Right arm swelling and CT suggestive of elbow OM    --consider ortho consultation and MRI of the elbow   --consider lung biopsy   --check ANCA ,   --most likangelic gout of the hand:      60 yo man with history of severe eosinophilic asthma ( well know to pulm: they were considering starting Fasenra or tezspire) , SHERI, HLD, gout, HTN and new DM was sent to hospital for ARF.  Patient was recently  (3/13/2024) in the hospital for asthma exacerbation and URI ( positive human metapneumovirus) .  During that hospitalization he was found to have HTN urgency and new DM. His initial CCT 3/15 did not show inflitrates.   Patient was hospitalized again 3/29 and developed fever inpatient 3/31.  on repeat CCT he is noted to have multi lobe PNA ( new mass like consolidation in RUL, nodular opacities throughpout LIAM and bilateral bases)  and mediastinal/Hilar LAD.   He was on steroids and on antibiotics without resolution of fevers.  Patient developed RT arm swelling and had a CT today which suggest elbow OM. Elbow is FROM without any pain.    Patient is noted to have pain and swelling of the right hand and right wrist .  pain over the 4th and 5th digit.  states that it feels like his gout.  Patient admits to being non compliant with allopurinol as an out patient.      Patient started to have asthma after the  service at age 27.  His asthma has been difficult to control and his out patient pulmonologist has requested Tezspire.  Patient denies any allergic rhinitis and apprently he had allergy testing which was negative.      Patient denies any rashes , acute red painful eye, recurrent sinus/ear infections, rhinitis, oral lesions, facial rashes, body rashes, serositis , hx low counts, blood in the stool, abdominal pain , freq diarrhea, CP, raynauds, hx DVT/PE, CVA or TIA, numbness/tinging       PMH: as above  Surgery: none  allergy: NKDA  meds: zosyn, metformin, advair, albuterol, atorvastatin amlodipine/benazepril   FH: No RA/SLE/thyroid dx lung cancer in [facility] whi was a smoker   SH: no alcohol,smoking or illicit drugs, lives with wife, works in [place]       TTE: LVH, LVEF 70-75% hyperdynamic, normal RV, normal LA and RA , no pericardial effusion   Stress TEST; aborted due to bradycardia and hypotension    CA/P: normal-and no LAD     CCT: new mass like consolidation in RUL, nodular opacities throughout the LIAM and bilateral lung bases-all new since 3/15.2024.  enlarged medialstinal and hilar LAD.  2.2x 1.9 cm    CT arm: small chronic nonunited cortical avulsion stress fx fragment vs detached enthesophyte atthe olecranon insertion of right triceps.    overlying dermal ulceration with likely exposed bone and CT concerning for focal OM??  acute cellulitis in the proper clinal setting.    no elbow effusion or.septic arthritis  advanced right wrist arthrosis without effusion or convincing CT evidence of OM/spetic arthritis.      US right UE: No DVT  US LE b/l: no DVT 4/5/2024  US renal normal      Labs:  cbc with new thrombocytosis ( plts 541) , Hg 10 eosinophils 0.38 previous 0.58   cmp Cr 3/13 at admission and quickly improved 1.09 today    from 845 ( at 3/13 admission)     procalcitonin 0.3   tsh/t4 normal  uric 6.7   UA multiple times with no protein or blood   A1C 7.1   Bcx 3/31, 4/3, 4/6 neg  RVP/covid neg  legionella/strep   lyme and babesia negative  HIV neg  viral hep neg  UA 3/30 , 4/1 neg   ERON neg   RF 11    PE:   GEN: laying in bed, talks in full sentence,   HEENT: clear scler, no facial rashes, parotid enlargement   CVS: tachy ,   PULM: mild crackle  MSK: right 4th and 5th digit pain and swelling, right wrist pain and swelling, elbows normal and FROM, right foot pitting   skin: normal       A/P 60 yo man with severe eosinophilic asthma, SHERI, HLD, HTN, DM, gout presents for ARF s/p recent hospital discharge for asthma exacerbation and URI.  readmitted 3/29 and developed fevers 3/31.  CCT with multi lobe PNA.  despite antibiotics continues to have fever.  Right arm swelling and CT suggestive of elbow OM/cellulitis  Patient is followed closely by pulmonary for eosinophilic asthma and given refractory asthma, he was going to start Tezspire.    DDX include   infectious (multi lobe  PNA ( had human metapneumovitus),  OM)  vasculitis like [place] is possible ( long history of severe asthma and history of eosinophilia in the past)  Adult onset stills disease less likely given that fever does not have a particular pattern and no rashes.   malignancy:   lymphomatoid granulomatosis,          --r/o OM , pending MRI , currently on antibiotics   --consider aspergillus in patient who is frequently on steroids.  consider EBV, cmv  --lung infiltrates are new, consider pulm consult, BAL and lung biopsy if no improvement in fevers and no clear etiology   --check ANCA, sjogrens, ACE, LDH, spep, immunoglobulins, ferritin,  quanitiferon, RF, CCP,   --acute arthritis of the hand and wrist -most likely gout - will hold off on steroids while w/u for OM.  consider NSAIDS   --ARF resolved --no blood or protein in UA

## 2024-04-10 NOTE — CHART NOTE - NSCHARTNOTEFT_GEN_A_CORE
Follow up for  Code Sepsis called for tachycardia and fever     SEPSIS SEVERITY: Sepsis              SUSPECTED SOURCE: PNU  - Lactate 1.9    Pt seen at bedside stating he his feeling better, Temp now 99.2 remains Tachardia   Pt completed 7 day course of ABO ID at this time will re-evaluate need for further work up  consult placed for Rheum for fever of unknown origin   discussed case with attending      Vital Signs Last 24 Hrs  T(C): 37.3 (10 Apr 2024 07:32), Max: 39.4 (10 Apr 2024 05:40)  T(F): 99.2 (10 Apr 2024 07:32), Max: 102.9 (10 Apr 2024 05:40)  HR: 115 (10 Apr 2024 07:32) (81 - 122)  BP: 134/70 (10 Apr 2024 07:32) (134/70 - 161/80)  BP(mean): --  RR: 18 (10 Apr 2024 07:32) (18 - 18)  SpO2: 92% (10 Apr 2024 07:32) (92% - 94%)    Parameters below as of 10 Apr 2024 07:32  Patient On (Oxygen Delivery Method): room air  LABS:                          9.3    11.97 )-----------( 536      ( 10 Apr 2024 05:55 )             28.7     04-10    138  |  103  |  3.9<L>  ----------------------------<  96  3.5   |  21.0<L>  |  1.04    Ca    7.5<L>      10 Apr 2024 07:04  Phos  2.0     04-10  Mg     1.3     04-10    TPro  5.7<L>  /  Alb  2.1<L>  /  TBili  0.4  /  DBili  x   /  AST  45<H>  /  ALT  51<H>  /  AlkPhos  85  04-10    LIVER FUNCTIONS - ( 10 Apr 2024 07:04 )  Alb: 2.1 g/dL / Pro: 5.7 g/dL / ALK PHOS: 85 U/L / ALT: 51 U/L / AST: 45 U/L / GGT: x           PT/INR - ( 10 Apr 2024 05:55 )   PT: 15.0 sec;   INR: 1.36 ratio         PTT - ( 10 Apr 2024 05:55 )  PTT:41.1 sec      Urinalysis Basic - ( 10 Apr 2024 07:04 )    Color: x / Appearance: x / SG: x / pH: x  Gluc: 96 mg/dL / Ketone: x  / Bili: x / Urobili: x   Blood: x / Protein: x / Nitrite: x   Leuk Esterase: x / RBC: x / WBC x   Sq Epi: x / Non Sq Epi: x / Bacteria: x      CAPILLARY BLOOD GLUCOSE      POCT Blood Glucose.: 101 mg/dL (10 Apr 2024 07:51)  POCT Blood Glucose.: 97 mg/dL (10 Apr 2024 05:38)  POCT Blood Glucose.: 88 mg/dL (09 Apr 2024 22:23)  POCT Blood Glucose.: 110 mg/dL (09 Apr 2024 17:10)  POCT Blood Glucose.: 94 mg/dL (09 Apr 2024 11:40)    Plan:  follow cultures  consult Rheum  RN will escalate any further concerns

## 2024-04-10 NOTE — PROGRESS NOTE ADULT - SUBJECTIVE AND OBJECTIVE BOX
[facility] Division of Hospital Medicine  Dorcas Gomez MD   I'm reachable on [employer] Teams      Patient is a 59y old  Male who presents with a chief complaint of ARF (10 Apr 2024 10:16)      SUBJECTIVE / OVERNIGHT EVENTS:   Patient was seen and examined during rounds  -Code sepsis called overnight for fever and tachycardia. Received bolus and       12 points ROS negative except above    MEDICATIONS  (STANDING):  amLODIPine   Tablet 10 milliGRAM(s) Oral daily  dextrose 5%. 1000 milliLiter(s) (50 mL/Hr) IV Continuous <Continuous>  dextrose 5%. 1000 milliLiter(s) (100 mL/Hr) IV Continuous <Continuous>  dextrose 50% Injectable 25 Gram(s) IV Push once  dextrose 50% Injectable 25 Gram(s) IV Push once  dextrose 50% Injectable 12.5 Gram(s) IV Push once  glucagon  Injectable 1 milliGRAM(s) IntraMuscular once  heparin   Injectable 5000 Unit(s) SubCutaneous every 12 hours  insulin lispro (ADMELOG) corrective regimen sliding scale   SubCutaneous Before meals and at bedtime  lactated ringers. 2000 milliLiter(s) (100 mL/Hr) IV Continuous <Continuous>  pantoprazole    Tablet 40 milliGRAM(s) Oral before breakfast  piperacillin/tazobactam IVPB.. 3.375 Gram(s) IV Intermittent every 8 hours    MEDICATIONS  (PRN):  acetaminophen     Tablet .. 650 milliGRAM(s) Oral every 6 hours PRN Temp greater or equal to 38C (100.4F), Mild Pain (1 - 3)  albuterol    90 MICROgram(s) HFA Inhaler 2 Puff(s) Inhalation every 6 hours PRN Shortness of Breath and/or Wheezing  aluminum hydroxide/magnesium hydroxide/simethicone Suspension 30 milliLiter(s) Oral every 4 hours PRN Dyspepsia  dextrose Oral Gel 15 Gram(s) Oral once PRN Blood Glucose LESS THAN 70 milliGRAM(s)/deciliter  melatonin 3 milliGRAM(s) Oral at bedtime PRN Insomnia  ondansetron Injectable 4 milliGRAM(s) IV Push every 8 hours PRN Nausea and/or Vomiting        I&O's Summary    09 Apr 2024 07:01  -  10 Apr 2024 07:00  --------------------------------------------------------  IN: 100 mL / OUT: 0 mL / NET: 100 mL    10 Apr 2024 07:01  -  10 Apr 2024 18:16  --------------------------------------------------------  IN: 0 mL / OUT: 750 mL / NET: -750 mL        PHYSICAL EXAM:  Vital Signs Last 24 Hrs  T(C): 36.8 (10 Apr 2024 17:43), Max: 39.4 (10 Apr 2024 05:40)  T(F): 98.2 (10 Apr 2024 17:43), Max: 102.9 (10 Apr 2024 05:40)  HR: 114 (10 Apr 2024 17:43) (81 - 122)  BP: 157/76 (10 Apr 2024 17:43) (130/70 - 157/76)  BP(mean): --  RR: 18 (10 Apr 2024 17:43) (18 - 20)  SpO2: 96% (10 Apr 2024 17:43) (92% - 96%)    Parameters below as of 10 Apr 2024 10:32  Patient On (Oxygen Delivery Method): room air        CONSTITUTIONAL: NAD, Not in acute distress  EYES:  EOMI, conjunctiva and sclera clear  ENMT: , neck supple , non-tender  RESPIRATORY: Normal respiratory effort; lungs are clear to auscultation bilaterally  CARDIOVASCULAR: S1S2 present  ABDOMEN: soft. bowel sound present  MUSCLOSKELETAL: ; no clubbing or cyanosis of digits;   PSYCH: A+O to person, place, and time; affect appropriate  NEUROLOGY: CN, motor and sensory intact   SKIN: No rashes; no palpable lesions  Extremity: No bilateral edema      LABS:                        9.3    11.97 )-----------( 536      ( 10 Apr 2024 05:55 )             28.7     04-10    138  |  103  |  3.9<L>  ----------------------------<  96  3.5   |  21.0<L>  |  1.04    Ca    7.5<L>      10 Apr 2024 07:04  Phos  2.0     04-10  Mg     1.3     04-10    TPro  5.7<L>  /  Alb  2.1<L>  /  TBili  0.4  /  DBili  x   /  AST  45<H>  /  ALT  51<H>  /  AlkPhos  85  04-10    PT/INR - ( 10 Apr 2024 05:55 )   PT: 15.0 sec;   INR: 1.36 ratio         PTT - ( 10 Apr 2024 05:55 )  PTT:41.1 sec      Urinalysis Basic - ( 10 Apr 2024 07:04 )    Color: x / Appearance: x / SG: x / pH: x  Gluc: 96 mg/dL / Ketone: x  / Bili: x / Urobili: x   Blood: x / Protein: x / Nitrite: x   Leuk Esterase: x / RBC: x / WBC x   Sq Epi: x / Non Sq Epi: x / Bacteria: x        CAPILLARY BLOOD GLUCOSE      POCT Blood Glucose.: 97 mg/dL (10 Apr 2024 16:40)  POCT Blood Glucose.: 111 mg/dL (10 Apr 2024 11:49)  POCT Blood Glucose.: 101 mg/dL (10 Apr 2024 07:51)  POCT Blood Glucose.: 97 mg/dL (10 Apr 2024 05:38)  POCT Blood Glucose.: 88 mg/dL (09 Apr 2024 22:23)      Labs reviewed:    RADIOLOGY & ADDITIONAL TESTS:  Imaging Personally Reviewed:  Electrocardiogram Personally Reviewed:   [facility] Division of Hospital Medicine  Dorcas Gomez MD   I'm reachable on [employer] Teams      Patient is a 59y old  Male who presents with a chief complaint of ARF (10 Apr 2024 10:16)      SUBJECTIVE / OVERNIGHT EVENTS:   Patient was seen and examined during rounds  -Code sepsis called overnight for fever and tachycardia. Received bolus,tylenol, vancomycin and zosyn  -Started on IV zosyn by ID  -Cardiology consutled  -Rheumatology consulted  -CT right UE suggestive elbow osteomyelitis. MRI elbow ordered  -PErsonally discussed with Dr. Uribe and Dr. Woodward        12 points ROS negative except above    MEDICATIONS  (STANDING):  amLODIPine   Tablet 10 milliGRAM(s) Oral daily  dextrose 5%. 1000 milliLiter(s) (50 mL/Hr) IV Continuous <Continuous>  dextrose 5%. 1000 milliLiter(s) (100 mL/Hr) IV Continuous <Continuous>  dextrose 50% Injectable 25 Gram(s) IV Push once  dextrose 50% Injectable 25 Gram(s) IV Push once  dextrose 50% Injectable 12.5 Gram(s) IV Push once  glucagon  Injectable 1 milliGRAM(s) IntraMuscular once  heparin   Injectable 5000 Unit(s) SubCutaneous every 12 hours  insulin lispro (ADMELOG) corrective regimen sliding scale   SubCutaneous Before meals and at bedtime  lactated ringers. 2000 milliLiter(s) (100 mL/Hr) IV Continuous <Continuous>  pantoprazole    Tablet 40 milliGRAM(s) Oral before breakfast  piperacillin/tazobactam IVPB.. 3.375 Gram(s) IV Intermittent every 8 hours    MEDICATIONS  (PRN):  acetaminophen     Tablet .. 650 milliGRAM(s) Oral every 6 hours PRN Temp greater or equal to 38C (100.4F), Mild Pain (1 - 3)  albuterol    90 MICROgram(s) HFA Inhaler 2 Puff(s) Inhalation every 6 hours PRN Shortness of Breath and/or Wheezing  aluminum hydroxide/magnesium hydroxide/simethicone Suspension 30 milliLiter(s) Oral every 4 hours PRN Dyspepsia  dextrose Oral Gel 15 Gram(s) Oral once PRN Blood Glucose LESS THAN 70 milliGRAM(s)/deciliter  melatonin 3 milliGRAM(s) Oral at bedtime PRN Insomnia  ondansetron Injectable 4 milliGRAM(s) IV Push every 8 hours PRN Nausea and/or Vomiting        I&O's Summary    09 Apr 2024 07:01  -  10 Apr 2024 07:00  --------------------------------------------------------  IN: 100 mL / OUT: 0 mL / NET: 100 mL    10 Apr 2024 07:01  -  10 Apr 2024 18:16  --------------------------------------------------------  IN: 0 mL / OUT: 750 mL / NET: -750 mL        PHYSICAL EXAM:  Vital Signs Last 24 Hrs  T(C): 36.8 (10 Apr 2024 17:43), Max: 39.4 (10 Apr 2024 05:40)  T(F): 98.2 (10 Apr 2024 17:43), Max: 102.9 (10 Apr 2024 05:40)  HR: 114 (10 Apr 2024 17:43) (81 - 122)  BP: 157/76 (10 Apr 2024 17:43) (130/70 - 157/76)  BP(mean): --  RR: 18 (10 Apr 2024 17:43) (18 - 20)  SpO2: 96% (10 Apr 2024 17:43) (92% - 96%)    Parameters below as of 10 Apr 2024 10:32  Patient On (Oxygen Delivery Method): room air        CONSTITUTIONAL: NAD, Not in acute distress  EYES:  EOMI, conjunctiva and sclera clear  ENMT: , neck supple , non-tender  RESPIRATORY: Normal respiratory effort; lungs are clear to auscultation bilaterally  CARDIOVASCULAR: S1S2 present  ABDOMEN: soft. bowel sound present  MUSCLOSKELETAL: ; no clubbing or cyanosis of digits;   PSYCH: A+O to person, place, and time; affect appropriate  NEUROLOGY: CN, motor and sensory intact   SKIN: No rashes; no palpable lesions  Extremity: No bilateral edema      LABS:                        9.3    11.97 )-----------( 536      ( 10 Apr 2024 05:55 )             28.7     04-10    138  |  103  |  3.9<L>  ----------------------------<  96  3.5   |  21.0<L>  |  1.04    Ca    7.5<L>      10 Apr 2024 07:04  Phos  2.0     04-10  Mg     1.3     04-10    TPro  5.7<L>  /  Alb  2.1<L>  /  TBili  0.4  /  DBili  x   /  AST  45<H>  /  ALT  51<H>  /  AlkPhos  85  04-10    PT/INR - ( 10 Apr 2024 05:55 )   PT: 15.0 sec;   INR: 1.36 ratio         PTT - ( 10 Apr 2024 05:55 )  PTT:41.1 sec      Urinalysis Basic - ( 10 Apr 2024 07:04 )    Color: x / Appearance: x / SG: x / pH: x  Gluc: 96 mg/dL / Ketone: x  / Bili: x / Urobili: x   Blood: x / Protein: x / Nitrite: x   Leuk Esterase: x / RBC: x / WBC x   Sq Epi: x / Non Sq Epi: x / Bacteria: x        CAPILLARY BLOOD GLUCOSE      POCT Blood Glucose.: 97 mg/dL (10 Apr 2024 16:40)  POCT Blood Glucose.: 111 mg/dL (10 Apr 2024 11:49)  POCT Blood Glucose.: 101 mg/dL (10 Apr 2024 07:51)  POCT Blood Glucose.: 97 mg/dL (10 Apr 2024 05:38)  POCT Blood Glucose.: 88 mg/dL (09 Apr 2024 22:23)      Labs reviewed:    RADIOLOGY & ADDITIONAL TESTS:  Imaging Personally Reviewed:  Electrocardiogram Personally Reviewed:

## 2024-04-10 NOTE — CHART NOTE - NSCHARTNOTEFT_GEN_A_CORE
Pt is a 59y old Male s/p a Code Sepsis called for fever and tachycardia.     Vital Signs:  T(F): 102.9  HR: 122  BP: 152/77  RR: 18  SpO2: 92% on room air    SUSPECTED SOURCE: TBD    STAT LABS ORDERED:   - CBC w/diff  - CMP  - Blood cultures x2  - Lactate  - Procalcitonin   - PT/PTT/INR  - UA  - Urine Culture if UA positive     IMAGING/DIAGNOSTIC STUDIES ORDERED: CXR    ANTIBIOTICS ORDERED: Vancomycin x1 dose, Zosyn     FLUIDS GIVEN: 2000ml (based on IBW)    CONSULTS CALLED: none; ID following    PLAN OF CARE/ INTERVENTIONS PLANNED:   - abx + fluids as above  - VS q30min x 1hr, then q1hr x 6hrs  - F/u labs + imaging  - 2hr bedside follow up planned     DISPOSITION:  - Remain at current level of care Pt is a 59y old Male s/p a Code Sepsis called for fever and tachycardia. Patient denies any complaints at this time except for R hand swelling and discomfort from previous IV infiltrate.     Vital Signs:  T(F): 102.9  HR: 122  BP: 152/77  RR: 18  SpO2: 92% on room air    SUSPECTED SOURCE: TBD, was treated for PNA earlier this admission    STAT LABS ORDERED:   - CBC w/diff  - CMP  - Blood cultures x2  - Lactate  - Procalcitonin   - PT/PTT/INR  - UA  - Urine Culture if UA positive     IMAGING/DIAGNOSTIC STUDIES ORDERED: CXR    ANTIBIOTICS ORDERED: Vancomycin x1 dose, Zosyn     FLUIDS GIVEN: 2000ml (based on IBW)    CONSULTS CALLED: none; ID following    PLAN OF CARE/ INTERVENTIONS PLANNED:   - abx + fluids as above  - VS q30min x 1hr, then q1hr x 6hrs  - F/u labs + imaging  - 2hr bedside follow up planned     DISPOSITION:  - Remain at current level of care

## 2024-04-10 NOTE — CONSULT NOTE ADULT - PROBLEM SELECTOR RECOMMENDATION 9
-Patient tachycardic to the 110s in the setting of sepsis. Expected physiological response to vasodilatory state.   -Continue to treat underlying cause with tylenol q6h   - Monitor HR along with concurrent VS  -HR at time of exam in the 90s   - Ischemic evaluation and echo 2 weeks ago with no significant findings. -Patient tachycardic to the 110s in the setting of sepsis. Expected physiological response to vasodilatory state.   -Continue to treat underlying cause with tylenol q6h, antibiotics and fluid resuscitation.   - Monitor HR along with concurrent VS  -HR at time of exam in the 90s   - Ischemic evaluation and echo 2 weeks ago with no significant findings. .  - Patient tachycardic to the 110s in the setting of acute sepsis. S/P Code sepsis. Patient with Tmax 102.9 and HR elevated. Expected physiological response to vasodilatory state.   - Continue to treat underlying cause with Tylenol q6h, antibiotics and fluid resuscitation.   - Monitor HR along with concurrent VS  - HR at time of exam in the 90s   - Ischemic evaluation and echo 2 weeks ago with no significant findings.  - will follow up .  - Patient tachycardic to the 110s in the setting of acute sepsis. S/P Code sepsis. Patient with Tmax 102.9 and HR elevated. Expected physiological response to vasodilatory state.   - Continue to treat underlying cause with Tylenol q6h, antibiotics and fluid resuscitation.   - Monitor HR along with concurrent VS  - HR at time of exam in the 90s   - Ischemic evaluation inconclusive but no need for urgent ischemic evaluation at this time  - recent echo 2 weeks ago with no significant findings.  - daily EKGs  - will follow up

## 2024-04-10 NOTE — CONSULT NOTE ADULT - NS ATTEND AMEND GEN_ALL_CORE FT
59 y/o M with history of HTN, HLD, DM2, asthma, gout, recently admitted <1 month ago with URI/asthma exacerbation, was sent to ED due to abnormal renal function on outpatient labs. Being managed for RUL PNA/fever of unknown origin (intermittently febrile despite antibiotic therapy). Cardiology consulted due to tachycardia. Patient with sinus tachycardia in 110s in the setting of fever to 102.9 this AM (appropriate physiological response). LE dopplers 4/5 negative for DVT. RUE US pending due to swelling on exam. Had recent TTE with no significant findings and ischemic workup with dobutamine NST during prior admission, which was uninterpretable due to inability to tolerate dobutamine. Would continue tylenol prn fever, IVF resuscitation, and supportive care. Antibiotics per ID. Continue telemetry monitoring. No indication for AVN blockers at this time. Would consider eventual ischemic workup due to prior inability to complete study, however this can be addressed as outpatient once acute condition improves.     No further inpatient cardiac workup planned at this time   please reconsult cardiology with any further questions

## 2024-04-10 NOTE — PROGRESS NOTE ADULT - ASSESSMENT
58y  Male with a h/o Asthma, HTN, HLD, DM2, Gout. Patient presented to [facility] ER for abnormal lab work, elevated Cr from his PMD's office. Patient recently hospitalized for acute asthma exacerbation and viral URI found to have uncontrolled HTN and new onset DM type 2 at that time discharged home with outpatient follow-up and sent back to [facility] ER today by PCP for abnormal kidney function. During his hospital course patient developed fever to 102.3F on 3/31, was a code sepsis, was administered Azithromycin x1 and Zosyn since 4/1.      RT UE swelling and warmth   Fever  TYESHA  Transaminitis   Thrombocytosis       - Blood cultures  3/31, 4/3, 4/6 no growth   - Repeat blood cultures pending    - RVP/COVID 19 PCR 3/31, 4/4 negative   - Urine for legionella and strep negative   - CT C/A/P 4/1 reporting multifocal PNA   - UA 3/30 and 4/1 negative   - Procalcitonin level 0.63, will order repeat for today  - Will order RUE CT with contrast   - Babesia PCR is negative   - Lyme PCR  is negative   - HIV negative   - viral hepatitis profile negative  - ERON negative   - LFTs improving   - RF is 11  - Start Zosyn  - Hold off on PICC/Midline for now unless needed for reasons other than infectious diseases  - Follow up cultures  - Trend Fever  - Trend WBC      Will Follow    Discussed treatment plan with: Dr Gomez

## 2024-04-10 NOTE — PROGRESS NOTE ADULT - SUBJECTIVE AND OBJECTIVE BOX
[facility]  INFECTIOUS DISEASES at [place] / [place] / [place]  =======================================================                               Serg Almanza MD#   Rony Guillory MD*                             Lisy Sandhu MD*   Laila Bell MD*                              Professor Emeritus:  Dr Josr Chatman MD^            Diplomates American Board of Internal Medicine & Infectious Diseases                # [place] Office - Appt - Tel  [phone] Fax [phone]                * [place] Office - Appt - Tel [phone] Fax [phone]                      ^[place] Office - Tel  [phone] Fax [phone]                                  Hospital Consult line:  [phone]  =======================================================    ZAINA MILLER [id number]    Follow up: Fever    Fever this AM  c/o RUE swelling and pain       Allergies:  No Known Allergies      REVIEW OF SYSTEMS:  CONSTITUTIONAL:  + Fever + chills  HEENT:  No diplopia or blurred vision.  No earache, sore throat or runny nose.  CARDIOVASCULAR:  No chest pain  RESPIRATORY:  No cough, shortness of breath  GASTROINTESTINAL:  No nausea, vomiting or diarrhea.  GENITOURINARY:  No dysuria, frequency or urgency. No Blood in urine  MUSCULOSKELETAL:  no joint aches, no muscle pain  SKIN:  No change in skin, hair or nails.  NEUROLOGIC:  No Headaches      Physical Exam:  GEN: NAD  HEENT: normocephalic and atraumatic. EOMI. PERRL.    NECK: Supple.   LUNGS: CTA B/L.  HEART: RRR  ABDOMEN: Soft, NT, ND.  +BS.    : No CVA tenderness  EXTREMITIES: RUE swelling and pain  MSK: No joint swelling  NEUROLOGIC: No Focal Deficits   SKIN: No rash      Vitals:  T(F): 99.2 (10 Apr 2024 07:32), Max: 102.9 (10 Apr 2024 05:40)  HR: 115 (10 Apr 2024 07:32)  BP: 134/70 (10 Apr 2024 07:32)  RR: 18 (10 Apr 2024 07:32)  SpO2: 92% (10 Apr 2024 07:32) (92% - 94%)  temp max in last 48H T(F): , Max: 102.9 (04-10-24 @ 05:40)    Current Antibiotics:  piperacillin/tazobactam IVPB.- 3.375 Gram(s) IV Intermittent once  piperacillin/tazobactam IVPB.- 3.375 Gram(s) IV Intermittent once  piperacillin/tazobactam IVPB.. 3.375 Gram(s) IV Intermittent every 8 hours    Other medications:  amLODIPine   Tablet 10 milliGRAM(s) Oral daily  dextrose 5%. 1000 milliLiter(s) IV Continuous <Continuous>  dextrose 5%. 1000 milliLiter(s) IV Continuous <Continuous>  dextrose 50% Injectable 25 Gram(s) IV Push once  dextrose 50% Injectable 25 Gram(s) IV Push once  dextrose 50% Injectable 12.5 Gram(s) IV Push once  glucagon  Injectable 1 milliGRAM(s) IntraMuscular once  heparin   Injectable 5000 Unit(s) SubCutaneous every 12 hours  insulin lispro (ADMELOG) corrective regimen sliding scale   SubCutaneous Before meals and at bedtime  lactated ringers. 2000 milliLiter(s) IV Continuous <Continuous>  magnesium sulfate  IVPB 2 Gram(s) IV Intermittent every 4 hours  pantoprazole    Tablet 40 milliGRAM(s) Oral before breakfast  potassium phosphate IVPB 30 milliMole(s) IV Intermittent once                 9.3    11.97 )-----------( 536      ( 10 Apr 2024 05:55 )             28.7     04-10    138  |  103  |  3.9<L>  ----------------------------<  96  3.5   |  21.0<L>  |  1.04    Ca    7.5<L>      10 Apr 2024 07:04  Phos  2.0     04-10  Mg     1.3     04-10    TPro  5.7<L>  /  Alb  2.1<L>  /  TBili  0.4  /  DBili  x   /  AST  45<H>  /  ALT  51<H>  /  AlkPhos  85  04-10    RECENT CULTURES:  04-06 @ 05:25 .Blood Blood     No growth at 72 Hours    04-06 @ 05:19 .Blood Blood     No growth at 72 Hours    04-04 @ 13:21    RVP  NotDetec    04-03 @ 21:30 .Blood Blood-Peripheral     No growth at 5 days    04-03 @ 21:20 .Blood Blood-Peripheral     No growth at 5 days    04-03 @ 06:26 .Blood Blood     No growth at 5 days    04-03 @ 06:20 .Blood Blood     No growth at 5 days    03-31 @ 22:32    RVP  NotDetec    03-31 @ 22:21 .Blood Blood-Peripheral     No growth at 5 days    03-31 @ 22:18 .Blood Blood-Peripheral     No growth at 5 days    03-29 @ 20:22 Clean Catch Clean Catch (Midstream) Enterococcus faecalis    10,000 - 49,000 CFU/mL Enterococcus faecalis      WBC Count: 11.97 K/uL (04-10-24 @ 05:55)  WBC Count: 11.17 K/uL (04-10-24 @ 04:01)  WBC Count: 10.27 K/uL (04-09-24 @ 04:00)  WBC Count: 9.37 K/uL (04-08-24 @ 06:12)  WBC Count: 8.56 K/uL (04-07-24 @ 05:00)  WBC Count: 8.74 K/uL (04-06-24 @ 05:25)    Creatinine: 1.04 mg/dL (04-10-24 @ 07:04)  Creatinine: 1.04 mg/dL (04-10-24 @ 04:01)  Creatinine: 0.97 mg/dL (04-09-24 @ 04:00)  Creatinine: 1.00 mg/dL (04-08-24 @ 06:12)  Creatinine: 0.93 mg/dL (04-07-24 @ 05:00)  Creatinine: 1.04 mg/dL (04-06-24 @ 05:25)    C-Reactive Protein, Serum: 162 mg/L (04-09-24 @ 04:00)  C-Reactive Protein, Serum: 163 mg/L (04-06-24 @ 05:25)  C-Reactive Protein, Serum: 141 mg/L (04-05-24 @ 08:34)    Procalcitonin, Serum: 0.25 ng/mL (04-09-24 @ 04:00)  Procalcitonin, Serum: 0.63 ng/mL (04-06-24 @ 05:25)  Procalcitonin, Serum: 0.63 ng/mL (04-05-24 @ 06:57)  Procalcitonin, Serum: 0.64 ng/mL (04-05-24 @ 06:56)  Procalcitonin, Serum: 0.39 ng/mL (04-02-24 @ 04:52)  Procalcitonin, Serum: 0.17 ng/mL (03-31-24 @ 22:18)     SARS-CoV-2: NotDetec (04-04-24 @ 13:21)  COVID-19 PCR: NotDetec (03-31-24 @ 22:32)  SARS-CoV-2: NotDetec (03-31-24 @ 22:32)  SARS-CoV-2: NotDetec (03-13-24 @ 15:45)      Anti-Nuclear Antibody in AM (04.06.24 @ 05:25)    Anti Nuclear Factor Titer: Negative: Antinuclear AB (ERON), IFA Method    Rapid HIV-1/2 Antibody (04.05.24 @ 08:34)    Rapid HIV-1/2 Antibody: Nonreact    Legionella pneumophila Antigen, Urine (04.02.24 @ 15:51)    Legionella Antigen, Urine: Negative    Streptococcus pneumoniae Ag, Ur (04.02.24 @ 15:51)    Streptococcus pneumoniae Ag, Ur Result: Negative    Lipase (04.04.24 @ 05:05)    Lipase: 53 U/L    Acute Hepatitis Panel (04.06.24 @ 05:25)    Hepatitis C Virus Interpretation: Nonreact   Hepatitis C Virus S/CO Ratio: 0.13 S/CO   Hepatitis B Core IgM Antibody: Nonreact   Hepatitis B Surface Antigen: Nonreact   Hepatitis A IgM Antibody: Nonreact    Rheumatoid Factor Quant, Serum or Plasma in AM (04.06.24 @ 05:25)    Rheumatoid Factor Quant, Serum or Plasma: 11 IU/mL

## 2024-04-11 LAB
ALBUMIN SERPL ELPH-MCNC: 2.3 G/DL — LOW (ref 3.3–5.2)
ALP SERPL-CCNC: 97 U/L — SIGNIFICANT CHANGE UP (ref 40–120)
ALT FLD-CCNC: 45 U/L — HIGH
ANION GAP SERPL CALC-SCNC: 15 MMOL/L — SIGNIFICANT CHANGE UP (ref 5–17)
AST SERPL-CCNC: 39 U/L — SIGNIFICANT CHANGE UP
BILIRUB DIRECT SERPL-MCNC: 0.2 MG/DL — SIGNIFICANT CHANGE UP (ref 0–0.3)
BILIRUB INDIRECT FLD-MCNC: 0.2 MG/DL — SIGNIFICANT CHANGE UP (ref 0.2–1)
BILIRUB SERPL-MCNC: 0.4 MG/DL — SIGNIFICANT CHANGE UP (ref 0.4–2)
BUN SERPL-MCNC: 3.8 MG/DL — LOW (ref 8–20)
CALCIUM SERPL-MCNC: 8.1 MG/DL — LOW (ref 8.4–10.5)
CHLORIDE SERPL-SCNC: 103 MMOL/L — SIGNIFICANT CHANGE UP (ref 96–108)
CO2 SERPL-SCNC: 21 MMOL/L — LOW (ref 22–29)
CREAT SERPL-MCNC: 1.09 MG/DL — SIGNIFICANT CHANGE UP (ref 0.5–1.3)
CULTURE RESULTS: SIGNIFICANT CHANGE UP
CULTURE RESULTS: SIGNIFICANT CHANGE UP
EGFR: 77 ML/MIN/1.73M2 — SIGNIFICANT CHANGE UP
GLUCOSE BLDC GLUCOMTR-MCNC: 89 MG/DL — SIGNIFICANT CHANGE UP (ref 70–99)
GLUCOSE BLDC GLUCOMTR-MCNC: 92 MG/DL — SIGNIFICANT CHANGE UP (ref 70–99)
GLUCOSE BLDC GLUCOMTR-MCNC: 97 MG/DL — SIGNIFICANT CHANGE UP (ref 70–99)
GLUCOSE BLDC GLUCOMTR-MCNC: 98 MG/DL — SIGNIFICANT CHANGE UP (ref 70–99)
GLUCOSE SERPL-MCNC: 95 MG/DL — SIGNIFICANT CHANGE UP (ref 70–99)
HCT VFR BLD CALC: 31.4 % — LOW (ref 39–50)
HGB BLD-MCNC: 10.1 G/DL — LOW (ref 13–17)
MAGNESIUM SERPL-MCNC: 1.9 MG/DL — SIGNIFICANT CHANGE UP (ref 1.6–2.6)
MCHC RBC-ENTMCNC: 27.7 PG — SIGNIFICANT CHANGE UP (ref 27–34)
MCHC RBC-ENTMCNC: 32.2 GM/DL — SIGNIFICANT CHANGE UP (ref 32–36)
MCV RBC AUTO: 86 FL — SIGNIFICANT CHANGE UP (ref 80–100)
PHOSPHATE SERPL-MCNC: 3 MG/DL — SIGNIFICANT CHANGE UP (ref 2.4–4.7)
PLATELET # BLD AUTO: 542 K/UL — HIGH (ref 150–400)
POTASSIUM SERPL-MCNC: 3.9 MMOL/L — SIGNIFICANT CHANGE UP (ref 3.5–5.3)
POTASSIUM SERPL-SCNC: 3.9 MMOL/L — SIGNIFICANT CHANGE UP (ref 3.5–5.3)
PROT SERPL-MCNC: 6.2 G/DL — LOW (ref 6.6–8.7)
RBC # BLD: 3.65 M/UL — LOW (ref 4.2–5.8)
RBC # FLD: 14 % — SIGNIFICANT CHANGE UP (ref 10.3–14.5)
SODIUM SERPL-SCNC: 139 MMOL/L — SIGNIFICANT CHANGE UP (ref 135–145)
SPECIMEN SOURCE: SIGNIFICANT CHANGE UP
SPECIMEN SOURCE: SIGNIFICANT CHANGE UP
WBC # BLD: 8.86 K/UL — SIGNIFICANT CHANGE UP (ref 3.8–10.5)
WBC # FLD AUTO: 8.86 K/UL — SIGNIFICANT CHANGE UP (ref 3.8–10.5)

## 2024-04-11 PROCEDURE — 99232 SBSQ HOSP IP/OBS MODERATE 35: CPT

## 2024-04-11 PROCEDURE — 73221 MRI JOINT UPR EXTREM W/O DYE: CPT | Mod: 26,RT

## 2024-04-11 PROCEDURE — 93010 ELECTROCARDIOGRAM REPORT: CPT

## 2024-04-11 RX ORDER — METOPROLOL TARTRATE 50 MG
12.5 TABLET ORAL EVERY 12 HOURS
Refills: 0 | Status: DISCONTINUED | OUTPATIENT
Start: 2024-04-11 | End: 2024-04-23

## 2024-04-11 RX ORDER — SODIUM CHLORIDE 9 MG/ML
500 INJECTION, SOLUTION INTRAVENOUS ONCE
Refills: 0 | Status: COMPLETED | OUTPATIENT
Start: 2024-04-11 | End: 2024-04-11

## 2024-04-11 RX ORDER — ACETAMINOPHEN 500 MG
1000 TABLET ORAL ONCE
Refills: 0 | Status: COMPLETED | OUTPATIENT
Start: 2024-04-11 | End: 2024-04-11

## 2024-04-11 RX ORDER — LABETALOL HCL 100 MG
100 TABLET ORAL
Refills: 0 | Status: DISCONTINUED | OUTPATIENT
Start: 2024-04-11 | End: 2024-04-11

## 2024-04-11 RX ORDER — METOPROLOL TARTRATE 50 MG
2.5 TABLET ORAL EVERY 6 HOURS
Refills: 0 | Status: DISCONTINUED | OUTPATIENT
Start: 2024-04-11 | End: 2024-04-23

## 2024-04-11 RX ADMIN — PIPERACILLIN AND TAZOBACTAM 25 GRAM(S): 4; .5 INJECTION, POWDER, LYOPHILIZED, FOR SOLUTION INTRAVENOUS at 21:26

## 2024-04-11 RX ADMIN — AMLODIPINE BESYLATE 10 MILLIGRAM(S): 2.5 TABLET ORAL at 05:38

## 2024-04-11 RX ADMIN — Medication 1000 MILLIGRAM(S): at 17:36

## 2024-04-11 RX ADMIN — PIPERACILLIN AND TAZOBACTAM 25 GRAM(S): 4; .5 INJECTION, POWDER, LYOPHILIZED, FOR SOLUTION INTRAVENOUS at 05:38

## 2024-04-11 RX ADMIN — SODIUM CHLORIDE 500 MILLILITER(S): 9 INJECTION, SOLUTION INTRAVENOUS at 17:06

## 2024-04-11 RX ADMIN — SODIUM CHLORIDE 100 MILLILITER(S): 9 INJECTION, SOLUTION INTRAVENOUS at 21:29

## 2024-04-11 RX ADMIN — HEPARIN SODIUM 5000 UNIT(S): 5000 INJECTION INTRAVENOUS; SUBCUTANEOUS at 05:38

## 2024-04-11 RX ADMIN — Medication 400 MILLIGRAM(S): at 17:06

## 2024-04-11 RX ADMIN — SODIUM CHLORIDE 100 MILLILITER(S): 9 INJECTION, SOLUTION INTRAVENOUS at 05:38

## 2024-04-11 RX ADMIN — PANTOPRAZOLE SODIUM 40 MILLIGRAM(S): 20 TABLET, DELAYED RELEASE ORAL at 05:38

## 2024-04-11 RX ADMIN — PIPERACILLIN AND TAZOBACTAM 25 GRAM(S): 4; .5 INJECTION, POWDER, LYOPHILIZED, FOR SOLUTION INTRAVENOUS at 14:58

## 2024-04-11 RX ADMIN — Medication 2.5 MILLIGRAM(S): at 19:00

## 2024-04-11 RX ADMIN — HEPARIN SODIUM 5000 UNIT(S): 5000 INJECTION INTRAVENOUS; SUBCUTANEOUS at 17:06

## 2024-04-11 NOTE — PROGRESS NOTE ADULT - ASSESSMENT
58M with Asthma, HTN, HLD, DM2, Gout admitted with TYESHA suspected 2.2 medications along with MF PNA     Sepsis 2/2 Right Upper Lobe PNA  Fever of unknown origin  Remains intermittently febrile despite apoprotein coverage for typical organisms   Persistent fever  Appreciate ID f/u .As per ID Dr. Brand, already received 7 days of Abx. -->Restarted zosyn on 04/10  Pneurococcal and Legionella neg, HIV negative, stool PCR negative. Doppler negative for DVT  Now on room air  Repeat cxr persistent infiltrate    # Right elbolw osteomyelitis  -FOund right UE swelling on 04/10  -CT RUE concerning for right elbow osteomyelitis  -Right elbow MRI pending  -Dr. Brand following    #Sinus Tachycardia  -Most likely reactive  -WOrk up including US doppler negative. Echo unremarkable  -Follow up after IV fluid    TYESHA (now resolved)   -Possibly 2/2 medication induced ATN   -Hold ARB/HCTZ/Metformin   -Potentially  medication induced (in setting of poor PO intake) from recent introduction of ARB/HCTZ/Metformin  -Cr (baseline 1.08) >>>3.13 >2.39>1.91>1.75>1.41>1.40>1.3>1.31> 1.08  -UA negative,Renal US negative  -Hold above meds. Avoid Nephrotoxins.Renally dose meds  -Nephro Consult note appreciated    HTN, HLD  Amlodipine 10mg q24  Hold ARB and HCTZ for now  Not currently on statin therapy    DM2  Hold Metformin 500mg BID  ISS/Accuchecks/A1C    Asthma  Finished steroid taper last Sunday. No current symptoms/hypoxia.  No longer taking Montelukast  Wixela/Duoneb PRN    Gout  Uric Acid level 12.2  Takes Allopurinol as needed. Hold for now.     VTE PPX SQH/SCDs    Dispo: Acute.

## 2024-04-11 NOTE — PROGRESS NOTE ADULT - ASSESSMENT
58y  Male with a h/o Asthma, HTN, HLD, DM2, Gout. Patient presented to [facility] ER for abnormal lab work, elevated Cr from his PMD's office. Patient recently hospitalized for acute asthma exacerbation and viral URI found to have uncontrolled HTN and new onset DM type 2 at that time discharged home with outpatient follow-up and sent back to [facility] ER today by PCP for abnormal kidney function. During his hospital course patient developed fever to 102.3F on 3/31, was a code sepsis, was administered Azithromycin x1 and Zosyn since 4/1.      RT UE swelling and warmth   Fever  TYESHA  Transaminitis   Thrombocytosis       - Blood cultures  3/31, 4/3, 4/6 no growth   - Repeat blood cultures 4/10 no growth     - RVP/COVID 19 PCR 3/31, 4/4 negative   - Urine for legionella and strep negative   - CT C/A/P 4/1 reporting multifocal PNA   - UA 3/30 and 4/1 negative   - Procalcitonin level 0.63, will order repeat for today  - RUE CT with contrast concerning for Rt elbow OM and Advanced right wrist arthrosis without significant effusion or convincing CT evidence for osteomyelitis/septic arthritis.  - Rt Elbow MRI with no OM.   - Rheumatology consult noted  - Babesia PCR is negative   - Lyme PCR  is negative   - HIV negative   - viral hepatitis profile negative  - ERON negative   - LFTs improving   - RF is 11  - Procalcitonin 0.30, will repeat in AM   - Continue Zosyn  - Hold off on PICC/Midline for now unless needed for reasons other than infectious diseases  - Follow up cultures  - Trend Fever  - Trend WBC      Will Follow    Discussed treatment plan with: Dr Gomez

## 2024-04-11 NOTE — PROGRESS NOTE ADULT - SUBJECTIVE AND OBJECTIVE BOX
[facility] Division of Hospital Medicine  Dorcas Gomez MD   I'm reachable on [employer] Teams      Patient is a 59y old  Male who presents with a chief complaint of ARF (10 Apr 2024 18:49)      SUBJECTIVE / OVERNIGHT EVENTS:   Patient was seen and examined during rounds  -Still spiking low grade fever  -Rheumatology recommended to work up for osteomyeleitits  -Right elbow MRI pending  -HR remain elevated      12 points ROS negative except above    MEDICATIONS  (STANDING):  amLODIPine   Tablet 10 milliGRAM(s) Oral daily  dextrose 5%. 1000 milliLiter(s) (100 mL/Hr) IV Continuous <Continuous>  dextrose 5%. 1000 milliLiter(s) (50 mL/Hr) IV Continuous <Continuous>  dextrose 50% Injectable 25 Gram(s) IV Push once  dextrose 50% Injectable 25 Gram(s) IV Push once  dextrose 50% Injectable 12.5 Gram(s) IV Push once  glucagon  Injectable 1 milliGRAM(s) IntraMuscular once  heparin   Injectable 5000 Unit(s) SubCutaneous every 12 hours  insulin lispro (ADMELOG) corrective regimen sliding scale   SubCutaneous Before meals and at bedtime  lactated ringers. 2000 milliLiter(s) (100 mL/Hr) IV Continuous <Continuous>  pantoprazole    Tablet 40 milliGRAM(s) Oral before breakfast  piperacillin/tazobactam IVPB.. 3.375 Gram(s) IV Intermittent every 8 hours    MEDICATIONS  (PRN):  acetaminophen     Tablet .. 650 milliGRAM(s) Oral every 6 hours PRN Temp greater or equal to 38C (100.4F), Mild Pain (1 - 3)  albuterol    90 MICROgram(s) HFA Inhaler 2 Puff(s) Inhalation every 6 hours PRN Shortness of Breath and/or Wheezing  aluminum hydroxide/magnesium hydroxide/simethicone Suspension 30 milliLiter(s) Oral every 4 hours PRN Dyspepsia  dextrose Oral Gel 15 Gram(s) Oral once PRN Blood Glucose LESS THAN 70 milliGRAM(s)/deciliter  melatonin 3 milliGRAM(s) Oral at bedtime PRN Insomnia  ondansetron Injectable 4 milliGRAM(s) IV Push every 8 hours PRN Nausea and/or Vomiting        I&O's Summary    10 Apr 2024 07:01  -  11 Apr 2024 07:00  --------------------------------------------------------  IN: 0 mL / OUT: 1600 mL / NET: -1600 mL    11 Apr 2024 07:01  -  11 Apr 2024 16:37  --------------------------------------------------------  IN: 640 mL / OUT: 550 mL / NET: 90 mL        PHYSICAL EXAM:  Vital Signs Last 24 Hrs  T(C): 36.9 (11 Apr 2024 10:21), Max: 37.8 (10 Apr 2024 22:32)  T(F): 98.5 (11 Apr 2024 10:21), Max: 100 (10 Apr 2024 22:32)  HR: 105 (11 Apr 2024 10:21) (104 - 121)  BP: 132/72 (11 Apr 2024 10:21) (127/67 - 157/76)  BP(mean): --  RR: 18 (11 Apr 2024 10:21) (18 - 18)  SpO2: 92% (11 Apr 2024 10:21) (92% - 100%)    Parameters below as of 11 Apr 2024 10:21  Patient On (Oxygen Delivery Method): room air        CONSTITUTIONAL: NAD, Not in acute distress  EYES:  EOMI, conjunctiva and sclera clear  ENMT: , neck supple , non-tender  RESPIRATORY: Normal respiratory effort; lungs are clear to auscultation bilaterally  CARDIOVASCULAR: S1S2 present  ABDOMEN: soft. bowel sound present  MUSCLOSKELETAL: ; no clubbing or cyanosis of digits;   PSYCH: A+O to person, place, and time; affect appropriate  NEUROLOGY: CN, motor and sensory intact   SKIN: No rashes; no palpable lesions  Extremity: No bilateral edema      LABS:                        10.1   8.86  )-----------( 542      ( 11 Apr 2024 04:54 )             31.4     04-11    139  |  103  |  3.8<L>  ----------------------------<  95  3.9   |  21.0<L>  |  1.09    Ca    8.1<L>      11 Apr 2024 04:54  Phos  3.0     04-11  Mg     1.9     04-11    TPro  6.2<L>  /  Alb  2.3<L>  /  TBili  0.4  /  DBili  0.2  /  AST  39  /  ALT  45<H>  /  AlkPhos  97  04-11    PT/INR - ( 10 Apr 2024 05:55 )   PT: 15.0 sec;   INR: 1.36 ratio         PTT - ( 10 Apr 2024 05:55 )  PTT:41.1 sec      Urinalysis Basic - ( 11 Apr 2024 04:54 )    Color: x / Appearance: x / SG: x / pH: x  Gluc: 95 mg/dL / Ketone: x  / Bili: x / Urobili: x   Blood: x / Protein: x / Nitrite: x   Leuk Esterase: x / RBC: x / WBC x   Sq Epi: x / Non Sq Epi: x / Bacteria: x        Culture - Blood (collected 10 Apr 2024 05:59)  Source: .Blood Blood-Peripheral  Preliminary Report (11 Apr 2024 15:02):    No growth at 24 hours    Culture - Blood (collected 10 Apr 2024 05:55)  Source: .Blood Blood-Peripheral  Preliminary Report (11 Apr 2024 15:02):    No growth at 24 hours      CAPILLARY BLOOD GLUCOSE      POCT Blood Glucose.: 92 mg/dL (11 Apr 2024 11:17)  POCT Blood Glucose.: 98 mg/dL (11 Apr 2024 07:47)  POCT Blood Glucose.: 101 mg/dL (10 Apr 2024 21:49)  POCT Blood Glucose.: 97 mg/dL (10 Apr 2024 16:40)      Labs reviewed:    RADIOLOGY & ADDITIONAL TESTS:  Imaging Personally Reviewed:  Electrocardiogram Personally Reviewed:

## 2024-04-11 NOTE — PROGRESS NOTE ADULT - SUBJECTIVE AND OBJECTIVE BOX
[facility]  INFECTIOUS DISEASES at [place] / [place] / [place]  =======================================================                               Serg Almanza MD#   Maurisio Farr MD*                             Lisy Sandhu MD*   Laila Bell MD*                              Professor Emeritus:  Dr Josr Chatman MD^            Diplomates American Board of Internal Medicine & Infectious Diseases                # [place] Office - Appt - Tel  [phone] Fax [phone]                * [place] Office - Appt - Tel [phone] Fax [phone]                      ^[place] Office - Tel  [phone] Fax [phone]                                  Hospital Consult line:  [phone]  =======================================================    ZAINA MILLER [id number]    Follow up: Fever    Fever improved  RUE swelling and pain improving      Allergies:  No Known Allergies      REVIEW OF SYSTEMS:  CONSTITUTIONAL:  Improved Fever and chills  HEENT:  No diplopia or blurred vision.  No earache, sore throat or runny nose.  CARDIOVASCULAR:  No chest pain  RESPIRATORY:  No cough, shortness of breath  GASTROINTESTINAL:  No nausea, vomiting or diarrhea.  GENITOURINARY:  No dysuria, frequency or urgency. No Blood in urine  MUSCULOSKELETAL:  no joint aches, no muscle pain  SKIN:  No change in skin, hair or nails.  NEUROLOGIC:  No Headaches      Physical Exam:  GEN: NAD  HEENT: normocephalic and atraumatic. EOMI. PERRL.    NECK: Supple.   LUNGS: CTA B/L.  HEART: RRR  ABDOMEN: Soft, NT, ND.  +BS.    : No CVA tenderness  EXTREMITIES: RUE swelling and pain  MSK: No joint swelling  NEUROLOGIC: No Focal Deficits   SKIN: Rt hand swlling improved, no findings on external Rt elbow. Nl ROM Rt elbow.       Vitals:  T(F): 98.6 (11 Apr 2024 20:44), Max: 100 (10 Apr 2024 22:32)  HR: 93 (11 Apr 2024 20:44)  BP: 115/68 (11 Apr 2024 20:44)  RR: 18 (11 Apr 2024 20:44)  SpO2: 100% (11 Apr 2024 20:44) (92% - 100%)  temp max in last 48H T(F): , Max: 102.9 (04-10-24 @ 05:40)    Current Antibiotics:  piperacillin/tazobactam IVPB.. 3.375 Gram(s) IV Intermittent every 8 hours    Other medications:  amLODIPine   Tablet 10 milliGRAM(s) Oral daily  dextrose 5%. 1000 milliLiter(s) IV Continuous <Continuous>  dextrose 5%. 1000 milliLiter(s) IV Continuous <Continuous>  dextrose 50% Injectable 25 Gram(s) IV Push once  dextrose 50% Injectable 25 Gram(s) IV Push once  dextrose 50% Injectable 12.5 Gram(s) IV Push once  glucagon  Injectable 1 milliGRAM(s) IntraMuscular once  heparin   Injectable 5000 Unit(s) SubCutaneous every 12 hours  insulin lispro (ADMELOG) corrective regimen sliding scale   SubCutaneous Before meals and at bedtime  lactated ringers. 2000 milliLiter(s) IV Continuous <Continuous>  metoprolol tartrate 12.5 milliGRAM(s) Oral every 12 hours  pantoprazole    Tablet 40 milliGRAM(s) Oral before breakfast                            10.1   8.86  )-----------( 542      ( 11 Apr 2024 04:54 )             31.4     04-11    139  |  103  |  3.8<L>  ----------------------------<  95  3.9   |  21.0<L>  |  1.09    Ca    8.1<L>      11 Apr 2024 04:54  Phos  3.0     04-11  Mg     1.9     04-11    TPro  6.2<L>  /  Alb  2.3<L>  /  TBili  0.4  /  DBili  0.2  /  AST  39  /  ALT  45<H>  /  AlkPhos  97  04-11    RECENT CULTURES:  04-10 @ 05:59 .Blood Blood-Peripheral     No growth at 24 hours    04-10 @ 05:55 .Blood Blood-Peripheral     No growth at 24 hours    04-06 @ 05:25 .Blood Blood     No growth at 5 days    04-06 @ 05:19 .Blood Blood     No growth at 5 days    04-04 @ 13:21    RVP  NotDete    04-03 @ 21:30 .Blood Blood-Peripheral     No growth at 5 days    04-03 @ 21:20 .Blood Blood-Peripheral     No growth at 5 days    04-03 @ 06:26 .Blood Blood     No growth at 5 days    04-03 @ 06:20 .Blood Blood     No growth at 5 days    03-31 @ 22:32   RVP   NotDete    03-31 @ 22:21 .Blood Blood-Peripheral     No growth at 5 days    03-31 @ 22:18 .Blood Blood-Peripheral     No growth at 5 days    03-29 @ 20:22 Clean Catch Clean Catch (Midstream) Enterococcus faecalis    10,000 - 49,000 CFU/mL Enterococcus faecalis      WBC Count: 8.86 K/uL (04-11-24 @ 04:54)  WBC Count: 11.97 K/uL (04-10-24 @ 05:55)  WBC Count: 11.17 K/uL (04-10-24 @ 04:01)  WBC Count: 10.27 K/uL (04-09-24 @ 04:00)  WBC Count: 9.37 K/uL (04-08-24 @ 06:12)  WBC Count: 8.56 K/uL (04-07-24 @ 05:00)    Creatinine: 1.09 mg/dL (04-11-24 @ 04:54)  Creatinine: 1.04 mg/dL (04-10-24 @ 07:04)  Creatinine: 1.04 mg/dL (04-10-24 @ 04:01)  Creatinine: 0.97 mg/dL (04-09-24 @ 04:00)  Creatinine: 1.00 mg/dL (04-08-24 @ 06:12)  Creatinine: 0.93 mg/dL (04-07-24 @ 05:00)    C-Reactive Protein, Serum: 162 mg/L (04-09-24 @ 04:00)  C-Reactive Protein, Serum: 163 mg/L (04-06-24 @ 05:25)  C-Reactive Protein, Serum: 141 mg/L (04-05-24 @ 08:34)    Procalcitonin, Serum: 0.30 ng/mL (04-10-24 @ 07:04)  Procalcitonin, Serum: 0.25 ng/mL (04-09-24 @ 04:00)  Procalcitonin, Serum: 0.63 ng/mL (04-06-24 @ 05:25)  Procalcitonin, Serum: 0.63 ng/mL (04-05-24 @ 06:57)  Procalcitonin, Serum: 0.64 ng/mL (04-05-24 @ 06:56)  Procalcitonin, Serum: 0.39 ng/mL (04-02-24 @ 04:52)  Procalcitonin, Serum: 0.17 ng/mL (03-31-24 @ 22:18)     SARS-CoV-2: NotDetec (04-04-24 @ 13:21)  COVID-19 PCR: NotDetec (03-31-24 @ 22:32)  SARS-CoV-2: NotDetec (03-31-24 @ 22:32)  SARS-CoV-2: NotDetec (03-13-24 @ 15:45)      Anti-Nuclear Antibody in AM (04.06.24 @ 05:25)    Anti Nuclear Factor Titer: Negative: Antinuclear AB (ERON), IFA Method    Rapid HIV-1/2 Antibody (04.05.24 @ 08:34)    Rapid HIV-1/2 Antibody: Nonreact    Legionella pneumophila Antigen, Urine (04.02.24 @ 15:51)    Legionella Antigen, Urine: Negative    Streptococcus pneumoniae Ag, Ur (04.02.24 @ 15:51)    Streptococcus pneumoniae Ag, Ur Result: Negative    Lipase (04.04.24 @ 05:05)    Lipase: 53 U/L    Acute Hepatitis Panel (04.06.24 @ 05:25)    Hepatitis C Virus Interpretation: Nonreact   Hepatitis C Virus S/CO Ratio: 0.13 S/CO   Hepatitis B Core IgM Antibody: Nonreact   Hepatitis B Surface Antigen: Nonreact   Hepatitis A IgM Antibody: Nonreact    Rheumatoid Factor Quant, Serum or Plasma in AM (04.06.24 @ 05:25)    Rheumatoid Factor Quant, Serum or Plasma: 11 IU/mL              < from: MR Elbow No Cont, Right (04.11.24 @ 14:50) >    ACC: [id number] EXAM:  MR ELBOW RT   ORDERED BY: JAIRON CARPIO     PROCEDURE DATE:  04/11/2024      INTERPRETATION:  Clinical indication: Fever of unknown origin. Swelling   about the elbow. CT findings concerning for avulsion fracture versus   detached enthesophyte    Multiplanar multisequence noncontrast MRI of the right elbow was   performed.    Correlation is made with prior CT of the right elbow from April 10, 2024.    FINDINGS:    There is extensive circumferential subcutaneous edemathroughout the   visualized elbow consistent with cellulitis. There is mild feathery edema   within the medial flexor and to a lesser extent the dorsal extensor   musculature which is nonspecific and may be related to infectious or   inflammatory etiologies.    The previously described ossific fragment embedded within the ulnar   margin of the distal triceps tendon is again seen. This is likely related   to the sequela of remote avulsive injury. There is moderate tendinosis   and surrounding peritendinous edema within the triceps tendon. There is   no full-thickness or retracted triceps tendon tear. There is mild   psuedoarthritic changes between the olecranon and the avulsed fragment   which trace osseous edema about the pseudoarthrosis. There is mild   overlying olecranon bursitis. Trace osseous edema about the   pseudoarthrosis of the avulsed fragment which is favored to be   degenerative in nature. No convincing evidence of osteomyelitis.   Otherwise, the marrow signal within the elbow is preserved.    There is a nonspecific small elbow joint effusion. There is no evidence   of osseous erosion or subarticular osteitis. The articular surfaces are   preserved.    There is chronic mild undersurface tearing involving the origin of the  common extensor tendon. The lateral collateral ligamentous structures are   preserved.    The common flexor tendon origin is intact. Medial collateral ligamentous   structures are preserved. The distal biceps and brachialis tendinous   insertions are intact.    IMPRESSION:    Redemonstration of a chronic appearing avulsion along the ulnar margin of   the triceps insertion. There is moderate associated tendinosis and   surrounding peritendinous edema within the triceps tendon. No   full-thickness or retracted triceps tendon tear. Mild psuedoarthritic   changes between the olecranon and the avulsed fragment which trace   osseous edema about the pseudoarthrosis. Trace osseous edema about the   pseudoarthrosis of the avulsed fragment which is favored to be   degenerative in nature. No convincing evidence of osteomyelitis.    Diffuse subcutaneous edema about the elbow. Mild olecranon bursitis. Mild   edema within the medial flexor and to a lesser extent extensor   musculature. Findings may be related to underlying cellulitis.    Nonspecific small elbow joint effusion.    --- End of Report ---      < end of copied text >      < from: CT Upper Extremity w/ IV Cont, Right (04.10.24 @ 14:05) >  ACC: [id number] EXAM:  CT UPR EXT IC RT   ORDERED BY: MAURISIO FARR     PROCEDURE DATE:  04/10/2024          INTERPRETATION:  CT OF THE RIGHT UPPER EXTREMITY    CLINICAL INFORMATION: Swelling.    COMPARISON: None available.    TECHNIQUE: Axial CT images were obtained of the right upper extremity   from the shoulder through the fingertips following administration of 90   cc Omnipaque 350 intravenous contrast. Sagittal and coronal   reconstructions were provided.    FINDINGS:    Osseous: No acutefracture or dislocation. Small well-corticated chronic   nonunited cortical avulsion stress fracture fragment versus detached   enthesophyte at the olecranon insertion of the triceps. Subtle adjacent   small focal cortical erosion. No aggressive periosteal reaction.   Moderately severe wrist arthrosis without definite effusions.   Mineralization within normal limits.     Soft tissues: Inflammatory stranding near circumferentially surrounding   the elbow and proximal to mid forearm, but without discrete sizable   hyperattenuating hematoma or drainable encapsulated fluid collection. No   elbow joint effusion. Ill-defined superficial dermal ulceration overlying   the tip of the olecranon with nearly exposed bone. No tracking emphysema.   No radiopaque foreign body.    IMPRESSION:    1.  Small chronic nonunited cortical avulsion stress fracture fragment   versus detached enthesophyte at the olecranon insertion of right triceps.   Overlying dermal ulceration with likely exposed bone and CT findings   concerning for focal osteomyelitis. Correlate for probe to bone and   consider follow-up elbow MRI as clinically indicated.  2.  Acute cellulitis in the proper clinical setting. No tracking soft   tissue emphysema drainable mature abscess.  3. No right elbow joint effusion/septic arthritis. Advanced right wrist   arthrosis without significant effusion or convincing CT evidence for   osteomyelitis/septic arthritis.    --- End of Report ---    < end of copied text >

## 2024-04-12 ENCOUNTER — RESULT REVIEW (OUTPATIENT)
Age: 59
End: 2024-04-12

## 2024-04-12 DIAGNOSIS — J18.9 PNEUMONIA, UNSPECIFIED ORGANISM: ICD-10-CM

## 2024-04-12 DIAGNOSIS — R06.02 SHORTNESS OF BREATH: ICD-10-CM

## 2024-04-12 DIAGNOSIS — J96.01 ACUTE RESPIRATORY FAILURE WITH HYPOXIA: ICD-10-CM

## 2024-04-12 DIAGNOSIS — J45.909 UNSPECIFIED ASTHMA, UNCOMPLICATED: ICD-10-CM

## 2024-04-12 LAB
-  CANDIDA PARAPSILOSIS: SIGNIFICANT CHANGE UP
ANION GAP SERPL CALC-SCNC: 14 MMOL/L — SIGNIFICANT CHANGE UP (ref 5–17)
BUN SERPL-MCNC: 3.9 MG/DL — LOW (ref 8–20)
CALCIUM SERPL-MCNC: 8.3 MG/DL — LOW (ref 8.4–10.5)
CHLORIDE SERPL-SCNC: 104 MMOL/L — SIGNIFICANT CHANGE UP (ref 96–108)
CO2 SERPL-SCNC: 21 MMOL/L — LOW (ref 22–29)
CREAT SERPL-MCNC: 1.01 MG/DL — SIGNIFICANT CHANGE UP (ref 0.5–1.3)
CRP SERPL-MCNC: 188 MG/L — HIGH
EGFR: 85 ML/MIN/1.73M2 — SIGNIFICANT CHANGE UP
GLUCOSE BLDC GLUCOMTR-MCNC: 81 MG/DL — SIGNIFICANT CHANGE UP (ref 70–99)
GLUCOSE BLDC GLUCOMTR-MCNC: 82 MG/DL — SIGNIFICANT CHANGE UP (ref 70–99)
GLUCOSE BLDC GLUCOMTR-MCNC: 85 MG/DL — SIGNIFICANT CHANGE UP (ref 70–99)
GLUCOSE BLDC GLUCOMTR-MCNC: 85 MG/DL — SIGNIFICANT CHANGE UP (ref 70–99)
GLUCOSE SERPL-MCNC: 95 MG/DL — SIGNIFICANT CHANGE UP (ref 70–99)
GRAM STN FLD: ABNORMAL
HCT VFR BLD CALC: 29.8 % — LOW (ref 39–50)
HGB BLD-MCNC: 9.8 G/DL — LOW (ref 13–17)
MAGNESIUM SERPL-MCNC: 1.9 MG/DL — SIGNIFICANT CHANGE UP (ref 1.6–2.6)
MCHC RBC-ENTMCNC: 27.9 PG — SIGNIFICANT CHANGE UP (ref 27–34)
MCHC RBC-ENTMCNC: 32.9 GM/DL — SIGNIFICANT CHANGE UP (ref 32–36)
MCV RBC AUTO: 84.9 FL — SIGNIFICANT CHANGE UP (ref 80–100)
METHOD TYPE: SIGNIFICANT CHANGE UP
PLATELET # BLD AUTO: 576 K/UL — HIGH (ref 150–400)
POTASSIUM SERPL-MCNC: 3.8 MMOL/L — SIGNIFICANT CHANGE UP (ref 3.5–5.3)
POTASSIUM SERPL-SCNC: 3.8 MMOL/L — SIGNIFICANT CHANGE UP (ref 3.5–5.3)
PROCALCITONIN SERPL-MCNC: 0.36 NG/ML — HIGH (ref 0.02–0.1)
RBC # BLD: 3.51 M/UL — LOW (ref 4.2–5.8)
RBC # FLD: 14.1 % — SIGNIFICANT CHANGE UP (ref 10.3–14.5)
SODIUM SERPL-SCNC: 139 MMOL/L — SIGNIFICANT CHANGE UP (ref 135–145)
WBC # BLD: 11.41 K/UL — HIGH (ref 3.8–10.5)
WBC # FLD AUTO: 11.41 K/UL — HIGH (ref 3.8–10.5)

## 2024-04-12 PROCEDURE — 99233 SBSQ HOSP IP/OBS HIGH 50: CPT

## 2024-04-12 PROCEDURE — 99232 SBSQ HOSP IP/OBS MODERATE 35: CPT

## 2024-04-12 PROCEDURE — 93308 TTE F-UP OR LMTD: CPT | Mod: 26

## 2024-04-12 PROCEDURE — 93321 DOPPLER ECHO F-UP/LMTD STD: CPT | Mod: 26

## 2024-04-12 PROCEDURE — 99223 1ST HOSP IP/OBS HIGH 75: CPT

## 2024-04-12 RX ORDER — CASPOFUNGIN ACETATE 7 MG/ML
50 INJECTION, POWDER, LYOPHILIZED, FOR SOLUTION INTRAVENOUS EVERY 24 HOURS
Refills: 0 | Status: DISCONTINUED | OUTPATIENT
Start: 2024-04-13 | End: 2024-05-01

## 2024-04-12 RX ORDER — ALBUTEROL 90 UG/1
2.5 AEROSOL, METERED ORAL EVERY 6 HOURS
Refills: 0 | Status: COMPLETED | OUTPATIENT
Start: 2024-04-12 | End: 2024-04-15

## 2024-04-12 RX ORDER — CASPOFUNGIN ACETATE 7 MG/ML
70 INJECTION, POWDER, LYOPHILIZED, FOR SOLUTION INTRAVENOUS ONCE
Refills: 0 | Status: COMPLETED | OUTPATIENT
Start: 2024-04-12 | End: 2024-04-12

## 2024-04-12 RX ORDER — ALBUTEROL 90 UG/1
2.5 AEROSOL, METERED ORAL EVERY 6 HOURS
Refills: 0 | Status: DISCONTINUED | OUTPATIENT
Start: 2024-04-12 | End: 2024-04-12

## 2024-04-12 RX ORDER — ALBUTEROL 90 UG/1
2.5 AEROSOL, METERED ORAL EVERY 4 HOURS
Refills: 0 | Status: DISCONTINUED | OUTPATIENT
Start: 2024-04-12 | End: 2024-04-23

## 2024-04-12 RX ORDER — DIPHENOXYLATE HCL/ATROPINE 2.5-.025MG
1 TABLET ORAL EVERY 6 HOURS
Refills: 0 | Status: DISCONTINUED | OUTPATIENT
Start: 2024-04-12 | End: 2024-04-12

## 2024-04-12 RX ORDER — CASPOFUNGIN ACETATE 7 MG/ML
INJECTION, POWDER, LYOPHILIZED, FOR SOLUTION INTRAVENOUS
Refills: 0 | Status: DISCONTINUED | OUTPATIENT
Start: 2024-04-12 | End: 2024-05-01

## 2024-04-12 RX ORDER — ALBUTEROL 90 UG/1
2.5 AEROSOL, METERED ORAL EVERY 4 HOURS
Refills: 0 | Status: DISCONTINUED | OUTPATIENT
Start: 2024-04-12 | End: 2024-04-12

## 2024-04-12 RX ADMIN — CASPOFUNGIN ACETATE 70 MILLIGRAM(S): 7 INJECTION, POWDER, LYOPHILIZED, FOR SOLUTION INTRAVENOUS at 12:54

## 2024-04-12 RX ADMIN — Medication 12.5 MILLIGRAM(S): at 05:17

## 2024-04-12 RX ADMIN — PANTOPRAZOLE SODIUM 40 MILLIGRAM(S): 20 TABLET, DELAYED RELEASE ORAL at 05:16

## 2024-04-12 RX ADMIN — SODIUM CHLORIDE 100 MILLILITER(S): 9 INJECTION, SOLUTION INTRAVENOUS at 09:22

## 2024-04-12 RX ADMIN — ALBUTEROL 2.5 MILLIGRAM(S): 90 AEROSOL, METERED ORAL at 21:21

## 2024-04-12 RX ADMIN — PIPERACILLIN AND TAZOBACTAM 25 GRAM(S): 4; .5 INJECTION, POWDER, LYOPHILIZED, FOR SOLUTION INTRAVENOUS at 05:17

## 2024-04-12 RX ADMIN — ALBUTEROL 2.5 MILLIGRAM(S): 90 AEROSOL, METERED ORAL at 14:41

## 2024-04-12 RX ADMIN — HEPARIN SODIUM 5000 UNIT(S): 5000 INJECTION INTRAVENOUS; SUBCUTANEOUS at 05:17

## 2024-04-12 RX ADMIN — HEPARIN SODIUM 5000 UNIT(S): 5000 INJECTION INTRAVENOUS; SUBCUTANEOUS at 17:22

## 2024-04-12 RX ADMIN — AMLODIPINE BESYLATE 10 MILLIGRAM(S): 2.5 TABLET ORAL at 05:16

## 2024-04-12 RX ADMIN — Medication 12.5 MILLIGRAM(S): at 17:22

## 2024-04-12 NOTE — PROVIDER CONTACT NOTE (CRITICAL VALUE NOTIFICATION) - TEST AND RESULT REPORTED:
critical mag 1.0
blood cultures collected from 4/10 positive gross aerobic yeast like cells
critical mag 1.0

## 2024-04-12 NOTE — CONSULT NOTE ADULT - SUBJECTIVE AND OBJECTIVE BOX
PULMONARY CONSULT NOTE      AMANDA MILLER-[id number]    Patient is a 59y old  Male who presents with a chief complaint of ARF (12 Apr 2024 08:48)     HPI: PMHX Asthma, HTN, HLD, DM2, Gout presents to [facility] ER for abnormal lab work. Patient recently hospitalizated for acute asthma exacerbation and viral URI found to have uncontrolled HTN and new onset DM2 at that time discharged home with outpatient follow-up and sent back to [facility] ER today by PCP for abnormal kidney function. ROS +Fatigue and +Malaise. No other complaints. Denies CVAT or back pain. No urinary complaints. Compliant with meds. Recently started on Losartan, HCTZ, and Metformin on discharge and by PCP. +Tylenol use at home. Denies NSAIDs.     3/31 had a CODE SEPSIS  CT chest showed b/l multifocal consolidations not present on last hospitalization; abx  ID called  Fever persisted; CODE SEPSIS again on 4/10  ?Elbow OM dx, multiple other arthalgias  Rheum eval 4/10  Blood cultures +ve for yeast; started on caspofungin    Previous hx of severe asthma. Was seeing Dr Benavidez but not in our office since 3/2023. At the time was being ordered for fasenra; denied by insurance. FEV1 54% in 2023. Hx of Eos; last diff in 2023 was normal.    Also hx of SHERI. Ordered for APAP 6-16. He is not using it.    MEDICATIONS  (STANDING):  albuterol    0.083% 2.5 milliGRAM(s) Nebulizer every 6 hours  amLODIPine   Tablet 10 milliGRAM(s) Oral daily  caspofungin IVPB      dextrose 5%. 1000 milliLiter(s) (50 mL/Hr) IV Continuous <Continuous>  dextrose 5%. 1000 milliLiter(s) (100 mL/Hr) IV Continuous <Continuous>  dextrose 50% Injectable 25 Gram(s) IV Push once  dextrose 50% Injectable 25 Gram(s) IV Push once  dextrose 50% Injectable 12.5 Gram(s) IV Push once  glucagon  Injectable 1 milliGRAM(s) IntraMuscular once  heparin   Injectable 5000 Unit(s) SubCutaneous every 12 hours  insulin lispro (ADMELOG) corrective regimen sliding scale   SubCutaneous Before meals and at bedtime  metoprolol tartrate 12.5 milliGRAM(s) Oral every 12 hours  pantoprazole    Tablet 40 milliGRAM(s) Oral before breakfast      MEDICATIONS  (PRN):  acetaminophen     Tablet .. 650 milliGRAM(s) Oral every 6 hours PRN Temp greater or equal to 38C (100.4F), Mild Pain (1 - 3)  albuterol    0.083% 2.5 milliGRAM(s) Nebulizer every 4 hours PRN Wheezing  albuterol    90 MICROgram(s) HFA Inhaler 2 Puff(s) Inhalation every 6 hours PRN Shortness of Breath and/or Wheezing  aluminum hydroxide/magnesium hydroxide/simethicone Suspension 30 milliLiter(s) Oral every 4 hours PRN Dyspepsia  dextrose Oral Gel 15 Gram(s) Oral once PRN Blood Glucose LESS THAN 70 milliGRAM(s)/deciliter  diphenoxylate/atropine 1 Tablet(s) Oral every 6 hours PRN Diarrhea  melatonin 3 milliGRAM(s) Oral at bedtime PRN Insomnia  metoprolol tartrate Injectable 2.5 milliGRAM(s) IV Push every 6 hours PRN HR above 110/min  ondansetron Injectable 4 milliGRAM(s) IV Push every 8 hours PRN Nausea and/or Vomiting      Allergies    No Known Allergies    Intolerances        PAST MEDICAL & SURGICAL HISTORY:  Gout      Asthma      Arthritis      HTN (hypertension)      No significant past surgical history          FAMILY HISTORY:  Family history of diabetes mellitus type II    Family history of lung cancer        SOCIAL HISTORY  Smoking History: nonsmoker    REVIEW OF SYSTEMS:    CONSTITUTIONAL:  No fevers, chills, sweats    HEENT:  Eyes:  No diplopia or blurred vision. ENT:  No earache, sore throat or runny nose.    CARDIOVASCULAR:  No pressure, squeezing, tightness, or heaviness about the chest; no palpitations.    RESPIRATORY: per HPI      GASTROINTESTINAL:  No abdominal pain, nausea, vomiting or diarrhea.    GENITOURINARY:  No dysuria, frequency or urgency.    NEUROLOGIC:  No paresthesias, fasciculations, seizures or weakness.    PSYCHIATRIC:  No disorder of thought or mood.    Vital Signs Last 24 Hrs  T(C): 37 (12 Apr 2024 09:00), Max: 37.6 (11 Apr 2024 16:58)  T(F): 98.6 (12 Apr 2024 09:00), Max: 99.6 (11 Apr 2024 16:58)  HR: 110 (12 Apr 2024 14:47) (93 - 119)  BP: 147/80 (12 Apr 2024 09:00) (111/68 - 147/80)  BP(mean): --  RR: 18 (12 Apr 2024 09:00) (18 - 18)  SpO2: 99% (12 Apr 2024 14:47) (96% - 100%)    Parameters below as of 12 Apr 2024 14:47  Patient On (Oxygen Delivery Method): nasal cannula        PHYSICAL EXAMINATION:    GENERAL: The patient is in no apparent distress.     HEENT: Head is normocephalic and atraumatic.  Mucous membranes are moist.     NECK: Supple.     LUNGS: diffuse b/l wheeze, good air entry, respirations unlabored    HEART: Regular rate and rhythm      ABDOMEN: Soft, nontender, and nondistended.       EXTREMITIES: Without any cyanosis, clubbing, rash, lesions or edema.    NEUROLOGIC: Grossly intact.      LABS:                        9.8    11.41 )-----------( 576      ( 12 Apr 2024 06:40 )             29.8     04-12    139  |  104  |  3.9<L>  ----------------------------<  95  3.8   |  21.0<L>  |  1.01    Ca    8.3<L>      12 Apr 2024 06:40  Phos  3.0     04-11  Mg     1.9     04-12    TPro  6.2<L>  /  Alb  2.3<L>  /  TBili  0.4  /  DBili  0.2  /  AST  39  /  ALT  45<H>  /  AlkPhos  97  04-11    Procalcitonin, Serum: 0.36 ng/mL (04-12-24 @ 06:40)  Procalcitonin, Serum: 0.30 ng/mL (04-10-24 @ 07:04)  C-Reactive Protein, Serum (04.12.24 @ 06:40)    C-Reactive Protein, Serum: 188 mg/L    ImmunoCAP Total IgE (03.15.24 @ 06:18)    ImmunoCAP Total IgE: 18 KU/L    Anti-Nuclear Antibody in AM (04.06.24 @ 05:25)    Anti Nuclear Factor Titer: Negative: Antinuclear AB (ERON), IFA Method    Rheumatoid Factor Quant, Serum or Plasma in AM (04.06.24 @ 05:25)    Rheumatoid Factor Quant, Serum or Plasma: 11 IU/mL        MICROBIOLOGY:  Culture - Blood (04.10.24 @ 05:59)    Gram Stain:   Growth in aerobic bottle: Yeast like cells   -  Candida parapsilosis: Detec   Specimen Source: .Blood Blood-Peripheral   Organism: Blood Culture PCR   Culture Results:   Growth in aerobic bottle: Yeast like cells  Direct identification is available within approximately 3-5  hours either by Blood Panel Multiplexed PCR or Direct  MALDI-TOF. Details: [email or web address]   Organism Identification: Blood Culture PCR   Method Type: PCR        RADIOLOGY & ADDITIONAL STUDIES:  < from: CT Chest No Cont (04.01.24 @ 13:28) >    ACC: [id number] EXAM:  CT ABDOMEN AND PELVIS   ORDERED BY: ELDER DUQUE     ACC: [id number] EXAM:  CT CHEST   ORDERED BY: ELDER DUQUE     PROCEDURE DATE:  04/01/2024          INTERPRETATION:  CLINICAL INFORMATION: Fever    COMPARISON: CT chest 3/15/2024    CONTRAST/COMPLICATIONS:  IV Contrast: NONE  Oral Contrast: NONE  Complications: None reported at time of study completion    PROCEDURE:  CT of the Chest, Abdomen and Pelvis was performed.  Sagittal and coronal reformats were performed.    FINDINGS:  CHEST:  LUNGS AND LARGE AIRWAYS: Patent central airways. New masslike   consolidation in the right upper lobe. Nodular opacities are also noted   throughout the left upper lobe and bilateral lung bases, all of which are   new compared to CT 3/15/2024.  PLEURA: No pleural effusion.  VESSELS: Within normal limits.  HEART: Heart size is normal. No pericardial effusion.  MEDIASTINUM AND ELIECER: There are some enlarged mediastinal and hilar lymph   nodes, for example a right hilar node that measures 2.2x 1.9 cm (3-46),   nonspecific and possibly reactive.  CHEST WALL AND LOWER NECK: Within normal limits.    ABDOMEN AND PELVIS:  LIVER: Within normal limits.  BILE DUCTS: Normal caliber.  GALLBLADDER: Within normal limits.  SPLEEN: Within normal limits.  PANCREAS: Within normal limits.  ADRENALS: Within normal limits.  KIDNEYS/URETERS: Punctate nonobstructing left renal stone. No   hydronephrosis.    BLADDER: Within normal limits.  REPRODUCTIVE ORGANS: Prostate within normal limits.    BOWEL: No bowel obstruction. Appendix is normal.  PERITONEUM: No ascites.  VESSELS: Within normal limits.  RETROPERITONEUM/LYMPH NODES: No lymphadenopathy.  ABDOMINAL WALL: Within normal limits.  BONES: Within normal limits.    IMPRESSION:  Multifocal consolidations, most prominent in the right upper lobe, new   compared to CT 3/15/2024, likely representing multifocal pneumonia.        --- End of Report ---            ASHA ALAN DO; Attending Radiologist  This document has been electronically signed. Apr 1 2024  4:12PM    < end of copied text >  < from: Xray Chest 1 View AP/PA. (04.10.24 @ 06:02) >    ACC: [id number] EXAM:  XR CHEST AP OR PA 1V   ORDERED BY: RAVEN FULLER     PROCEDURE DATE:  04/10/2024          INTERPRETATION:  Exam:XR CHEST    clinical history:Pneumonia    Stable right upper lobe consolidation. Stable infiltrate left upper lobe.   No pneumothorax or pleural effusion    IMPRESSION: Stable bilateral upper lobe pneumonia    --- End of Report ---            PAVEL BANKS MD; Attending Radiologist  This document has been electronically signed. Apr 10 2024 11:58AM    < end of copied text >

## 2024-04-12 NOTE — PROGRESS NOTE ADULT - SUBJECTIVE AND OBJECTIVE BOX
60 yo man with history of severe eosinophilic asthma ( well know to pulm: they were considering starting Fasenra or Tezspire) , SHERI, HLD, gout, HTN and new DM was sent to hospital for ARF.  Patient was recently  (3/13/2024) in the hospital for asthma exacerbation and URI ( positive human metapneumovirus) .  During that hospitalization he was found to have HTN urgency and new DM. His initial CCT 3/15 did not show inflitrates.   Patient was hospitalized again 3/29 and developed fever inpatient 3/31.  on repeat CCT he is noted to have multi lobe PNA ( new mass like consolidation in RUL, nodular opacities throughpout LIAM and bilateral bases)  and mediastinal/Hilar LAD.   He was on steroids  until prior to 3/29/2024 admission,  He has been on antibiotics without resolution of fevers.  Patient developed RT arm swelling and had a CT which suggested elbow OM. Elbow is FROM without any pain.  MRI of the right arm was negative for OM.     Patient started to have asthma after the his  service at age 27.  His asthma has been difficult to control and his out patient pulmonologist has requested starting Tezspire.  He states that he is on steroids at least 1 week every month.  Patient denies any allergic rhinitis, sinus issues, nasal discharge.  And  apparently he had allergy testing which was negative.      He came in ARF with CR in the 3s.  UA on multiple times has been bland.  Renal failure improved quickly with with fluids.  He has lost of appetite and has not been eating and drinking much.      Patient is noted to have pain and swelling of the right hand ( particular 4th and 5th digit )  and right wrist . these joints are tender and warm to touch.  He states that it feels like his gout.  Patient admits to being non compliant with allopurinol as an out patient.      TODAY: patient again denies an history of sinus issues, rhinitis, red painful eye, oral lesion, rashes in extremities, chronic joint issues.  No history of numbness, tingling of extremities or epidoes of wrist or foot drop.  Asthma has worse over time and requires more steroid use when compared when he was younger.    -patient feels weak and is not eat because he has no appepite since this admission.  He is also not eating because he immediately has to have a bowal movement or his stomach feels uncomfortable.  He has been on antibiotics and a GI panel ( stool study was sent 4/6) which was negative.    -pateint denies having cardiac issues in the past.  never heart failure or MIs.  NO syncope or palpitation.        PMH: as above  Surgery: none  allergy: NKDA  meds: zosyn, metformin, advair, albuterol, atorvastatin amlodipine/benazepril   FH: No RA/SLE/thyroid dx lung cancer in [place] was a smoker   SH: no alcohol,smoking or illicit drugs, lives with wife, [place]       TTE: LVH, LVEF 70-75% hyperdynamic, normal RV, normal LA and RA , no pericardial effusion   Stress TEST; aborted due to bradycardia and hypotension    CA/P: normal-and no LAD     CCT: new mass like consolidation in RUL, nodular opacities throughout the LIAM and bilateral lung bases-all new since 3/15.2024.  enlarged medialstinal and hilar LAD.  2.2x 1.9 cm    CT arm: small chronic nonunited cortical avulsion stress fx fragment vs detached enthesophyte atthe olecranon insertion of right triceps.    overlying dermal ulceration with likely exposed bone and CT concerning for focal OM??  acute cellulitis in the proper clinal setting.    no elbow effusion or.septic arthritis  advanced right wrist arthrosis without effusion or convincing CT evidence of OM/spetic arthritis.      US right UE: No DVT x2 ( last 4/10/2024)   US LE b/l: no DVT 4/5/2024  US renal normal      Labs:  cbc with new thrombocytosis ( plts 541) , Hg 10 eosinophils 0.38 previous 0.58 ( was on setroid until prior to this admission)   cmp Cr 3/13 at admission and quickly improved 1.09 today   UA multiple times with no protein or blood    ( 845 at 3/13 admission)         procalcitonin 0.3   tsh/t4 normal  uric 6.7   A1C 7.1     trop normal on prior admission   BNP normal on prior admission     Bcx 3/31, 4/3, 4/6 neg  RVP/covid neg  legionella/strep negative  lyme and babesia negative  HIV neg  viral hep neg  UA 3/30 , 4/1 neg   GI panel negative 4/6/2024  ERON neg   RF 11    PE:   GEN: sitting in chair, well groomed, talks comfortably in full sentences, appear well    HEENT: clear sclera, no facial rashes, no parotid enlargement, no sinus pain on palpation    CVS: RRR no murmus    PULM: mild crackle but no wheezing   MSK: right 4th and 5th digit pain and swelling, right wrist pain and swelling, pitting edema of the right hand. elbows normal and FROM,  foot pitting edema  skin: normal       A/P 60 yo man with severe eosinophilic asthma, SHERI, HLD, HTN, DM, gout presents for ARF s/p recent hospital discharge for asthma exacerbation and URI ( + human metapneuoviris ) .  readmitted 3/29 and developed  fevers since 3/31.  CCT with multi lobe PNA despite antibiotics continues to have fever.  Right arm swelling and right wrist and right 4th and 5th digit swelling and pain.  DDX    -infectious (multi lobe  PNA ( with human metapneumovitus),   -vasculitis : Churg Bijan is possible however does not have multi-organ involvement  to suggest vasculitic phase( rhinitis, sinuitis, mononeuritis, rashes, cardiac involvement).  However he does have long history of severe eosinophilic asthma which  bring Churg Bijan to be questioned.  there are 3 phases  to churg bijan- phase 1-allegic phase with rhinitis/sinusitis, asthma , phase 2 with eosinophilic infiltration ( can affect the lung, heart and GI) and phase 3 is vasculitis with multi-organ involvement- mononeuritis, cardiac involvement, renal involvement, palpaple purpura, pulmonary involvement)   -malignancy:   lymphomatoid granulomatosis???      --consider aspergillus in patient who is frequently on steroids.  consider EBV, cmv  --lung infiltrates are new, consider pulm consult, BAL and lung biopsy if no improvement in fevers and no clear etiology   --check ANCA, sjogrens, ACE, LDH, spep, immunoglobulins, ferritin,  quanitiferon, RF, CCP,   --acute arthritis of the hand and wrist -most likely gout - will hold off on steroids for now.  consider NSAIDS   --ARF resolved --no blood or protein in UA   --please also check IgE and sent a CBC with complete differential now that he has been off sterods for about 2 weeks   --consder sinus CT   --diarrhea with relatively normal wbc and prior GI panel negative-possible antibiotic induced.  however given no clear etiology of fevers consider sending checking for c Diff

## 2024-04-12 NOTE — PROGRESS NOTE ADULT - ASSESSMENT
58y  Male with a h/o Asthma, HTN, HLD, DM2, Gout. Patient presented to [facility] ER for abnormal lab work, elevated Cr from his PMD's office. Patient recently hospitalized for acute asthma exacerbation and viral URI found to have uncontrolled HTN and new onset DM type 2 at that time discharged home with outpatient follow-up and sent back to [facility] ER today by PCP for abnormal kidney function. During his hospital course patient developed fever to 102.3F on 3/31, was a code sepsis, was administered Azithromycin x1 and Zosyn since 4/1.      RT UE swelling and warmth   Fever  TYESHA  Transaminitis   Thrombocytosis       - Blood cultures  3/31, 4/3, 4/6 no growth   - blood cultures 4/10 reporting Candida  - Repeat blood cultures  ordered  - RVP/COVID 19 PCR 3/31, 4/4 negative   - Urine for legionella and strep negative   - CT C/A/P 4/1 reporting multifocal PNA   - UA 3/30 and 4/1 negative   - RUE CT with contrast concerning for Rt elbow OM and Advanced right wrist arthrosis without significant effusion or convincing CT evidence for osteomyelitis/septic arthritis.  - Rt Elbow MRI with no OM.   - Rheumatology consult noted  - Babesia PCR is negative   - Lyme PCR  is negative   - HIV negative   - viral hepatitis profile negative  - ERON negative   - LFTs improving   - RF is 11  - Procalcitonin 0.30, will repeat in AM   - D/C Zosyn  - Start Caspofungin   - Check TTE, ordered  - Check CT A/P with contrast   - Hold off on PICC/Midline for now unless needed for reasons other than infectious diseases  - Follow up cultures  - Trend Fever  - Trend WBC      Will Follow    Discussed treatment plan with: Dr Gomez

## 2024-04-12 NOTE — PROGRESS NOTE ADULT - SUBJECTIVE AND OBJECTIVE BOX
[facility]  INFECTIOUS DISEASES at [place] / [place] / [place]  =======================================================                               Serg Almanza MD#   Maurisio Farr MD*                             Lisy Sandhu MD*   Laila Bell MD*                              Professor Emeritus:  Dr Josr Chatman MD^            Diplomates American Board of Internal Medicine & Infectious Diseases                # [place] Office - Appt - Tel  [phone] Fax [phone]                * [place] Office - Appt - Tel [phone] Fax [phone]                      ^[place] Office - Tel  [phone] Fax [phone]                                  Hospital Consult line:  [phone]  =======================================================    ZAINA MILLER [id number]    Follow up: Fever    Fever improved  RUE swelling and pain improving  Blood cultures now with yeast    Allergies:  No Known Allergies      REVIEW OF SYSTEMS:  CONSTITUTIONAL:  Improved Fever and chills  HEENT:  No diplopia or blurred vision.  No earache, sore throat or runny nose.  CARDIOVASCULAR:  No chest pain  RESPIRATORY:  No cough, shortness of breath  GASTROINTESTINAL:  No nausea, vomiting or diarrhea.  GENITOURINARY:  No dysuria, frequency or urgency. No Blood in urine  MUSCULOSKELETAL:  no joint aches, no muscle pain  SKIN:  No change in skin, hair or nails.  NEUROLOGIC:  No Headaches      Physical Exam:  GEN: NAD  HEENT: normocephalic and atraumatic. EOMI. PERRL.    NECK: Supple.   LUNGS: CTA B/L.  HEART: RRR  ABDOMEN: Soft, NT, ND.  +BS.    : No CVA tenderness  EXTREMITIES: RUE swelling and pain  MSK: No joint swelling  NEUROLOGIC: No Focal Deficits   SKIN: Rt hand swlling improved, no findings on external Rt elbow. Nl ROM Rt elbow.       Vitals:  T(F): 98.6 (12 Apr 2024 09:00), Max: 99.6 (11 Apr 2024 16:58)  HR: 98 (12 Apr 2024 09:00)  BP: 147/80 (12 Apr 2024 09:00)  RR: 18 (12 Apr 2024 09:00)  SpO2: 98% (12 Apr 2024 09:00) (96% - 100%)  temp max in last 48H T(F): , Max: 100 (04-10-24 @ 22:32)    Current Antibiotics:  piperacillin/tazobactam IVPB.. 3.375 Gram(s) IV Intermittent every 8 hours    Other medications:  amLODIPine   Tablet 10 milliGRAM(s) Oral daily  dextrose 5%. 1000 milliLiter(s) IV Continuous <Continuous>  dextrose 5%. 1000 milliLiter(s) IV Continuous <Continuous>  dextrose 50% Injectable 12.5 Gram(s) IV Push once  dextrose 50% Injectable 25 Gram(s) IV Push once  dextrose 50% Injectable 25 Gram(s) IV Push once  glucagon  Injectable 1 milliGRAM(s) IntraMuscular once  heparin   Injectable 5000 Unit(s) SubCutaneous every 12 hours  insulin lispro (ADMELOG) corrective regimen sliding scale   SubCutaneous Before meals and at bedtime  lactated ringers. 2000 milliLiter(s) IV Continuous <Continuous>  metoprolol tartrate 12.5 milliGRAM(s) Oral every 12 hours  pantoprazole    Tablet 40 milliGRAM(s) Oral before breakfast                            9.8    11.41 )-----------( 576      ( 12 Apr 2024 06:40 )             29.8     04-12    139  |  104  |  3.9<L>  ----------------------------<  95  3.8   |  21.0<L>  |  1.01    Ca    8.3<L>      12 Apr 2024 06:40  Phos  3.0     04-11  Mg     1.9     04-12    TPro  6.2<L>  /  Alb  2.3<L>  /  TBili  0.4  /  DBili  0.2  /  AST  39  /  ALT  45<H>  /  AlkPhos  97  04-11    RECENT CULTURES:  04-10 @ 05:59 .Blood Blood-Peripheral Blood Culture PCR    Growth in aerobic bottle: Yeast like cells  Direct identification is available within approximately 3-5  hours either by Blood Panel Multiplexed PCR or Direct  MALDI-TOF. Details: [email or web address]  Growth in aerobic bottle: Yeast like cells    04-10 @ 05:55 .Blood Blood-Peripheral     No growth at 24 hours    04-06 @ 05:25 .Blood Blood     No growth at 5 days    04-06 @ 05:19 .Blood Blood     No growth at 5 days    04-04 @ 13:21    RVP  NotDetec    04-03 @ 21:30 .Blood Blood-Peripheral     No growth at 5 days    04-03 @ 21:20 .Blood Blood-Peripheral     No growth at 5 days    04-03 @ 06:26 .Blood Blood     No growth at 5 days    04-03 @ 06:20 .Blood Blood     No growth at 5 days    03-31 @ 22:32    RVP  NotDetec    03-31 @ 22:21 .Blood Blood-Peripheral     No growth at 5 days    03-31 @ 22:18 .Blood Blood-Peripheral     No growth at 5 days    03-29 @ 20:22 Clean Catch Clean Catch (Midstream) Enterococcus faecalis    10,000 - 49,000 CFU/mL Enterococcus faecalis      WBC Count: 11.41 K/uL (04-12-24 @ 06:40)  WBC Count: 8.86 K/uL (04-11-24 @ 04:54)  WBC Count: 11.97 K/uL (04-10-24 @ 05:55)  WBC Count: 11.17 K/uL (04-10-24 @ 04:01)  WBC Count: 10.27 K/uL (04-09-24 @ 04:00)  WBC Count: 9.37 K/uL (04-08-24 @ 06:12)    Creatinine: 1.01 mg/dL (04-12-24 @ 06:40)  Creatinine: 1.09 mg/dL (04-11-24 @ 04:54)  Creatinine: 1.04 mg/dL (04-10-24 @ 07:04)  Creatinine: 1.04 mg/dL (04-10-24 @ 04:01)  Creatinine: 0.97 mg/dL (04-09-24 @ 04:00)  Creatinine: 1.00 mg/dL (04-08-24 @ 06:12)    C-Reactive Protein, Serum: 188 mg/L (04-12-24 @ 06:40)  C-Reactive Protein, Serum: 162 mg/L (04-09-24 @ 04:00)  C-Reactive Protein, Serum: 163 mg/L (04-06-24 @ 05:25)  C-Reactive Protein, Serum: 141 mg/L (04-05-24 @ 08:34)      Procalcitonin, Serum: 0.36 ng/mL (04-12-24 @ 06:40)  Procalcitonin, Serum: 0.30 ng/mL (04-10-24 @ 07:04)  Procalcitonin, Serum: 0.25 ng/mL (04-09-24 @ 04:00)  Procalcitonin, Serum: 0.63 ng/mL (04-06-24 @ 05:25)  Procalcitonin, Serum: 0.63 ng/mL (04-05-24 @ 06:57)  Procalcitonin, Serum: 0.64 ng/mL (04-05-24 @ 06:56)  Procalcitonin, Serum: 0.39 ng/mL (04-02-24 @ 04:52)  Procalcitonin, Serum: 0.17 ng/mL (03-31-24 @ 22:18)     SARS-CoV-2: NotDetec (04-04-24 @ 13:21)  COVID-19 PCR: NotDetec (03-31-24 @ 22:32)  SARS-CoV-2: NotDetec (03-31-24 @ 22:32)  SARS-CoV-2: NotDetec (03-13-24 @ 15:45)              Anti-Nuclear Antibody in AM (04.06.24 @ 05:25)    Anti Nuclear Factor Titer: Negative: Antinuclear AB (ERON), IFA Method    Rapid HIV-1/2 Antibody (04.05.24 @ 08:34)    Rapid HIV-1/2 Antibody: Nonreact    Legionella pneumophila Antigen, Urine (04.02.24 @ 15:51)    Legionella Antigen, Urine: Negative    Streptococcus pneumoniae Ag, Ur (04.02.24 @ 15:51)    Streptococcus pneumoniae Ag, Ur Result: Negative    Lipase (04.04.24 @ 05:05)    Lipase: 53 U/L    Acute Hepatitis Panel (04.06.24 @ 05:25)    Hepatitis C Virus Interpretation: Nonreact   Hepatitis C Virus S/CO Ratio: 0.13 S/CO   Hepatitis B Core IgM Antibody: Nonreact   Hepatitis B Surface Antigen: Nonreact   Hepatitis A IgM Antibody: Nonreact    Rheumatoid Factor Quant, Serum or Plasma in AM (04.06.24 @ 05:25)    Rheumatoid Factor Quant, Serum or Plasma: 11 IU/mL              < from: MR Elbow No Cont, Right (04.11.24 @ 14:50) >    ACC: [id number] EXAM:  MR ELBOW RT   ORDERED BY: JAIRON CARPIO     PROCEDURE DATE:  04/11/2024      INTERPRETATION:  Clinical indication: Fever of unknown origin. Swelling   about the elbow. CT findings concerning for avulsion fracture versus   detached enthesophyte    Multiplanar multisequence noncontrast MRI of the right elbow was   performed.    Correlation is made with prior CT of the right elbow from April 10, 2024.    FINDINGS:    There is extensive circumferential subcutaneous edemathroughout the   visualized elbow consistent with cellulitis. There is mild feathery edema   within the medial flexor and to a lesser extent the dorsal extensor   musculature which is nonspecific and may be related to infectious or   inflammatory etiologies.    The previously described ossific fragment embedded within the ulnar   margin of the distal triceps tendon is again seen. This is likely related   to the sequela of remote avulsive injury. There is moderate tendinosis   and surrounding peritendinous edema within the triceps tendon. There is   no full-thickness or retracted triceps tendon tear. There is mild   psuedoarthritic changes between the olecranon and the avulsed fragment   which trace osseous edema about the pseudoarthrosis. There is mild   overlying olecranon bursitis. Trace osseous edema about the   pseudoarthrosis of the avulsed fragment which is favored to be   degenerative in nature. No convincing evidence of osteomyelitis.   Otherwise, the marrow signal within the elbow is preserved.    There is a nonspecific small elbow joint effusion. There is no evidence   of osseous erosion or subarticular osteitis. The articular surfaces are   preserved.    There is chronic mild undersurface tearing involving the origin of the  common extensor tendon. The lateral collateral ligamentous structures are   preserved.    The common flexor tendon origin is intact. Medial collateral ligamentous   structures are preserved. The distal biceps and brachialis tendinous   insertions are intact.    IMPRESSION:    Redemonstration of a chronic appearing avulsion along the ulnar margin of   the triceps insertion. There is moderate associated tendinosis and   surrounding peritendinous edema within the triceps tendon. No   full-thickness or retracted triceps tendon tear. Mild psuedoarthritic   changes between the olecranon and the avulsed fragment which trace   osseous edema about the pseudoarthrosis. Trace osseous edema about the   pseudoarthrosis of the avulsed fragment which is favored to be   degenerative in nature. No convincing evidence of osteomyelitis.    Diffuse subcutaneous edema about the elbow. Mild olecranon bursitis. Mild   edema within the medial flexor and to a lesser extent extensor   musculature. Findings may be related to underlying cellulitis.    Nonspecific small elbow joint effusion.    --- End of Report ---      < end of copied text >      < from: CT Upper Extremity w/ IV Cont, Right (04.10.24 @ 14:05) >  ACC: [id number] EXAM:  CT UPR EXT IC RT   ORDERED BY: MAURISIO FARR     PROCEDURE DATE:  04/10/2024          INTERPRETATION:  CT OF THE RIGHT UPPER EXTREMITY    CLINICAL INFORMATION: Swelling.    COMPARISON: None available.    TECHNIQUE: Axial CT images were obtained of the right upper extremity   from the shoulder through the fingertips following administration of 90   cc Omnipaque 350 intravenous contrast. Sagittal and coronal   reconstructions were provided.    FINDINGS:    Osseous: No acutefracture or dislocation. Small well-corticated chronic   nonunited cortical avulsion stress fracture fragment versus detached   enthesophyte at the olecranon insertion of the triceps. Subtle adjacent   small focal cortical erosion. No aggressive periosteal reaction.   Moderately severe wrist arthrosis without definite effusions.   Mineralization within normal limits.     Soft tissues: Inflammatory stranding near circumferentially surrounding   the elbow and proximal to mid forearm, but without discrete sizable   hyperattenuating hematoma or drainable encapsulated fluid collection. No   elbow joint effusion. Ill-defined superficial dermal ulceration overlying   the tip of the olecranon with nearly exposed bone. No tracking emphysema.   No radiopaque foreign body.    IMPRESSION:    1.  Small chronic nonunited cortical avulsion stress fracture fragment   versus detached enthesophyte at the olecranon insertion of right triceps.   Overlying dermal ulceration with likely exposed bone and CT findings   concerning for focal osteomyelitis. Correlate for probe to bone and   consider follow-up elbow MRI as clinically indicated.  2.  Acute cellulitis in the proper clinical setting. No tracking soft   tissue emphysema drainable mature abscess.  3. No right elbow joint effusion/septic arthritis. Advanced right wrist   arthrosis without significant effusion or convincing CT evidence for   osteomyelitis/septic arthritis.    --- End of Report ---    < end of copied text >

## 2024-04-12 NOTE — PROGRESS NOTE ADULT - SUBJECTIVE AND OBJECTIVE BOX
[facility] Division of Hospital Medicine  Dorcas Gomez MD   I'm reachable on [employer] Teams      Patient is a 59y old  Male who presents with a chief complaint of ARF (12 Apr 2024 13:00)      SUBJECTIVE / OVERNIGHT EVENTS:   Patient was seen and examined during rounds  - Still spiking low grade fever  -2/2 blood culture on 04/10/2024 grew candida. Discussed with Dr. Uribe. Started on capsofungin  -Discussed with pulmonologist Dr. Tracy to evaluate for BAL  -Noticed wheezing. Started on albuterol nebulizer      12 points ROS negative except above    MEDICATIONS  (STANDING):  albuterol    0.083% 2.5 milliGRAM(s) Nebulizer every 6 hours  amLODIPine   Tablet 10 milliGRAM(s) Oral daily  caspofungin IVPB      dextrose 5%. 1000 milliLiter(s) (50 mL/Hr) IV Continuous <Continuous>  dextrose 5%. 1000 milliLiter(s) (100 mL/Hr) IV Continuous <Continuous>  dextrose 50% Injectable 25 Gram(s) IV Push once  dextrose 50% Injectable 25 Gram(s) IV Push once  dextrose 50% Injectable 12.5 Gram(s) IV Push once  glucagon  Injectable 1 milliGRAM(s) IntraMuscular once  heparin   Injectable 5000 Unit(s) SubCutaneous every 12 hours  insulin lispro (ADMELOG) corrective regimen sliding scale   SubCutaneous Before meals and at bedtime  metoprolol tartrate 12.5 milliGRAM(s) Oral every 12 hours  pantoprazole    Tablet 40 milliGRAM(s) Oral before breakfast    MEDICATIONS  (PRN):  acetaminophen     Tablet .. 650 milliGRAM(s) Oral every 6 hours PRN Temp greater or equal to 38C (100.4F), Mild Pain (1 - 3)  albuterol    0.083% 2.5 milliGRAM(s) Nebulizer every 4 hours PRN Wheezing  albuterol    90 MICROgram(s) HFA Inhaler 2 Puff(s) Inhalation every 6 hours PRN Shortness of Breath and/or Wheezing  aluminum hydroxide/magnesium hydroxide/simethicone Suspension 30 milliLiter(s) Oral every 4 hours PRN Dyspepsia  dextrose Oral Gel 15 Gram(s) Oral once PRN Blood Glucose LESS THAN 70 milliGRAM(s)/deciliter  diphenoxylate/atropine 1 Tablet(s) Oral every 6 hours PRN Diarrhea  melatonin 3 milliGRAM(s) Oral at bedtime PRN Insomnia  metoprolol tartrate Injectable 2.5 milliGRAM(s) IV Push every 6 hours PRN HR above 110/min  ondansetron Injectable 4 milliGRAM(s) IV Push every 8 hours PRN Nausea and/or Vomiting        I&O's Summary    11 Apr 2024 07:01  -  12 Apr 2024 07:00  --------------------------------------------------------  IN: 2070 mL / OUT: 1350 mL / NET: 720 mL    12 Apr 2024 07:01  -  12 Apr 2024 17:53  --------------------------------------------------------  IN: 1320 mL / OUT: 0 mL / NET: 1320 mL        PHYSICAL EXAM:  Vital Signs Last 24 Hrs  T(C): 37.4 (12 Apr 2024 17:31), Max: 37.4 (12 Apr 2024 17:31)  T(F): 99.4 (12 Apr 2024 17:31), Max: 99.4 (12 Apr 2024 17:31)  HR: 114 (12 Apr 2024 17:31) (93 - 114)  BP: 119/57 (12 Apr 2024 17:31) (111/68 - 147/80)  BP(mean): --  RR: 18 (12 Apr 2024 17:31) (18 - 18)  SpO2: 98% (12 Apr 2024 17:31) (98% - 100%)    Parameters below as of 12 Apr 2024 14:47  Patient On (Oxygen Delivery Method): nasal cannula        CONSTITUTIONAL: NAD, Not in acute distress  EYES:  EOMI, conjunctiva and sclera clear  ENMT: , neck supple , non-tender  RESPIRATORY: Normal respiratory effort; lungs are clear to auscultation bilaterally. WHeezing present. started on albuterol nebulizer  CARDIOVASCULAR: S1S2 present  ABDOMEN: soft. bowel sound present  MUSCLOSKELETAL: ; no clubbing or cyanosis of digits;   PSYCH: A+O to person, place, and time; affect appropriate  NEUROLOGY: CN, motor and sensory intact   SKIN: No rashes; no palpable lesions  Extremity: RIght UE swelling.       LABS:                        9.8    11.41 )-----------( 576      ( 12 Apr 2024 06:40 )             29.8     04-12    139  |  104  |  3.9<L>  ----------------------------<  95  3.8   |  21.0<L>  |  1.01    Ca    8.3<L>      12 Apr 2024 06:40  Phos  3.0     04-11  Mg     1.9     04-12    TPro  6.2<L>  /  Alb  2.3<L>  /  TBili  0.4  /  DBili  0.2  /  AST  39  /  ALT  45<H>  /  AlkPhos  97  04-11          Urinalysis Basic - ( 12 Apr 2024 06:40 )    Color: x / Appearance: x / SG: x / pH: x  Gluc: 95 mg/dL / Ketone: x  / Bili: x / Urobili: x   Blood: x / Protein: x / Nitrite: x   Leuk Esterase: x / RBC: x / WBC x   Sq Epi: x / Non Sq Epi: x / Bacteria: x        Culture - Blood (collected 10 Apr 2024 05:59)  Source: .Blood Blood-Peripheral  Gram Stain (12 Apr 2024 09:33):    Growth in aerobic bottle: Yeast like cells  Preliminary Report (12 Apr 2024 09:33):    Growth in aerobic bottle: Yeast like cells    Direct identification is available within approximately 3-5    hours either by Blood Panel Multiplexed PCR or Direct    MALDI-TOF. Details: [email or web address]  Organism: Blood Culture PCR (12 Apr 2024 11:13)  Organism: Blood Culture PCR (12 Apr 2024 11:13)    Culture - Blood (collected 10 Apr 2024 05:55)  Source: .Blood Blood-Peripheral  Preliminary Report (12 Apr 2024 15:01):    No growth at 48 Hours      CAPILLARY BLOOD GLUCOSE      POCT Blood Glucose.: 85 mg/dL (12 Apr 2024 16:11)  POCT Blood Glucose.: 85 mg/dL (12 Apr 2024 11:17)  POCT Blood Glucose.: 81 mg/dL (12 Apr 2024 07:48)  POCT Blood Glucose.: 97 mg/dL (11 Apr 2024 21:25)      Labs reviewed:    RADIOLOGY & ADDITIONAL TESTS:  Imaging Personally Reviewed:  Electrocardiogram Personally Reviewed:

## 2024-04-12 NOTE — PROGRESS NOTE ADULT - ASSESSMENT
58M with Asthma, HTN, HLD, DM2, Gout admitted with TYESHA suspected 2.2 medications along with MF PNA     Sepsis due to Fungemia  RIght lung infiltrate  Fever of unknown origin  Remains intermittently febrile despite on Abx  Bcx on 04/10 grew Solange  started on Capsofungin 04/12/2024  Echo pending  ID following  Rhrumatology following. ANCA, SSA, ACE, LDH, SPEP, IG lever, Ferritin, Quantiferon, RF an CCP ordered  Concern for fungal pneumonia given patient was intermittently on steroid before admission  Pulmonologist Dr. Tracy consulted for BAL    # Suspeceted Right elbolw osteomyelitis  -FOund right UE swelling on 04/10  -CT RUE concerning for right elbow osteomyelitis  -Right elbow MRI reported no osteomyelitis  -Dr. Brand following    #Sinus Tachycardia  -Most likely reactive  -WOrk up including US doppler negative. Echo unremarkable  -Follow up after IV fluid    TYESHA (now resolved)   -Possibly 2/2 medication induced ATN   -Hold ARB/HCTZ/Metformin   -Potentially  medication induced (in setting of poor PO intake) from recent introduction of ARB/HCTZ/Metformin  -Cr (baseline 1.08) >>>3.13 >2.39>1.91>1.75>1.41>1.40>1.3>1.31> 1.08  -UA negative,Renal US negative  -Hold above meds. Avoid Nephrotoxins.Renally dose meds  -Nephro Consult note appreciated    HTN, HLD  Amlodipine 10mg q24  Hold ARB and HCTZ for now  Not currently on statin therapy    DM2  Hold Metformin 500mg BID  ISS/Accuchecks/A1C    Asthma  Finished steroid taper last Sunday. No current symptoms/hypoxia.  No longer taking Montelukast  Wixela/Duoneb PRN    Gout  Uric Acid level 12.2  Takes Allopurinol as needed. Hold for now.     VTE PPX SQH/SCDs    Dispo: Acute.

## 2024-04-12 NOTE — PROVIDER CONTACT NOTE (CRITICAL VALUE NOTIFICATION) - ACTION/TREATMENT ORDERED:
made dr. oGmez aware. no new orders at this.
Dr. Goddard stated he will order another 2g of mag for pt
medicine TEODORO Ruiz stated she will order 2g mag, an ekg, and repeat lab for mag

## 2024-04-13 LAB
ANION GAP SERPL CALC-SCNC: 14 MMOL/L — SIGNIFICANT CHANGE UP (ref 5–17)
BUN SERPL-MCNC: 3.8 MG/DL — LOW (ref 8–20)
CALCIUM SERPL-MCNC: 8.2 MG/DL — LOW (ref 8.4–10.5)
CHLORIDE SERPL-SCNC: 104 MMOL/L — SIGNIFICANT CHANGE UP (ref 96–108)
CO2 SERPL-SCNC: 21 MMOL/L — LOW (ref 22–29)
CREAT SERPL-MCNC: 0.92 MG/DL — SIGNIFICANT CHANGE UP (ref 0.5–1.3)
DSDNA AB FLD-ACNC: <0.2 AI — SIGNIFICANT CHANGE UP
EGFR: 95 ML/MIN/1.73M2 — SIGNIFICANT CHANGE UP
ENA SS-A AB FLD IA-ACNC: <0.2 AI — SIGNIFICANT CHANGE UP
FERRITIN SERPL-MCNC: 1067 NG/ML — HIGH (ref 30–400)
GLUCOSE BLDC GLUCOMTR-MCNC: 115 MG/DL — HIGH (ref 70–99)
GLUCOSE BLDC GLUCOMTR-MCNC: 72 MG/DL — SIGNIFICANT CHANGE UP (ref 70–99)
GLUCOSE BLDC GLUCOMTR-MCNC: 89 MG/DL — SIGNIFICANT CHANGE UP (ref 70–99)
GLUCOSE BLDC GLUCOMTR-MCNC: 99 MG/DL — SIGNIFICANT CHANGE UP (ref 70–99)
GLUCOSE SERPL-MCNC: 79 MG/DL — SIGNIFICANT CHANGE UP (ref 70–99)
HCT VFR BLD CALC: 28.7 % — LOW (ref 39–50)
HGB BLD-MCNC: 9.4 G/DL — LOW (ref 13–17)
IGA FLD-MCNC: 507 MG/DL — HIGH (ref 84–499)
IGG FLD-MCNC: 1328 MG/DL — SIGNIFICANT CHANGE UP (ref 610–1660)
IGM SERPL-MCNC: 243 MG/DL — HIGH (ref 35–242)
KAPPA LC SER QL IFE: 7.05 MG/DL — HIGH (ref 0.33–1.94)
KAPPA/LAMBDA FREE LIGHT CHAIN RATIO, SERUM: 0.58 RATIO — SIGNIFICANT CHANGE UP (ref 0.26–1.65)
LAMBDA LC SER QL IFE: 12.1 MG/DL — HIGH (ref 0.57–2.63)
LDH SERPL L TO P-CCNC: 311 U/L — HIGH (ref 98–192)
MAGNESIUM SERPL-MCNC: 1.7 MG/DL — SIGNIFICANT CHANGE UP (ref 1.6–2.6)
MCHC RBC-ENTMCNC: 28 PG — SIGNIFICANT CHANGE UP (ref 27–34)
MCHC RBC-ENTMCNC: 32.8 GM/DL — SIGNIFICANT CHANGE UP (ref 32–36)
MCV RBC AUTO: 85.4 FL — SIGNIFICANT CHANGE UP (ref 80–100)
PLATELET # BLD AUTO: 604 K/UL — HIGH (ref 150–400)
POTASSIUM SERPL-MCNC: 3.5 MMOL/L — SIGNIFICANT CHANGE UP (ref 3.5–5.3)
POTASSIUM SERPL-SCNC: 3.5 MMOL/L — SIGNIFICANT CHANGE UP (ref 3.5–5.3)
PROT SERPL-MCNC: 5.7 G/DL — LOW (ref 6–8.3)
RBC # BLD: 3.36 M/UL — LOW (ref 4.2–5.8)
RBC # FLD: 14.2 % — SIGNIFICANT CHANGE UP (ref 10.3–14.5)
SODIUM SERPL-SCNC: 139 MMOL/L — SIGNIFICANT CHANGE UP (ref 135–145)
WBC # BLD: 12.39 K/UL — HIGH (ref 3.8–10.5)
WBC # FLD AUTO: 12.39 K/UL — HIGH (ref 3.8–10.5)

## 2024-04-13 PROCEDURE — 99233 SBSQ HOSP IP/OBS HIGH 50: CPT

## 2024-04-13 PROCEDURE — 74177 CT ABD & PELVIS W/CONTRAST: CPT | Mod: 26

## 2024-04-13 RX ORDER — ACETAMINOPHEN 500 MG
1000 TABLET ORAL ONCE
Refills: 0 | Status: COMPLETED | OUTPATIENT
Start: 2024-04-13 | End: 2024-04-13

## 2024-04-13 RX ADMIN — Medication 1000 MILLIGRAM(S): at 01:13

## 2024-04-13 RX ADMIN — ALBUTEROL 2.5 MILLIGRAM(S): 90 AEROSOL, METERED ORAL at 05:22

## 2024-04-13 RX ADMIN — HEPARIN SODIUM 5000 UNIT(S): 5000 INJECTION INTRAVENOUS; SUBCUTANEOUS at 17:26

## 2024-04-13 RX ADMIN — Medication 12.5 MILLIGRAM(S): at 05:05

## 2024-04-13 RX ADMIN — ALBUTEROL 2.5 MILLIGRAM(S): 90 AEROSOL, METERED ORAL at 20:18

## 2024-04-13 RX ADMIN — Medication 650 MILLIGRAM(S): at 21:48

## 2024-04-13 RX ADMIN — Medication 12.5 MILLIGRAM(S): at 17:26

## 2024-04-13 RX ADMIN — HEPARIN SODIUM 5000 UNIT(S): 5000 INJECTION INTRAVENOUS; SUBCUTANEOUS at 04:39

## 2024-04-13 RX ADMIN — AMLODIPINE BESYLATE 10 MILLIGRAM(S): 2.5 TABLET ORAL at 05:05

## 2024-04-13 RX ADMIN — Medication 2.5 MILLIGRAM(S): at 14:19

## 2024-04-13 RX ADMIN — Medication 650 MILLIGRAM(S): at 20:48

## 2024-04-13 RX ADMIN — CASPOFUNGIN ACETATE 260 MILLIGRAM(S): 7 INJECTION, POWDER, LYOPHILIZED, FOR SOLUTION INTRAVENOUS at 11:43

## 2024-04-13 RX ADMIN — Medication 400 MILLIGRAM(S): at 00:42

## 2024-04-13 RX ADMIN — ALBUTEROL 2.5 MILLIGRAM(S): 90 AEROSOL, METERED ORAL at 15:34

## 2024-04-13 RX ADMIN — PANTOPRAZOLE SODIUM 40 MILLIGRAM(S): 20 TABLET, DELAYED RELEASE ORAL at 05:05

## 2024-04-13 NOTE — PROGRESS NOTE ADULT - SUBJECTIVE AND OBJECTIVE BOX
ZAINA MILLER    [id number]    59y      Male    INTERVAL HPI/OVERNIGHT EVENTS:  patient being seen for arf and fungemia. patient seen at bedside and is in nad    patient had a ct abd done this am.     REVIEW OF SYSTEMS:    CONSTITUTIONAL: No fever, weight loss, or fatigue  RESPIRATORY: No cough, wheezing, hemoptysis; No shortness of breath  CARDIOVASCULAR: No chest pain, palpitations  GASTROINTESTINAL: No abdominal or epigastric pain. No nausea, vomiting  NEUROLOGICAL: No headaches, memory loss, loss of strength.  MISCELLANEOUS:      Vital Signs Last 24 Hrs  T(C): 37.3 (13 Apr 2024 11:49), Max: 38.6 (13 Apr 2024 00:05)  T(F): 99.2 (13 Apr 2024 11:49), Max: 101.4 (13 Apr 2024 00:05)  HR: 108 (13 Apr 2024 10:01) (97 - 114)  BP: 156/78 (13 Apr 2024 11:49) (119/57 - 164/76)  BP(mean): --  RR: 18 (13 Apr 2024 10:01) (18 - 18)  SpO2: 97% (13 Apr 2024 10:01) (97% - 100%)    Parameters below as of 13 Apr 2024 10:01  Patient On (Oxygen Delivery Method): room air        PHYSICAL EXAM:  CONSTITUTIONAL: NAD, Not in acute distress  EYES:  EOMI, conjunctiva and sclera clear  ENMT: , neck supple , non-tender  RESPIRATORY: Normal respiratory effort; lungs are clear to auscultation bilaterally. WHeezing present. started on albuterol nebulizer  CARDIOVASCULAR: S1S2 present  ABDOMEN: soft. bowel sound present  MUSCLOSKELETAL: ; no clubbing or cyanosis of digits;   PSYCH: A+O to person, place, and time; affect appropriate  NEUROLOGY: CN, motor and sensory intact   SKIN: No rashes; no palpable lesions  Extremity: RIght UE swelling.       LABS:                        9.4    12.39 )-----------( 604      ( 13 Apr 2024 06:05 )             28.7     04-13    139  |  104  |  3.8<L>  ----------------------------<  79  3.5   |  21.0<L>  |  0.92    Ca    8.2<L>      13 Apr 2024 06:05  Mg     1.7     04-13    TPro  5.7<L>  /  Alb  x   /  TBili  x   /  DBili  x   /  AST  x   /  ALT  x   /  AlkPhos  x   04-13      Urinalysis Basic - ( 13 Apr 2024 06:05 )    Color: x / Appearance: x / SG: x / pH: x  Gluc: 79 mg/dL / Ketone: x  / Bili: x / Urobili: x   Blood: x / Protein: x / Nitrite: x   Leuk Esterase: x / RBC: x / WBC x   Sq Epi: x / Non Sq Epi: x / Bacteria: x          MEDICATIONS  (STANDING):  albuterol    0.083% 2.5 milliGRAM(s) Nebulizer every 6 hours  amLODIPine   Tablet 10 milliGRAM(s) Oral daily  caspofungin IVPB      caspofungin IVPB 50 milliGRAM(s) IV Intermittent every 24 hours  dextrose 5%. 1000 milliLiter(s) (100 mL/Hr) IV Continuous <Continuous>  dextrose 5%. 1000 milliLiter(s) (50 mL/Hr) IV Continuous <Continuous>  dextrose 50% Injectable 25 Gram(s) IV Push once  dextrose 50% Injectable 25 Gram(s) IV Push once  dextrose 50% Injectable 12.5 Gram(s) IV Push once  glucagon  Injectable 1 milliGRAM(s) IntraMuscular once  heparin   Injectable 5000 Unit(s) SubCutaneous every 12 hours  insulin lispro (ADMELOG) corrective regimen sliding scale   SubCutaneous Before meals and at bedtime  metoprolol tartrate 12.5 milliGRAM(s) Oral every 12 hours  pantoprazole    Tablet 40 milliGRAM(s) Oral before breakfast    MEDICATIONS  (PRN):  acetaminophen     Tablet .. 650 milliGRAM(s) Oral every 6 hours PRN Temp greater or equal to 38C (100.4F), Mild Pain (1 - 3)  albuterol    0.083% 2.5 milliGRAM(s) Nebulizer every 4 hours PRN Wheezing  albuterol    90 MICROgram(s) HFA Inhaler 2 Puff(s) Inhalation every 6 hours PRN Shortness of Breath and/or Wheezing  aluminum hydroxide/magnesium hydroxide/simethicone Suspension 30 milliLiter(s) Oral every 4 hours PRN Dyspepsia  dextrose Oral Gel 15 Gram(s) Oral once PRN Blood Glucose LESS THAN 70 milliGRAM(s)/deciliter  diphenoxylate/atropine 1 Tablet(s) Oral every 6 hours PRN Diarrhea  melatonin 3 milliGRAM(s) Oral at bedtime PRN Insomnia  metoprolol tartrate Injectable 2.5 milliGRAM(s) IV Push every 6 hours PRN HR above 110/min  ondansetron Injectable 4 milliGRAM(s) IV Push every 8 hours PRN Nausea and/or Vomiting      RADIOLOGY & ADDITIONAL TESTS:

## 2024-04-13 NOTE — PROGRESS NOTE ADULT - ASSESSMENT
59 yoM with Asthma, HTN, HLD, DM2, Gout admitted with TYESHA suspected 2.2 medications along with MF PNA . Course complicated by fungemia.     Sepsis due to Fungemia  RIght lung infiltrate  Fever of unknown origin  Bcx on 04/10 grew Solange  started on Capsofungin 04/12/2024  Echo appreciated  ID following  Rhrumatology following. ANCA, SSA, ACE, LDH, SPEP, IG lever, Ferritin, Quantiferon, RF an CCP ordered  Concern for fungal pneumonia given patient was intermittently on steroid before admission  Pulmonologist Dr. Tracy consulted for BAL, possible next week   - ct abd wit iv contrast done today     # Suspeceted Right elbolw osteomyelitis  -FOund right UE swelling on 04/10  -CT RUE concerning for right elbow osteomyelitis  -Right elbow MRI reported no osteomyelitis  -ID following     #Sinus Tachycardia  -Most likely reactive  -WOrk up including US doppler negative. Echo unremarkable  -Follow up after IV fluid    TYESHA (now resolved)   -Possibly 2/2 medication induced ATN   -Hold ARB/HCTZ/Metformin   -Potentially  medication induced (in setting of poor PO intake) from recent introduction of ARB/HCTZ/Metformin  -Cr (baseline 1.08) >>>3.13 >2.39>1.91>1.75>1.41>1.40>1.3>1.31> 1.08  -UA negative, Renal US negative  -Hold above meds. Avoid Nephrotoxins. Renally dose meds  -Nephro Consult note appreciated    HTN, HLD  Amlodipine 10mg q24  Hold ARB and HCTZ for now  Not currently on statin therapy    DM2  Hold Metformin 500mg BID  ISS    Asthma  Finished steroid taper last Sunday. No current symptoms/hypoxia.  No longer taking Montelukast  Wixela/Duoneb PRN    Gout  Uric Acid level 12.2  Takes Allopurinol as needed. Hold for now.     VTE PPX SQH/SCDs    Dispo: Acute.

## 2024-04-13 NOTE — PROGRESS NOTE ADULT - ASSESSMENT
-Severe persistent asthma  -Hx Eosinophilia  -Lung infiltrate, fever - should be treated as infectious with atypical organisms such as fungal  Does not have other organ involvement expected for Churg B[place] but not ruled out. IgE was normal last checked. ALso in DDx is eosinophilic pna; long hx of asthma and prolonged symptoms which is seen in [facility]; however, given acute decompensation, could consider AEP.   -Increasing size of lung nodules c/w seen on admission  -Hypoxic resp failure  -Possible elbow OM  -Renal failure - improving  -SHERI; not treated    RECC:  Antifungal per ID. Agree with repeat IgE, CBC with diff, ANCA. Will need BAL. CPAP while here. Rheum f/u.     D/W pt.

## 2024-04-13 NOTE — PROGRESS NOTE ADULT - SUBJECTIVE AND OBJECTIVE BOX
PULMONARY PROGRESS NOTE      AMANDA MILLER-[id number]    Patient is a 59y old  Male who presents with a chief complaint of ARF (13 Apr 2024 14:06)      INTERVAL HPI/OVERNIGHT EVENTS: OOB in chair. On RA. Still cough, wheeze. Fevers.     MEDICATIONS  (STANDING):  albuterol    0.083% 2.5 milliGRAM(s) Nebulizer every 6 hours  amLODIPine   Tablet 10 milliGRAM(s) Oral daily  caspofungin IVPB      caspofungin IVPB 50 milliGRAM(s) IV Intermittent every 24 hours  dextrose 5%. 1000 milliLiter(s) (50 mL/Hr) IV Continuous <Continuous>  dextrose 5%. 1000 milliLiter(s) (100 mL/Hr) IV Continuous <Continuous>  dextrose 50% Injectable 25 Gram(s) IV Push once  dextrose 50% Injectable 25 Gram(s) IV Push once  dextrose 50% Injectable 12.5 Gram(s) IV Push once  glucagon  Injectable 1 milliGRAM(s) IntraMuscular once  heparin   Injectable 5000 Unit(s) SubCutaneous every 12 hours  insulin lispro (ADMELOG) corrective regimen sliding scale   SubCutaneous Before meals and at bedtime  metoprolol tartrate 12.5 milliGRAM(s) Oral every 12 hours  pantoprazole    Tablet 40 milliGRAM(s) Oral before breakfast      MEDICATIONS  (PRN):  acetaminophen     Tablet .. 650 milliGRAM(s) Oral every 6 hours PRN Temp greater or equal to 38C (100.4F), Mild Pain (1 - 3)  albuterol    0.083% 2.5 milliGRAM(s) Nebulizer every 4 hours PRN Wheezing  albuterol    90 MICROgram(s) HFA Inhaler 2 Puff(s) Inhalation every 6 hours PRN Shortness of Breath and/or Wheezing  aluminum hydroxide/magnesium hydroxide/simethicone Suspension 30 milliLiter(s) Oral every 4 hours PRN Dyspepsia  dextrose Oral Gel 15 Gram(s) Oral once PRN Blood Glucose LESS THAN 70 milliGRAM(s)/deciliter  diphenoxylate/atropine 1 Tablet(s) Oral every 6 hours PRN Diarrhea  melatonin 3 milliGRAM(s) Oral at bedtime PRN Insomnia  metoprolol tartrate Injectable 2.5 milliGRAM(s) IV Push every 6 hours PRN HR above 110/min  ondansetron Injectable 4 milliGRAM(s) IV Push every 8 hours PRN Nausea and/or Vomiting      Allergies    No Known Allergies    Intolerances        PAST MEDICAL & SURGICAL HISTORY:  Gout      Asthma      Arthritis      HTN (hypertension)      No significant past surgical history               REVIEW OF SYSTEMS:    CONSTITUTIONAL:  per HPI    HEENT:  Eyes:  No diplopia or blurred vision. ENT:  No earache, sore throat or runny nose.    CARDIOVASCULAR:  No pressure, squeezing, tightness, heaviness or aching about the chest; no palpitations.    RESPIRATORY:  per HPI     GASTROINTESTINAL:  No nausea, vomiting or diarrhea.    GENITOURINARY:  No dysuria, frequency or urgency.    NEUROLOGIC:  No paresthesias, fasciculations, seizures or weakness.    PSYCHIATRIC:  No disorder of thought or mood.    Vital Signs Last 24 Hrs  T(C): 37 (13 Apr 2024 14:22), Max: 38.6 (13 Apr 2024 00:05)  T(F): 98.6 (13 Apr 2024 14:22), Max: 101.4 (13 Apr 2024 00:05)  HR: 103 (13 Apr 2024 14:54) (97 - 120)  BP: 145/74 (13 Apr 2024 14:22) (119/57 - 164/76)  BP(mean): --  RR: 18 (13 Apr 2024 14:22) (18 - 18)  SpO2: 100% (13 Apr 2024 14:22) (97% - 100%)    Parameters below as of 13 Apr 2024 14:22  Patient On (Oxygen Delivery Method): room air        PHYSICAL EXAMINATION:    GENERAL: The patient is awake and alert in no apparent distress.     HEENT: Head is normocephalic and atraumatic. Extraocular muscles are intact. Mucous membranes are moist.    NECK: Supple.    LUNGS: Clear to auscultation without wheezing, rales or rhonchi; respirations unlabored    HEART: tachy       ABDOMEN: Soft, nontender, and nondistended.      EXTREMITIES: Without any cyanosis, clubbing, rash, lesions or edema.    NEUROLOGIC: Grossly intact.    LABS:                        9.4    12.39 )-----------( 604      ( 13 Apr 2024 06:05 )             28.7     04-13    139  |  104  |  3.8<L>  ----------------------------<  79  3.5   |  21.0<L>  |  0.92    Ca    8.2<L>      13 Apr 2024 06:05  Mg     1.7     04-13    TPro  5.7<L>  /  Alb  x   /  TBili  x   /  DBili  x   /  AST  x   /  ALT  x   /  AlkPhos  x   04-13            Procalcitonin, Serum: 0.36 ng/mL (04-12-24 @ 06:40)      MICROBIOLOGY:    RADIOLOGY & ADDITIONAL STUDIES:  < from: CT Abdomen and Pelvis w/ IV Cont (04.13.24 @ 09:41) >    ACC: [id number] EXAM:  CT ABDOMEN AND PELVIS IC   ORDERED BY: KENYATTA YEBOAH     PROCEDURE DATE:  04/13/2024          INTERPRETATION:  CLINICAL INFORMATION: Bacteremia.    COMPARISON: CT scan of the abdomen and pelvis from 4/1/2024    CONTRAST/COMPLICATIONS:  IV Contrast: Omnipaque 350  95 cc administered   5 cc discarded  Oral Contrast: NONE  Complications: None reported at time of study completion    PROCEDURE:  CT of the Abdomen and Pelvis was performed.  Sagittal and coronal reformats were performed.    FINDINGS:  LOWER CHEST: Pleural based pulmonary nodules at the left lung base which   appear larger in size. For example, 3.9 x 1.9 cm pleural-based nodule on   series 3 image 32 posteriorly, previously 2.3 x 1.6 cm. Small right   pleural effusion has increased. Atelectatic changes.    LIVER: Within normal limits.  BILE DUCTS: Normal caliber.  GALLBLADDER: Within normal limits.  SPLEEN: Within normal limits.  PANCREAS: Within normal limits.  ADRENALS: Within normal limits.  KIDNEYS/URETERS: 2 mm nonobstructing calcification in the midpole the   left kidney is unchanged.    BLADDER: Within normal limits.  REPRODUCTIVE ORGANS: Mildly heterogeneous prostate gland.    BOWEL: No bowel obstruction. Appendix within normal limits.  PERITONEUM: No ascites.  VESSELS: Within normal limits.  RETROPERITONEUM/LYMPH NODES: No lymphadenopathy.  ABDOMINAL WALL: Umbilical hernia containing fat. Bilateral inguinal   hernias, right greater than left, containing fat  BONES: Degenerative changes of bone.    IMPRESSION:  Pleural-based nodules at the left lung base appears larger in size.   Please correlate clinically. Small right pleural effusion has increased.    No acute intra-abdominal pathology visualized.        --- End of Report ---            < end of copied text >

## 2024-04-14 LAB
ALBUMIN SERPL ELPH-MCNC: 2.4 G/DL — LOW (ref 3.3–5.2)
ALP SERPL-CCNC: 100 U/L — SIGNIFICANT CHANGE UP (ref 40–120)
ALT FLD-CCNC: 29 U/L — SIGNIFICANT CHANGE UP
ANION GAP SERPL CALC-SCNC: 14 MMOL/L — SIGNIFICANT CHANGE UP (ref 5–17)
AST SERPL-CCNC: 44 U/L — HIGH
BASOPHILS # BLD AUTO: 0.07 K/UL — SIGNIFICANT CHANGE UP (ref 0–0.2)
BASOPHILS NFR BLD AUTO: 0.6 % — SIGNIFICANT CHANGE UP (ref 0–2)
BILIRUB SERPL-MCNC: 0.4 MG/DL — SIGNIFICANT CHANGE UP (ref 0.4–2)
BLD GP AB SCN SERPL QL: SIGNIFICANT CHANGE UP
BUN SERPL-MCNC: 3.4 MG/DL — LOW (ref 8–20)
CALCIUM SERPL-MCNC: 8.2 MG/DL — LOW (ref 8.4–10.5)
CHLORIDE SERPL-SCNC: 104 MMOL/L — SIGNIFICANT CHANGE UP (ref 96–108)
CO2 SERPL-SCNC: 22 MMOL/L — SIGNIFICANT CHANGE UP (ref 22–29)
CREAT SERPL-MCNC: 0.89 MG/DL — SIGNIFICANT CHANGE UP (ref 0.5–1.3)
EGFR: 98 ML/MIN/1.73M2 — SIGNIFICANT CHANGE UP
EOSINOPHIL # BLD AUTO: 0.62 K/UL — HIGH (ref 0–0.5)
EOSINOPHIL NFR BLD AUTO: 4.9 % — SIGNIFICANT CHANGE UP (ref 0–6)
FERRITIN SERPL-MCNC: 1079 NG/ML — HIGH (ref 30–400)
GLUCOSE BLDC GLUCOMTR-MCNC: 102 MG/DL — HIGH (ref 70–99)
GLUCOSE BLDC GLUCOMTR-MCNC: 88 MG/DL — SIGNIFICANT CHANGE UP (ref 70–99)
GLUCOSE BLDC GLUCOMTR-MCNC: 98 MG/DL — SIGNIFICANT CHANGE UP (ref 70–99)
GLUCOSE BLDC GLUCOMTR-MCNC: 99 MG/DL — SIGNIFICANT CHANGE UP (ref 70–99)
GLUCOSE SERPL-MCNC: 85 MG/DL — SIGNIFICANT CHANGE UP (ref 70–99)
HCT VFR BLD CALC: 28.4 % — LOW (ref 39–50)
HGB BLD-MCNC: 9.1 G/DL — LOW (ref 13–17)
IGE SERPL-ACNC: 33 KU/L — SIGNIFICANT CHANGE UP
IGE SERPL-ACNC: 38 KU/L — SIGNIFICANT CHANGE UP
IMM GRANULOCYTES NFR BLD AUTO: 1.1 % — HIGH (ref 0–0.9)
IRON SATN MFR SERPL: 18 % — SIGNIFICANT CHANGE UP (ref 16–55)
IRON SATN MFR SERPL: 23 UG/DL — LOW (ref 59–158)
LYMPHOCYTES # BLD AUTO: 38 % — SIGNIFICANT CHANGE UP (ref 13–44)
LYMPHOCYTES # BLD AUTO: 4.78 K/UL — HIGH (ref 1–3.3)
MAGNESIUM SERPL-MCNC: 1.6 MG/DL — SIGNIFICANT CHANGE UP (ref 1.6–2.6)
MCHC RBC-ENTMCNC: 27.7 PG — SIGNIFICANT CHANGE UP (ref 27–34)
MCHC RBC-ENTMCNC: 32 GM/DL — SIGNIFICANT CHANGE UP (ref 32–36)
MCV RBC AUTO: 86.6 FL — SIGNIFICANT CHANGE UP (ref 80–100)
MONOCYTES # BLD AUTO: 1.25 K/UL — HIGH (ref 0–0.9)
MONOCYTES NFR BLD AUTO: 9.9 % — SIGNIFICANT CHANGE UP (ref 2–14)
NEUTROPHILS # BLD AUTO: 5.73 K/UL — SIGNIFICANT CHANGE UP (ref 1.8–7.4)
NEUTROPHILS NFR BLD AUTO: 45.5 % — SIGNIFICANT CHANGE UP (ref 43–77)
PLATELET # BLD AUTO: 623 K/UL — HIGH (ref 150–400)
POTASSIUM SERPL-MCNC: 3.4 MMOL/L — LOW (ref 3.5–5.3)
POTASSIUM SERPL-SCNC: 3.4 MMOL/L — LOW (ref 3.5–5.3)
PROT SERPL-MCNC: 6.3 G/DL — LOW (ref 6.6–8.7)
RBC # BLD: 3.28 M/UL — LOW (ref 4.2–5.8)
RBC # BLD: 3.28 M/UL — LOW (ref 4.2–5.8)
RBC # FLD: 14.3 % — SIGNIFICANT CHANGE UP (ref 10.3–14.5)
RETICS #: 50.2 K/UL — SIGNIFICANT CHANGE UP (ref 25–125)
RETICS/RBC NFR: 1.5 % — SIGNIFICANT CHANGE UP (ref 0.5–2.5)
RHEUMATOID FACT SERPL-ACNC: 15 IU/ML — HIGH (ref 0–13)
SODIUM SERPL-SCNC: 140 MMOL/L — SIGNIFICANT CHANGE UP (ref 135–145)
TIBC SERPL-MCNC: 127 UG/DL — LOW (ref 220–430)
TRANSFERRIN SERPL-MCNC: 89 MG/DL — LOW (ref 180–329)
WBC # BLD: 12.59 K/UL — HIGH (ref 3.8–10.5)
WBC # FLD AUTO: 12.59 K/UL — HIGH (ref 3.8–10.5)

## 2024-04-14 PROCEDURE — 99233 SBSQ HOSP IP/OBS HIGH 50: CPT

## 2024-04-14 RX ADMIN — HEPARIN SODIUM 5000 UNIT(S): 5000 INJECTION INTRAVENOUS; SUBCUTANEOUS at 05:28

## 2024-04-14 RX ADMIN — Medication 12.5 MILLIGRAM(S): at 05:27

## 2024-04-14 RX ADMIN — ALBUTEROL 2.5 MILLIGRAM(S): 90 AEROSOL, METERED ORAL at 04:00

## 2024-04-14 RX ADMIN — ALBUTEROL 2.5 MILLIGRAM(S): 90 AEROSOL, METERED ORAL at 15:51

## 2024-04-14 RX ADMIN — AMLODIPINE BESYLATE 10 MILLIGRAM(S): 2.5 TABLET ORAL at 05:27

## 2024-04-14 RX ADMIN — ALBUTEROL 2 PUFF(S): 90 AEROSOL, METERED ORAL at 08:12

## 2024-04-14 RX ADMIN — PANTOPRAZOLE SODIUM 40 MILLIGRAM(S): 20 TABLET, DELAYED RELEASE ORAL at 05:28

## 2024-04-14 RX ADMIN — CASPOFUNGIN ACETATE 260 MILLIGRAM(S): 7 INJECTION, POWDER, LYOPHILIZED, FOR SOLUTION INTRAVENOUS at 11:46

## 2024-04-14 RX ADMIN — Medication 12.5 MILLIGRAM(S): at 17:27

## 2024-04-14 RX ADMIN — ALBUTEROL 2.5 MILLIGRAM(S): 90 AEROSOL, METERED ORAL at 20:34

## 2024-04-14 RX ADMIN — HEPARIN SODIUM 5000 UNIT(S): 5000 INJECTION INTRAVENOUS; SUBCUTANEOUS at 17:27

## 2024-04-14 RX ADMIN — ALBUTEROL 2.5 MILLIGRAM(S): 90 AEROSOL, METERED ORAL at 09:19

## 2024-04-14 NOTE — PROGRESS NOTE ADULT - SUBJECTIVE AND OBJECTIVE BOX
ZAINA MILLER    [id number]    59y      Male    INTERVAL HPI/OVERNIGHT EVENTS:  patient being seen for fungemia    incomplete    REVIEW OF SYSTEMS:    CONSTITUTIONAL: No fever, weight loss, or fatigue  RESPIRATORY: No cough, wheezing, hemoptysis; No shortness of breath  CARDIOVASCULAR: No chest pain, palpitations  GASTROINTESTINAL: No abdominal or epigastric pain. No nausea, vomiting  NEUROLOGICAL: No headaches, memory loss, loss of strength.  MISCELLANEOUS:      Vital Signs Last 24 Hrs  T(C): 37 (14 Apr 2024 04:10), Max: 38.1 (13 Apr 2024 20:39)  T(F): 98.6 (14 Apr 2024 04:10), Max: 100.5 (13 Apr 2024 20:39)  HR: 109 (14 Apr 2024 04:10) (100 - 120)  BP: 127/64 (14 Apr 2024 04:10) (127/64 - 164/76)  BP(mean): --  RR: 18 (14 Apr 2024 04:10) (18 - 18)  SpO2: 92% (14 Apr 2024 04:10) (92% - 100%)    Parameters below as of 14 Apr 2024 04:10  Patient On (Oxygen Delivery Method): room air        PHYSICAL EXAM:    CONSTITUTIONAL: NAD, Not in acute distress  EYES:  EOMI, conjunctiva and sclera clear  ENMT: , neck supple , non-tender  RESPIRATORY: Normal respiratory effort; lungs are clear to auscultation bilaterally. WHeezing present. started on albuterol nebulizer  CARDIOVASCULAR: S1S2 present  ABDOMEN: soft. bowel sound present  MUSCLOSKELETAL: ; no clubbing or cyanosis of digits;   PSYCH: A+O to person, place, and time; affect appropriate  NEUROLOGY: CN, motor and sensory intact   SKIN: No rashes; no palpable lesions  Extremity: RIght UE swelling.       LABS:                        9.1    12.59 )-----------( 623      ( 14 Apr 2024 05:17 )             28.4     04-13    139  |  104  |  3.8<L>  ----------------------------<  79  3.5   |  21.0<L>  |  0.92    Ca    8.2<L>      13 Apr 2024 06:05  Mg     1.7     04-13    TPro  5.7<L>  /  Alb  x   /  TBili  x   /  DBili  x   /  AST  x   /  ALT  x   /  AlkPhos  x   04-13      Urinalysis Basic - ( 13 Apr 2024 06:05 )    Color: x / Appearance: x / SG: x / pH: x  Gluc: 79 mg/dL / Ketone: x  / Bili: x / Urobili: x   Blood: x / Protein: x / Nitrite: x   Leuk Esterase: x / RBC: x / WBC x   Sq Epi: x / Non Sq Epi: x / Bacteria: x          MEDICATIONS  (STANDING):  albuterol    0.083% 2.5 milliGRAM(s) Nebulizer every 6 hours  amLODIPine   Tablet 10 milliGRAM(s) Oral daily  caspofungin IVPB      caspofungin IVPB 50 milliGRAM(s) IV Intermittent every 24 hours  dextrose 5%. 1000 milliLiter(s) (100 mL/Hr) IV Continuous <Continuous>  dextrose 5%. 1000 milliLiter(s) (50 mL/Hr) IV Continuous <Continuous>  dextrose 50% Injectable 25 Gram(s) IV Push once  dextrose 50% Injectable 25 Gram(s) IV Push once  dextrose 50% Injectable 12.5 Gram(s) IV Push once  glucagon  Injectable 1 milliGRAM(s) IntraMuscular once  heparin   Injectable 5000 Unit(s) SubCutaneous every 12 hours  insulin lispro (ADMELOG) corrective regimen sliding scale   SubCutaneous Before meals and at bedtime  metoprolol tartrate 12.5 milliGRAM(s) Oral every 12 hours  pantoprazole    Tablet 40 milliGRAM(s) Oral before breakfast    MEDICATIONS  (PRN):  acetaminophen     Tablet .. 650 milliGRAM(s) Oral every 6 hours PRN Temp greater or equal to 38C (100.4F), Mild Pain (1 - 3)  albuterol    0.083% 2.5 milliGRAM(s) Nebulizer every 4 hours PRN Wheezing  albuterol    90 MICROgram(s) HFA Inhaler 2 Puff(s) Inhalation every 6 hours PRN Shortness of Breath and/or Wheezing  aluminum hydroxide/magnesium hydroxide/simethicone Suspension 30 milliLiter(s) Oral every 4 hours PRN Dyspepsia  dextrose Oral Gel 15 Gram(s) Oral once PRN Blood Glucose LESS THAN 70 milliGRAM(s)/deciliter  diphenoxylate/atropine 1 Tablet(s) Oral every 6 hours PRN Diarrhea  melatonin 3 milliGRAM(s) Oral at bedtime PRN Insomnia  metoprolol tartrate Injectable 2.5 milliGRAM(s) IV Push every 6 hours PRN HR above 110/min  ondansetron Injectable 4 milliGRAM(s) IV Push every 8 hours PRN Nausea and/or Vomiting      RADIOLOGY & ADDITIONAL TESTS:   ZAINA MILLER    [id number]    59y      Male    INTERVAL HPI/OVERNIGHT EVENTS:  patient being seen for fungemia    REVIEW OF SYSTEMS:    CONSTITUTIONAL: No fever, weight loss, or fatigue  RESPIRATORY: No cough, wheezing, hemoptysis; No shortness of breath  CARDIOVASCULAR: No chest pain, palpitations  GASTROINTESTINAL: No abdominal or epigastric pain. No nausea, vomiting  NEUROLOGICAL: No headaches, memory loss, loss of strength.  MISCELLANEOUS:      Vital Signs Last 24 Hrs  T(C): 37 (14 Apr 2024 04:10), Max: 38.1 (13 Apr 2024 20:39)  T(F): 98.6 (14 Apr 2024 04:10), Max: 100.5 (13 Apr 2024 20:39)  HR: 109 (14 Apr 2024 04:10) (100 - 120)  BP: 127/64 (14 Apr 2024 04:10) (127/64 - 164/76)  BP(mean): --  RR: 18 (14 Apr 2024 04:10) (18 - 18)  SpO2: 92% (14 Apr 2024 04:10) (92% - 100%)    Parameters below as of 14 Apr 2024 04:10  Patient On (Oxygen Delivery Method): room air        PHYSICAL EXAM:    CONSTITUTIONAL: NAD, Not in acute distress  EYES:  EOMI, conjunctiva and sclera clear  ENMT: , neck supple , non-tender  RESPIRATORY: Normal respiratory effort; lungs are clear to auscultation bilaterally. WHeezing present. started on albuterol nebulizer  CARDIOVASCULAR: S1S2 present  ABDOMEN: soft. bowel sound present  MUSCLOSKELETAL: ; no clubbing or cyanosis of digits;   PSYCH: A+O to person, place, and time; affect appropriate  NEUROLOGY: CN, motor and sensory intact   SKIN: No rashes; no palpable lesions  Extremity: RIght UE swelling.       LABS:                        9.1    12.59 )-----------( 623      ( 14 Apr 2024 05:17 )             28.4     04-13    139  |  104  |  3.8<L>  ----------------------------<  79  3.5   |  21.0<L>  |  0.92    Ca    8.2<L>      13 Apr 2024 06:05  Mg     1.7     04-13    TPro  5.7<L>  /  Alb  x   /  TBili  x   /  DBili  x   /  AST  x   /  ALT  x   /  AlkPhos  x   04-13      Urinalysis Basic - ( 13 Apr 2024 06:05 )    Color: x / Appearance: x / SG: x / pH: x  Gluc: 79 mg/dL / Ketone: x  / Bili: x / Urobili: x   Blood: x / Protein: x / Nitrite: x   Leuk Esterase: x / RBC: x / WBC x   Sq Epi: x / Non Sq Epi: x / Bacteria: x          MEDICATIONS  (STANDING):  albuterol    0.083% 2.5 milliGRAM(s) Nebulizer every 6 hours  amLODIPine   Tablet 10 milliGRAM(s) Oral daily  caspofungin IVPB      caspofungin IVPB 50 milliGRAM(s) IV Intermittent every 24 hours  dextrose 5%. 1000 milliLiter(s) (100 mL/Hr) IV Continuous <Continuous>  dextrose 5%. 1000 milliLiter(s) (50 mL/Hr) IV Continuous <Continuous>  dextrose 50% Injectable 25 Gram(s) IV Push once  dextrose 50% Injectable 25 Gram(s) IV Push once  dextrose 50% Injectable 12.5 Gram(s) IV Push once  glucagon  Injectable 1 milliGRAM(s) IntraMuscular once  heparin   Injectable 5000 Unit(s) SubCutaneous every 12 hours  insulin lispro (ADMELOG) corrective regimen sliding scale   SubCutaneous Before meals and at bedtime  metoprolol tartrate 12.5 milliGRAM(s) Oral every 12 hours  pantoprazole    Tablet 40 milliGRAM(s) Oral before breakfast    MEDICATIONS  (PRN):  acetaminophen     Tablet .. 650 milliGRAM(s) Oral every 6 hours PRN Temp greater or equal to 38C (100.4F), Mild Pain (1 - 3)  albuterol    0.083% 2.5 milliGRAM(s) Nebulizer every 4 hours PRN Wheezing  albuterol    90 MICROgram(s) HFA Inhaler 2 Puff(s) Inhalation every 6 hours PRN Shortness of Breath and/or Wheezing  aluminum hydroxide/magnesium hydroxide/simethicone Suspension 30 milliLiter(s) Oral every 4 hours PRN Dyspepsia  dextrose Oral Gel 15 Gram(s) Oral once PRN Blood Glucose LESS THAN 70 milliGRAM(s)/deciliter  diphenoxylate/atropine 1 Tablet(s) Oral every 6 hours PRN Diarrhea  melatonin 3 milliGRAM(s) Oral at bedtime PRN Insomnia  metoprolol tartrate Injectable 2.5 milliGRAM(s) IV Push every 6 hours PRN HR above 110/min  ondansetron Injectable 4 milliGRAM(s) IV Push every 8 hours PRN Nausea and/or Vomiting      RADIOLOGY & ADDITIONAL TESTS:

## 2024-04-14 NOTE — CHART NOTE - NSCHARTNOTEFT_GEN_A_CORE
Source: Patient [ x]  Family [ ]   other [x ]  59 yoM with Asthma, HTN, HLD, DM2, Gout admitted with TYESHA suspected 2.2 medications along with MF PNA . Course complicated by fungemia.   Sepsis due to Fungemia    Current Diet: Diet, DASH/TLC:   Sodium & Cholesterol Restricted (03-30-24 @ 01:28)      Patient reports [ ] nausea  [ ] vomiting [ ] diarrhea [ ] constipation  [ ]chewing problems [ ] swallowing issues  [ ] other:     PO intake:  < 50% [ ]   50-75%  [ x]   %  [ ]  other :    Source for PO intake [ x] Patient [ ] family [ ] chart [ ] staff [ ] other    Enteral /Parenteral Nutrition:     Current Weight:   4/10 108.4 kg  4/3 105.1 kg  3/29 95.3 kg  % Weight Change   ? accuracy-  Edema :   2+ feet  3+ arms  may be attributing to weight increase      Pertinent Medications: MEDICATIONS  (STANDING):  albuterol    0.083% 2.5 milliGRAM(s) Nebulizer every 6 hours  amLODIPine   Tablet 10 milliGRAM(s) Oral daily  caspofungin IVPB      caspofungin IVPB 50 milliGRAM(s) IV Intermittent every 24 hours  dextrose 5%. 1000 milliLiter(s) (50 mL/Hr) IV Continuous <Continuous>    glucagon  Injectable 1 milliGRAM(s) IntraMuscular once  heparin   Injectable 5000 Unit(s) SubCutaneous every 12 hours  insulin lispro (ADMELOG) corrective regimen sliding scale   SubCutaneous Before meals and at bedtime  metoprolol tartrate 12.5 milliGRAM(s) Oral every 12 hours  pantoprazole    Tablet 40 milliGRAM(s) Oral before breakfast    MEDICATIONS  (PRN):  acetaminophen     Tablet .. 650 milliGRAM(s) Oral every 6 hours PRN Temp greater or equal to 38C (100.4F), Mild Pain (1 - 3)  albuterol    0.083% 2.5 milliGRAM(s) Nebulizer every 4 hours PRN Wheezing  albuterol    90 MICROgram(s) HFA Inhaler 2 Puff(s) Inhalation every 6 hours PRN Shortness of Breath and/or Wheezing  aluminum hydroxide/magnesium hydroxide/simethicone Suspension 30 milliLiter(s) Oral every 4 hours PRN Dyspepsia  dextrose Oral Gel 15 Gram(s) Oral once PRN Blood Glucose LESS THAN 70 milliGRAM(s)/deciliter  diphenoxylate/atropine 1 Tablet(s) Oral every 6 hours PRN Diarrhea  melatonin 3 milliGRAM(s) Oral at bedtime PRN Insomnia  metoprolol tartrate Injectable 2.5 milliGRAM(s) IV Push every 6 hours PRN HR above 110/min  ondansetron Injectable 4 milliGRAM(s) IV Push every 8 hours PRN Nausea and/or Vomiting    Pertinent Labs: CBC Full  -  ( 14 Apr 2024 05:17 )  WBC Count : 12.59 K/uL  RBC Count : 3.28 M/uL  Hemoglobin : 9.1 g/dL  Hematocrit : 28.4 %  Platelet Count - Automated : 623 K/uL  Mean Cell Volume : 86.6 fl  Mean Cell Hemoglobin : 27.7 pg  Mean Cell Hemoglobin Concentration : 32.0 gm/dL  04-14 Na140 mmol/L Glu 85 mg/dL K+ 3.4 mmol/L<L> Cr  0.89 mg/dL BUN 3.4 mg/dL<L> Phos n/a   Alb 2.4 g/dL<L> PAB n/a                 Skin:     Nutrition focused physical exam conducted - found signs of malnutrition [x ]absent [ ]present    Subcutaneous fat loss: [ ] Orbital fat pads region, [ ]Buccal fat region, [ ]Triceps region,  [ ]Ribs region    Muscle wasting: [ ]Temples region, [ ]Clavicle region, [ ]Shoulder region, [ ]Scapula region, [ ]Interosseous region,  [ ]thigh region, [ ]Calf region    Estimated Needs:   [x ] no change since previous assessment  [ ] recalculated:     Current Nutrition Diagnosis: Pt presents at risk secondary to increased protein calorie needs, related to increased  physiologic demand stress factor, as evidenced by sepsis due to fungemia. Po intake 50-75% of meals. Weight trending up, may be related to fluid. HBV protein foods encouraged. LBM 4/13.     Recommendations:   1) Continue diet  2) Daily weight  3) RX MVI, Vit C    Monitoring and Evaluation:   [ ] PO intake [ ] Tolerance to diet prescription [X] Weights  [X] Follow up per protocol [X] Labs:

## 2024-04-14 NOTE — PROGRESS NOTE ADULT - ASSESSMENT
59 yoM with Asthma, HTN, HLD, DM2, Gout admitted with TYESHA suspected 2.2 medications along with MF PNA . Course complicated by fungemia.     Sepsis due to Fungemia  RIght lung infiltrate  Bcx on 04/10 grew Solange  started on Capsofungin 04/12/2024  Echo appreciated  ID following  Rhrumatology following. ANCA, SSA, ACE, LDH, SPEP, IG lever, Ferritin, Quantiferon, RF an CCP ordered  Concern for fungal pneumonia given patient was intermittently on steroid before admission  Pulmonologist following for BAL this week  - ct abd with iv contrast, within normal limits    # Suspeceted Right elbolw osteomyelitis  -FOund right UE swelling on 04/10  -CT RUE concerning for right elbow osteomyelitis  -Right elbow MRI reported no osteomyelitis  -ID following     #Sinus Tachycardia  -Most likely reactive  -WOrk up including US doppler negative. Echo unremarkable  -Follow up after IV fluid    TYESHA (now resolved)   -Possibly 2/2 medication induced ATN   -Hold ARB/HCTZ/Metformin   -Potentially  medication induced (in setting of poor PO intake) from recent introduction of ARB/HCTZ/Metformin  -Cr (baseline 1.08) >>>3.13 >2.39>1.91>1.75>1.41>1.40>1.3>1.31> 1.08  -UA negative, Renal US negative  -Hold above meds. Avoid Nephrotoxins. Renally dose meds  -Nephro Consult note appreciated    HTN, HLD  Amlodipine 10mg q24  Hold ARB and HCTZ for now  Not currently on statin therapy    DM2  Hold Metformin 500mg BID  ISS    Asthma  Finished steroid taper last Sunday. No current symptoms/hypoxia.  No longer taking Montelukast  Wixela/Duoneb PRN    Gout  Uric Acid level 12.2  Takes Allopurinol as needed. Hold for now.     VTE PPX SQH/SCDs    Dispo: Acute.   BAL this weel         59 yoM with Asthma, HTN, HLD, DM2, Gout admitted with TYESHA suspected 2.2 medications along with MF PNA . Course complicated by fungemia.     Sepsis due to Fungemia  RIght lung infiltrate  Bcx on 04/10 grew Solange  started on Capsofungin 04/12/2024  Echo appreciated  ID following  Rhrumatology following. ANCA, SSA, ACE, LDH, SPEP, IG lever, Ferritin, Quantiferon, RF an CCP ordered  Concern for fungal pneumonia given patient was intermittently on steroid before admission  Pulmonologist following for BAL this week  - ct abd with iv contrast, within normal limits    # Suspeceted Right elbolw osteomyelitis  -FOund right UE swelling on 04/10  -CT RUE concerning for right elbow osteomyelitis  -Right elbow MRI reported no osteomyelitis  -ID following     #Sinus Tachycardia  -Most likely reactive  -WOrk up including US doppler negative. Echo unremarkable  -Follow up after IV fluid    TYESHA (now resolved)   -Possibly 2/2 medication induced ATN   -Hold ARB/HCTZ/Metformin   -Potentially  medication induced (in setting of poor PO intake) from recent introduction of ARB/HCTZ/Metformin  -Cr (baseline 1.08) >>>3.13 >2.39>1.91>1.75>1.41>1.40>1.3>1.31> 1.08  -UA negative, Renal US negative  -Hold above meds. Avoid Nephrotoxins. Renally dose meds  -Nephro Consult note appreciated    HTN, HLD  Amlodipine 10mg q24  Hold ARB and HCTZ for now  Not currently on statin therapy    DM2  Hold Metformin 500mg BID  ISS    Asthma  Finished steroid taper last Sunday. No current symptoms/hypoxia.  No longer taking Montelukast  Wixela/Duoneb PRN    Gout  Uric Acid level 12.2  Takes Allopurinol as needed. Hold for now.     VTE PPX SQH/SCDs    Dispo: Acute.   BAL this week

## 2024-04-15 LAB
-  AMPHOTERICIN B: SIGNIFICANT CHANGE UP
-  ANIDULAFUNGIN: SIGNIFICANT CHANGE UP
-  CASPOFUNGIN: SIGNIFICANT CHANGE UP
-  FLUCONAZOLE: SIGNIFICANT CHANGE UP
-  MICAFUNGIN: SIGNIFICANT CHANGE UP
-  POSACONAZOLE: SIGNIFICANT CHANGE UP
-  VORICONAZOLE: SIGNIFICANT CHANGE UP
ACE SERPL-CCNC: 50 U/L — SIGNIFICANT CHANGE UP (ref 14–82)
ALBUMIN SERPL ELPH-MCNC: 2.3 G/DL — LOW (ref 3.3–5.2)
ALP SERPL-CCNC: 103 U/L — SIGNIFICANT CHANGE UP (ref 40–120)
ALT FLD-CCNC: 26 U/L — SIGNIFICANT CHANGE UP
ANION GAP SERPL CALC-SCNC: 15 MMOL/L — SIGNIFICANT CHANGE UP (ref 5–17)
AST SERPL-CCNC: 37 U/L — SIGNIFICANT CHANGE UP
BASOPHILS # BLD AUTO: 0.07 K/UL — SIGNIFICANT CHANGE UP (ref 0–0.2)
BASOPHILS NFR BLD AUTO: 0.6 % — SIGNIFICANT CHANGE UP (ref 0–2)
BILIRUB SERPL-MCNC: 0.4 MG/DL — SIGNIFICANT CHANGE UP (ref 0.4–2)
BUN SERPL-MCNC: 3.7 MG/DL — LOW (ref 8–20)
CALCIUM SERPL-MCNC: 8.1 MG/DL — LOW (ref 8.4–10.5)
CHLORIDE SERPL-SCNC: 101 MMOL/L — SIGNIFICANT CHANGE UP (ref 96–108)
CO2 SERPL-SCNC: 21 MMOL/L — LOW (ref 22–29)
CREAT SERPL-MCNC: 0.96 MG/DL — SIGNIFICANT CHANGE UP (ref 0.5–1.3)
CULTURE RESULTS: ABNORMAL
CULTURE RESULTS: SIGNIFICANT CHANGE UP
EGFR: 90 ML/MIN/1.73M2 — SIGNIFICANT CHANGE UP
EOSINOPHIL # BLD AUTO: 0.44 K/UL — SIGNIFICANT CHANGE UP (ref 0–0.5)
EOSINOPHIL NFR BLD AUTO: 3.8 % — SIGNIFICANT CHANGE UP (ref 0–6)
GLUCOSE BLDC GLUCOMTR-MCNC: 100 MG/DL — HIGH (ref 70–99)
GLUCOSE BLDC GLUCOMTR-MCNC: 93 MG/DL — SIGNIFICANT CHANGE UP (ref 70–99)
GLUCOSE BLDC GLUCOMTR-MCNC: 93 MG/DL — SIGNIFICANT CHANGE UP (ref 70–99)
GLUCOSE BLDC GLUCOMTR-MCNC: 96 MG/DL — SIGNIFICANT CHANGE UP (ref 70–99)
GLUCOSE SERPL-MCNC: 91 MG/DL — SIGNIFICANT CHANGE UP (ref 70–99)
HCT VFR BLD CALC: 29.6 % — LOW (ref 39–50)
HGB BLD-MCNC: 9.9 G/DL — LOW (ref 13–17)
IMM GRANULOCYTES NFR BLD AUTO: 0.9 % — SIGNIFICANT CHANGE UP (ref 0–0.9)
LYMPHOCYTES # BLD AUTO: 35.9 % — SIGNIFICANT CHANGE UP (ref 13–44)
LYMPHOCYTES # BLD AUTO: 4.16 K/UL — HIGH (ref 1–3.3)
MAGNESIUM SERPL-MCNC: 1.5 MG/DL — LOW (ref 1.6–2.6)
MCHC RBC-ENTMCNC: 28.5 PG — SIGNIFICANT CHANGE UP (ref 27–34)
MCHC RBC-ENTMCNC: 33.4 GM/DL — SIGNIFICANT CHANGE UP (ref 32–36)
MCV RBC AUTO: 85.3 FL — SIGNIFICANT CHANGE UP (ref 80–100)
METHOD TYPE: SIGNIFICANT CHANGE UP
MONOCYTES # BLD AUTO: 1.07 K/UL — HIGH (ref 0–0.9)
MONOCYTES NFR BLD AUTO: 9.2 % — SIGNIFICANT CHANGE UP (ref 2–14)
NEUTROPHILS # BLD AUTO: 5.76 K/UL — SIGNIFICANT CHANGE UP (ref 1.8–7.4)
NEUTROPHILS NFR BLD AUTO: 49.6 % — SIGNIFICANT CHANGE UP (ref 43–77)
ORGANISM # SPEC MICROSCOPIC CNT: ABNORMAL
ORGANISM # SPEC MICROSCOPIC CNT: ABNORMAL
ORGANISM # SPEC MICROSCOPIC CNT: SIGNIFICANT CHANGE UP
PLATELET # BLD AUTO: 660 K/UL — HIGH (ref 150–400)
POTASSIUM SERPL-MCNC: 3.5 MMOL/L — SIGNIFICANT CHANGE UP (ref 3.5–5.3)
POTASSIUM SERPL-SCNC: 3.5 MMOL/L — SIGNIFICANT CHANGE UP (ref 3.5–5.3)
PROT SERPL-MCNC: 6.5 G/DL — LOW (ref 6.6–8.7)
RBC # BLD: 3.47 M/UL — LOW (ref 4.2–5.8)
RBC # FLD: 14.3 % — SIGNIFICANT CHANGE UP (ref 10.3–14.5)
SODIUM SERPL-SCNC: 137 MMOL/L — SIGNIFICANT CHANGE UP (ref 135–145)
SPECIMEN SOURCE: SIGNIFICANT CHANGE UP
SPECIMEN SOURCE: SIGNIFICANT CHANGE UP
WBC # BLD: 11.6 K/UL — HIGH (ref 3.8–10.5)
WBC # FLD AUTO: 11.6 K/UL — HIGH (ref 3.8–10.5)

## 2024-04-15 PROCEDURE — 99233 SBSQ HOSP IP/OBS HIGH 50: CPT

## 2024-04-15 PROCEDURE — 99232 SBSQ HOSP IP/OBS MODERATE 35: CPT

## 2024-04-15 RX ORDER — MAGNESIUM SULFATE 500 MG/ML
2 VIAL (ML) INJECTION ONCE
Refills: 0 | Status: COMPLETED | OUTPATIENT
Start: 2024-04-15 | End: 2024-04-15

## 2024-04-15 RX ADMIN — HEPARIN SODIUM 5000 UNIT(S): 5000 INJECTION INTRAVENOUS; SUBCUTANEOUS at 18:02

## 2024-04-15 RX ADMIN — ALBUTEROL 2.5 MILLIGRAM(S): 90 AEROSOL, METERED ORAL at 08:45

## 2024-04-15 RX ADMIN — Medication 12.5 MILLIGRAM(S): at 05:02

## 2024-04-15 RX ADMIN — CASPOFUNGIN ACETATE 260 MILLIGRAM(S): 7 INJECTION, POWDER, LYOPHILIZED, FOR SOLUTION INTRAVENOUS at 12:09

## 2024-04-15 RX ADMIN — ALBUTEROL 2.5 MILLIGRAM(S): 90 AEROSOL, METERED ORAL at 03:47

## 2024-04-15 RX ADMIN — PANTOPRAZOLE SODIUM 40 MILLIGRAM(S): 20 TABLET, DELAYED RELEASE ORAL at 05:02

## 2024-04-15 RX ADMIN — Medication 2.5 MILLIGRAM(S): at 00:32

## 2024-04-15 RX ADMIN — AMLODIPINE BESYLATE 10 MILLIGRAM(S): 2.5 TABLET ORAL at 05:01

## 2024-04-15 RX ADMIN — HEPARIN SODIUM 5000 UNIT(S): 5000 INJECTION INTRAVENOUS; SUBCUTANEOUS at 05:02

## 2024-04-15 RX ADMIN — Medication 25 GRAM(S): at 11:47

## 2024-04-15 RX ADMIN — Medication 12.5 MILLIGRAM(S): at 18:02

## 2024-04-15 NOTE — PROGRESS NOTE ADULT - SUBJECTIVE AND OBJECTIVE BOX
ZAINA MILLER    [id number]    59y      Male    INTERVAL HPI/OVERNIGHT EVENTS:  patient being seen for fungemia    patient denies any complaints    REVIEW OF SYSTEMS:    CONSTITUTIONAL: No fever, weight loss, or fatigue  RESPIRATORY: No cough, wheezing, hemoptysis; No shortness of breath  CARDIOVASCULAR: No chest pain, palpitations  GASTROINTESTINAL: No abdominal or epigastric pain. No nausea, vomiting  NEUROLOGICAL: No headaches, memory loss, loss of strength.  MISCELLANEOUS:      Vital Signs Last 24 Hrs  T(C): 36.8 (15 Apr 2024 09:00), Max: 37.2 (14 Apr 2024 22:25)  T(F): 98.3 (15 Apr 2024 09:00), Max: 99 (14 Apr 2024 22:25)  HR: 106 (15 Apr 2024 09:00) (91 - 124)  BP: 155/75 (15 Apr 2024 04:52) (130/73 - 155/75)  BP(mean): --  RR: 18 (15 Apr 2024 09:00) (18 - 18)  SpO2: 97% (15 Apr 2024 09:00) (93% - 98%)    Parameters below as of 15 Apr 2024 09:00  Patient On (Oxygen Delivery Method): room air        PHYSICAL EXAM:  CONSTITUTIONAL: NAD, Not in acute distress  EYES:  EOMI, conjunctiva and sclera clear  ENMT: , neck supple , non-tender  RESPIRATORY: Normal respiratory effort; lungs are clear to auscultation bilaterally.   CARDIOVASCULAR: S1S2 present  ABDOMEN: soft. bowel sound present  MUSCLOSKELETAL: ; no clubbing or cyanosis of digits;   PSYCH: A+O to person, place, and time; affect appropriate  NEUROLOGY: CN, motor and sensory intact   SKIN: No rashes; no palpable lesions  Extremity: RIght UE swelling.       LABS:                        9.9    11.60 )-----------( 660      ( 15 Apr 2024 05:25 )             29.6     04-15    137  |  101  |  3.7<L>  ----------------------------<  91  3.5   |  21.0<L>  |  0.96    Ca    8.1<L>      15 Apr 2024 05:25  Mg     1.5     04-15    TPro  6.5<L>  /  Alb  2.3<L>  /  TBili  0.4  /  DBili  x   /  AST  37  /  ALT  26  /  AlkPhos  103  04-15      Urinalysis Basic - ( 15 Apr 2024 05:25 )    Color: x / Appearance: x / SG: x / pH: x  Gluc: 91 mg/dL / Ketone: x  / Bili: x / Urobili: x   Blood: x / Protein: x / Nitrite: x   Leuk Esterase: x / RBC: x / WBC x   Sq Epi: x / Non Sq Epi: x / Bacteria: x          MEDICATIONS  (STANDING):  amLODIPine   Tablet 10 milliGRAM(s) Oral daily  caspofungin IVPB      caspofungin IVPB 50 milliGRAM(s) IV Intermittent every 24 hours  dextrose 5%. 1000 milliLiter(s) (50 mL/Hr) IV Continuous <Continuous>  dextrose 5%. 1000 milliLiter(s) (100 mL/Hr) IV Continuous <Continuous>  dextrose 50% Injectable 25 Gram(s) IV Push once  dextrose 50% Injectable 25 Gram(s) IV Push once  dextrose 50% Injectable 12.5 Gram(s) IV Push once  glucagon  Injectable 1 milliGRAM(s) IntraMuscular once  heparin   Injectable 5000 Unit(s) SubCutaneous every 12 hours  insulin lispro (ADMELOG) corrective regimen sliding scale   SubCutaneous Before meals and at bedtime  magnesium sulfate  IVPB 2 Gram(s) IV Intermittent once  metoprolol tartrate 12.5 milliGRAM(s) Oral every 12 hours  pantoprazole    Tablet 40 milliGRAM(s) Oral before breakfast    MEDICATIONS  (PRN):  acetaminophen     Tablet .. 650 milliGRAM(s) Oral every 6 hours PRN Temp greater or equal to 38C (100.4F), Mild Pain (1 - 3)  albuterol    0.083% 2.5 milliGRAM(s) Nebulizer every 4 hours PRN Wheezing  albuterol    90 MICROgram(s) HFA Inhaler 2 Puff(s) Inhalation every 6 hours PRN Shortness of Breath and/or Wheezing  aluminum hydroxide/magnesium hydroxide/simethicone Suspension 30 milliLiter(s) Oral every 4 hours PRN Dyspepsia  dextrose Oral Gel 15 Gram(s) Oral once PRN Blood Glucose LESS THAN 70 milliGRAM(s)/deciliter  diphenoxylate/atropine 1 Tablet(s) Oral every 6 hours PRN Diarrhea  melatonin 3 milliGRAM(s) Oral at bedtime PRN Insomnia  metoprolol tartrate Injectable 2.5 milliGRAM(s) IV Push every 6 hours PRN HR above 110/min  ondansetron Injectable 4 milliGRAM(s) IV Push every 8 hours PRN Nausea and/or Vomiting      RADIOLOGY & ADDITIONAL TESTS:

## 2024-04-15 NOTE — PROGRESS NOTE ADULT - SUBJECTIVE AND OBJECTIVE BOX
[facility]  INFECTIOUS DISEASES at [place] / [place] / [place]  =======================================================                               Serg Almanza MD#   Maurisio Farr MD*                             Lisy Sandhu MD*   Laila Bell MD*                              Professor Emeritus:  Dr Josr Chatman MD^            Diplomates American Board of Internal Medicine & Infectious Diseases                # [place] Office - Appt - Tel  [phone] Fax [phone]                * [place] Office - Appt - Tel [phone] Fax [phone]                      ^[place] Office - Tel  [phone] Fax [phone]                                  Hospital Consult line:  [phone]  =======================================================    ZAINA MILLER [id number]    Follow up: Fever    Fever improved  RUE swelling and pain improving  Blood cultures now with yeast    Allergies:  No Known Allergies      REVIEW OF SYSTEMS:  CONSTITUTIONAL:  Improved Fever and chills  HEENT:  No diplopia or blurred vision.  No earache, sore throat or runny nose.  CARDIOVASCULAR:  No chest pain  RESPIRATORY:  No cough, shortness of breath  GASTROINTESTINAL:  No nausea, vomiting or diarrhea.  GENITOURINARY:  No dysuria, frequency or urgency. No Blood in urine  MUSCULOSKELETAL:  no joint aches, no muscle pain  SKIN:  No change in skin, hair or nails.  NEUROLOGIC:  No Headaches      Physical Exam:  GEN: NAD  HEENT: normocephalic and atraumatic. EOMI. PERRL.    NECK: Supple.   LUNGS: CTA B/L.  HEART: RRR  ABDOMEN: Soft, NT, ND.  +BS.    : No CVA tenderness  EXTREMITIES: RUE swelling and pain  MSK: No joint swelling  NEUROLOGIC: No Focal Deficits   SKIN: Rt hand swelling improved, no findings on external Rt elbow. Nl ROM Rt elbow.         Vitals:  T(F): 98.3 (15 Apr 2024 09:00), Max: 99 (14 Apr 2024 22:25)  HR: 116 (15 Apr 2024 11:55)  BP: 144/71 (15 Apr 2024 11:55)  RR: 18 (15 Apr 2024 09:00)  SpO2: 97% (15 Apr 2024 09:00) (93% - 98%)  temp max in last 48H T(F): , Max: 100.5 (04-13-24 @ 20:39)    Current Antibiotics:  caspofungin IVPB 50 milliGRAM(s) IV Intermittent every 24 hours  caspofungin IVPB        Other medications:  amLODIPine   Tablet 10 milliGRAM(s) Oral daily  dextrose 5%. 1000 milliLiter(s) IV Continuous <Continuous>  dextrose 5%. 1000 milliLiter(s) IV Continuous <Continuous>  dextrose 50% Injectable 12.5 Gram(s) IV Push once  dextrose 50% Injectable 25 Gram(s) IV Push once  dextrose 50% Injectable 25 Gram(s) IV Push once  glucagon  Injectable 1 milliGRAM(s) IntraMuscular once  heparin   Injectable 5000 Unit(s) SubCutaneous every 12 hours  insulin lispro (ADMELOG) corrective regimen sliding scale   SubCutaneous Before meals and at bedtime  metoprolol tartrate 12.5 milliGRAM(s) Oral every 12 hours  pantoprazole    Tablet 40 milliGRAM(s) Oral before breakfast                            9.9    11.60 )-----------( 660      ( 15 Apr 2024 05:25 )             29.6     04-15    137  |  101  |  3.7<L>  ----------------------------<  91  3.5   |  21.0<L>  |  0.96    Ca    8.1<L>      15 Apr 2024 05:25  Mg     1.5     04-15    TPro  6.5<L>  /  Alb  2.3<L>  /  TBili  0.4  /  DBili  x   /  AST  37  /  ALT  26  /  AlkPhos  103  04-15    RECENT CULTURES:  04-13 @ 06:50 .Blood Blood     No growth at 24 hours    04-13 @ 06:47 .Blood Blood     No growth at 24 hours    04-10 @ 05:59 .Blood Blood-Peripheral Blood Culture PCR    Growth in aerobic bottle: Candida parapsilosis Referred to Mycology  Direct identification is available within approximately 3-5  hours either by Blood Panel Multiplexed PCR or Direct  MALDI-TOF. Details: [email or web address]  Growth in aerobic bottle: Yeast like cells    04-10 @ 05:55 .Blood Blood-Peripheral     No growth at 4 days    04-06 @ 05:25 .Blood Blood     No growth at 5 days    04-06 @ 05:19 .Blood Blood     No growth at 5 days    04-04 @ 13:21    RVP  NotDetec    04-03 @ 21:30 .Blood Blood-Peripheral     No growth at 5 days    04-03 @ 21:20 .Blood Blood-Peripheral     No growth at 5 days    04-03 @ 06:26 .Blood Blood     No growth at 5 days    04-03 @ 06:20 .Blood Blood     No growth at 5 days      WBC Count: 11.60 K/uL (04-15-24 @ 05:25)  WBC Count: 12.59 K/uL (04-14-24 @ 05:17)  WBC Count: 12.39 K/uL (04-13-24 @ 06:05)  WBC Count: 11.41 K/uL (04-12-24 @ 06:40)  WBC Count: 8.86 K/uL (04-11-24 @ 04:54)    Creatinine: 0.96 mg/dL (04-15-24 @ 05:25)  Creatinine: 0.89 mg/dL (04-14-24 @ 05:17)  Creatinine: 0.92 mg/dL (04-13-24 @ 06:05)  Creatinine: 1.01 mg/dL (04-12-24 @ 06:40)  Creatinine: 1.09 mg/dL (04-11-24 @ 04:54)    C-Reactive Protein, Serum: 188 mg/L (04-12-24 @ 06:40)  C-Reactive Protein, Serum: 162 mg/L (04-09-24 @ 04:00)  C-Reactive Protein, Serum: 163 mg/L (04-06-24 @ 05:25)  C-Reactive Protein, Serum: 141 mg/L (04-05-24 @ 08:34)      Procalcitonin, Serum: 0.36 ng/mL (04-12-24 @ 06:40)  Procalcitonin, Serum: 0.30 ng/mL (04-10-24 @ 07:04)  Procalcitonin, Serum: 0.25 ng/mL (04-09-24 @ 04:00)  Procalcitonin, Serum: 0.63 ng/mL (04-06-24 @ 05:25)  Procalcitonin, Serum: 0.63 ng/mL (04-05-24 @ 06:57)  Procalcitonin, Serum: 0.64 ng/mL (04-05-24 @ 06:56)  Procalcitonin, Serum: 0.39 ng/mL (04-02-24 @ 04:52)     SARS-CoV-2: NotDetec (04-04-24 @ 13:21)  COVID-19 PCR: NotDetec (03-31-24 @ 22:32)  SARS-CoV-2: NotDetec (03-31-24 @ 22:32)      Anti-Nuclear Antibody in AM (04.06.24 @ 05:25)    Anti Nuclear Factor Titer: Negative: Antinuclear AB (ERON), IFA Method    Rapid HIV-1/2 Antibody (04.05.24 @ 08:34)    Rapid HIV-1/2 Antibody: Nonreact    Legionella pneumophila Antigen, Urine (04.02.24 @ 15:51)    Legionella Antigen, Urine: Negative    Streptococcus pneumoniae Ag, Ur (04.02.24 @ 15:51)    Streptococcus pneumoniae Ag, Ur Result: Negative    Lipase (04.04.24 @ 05:05)    Lipase: 53 U/L    Acute Hepatitis Panel (04.06.24 @ 05:25)    Hepatitis C Virus Interpretation: Nonreact   Hepatitis C Virus S/CO Ratio: 0.13 S/CO   Hepatitis B Core IgM Antibody: Nonreact   Hepatitis B Surface Antigen: Nonreact   Hepatitis A IgM Antibody: Nonreact    Rheumatoid Factor Quant, Serum or Plasma in AM (04.06.24 @ 05:25)    Rheumatoid Factor Quant, Serum or Plasma: 11 IU/mL        < from: TTE W or WO Ultrasound Enhancing Agent (03.13.24 @ 21:00) >  CONCLUSIONS:      1. Mild left ventricular hypertrophy.   2. Left ventricular cavity is normal in size. Left ventricular systolic function is hyperdynamic with an ejection fraction of 71 % by Cheney's method of disks with an ejection fraction visually estimated at 70 to 75 %. There are no regional wall motion abnormalities seen.   3. Normal right ventricular cavity size and normal systolic function.   4. Trileaflet aortic valve with normal systolic excursion. mild calcification of the aortic valve leaflets.   5. Structurally normal mitral valve with normal leaflet excursion.   6. The right atrium is normal in size.   7. The left atrium is normal.   8. No pericardial effusion seen.    < end of copied text >        < from: MR Elbow No Cont, Right (04.11.24 @ 14:50) >    ACC: [id number] EXAM:  MR ELBOW RT   ORDERED BY: JAIRON CARPIO     PROCEDURE DATE:  04/11/2024      INTERPRETATION:  Clinical indication: Fever of unknown origin. Swelling   about the elbow. CT findings concerning for avulsion fracture versus   detached enthesophyte    Multiplanar multisequence noncontrast MRI of the right elbow was   performed.    Correlation is made with prior CT of the right elbow from April 10, 2024.    FINDINGS:    There is extensive circumferential subcutaneous edemathroughout the   visualized elbow consistent with cellulitis. There is mild feathery edema   within the medial flexor and to a lesser extent the dorsal extensor   musculature which is nonspecific and may be related to infectious or   inflammatory etiologies.    The previously described ossific fragment embedded within the ulnar   margin of the distal triceps tendon is again seen. This is likely related   to the sequela of remote avulsive injury. There is moderate tendinosis   and surrounding peritendinous edema within the triceps tendon. There is   no full-thickness or retracted triceps tendon tear. There is mild   psuedoarthritic changes between the olecranon and the avulsed fragment   which trace osseous edema about the pseudoarthrosis. There is mild   overlying olecranon bursitis. Trace osseous edema about the   pseudoarthrosis of the avulsed fragment which is favored to be   degenerative in nature. No convincing evidence of osteomyelitis.   Otherwise, the marrow signal within the elbow is preserved.    There is a nonspecific small elbow joint effusion. There is no evidence   of osseous erosion or subarticular osteitis. The articular surfaces are   preserved.    There is chronic mild undersurface tearing involving the origin of the  common extensor tendon. The lateral collateral ligamentous structures are   preserved.    The common flexor tendon origin is intact. Medial collateral ligamentous   structures are preserved. The distal biceps and brachialis tendinous   insertions are intact.    IMPRESSION:    Redemonstration of a chronic appearing avulsion along the ulnar margin of   the triceps insertion. There is moderate associated tendinosis and   surrounding peritendinous edema within the triceps tendon. No   full-thickness or retracted triceps tendon tear. Mild psuedoarthritic   changes between the olecranon and the avulsed fragment which trace   osseous edema about the pseudoarthrosis. Trace osseous edema about the   pseudoarthrosis of the avulsed fragment which is favored to be   degenerative in nature. No convincing evidence of osteomyelitis.    Diffuse subcutaneous edema about the elbow. Mild olecranon bursitis. Mild   edema within the medial flexor and to a lesser extent extensor   musculature. Findings may be related to underlying cellulitis.    Nonspecific small elbow joint effusion.    --- End of Report ---      < end of copied text >      < from: CT Upper Extremity w/ IV Cont, Right (04.10.24 @ 14:05) >  ACC: [id number] EXAM:  CT UPR EXT IC RT   ORDERED BY: MAURISIO FARR     PROCEDURE DATE:  04/10/2024      INTERPRETATION:  CT OF THE RIGHT UPPER EXTREMITY    CLINICAL INFORMATION: Swelling.    COMPARISON: None available.    TECHNIQUE: Axial CT images were obtained of the right upper extremity   from the shoulder through the fingertips following administration of 90   cc Omnipaque 350 intravenous contrast. Sagittal and coronal   reconstructions were provided.    FINDINGS:    Osseous: No acutefracture or dislocation. Small well-corticated chronic   nonunited cortical avulsion stress fracture fragment versus detached   enthesophyte at the olecranon insertion of the triceps. Subtle adjacent   small focal cortical erosion. No aggressive periosteal reaction.   Moderately severe wrist arthrosis without definite effusions.   Mineralization within normal limits.     Soft tissues: Inflammatory stranding near circumferentially surrounding   the elbow and proximal to mid forearm, but without discrete sizable   hyperattenuating hematoma or drainable encapsulated fluid collection. No   elbow joint effusion. Ill-defined superficial dermal ulceration overlying   the tip of the olecranon with nearly exposed bone. No tracking emphysema.   No radiopaque foreign body.    IMPRESSION:    1.  Small chronic nonunited cortical avulsion stress fracture fragment   versus detached enthesophyte at the olecranon insertion of right triceps.   Overlying dermal ulceration with likely exposed bone and CT findings   concerning for focal osteomyelitis. Correlate for probe to bone and   consider follow-up elbow MRI as clinically indicated.  2.  Acute cellulitis in the proper clinical setting. No tracking soft   tissue emphysema drainable mature abscess.  3. No right elbow joint effusion/septic arthritis. Advanced right wrist   arthrosis without significant effusion or convincing CT evidence for   osteomyelitis/septic arthritis.    --- End of Report ---    < end of copied text >        < from: CT Abdomen and Pelvis w/ IV Cont (04.13.24 @ 09:41) >  ACC: [id number] EXAM:  CT ABDOMEN AND PELVIS IC   ORDERED BY: KENYATTA YEBOAH     PROCEDURE DATE:  04/13/2024          INTERPRETATION:  CLINICAL INFORMATION: Bacteremia.    COMPARISON: CT scan of the abdomen and pelvis from 4/1/2024    CONTRAST/COMPLICATIONS:  IV Contrast: Omnipaque 350  95 cc administered   5 cc discarded  Oral Contrast: NONE  Complications: None reported at time of study completion    PROCEDURE:  CT of the Abdomen and Pelvis was performed.  Sagittal and coronal reformats were performed.    FINDINGS:  LOWER CHEST: Pleural based pulmonary nodules at the left lung base which   appear larger in size. For example, 3.9 x 1.9 cm pleural-based nodule on   series 3 image 32 posteriorly, previously 2.3 x 1.6 cm. Small right   pleural effusion has increased. Atelectatic changes.    LIVER: Within normal limits.  BILE DUCTS: Normal caliber.  GALLBLADDER: Within normal limits.  SPLEEN: Within normal limits.  PANCREAS: Within normal limits.  ADRENALS: Within normal limits.  KIDNEYS/URETERS: 2 mm nonobstructing calcification in the midpole the   left kidney is unchanged.    BLADDER: Within normal limits.  REPRODUCTIVE ORGANS: Mildly heterogeneous prostate gland.    BOWEL: No bowel obstruction. Appendix within normal limits.  PERITONEUM: No ascites.  VESSELS: Within normal limits.  RETROPERITONEUM/LYMPH NODES: No lymphadenopathy.  ABDOMINAL WALL: Umbilical hernia containing fat. Bilateral inguinal   hernias, right greater than left, containing fat  BONES: Degenerative changes of bone.    IMPRESSION:  Pleural-based nodules at the left lung base appears larger in size.   Please correlate clinically. Small right pleural effusion has increased.    No acute intra-abdominal pathology visualized.    --- End of Report ---    < end of copied text >

## 2024-04-15 NOTE — PROGRESS NOTE ADULT - ASSESSMENT
59 yoM with Asthma, HTN, HLD, DM2, Gout admitted with TYESHA suspected 2.2 medications along with MF PNA . Course complicated by fungemia.     Sepsis due to Fungemia  RIght lung infiltrate  Bcx on 04/10 grew Solange  started on Capsofungin 04/12/2024  Echo appreciated  ID following  Rhrumatology following.  Concern for fungal pneumonia given patient was intermittently on steroid before admission  Pulmonologist following for BAL this week  - ct abd with iv contrast, within normal limits    # Suspeceted Right elbolw osteomyelitis  -FOund right UE swelling on 04/10  -CT RUE concerning for right elbow osteomyelitis  -Right elbow MRI reported no osteomyelitis  -ID following     #TYESHA (now resolved)   -Possibly 2/2 medication induced ATN   -Hold ARB/HCTZ/Metformin   -Potentially  medication induced (in setting of poor PO intake) from recent introduction of ARB/HCTZ/Metformin  - Renal US negative  -Avoid Nephrotoxins. Renally dose meds  -Nephro Consult note appreciated    HTN, HLD  Amlodipine 10mg q24  Not currently on statin therapy    DM2  Hold Metformin 500mg BID  ISS    Asthma  Finished steroid taper last Sunday. No current symptoms/hypoxia.  No longer taking Montelukast  Wixela/Duoneb PRN    Gout  Uric Acid level 12.2  Takes Allopurinol as needed. Hold for now.     right arm swelling - duplex done neg for dvt    VTE PPX SQH/SCDs    Dispo: Acute.   BAL this week

## 2024-04-16 DIAGNOSIS — B49 UNSPECIFIED MYCOSIS: ICD-10-CM

## 2024-04-16 LAB
% ALBUMIN: 34 % — SIGNIFICANT CHANGE UP
% ALPHA 1: 8.8 % — SIGNIFICANT CHANGE UP
% ALPHA 2: 17.1 % — SIGNIFICANT CHANGE UP
% BETA: 17.5 % — SIGNIFICANT CHANGE UP
% GAMMA: 22.6 % — SIGNIFICANT CHANGE UP
% M SPIKE: SIGNIFICANT CHANGE UP
ALBUMIN SERPL ELPH-MCNC: 1.9 G/DL — LOW (ref 3.6–5.5)
ALBUMIN SERPL ELPH-MCNC: 2.5 G/DL — LOW (ref 3.3–5.2)
ALBUMIN/GLOB SERPL ELPH: 0.5 RATIO — SIGNIFICANT CHANGE UP
ALP SERPL-CCNC: 99 U/L — SIGNIFICANT CHANGE UP (ref 40–120)
ALPHA1 GLOB SERPL ELPH-MCNC: 0.5 G/DL — HIGH (ref 0.1–0.4)
ALPHA2 GLOB SERPL ELPH-MCNC: 1 G/DL — SIGNIFICANT CHANGE UP (ref 0.5–1)
ALT FLD-CCNC: 20 U/L — SIGNIFICANT CHANGE UP
ANION GAP SERPL CALC-SCNC: 16 MMOL/L — SIGNIFICANT CHANGE UP (ref 5–17)
AST SERPL-CCNC: 31 U/L — SIGNIFICANT CHANGE UP
B-GLOBULIN SERPL ELPH-MCNC: 1 G/DL — SIGNIFICANT CHANGE UP (ref 0.5–1)
BASOPHILS # BLD AUTO: 0 K/UL — SIGNIFICANT CHANGE UP (ref 0–0.2)
BASOPHILS # BLD AUTO: 0.05 K/UL — SIGNIFICANT CHANGE UP (ref 0–0.2)
BASOPHILS NFR BLD AUTO: 0 % — SIGNIFICANT CHANGE UP (ref 0–2)
BASOPHILS NFR BLD AUTO: 0.4 % — SIGNIFICANT CHANGE UP (ref 0–2)
BILIRUB SERPL-MCNC: 0.5 MG/DL — SIGNIFICANT CHANGE UP (ref 0.4–2)
BUN SERPL-MCNC: 4.3 MG/DL — LOW (ref 8–20)
CALCIUM SERPL-MCNC: 8.4 MG/DL — SIGNIFICANT CHANGE UP (ref 8.4–10.5)
CHLORIDE SERPL-SCNC: 101 MMOL/L — SIGNIFICANT CHANGE UP (ref 96–108)
CO2 SERPL-SCNC: 20 MMOL/L — LOW (ref 22–29)
CREAT SERPL-MCNC: 1.15 MG/DL — SIGNIFICANT CHANGE UP (ref 0.5–1.3)
EGFR: 72 ML/MIN/1.73M2 — SIGNIFICANT CHANGE UP
EOSINOPHIL # BLD AUTO: 0.47 K/UL — SIGNIFICANT CHANGE UP (ref 0–0.5)
EOSINOPHIL # BLD AUTO: 0.71 K/UL — HIGH (ref 0–0.5)
EOSINOPHIL NFR BLD AUTO: 3.7 % — SIGNIFICANT CHANGE UP (ref 0–6)
EOSINOPHIL NFR BLD AUTO: 4.3 % — SIGNIFICANT CHANGE UP (ref 0–6)
GAMMA GLOBULIN: 1.3 G/DL — SIGNIFICANT CHANGE UP (ref 0.6–1.6)
GAMMA INTERFERON BACKGROUND BLD IA-ACNC: 0.08 IU/ML — SIGNIFICANT CHANGE UP
GLUCOSE BLDC GLUCOMTR-MCNC: 130 MG/DL — HIGH (ref 70–99)
GLUCOSE BLDC GLUCOMTR-MCNC: 90 MG/DL — SIGNIFICANT CHANGE UP (ref 70–99)
GLUCOSE BLDC GLUCOMTR-MCNC: 99 MG/DL — SIGNIFICANT CHANGE UP (ref 70–99)
GLUCOSE BLDC GLUCOMTR-MCNC: 99 MG/DL — SIGNIFICANT CHANGE UP (ref 70–99)
GLUCOSE SERPL-MCNC: 92 MG/DL — SIGNIFICANT CHANGE UP (ref 70–99)
HCT VFR BLD CALC: 27.1 % — LOW (ref 39–50)
HCT VFR BLD CALC: 31 % — LOW (ref 39–50)
HGB BLD-MCNC: 10.1 G/DL — LOW (ref 13–17)
HGB BLD-MCNC: 8.8 G/DL — LOW (ref 13–17)
IMM GRANULOCYTES NFR BLD AUTO: 0.9 % — SIGNIFICANT CHANGE UP (ref 0–0.9)
INTERPRETATION SERPL IFE-IMP: SIGNIFICANT CHANGE UP
LACTATE BLDV-MCNC: 1 MMOL/L — SIGNIFICANT CHANGE UP (ref 0.5–2)
LYMPHOCYTES # BLD AUTO: 25.2 % — SIGNIFICANT CHANGE UP (ref 13–44)
LYMPHOCYTES # BLD AUTO: 36 % — SIGNIFICANT CHANGE UP (ref 13–44)
LYMPHOCYTES # BLD AUTO: 4.17 K/UL — HIGH (ref 1–3.3)
LYMPHOCYTES # BLD AUTO: 4.6 K/UL — HIGH (ref 1–3.3)
M TB IFN-G BLD-IMP: NEGATIVE — SIGNIFICANT CHANGE UP
M TB IFN-G CD4+ BCKGRND COR BLD-ACNC: 0 IU/ML — SIGNIFICANT CHANGE UP
M TB IFN-G CD4+CD8+ BCKGRND COR BLD-ACNC: 0 IU/ML — SIGNIFICANT CHANGE UP
M-SPIKE: SIGNIFICANT CHANGE UP (ref 0–0)
MAGNESIUM SERPL-MCNC: 1.8 MG/DL — SIGNIFICANT CHANGE UP (ref 1.8–2.6)
MANUAL SMEAR VERIFICATION: SIGNIFICANT CHANGE UP
MCHC RBC-ENTMCNC: 27.8 PG — SIGNIFICANT CHANGE UP (ref 27–34)
MCHC RBC-ENTMCNC: 27.8 PG — SIGNIFICANT CHANGE UP (ref 27–34)
MCHC RBC-ENTMCNC: 32.5 GM/DL — SIGNIFICANT CHANGE UP (ref 32–36)
MCHC RBC-ENTMCNC: 32.6 GM/DL — SIGNIFICANT CHANGE UP (ref 32–36)
MCV RBC AUTO: 85.4 FL — SIGNIFICANT CHANGE UP (ref 80–100)
MCV RBC AUTO: 85.8 FL — SIGNIFICANT CHANGE UP (ref 80–100)
MONOCYTES # BLD AUTO: 1.43 K/UL — HIGH (ref 0–0.9)
MONOCYTES # BLD AUTO: 1.59 K/UL — HIGH (ref 0–0.9)
MONOCYTES NFR BLD AUTO: 11.2 % — SIGNIFICANT CHANGE UP (ref 2–14)
MONOCYTES NFR BLD AUTO: 9.6 % — SIGNIFICANT CHANGE UP (ref 2–14)
NEUTROPHILS # BLD AUTO: 6.11 K/UL — SIGNIFICANT CHANGE UP (ref 1.8–7.4)
NEUTROPHILS # BLD AUTO: 9.49 K/UL — HIGH (ref 1.8–7.4)
NEUTROPHILS NFR BLD AUTO: 47.8 % — SIGNIFICANT CHANGE UP (ref 43–77)
NEUTROPHILS NFR BLD AUTO: 57.4 % — SIGNIFICANT CHANGE UP (ref 43–77)
PLAT MORPH BLD: NORMAL — SIGNIFICANT CHANGE UP
PLATELET # BLD AUTO: 609 K/UL — HIGH (ref 150–400)
PLATELET # BLD AUTO: 652 K/UL — HIGH (ref 150–400)
POLYCHROMASIA BLD QL SMEAR: SLIGHT — SIGNIFICANT CHANGE UP
POTASSIUM SERPL-MCNC: 3.7 MMOL/L — SIGNIFICANT CHANGE UP (ref 3.5–5.3)
POTASSIUM SERPL-SCNC: 3.7 MMOL/L — SIGNIFICANT CHANGE UP (ref 3.5–5.3)
PROT PATTERN SERPL ELPH-IMP: SIGNIFICANT CHANGE UP
PROT SERPL-MCNC: 5.7 G/DL — LOW (ref 6–8.3)
PROT SERPL-MCNC: 6.8 G/DL — SIGNIFICANT CHANGE UP (ref 6.6–8.7)
QUANT TB PLUS MITOGEN MINUS NIL: 8.2 IU/ML — SIGNIFICANT CHANGE UP
RBC # BLD: 3.16 M/UL — LOW (ref 4.2–5.8)
RBC # BLD: 3.63 M/UL — LOW (ref 4.2–5.8)
RBC # FLD: 14.3 % — SIGNIFICANT CHANGE UP (ref 10.3–14.5)
RBC # FLD: 15 % — HIGH (ref 10.3–14.5)
RBC BLD AUTO: NORMAL — SIGNIFICANT CHANGE UP
SMUDGE CELLS # BLD: PRESENT — SIGNIFICANT CHANGE UP
SODIUM SERPL-SCNC: 137 MMOL/L — SIGNIFICANT CHANGE UP (ref 135–145)
VARIANT LYMPHS # BLD: 3.5 % — SIGNIFICANT CHANGE UP (ref 0–6)
WBC # BLD: 12.77 K/UL — HIGH (ref 3.8–10.5)
WBC # BLD: 16.53 K/UL — HIGH (ref 3.8–10.5)
WBC # FLD AUTO: 12.77 K/UL — HIGH (ref 3.8–10.5)
WBC # FLD AUTO: 16.53 K/UL — HIGH (ref 3.8–10.5)

## 2024-04-16 PROCEDURE — 99232 SBSQ HOSP IP/OBS MODERATE 35: CPT

## 2024-04-16 PROCEDURE — 99233 SBSQ HOSP IP/OBS HIGH 50: CPT

## 2024-04-16 PROCEDURE — 71045 X-RAY EXAM CHEST 1 VIEW: CPT | Mod: 26

## 2024-04-16 RX ORDER — POTASSIUM CHLORIDE 20 MEQ
20 PACKET (EA) ORAL ONCE
Refills: 0 | Status: COMPLETED | OUTPATIENT
Start: 2024-04-16 | End: 2024-04-16

## 2024-04-16 RX ORDER — POTASSIUM CHLORIDE 20 MEQ
20 PACKET (EA) ORAL ONCE
Refills: 0 | Status: DISCONTINUED | OUTPATIENT
Start: 2024-04-16 | End: 2024-04-16

## 2024-04-16 RX ORDER — MAGNESIUM SULFATE 500 MG/ML
1 VIAL (ML) INJECTION ONCE
Refills: 0 | Status: COMPLETED | OUTPATIENT
Start: 2024-04-16 | End: 2024-04-16

## 2024-04-16 RX ADMIN — HEPARIN SODIUM 5000 UNIT(S): 5000 INJECTION INTRAVENOUS; SUBCUTANEOUS at 06:10

## 2024-04-16 RX ADMIN — PANTOPRAZOLE SODIUM 40 MILLIGRAM(S): 20 TABLET, DELAYED RELEASE ORAL at 06:12

## 2024-04-16 RX ADMIN — Medication 650 MILLIGRAM(S): at 23:02

## 2024-04-16 RX ADMIN — HEPARIN SODIUM 5000 UNIT(S): 5000 INJECTION INTRAVENOUS; SUBCUTANEOUS at 18:14

## 2024-04-16 RX ADMIN — Medication 20 MILLIEQUIVALENT(S): at 12:20

## 2024-04-16 RX ADMIN — AMLODIPINE BESYLATE 10 MILLIGRAM(S): 2.5 TABLET ORAL at 06:11

## 2024-04-16 RX ADMIN — Medication 2.5 MILLIGRAM(S): at 02:59

## 2024-04-16 RX ADMIN — Medication 650 MILLIGRAM(S): at 22:02

## 2024-04-16 RX ADMIN — CASPOFUNGIN ACETATE 260 MILLIGRAM(S): 7 INJECTION, POWDER, LYOPHILIZED, FOR SOLUTION INTRAVENOUS at 13:46

## 2024-04-16 RX ADMIN — Medication 100 GRAM(S): at 12:20

## 2024-04-16 RX ADMIN — Medication 12.5 MILLIGRAM(S): at 06:11

## 2024-04-16 RX ADMIN — Medication 12.5 MILLIGRAM(S): at 18:14

## 2024-04-16 NOTE — PROGRESS NOTE ADULT - TIME-BASED
Pt alert and oriented x4. Pt does not want any testing, dr dan and Stefano COATS saw pt, pt ambulated with assistance. Pt taken to bathroom via wheelchair. This RN spoke to daughter, updated on findings. Pt taken to exit via wheelchair assisted into car.        Oseas Teran, MARIFER  11/12/22 [phone] [id number]

## 2024-04-16 NOTE — PROGRESS NOTE ADULT - ASSESSMENT
59 yoM with Asthma, HTN, HLD, DM2, Gout admitted with TYESHA suspected 2.2 medications along with MF PNA . Course complicated by fungemia.     Sepsis due to Fungemia  RIght lung infiltrate  Bcx on 04/10 grew Solange  started on Capsofungin 04/12/2024  Echo appreciated  ID following  Rhrumatology following.  Concern for fungal pneumonia given patient was intermittently on steroid before admission  Pulmonologist following for BAL this week  - ct abd with iv contrast, within normal limits  - called cardio for shania eval, npo pmn     # Suspeceted Right elbolw osteomyelitis  -FOund right UE swelling on 04/10  -CT RUE concerning for right elbow osteomyelitis  -Right elbow MRI reported no osteomyelitis  -ID following     #TYESHA (now resolved)   -Possibly 2/2 medication induced ATN   -Hold ARB/HCTZ/Metformin   -Potentially  medication induced (in setting of poor PO intake) from recent introduction of ARB/HCTZ/Metformin  - Renal US negative  -Avoid Nephrotoxins. Renally dose meds  -Nephro Consult note appreciated    HTN, HLD  Amlodipine 10mg q24  Not currently on statin therapy    DM2  Hold Metformin 500mg BID  ISS    Asthma  Finished steroid taper   No longer taking Montelukast  Wixela/Duoneb PRN    Gout  Uric Acid level 12.2  Takes Allopurinol as needed. Hold for now.     right arm swelling - duplex done neg for dvt  encouraged elevation     VTE PPX SQH    Dispo: Acute.   BAL this week  shania tomorrow

## 2024-04-16 NOTE — PROGRESS NOTE ADULT - SUBJECTIVE AND OBJECTIVE BOX
ZAINA MILLER    [id number]    59y      Male    INTERVAL HPI/OVERNIGHT EVENTS:  patient beign seen for fungemia. patient seen at bedside and denies any complaints      REVIEW OF SYSTEMS:    CONSTITUTIONAL: No fever, weight loss, or fatigue  RESPIRATORY: No cough, wheezing, hemoptysis; No shortness of breath  CARDIOVASCULAR: No chest pain, palpitations  GASTROINTESTINAL: No abdominal or epigastric pain. No nausea, vomiting  NEUROLOGICAL: No headaches, memory loss, loss of strength.  MISCELLANEOUS:      Vital Signs Last 24 Hrs  T(C): 36.4 (16 Apr 2024 08:10), Max: 37.8 (15 Apr 2024 22:52)  T(F): 97.5 (16 Apr 2024 08:10), Max: 100 (15 Apr 2024 22:52)  HR: 107 (16 Apr 2024 08:10) (107 - 126)  BP: 120/71 (16 Apr 2024 08:10) (104/67 - 166/70)  BP(mean): --  RR: 18 (16 Apr 2024 08:10) (18 - 18)  SpO2: 91% (16 Apr 2024 08:10) (91% - 97%)    Parameters below as of 16 Apr 2024 08:10  Patient On (Oxygen Delivery Method): room air        PHYSICAL EXAM:    CONSTITUTIONAL: NAD, Not in acute distress  EYES:  EOMI, conjunctiva and sclera clear  ENMT: , neck supple , non-tender  RESPIRATORY: Normal respiratory effort; lungs are clear to auscultation bilaterally.   CARDIOVASCULAR: S1S2 present  ABDOMEN: soft. bowel sound present  MUSCLOSKELETAL: ; no clubbing or cyanosis of digits;   PSYCH: A+O to person, place, and time; affect appropriate  NEUROLOGY: CN, motor and sensory intact   SKIN: No rashes; no palpable lesions  Extremity: RIght UE swelling.       LABS:                        10.1   16.53 )-----------( 609      ( 16 Apr 2024 05:13 )             31.0     04-16    137  |  101  |  4.3<L>  ----------------------------<  92  3.7   |  20.0<L>  |  1.15    Ca    8.4      16 Apr 2024 05:13  Mg     1.8     04-16    TPro  6.8  /  Alb  2.5<L>  /  TBili  0.5  /  DBili  x   /  AST  31  /  ALT  20  /  AlkPhos  99  04-16      Urinalysis Basic - ( 16 Apr 2024 05:13 )    Color: x / Appearance: x / SG: x / pH: x  Gluc: 92 mg/dL / Ketone: x  / Bili: x / Urobili: x   Blood: x / Protein: x / Nitrite: x   Leuk Esterase: x / RBC: x / WBC x   Sq Epi: x / Non Sq Epi: x / Bacteria: x          MEDICATIONS  (STANDING):  amLODIPine   Tablet 10 milliGRAM(s) Oral daily  caspofungin IVPB      caspofungin IVPB 50 milliGRAM(s) IV Intermittent every 24 hours  dextrose 5%. 1000 milliLiter(s) (50 mL/Hr) IV Continuous <Continuous>  dextrose 5%. 1000 milliLiter(s) (100 mL/Hr) IV Continuous <Continuous>  dextrose 50% Injectable 12.5 Gram(s) IV Push once  dextrose 50% Injectable 25 Gram(s) IV Push once  dextrose 50% Injectable 25 Gram(s) IV Push once  glucagon  Injectable 1 milliGRAM(s) IntraMuscular once  heparin   Injectable 5000 Unit(s) SubCutaneous every 12 hours  insulin lispro (ADMELOG) corrective regimen sliding scale   SubCutaneous Before meals and at bedtime  metoprolol tartrate 12.5 milliGRAM(s) Oral every 12 hours  pantoprazole    Tablet 40 milliGRAM(s) Oral before breakfast    MEDICATIONS  (PRN):  acetaminophen     Tablet .. 650 milliGRAM(s) Oral every 6 hours PRN Temp greater or equal to 38C (100.4F), Mild Pain (1 - 3)  albuterol    0.083% 2.5 milliGRAM(s) Nebulizer every 4 hours PRN Wheezing  albuterol    90 MICROgram(s) HFA Inhaler 2 Puff(s) Inhalation every 6 hours PRN Shortness of Breath and/or Wheezing  aluminum hydroxide/magnesium hydroxide/simethicone Suspension 30 milliLiter(s) Oral every 4 hours PRN Dyspepsia  dextrose Oral Gel 15 Gram(s) Oral once PRN Blood Glucose LESS THAN 70 milliGRAM(s)/deciliter  diphenoxylate/atropine 1 Tablet(s) Oral every 6 hours PRN Diarrhea  melatonin 3 milliGRAM(s) Oral at bedtime PRN Insomnia  metoprolol tartrate Injectable 2.5 milliGRAM(s) IV Push every 6 hours PRN HR above 110/min  ondansetron Injectable 4 milliGRAM(s) IV Push every 8 hours PRN Nausea and/or Vomiting      RADIOLOGY & ADDITIONAL TESTS:

## 2024-04-16 NOTE — CHART NOTE - NSCHARTNOTEFT_GEN_A_CORE
Called by RN w/ pt's low grade fever of 100.3.      ICU Vital Signs Last 24 Hrs  T(C): 37.9 (16 Apr 2024 21:40), Max: 37.9 (16 Apr 2024 21:40)  T(F): 100.3 (16 Apr 2024 21:40), Max: 100.3 (16 Apr 2024 21:40)  HR: 116 (16 Apr 2024 21:40) (107 - 126)  BP: 116/65 (16 Apr 2024 21:40) (104/67 - 144/76)  BP(mean): --  ABP: --  ABP(mean): --  RR: 18 (16 Apr 2024 21:40) (18 - 18)  SpO2: 95% (16 Apr 2024 21:40) (91% - 97%)    O2 Parameters below as of 16 Apr 2024 21:40  Patient On (Oxygen Delivery Method): room air    Pt is A&OX3 in NAD, c/o pain in RUE, baseline, all w/u negative.      Pt initially admitted for TYESHA, developed fever and was code sepsis on 3/31    Blood cultures  3/31, 4/3, 4/6 no growth   - blood cultures 4/10 reporting Candida  - Repeat blood cultures  4/13 no growth   - RVP/COVID 19 PCR 3/31, 4/4 negative   - Urine for legionella and strep negative   - CT C/A/P 4/1 reporting multifocal PNA   - UA 3/30 and 4/1 negative   - RUE CT with contrast concerning for Rt elbow OM and Advanced right wrist arthrosis without significant effusion or convincing CT evidence for osteomyelitis/septic arthritis.  - Rt Elbow MRI with no OM.   - Rheumatology consult noted  - Babesia PCR is negative   - Lyme PCR  is negative   - HIV negative   - viral hepatitis profile negative  - ERON negative   - LFTs improving   - RF is 11  - Procalcitonin 0.30, will repeat in AM   - Continue Caspofungin   - TTE with no veg  - CT A/P with contrast 4/13 reporting no acute findings  - Pt scheduled for YASME[facility] in am    RN to give tylenol as ordered for fever  Will order stat labs - CBC w/ diff; RVP; lactate; BCx2; UA; UC; CXR  Lidocaine patch to RUE prn pain  RN to monitor and escalate prn Called by RN w/ pt's low grade fever of 100.3.      ICU Vital Signs Last 24 Hrs  T(C): 37.9 (16 Apr 2024 21:40), Max: 37.9 (16 Apr 2024 21:40)  T(F): 100.3 (16 Apr 2024 21:40), Max: 100.3 (16 Apr 2024 21:40)  HR: 116 (16 Apr 2024 21:40) (107 - 126)  BP: 116/65 (16 Apr 2024 21:40) (104/67 - 144/76)  BP(mean): --  ABP: --  ABP(mean): --  RR: 18 (16 Apr 2024 21:40) (18 - 18)  SpO2: 95% (16 Apr 2024 21:40) (91% - 97%)    O2 Parameters below as of 16 Apr 2024 21:40  Patient On (Oxygen Delivery Method): room air    Pt is A&OX3 in NAD, c/o pain in RUE, baseline, all w/u negative.      Pt initially admitted for TYESHA, developed fever and was code sepsis on 3/31    Blood cultures  3/31, 4/3, 4/6 no growth   - blood cultures 4/10 reporting Candida  - Repeat blood cultures  4/13 no growth   - RVP/COVID 19 PCR 3/31, 4/4 negative   - Urine for legionella and strep negative   - CT C/A/P 4/1 reporting multifocal PNA   - UA 3/30 and 4/1 negative   - RUE CT with contrast concerning for Rt elbow OM and Advanced right wrist arthrosis without significant effusion or convincing CT evidence for osteomyelitis/septic arthritis.  - Rt Elbow MRI with no OM  - Continue Caspofungin   - TTE with no veg  - CT A/P with contrast 4/13 reporting no acute findings  - Pt scheduled for [facility]ASME[facility] in am    RN to give tylenol as ordered for fever  Will order stat labs - CBC w/ diff; RVP; lactate; BCx2; UA; UC; CXR  Lidocaine patch to RUE prn pain  RN to monitor and escalate prn

## 2024-04-16 NOTE — PROGRESS NOTE ADULT - SUBJECTIVE AND OBJECTIVE BOX
PULMONARY PROGRESS NOTE      AMANDA MILLER-[id number]    Patient is a 59y old  Male who presents with a chief complaint of ARF (16 Apr 2024 09:40)      BRIEF HOSPITAL COURSE: ***    Events last 24 hours: **feeling better  no sob no cough    sitting in chair    some  rigth arm swelling is main complaint    REVIEW OF SYSTEMS     CONSTITUTIONAL   no fevers  no loss of appetite  no weight loss   HEENT  no sore throat   no ringing in ears  NECK   no pain   RESPIRATORY  see HPI   CARDIOVASCULAR  no chest  pain no palpitations   GASTROINTESTINAL no vomiting  no diarrhea    no   gerd   MUSCULOSKELETAL  no joint pains    no  back pain   SKIN   no rash   no itchiness   GENITOURINARY  no dysuria   HEME    no bleeding or bruising   ENDOCRINE     no   warmth   no  sweating   no  cold intolerance   NEUROLOGIC  no tremors  no seizures  no    weakness    PSYCHIATRIC   no mood disorder    no delirium       PAST MEDICAL & SURGICAL HISTORY:  Gout      Asthma      Arthritis      HTN (hypertension)      No significant past surgical history            Medications:  caspofungin IVPB      caspofungin IVPB 50 milliGRAM(s) IV Intermittent every 24 hours    amLODIPine   Tablet 10 milliGRAM(s) Oral daily  metoprolol tartrate 12.5 milliGRAM(s) Oral every 12 hours  metoprolol tartrate Injectable 2.5 milliGRAM(s) IV Push every 6 hours PRN    albuterol    0.083% 2.5 milliGRAM(s) Nebulizer every 4 hours PRN  albuterol    90 MICROgram(s) HFA Inhaler 2 Puff(s) Inhalation every 6 hours PRN    acetaminophen     Tablet .. 650 milliGRAM(s) Oral every 6 hours PRN  melatonin 3 milliGRAM(s) Oral at bedtime PRN  ondansetron Injectable 4 milliGRAM(s) IV Push every 8 hours PRN      heparin   Injectable 5000 Unit(s) SubCutaneous every 12 hours    aluminum hydroxide/magnesium hydroxide/simethicone Suspension 30 milliLiter(s) Oral every 4 hours PRN  diphenoxylate/atropine 1 Tablet(s) Oral every 6 hours PRN  pantoprazole    Tablet 40 milliGRAM(s) Oral before breakfast      dextrose 50% Injectable 12.5 Gram(s) IV Push once  dextrose 50% Injectable 25 Gram(s) IV Push once  dextrose 50% Injectable 25 Gram(s) IV Push once  dextrose Oral Gel 15 Gram(s) Oral once PRN  glucagon  Injectable 1 milliGRAM(s) IntraMuscular once  insulin lispro (ADMELOG) corrective regimen sliding scale   SubCutaneous Before meals and at bedtime    dextrose 5%. 1000 milliLiter(s) IV Continuous <Continuous>  dextrose 5%. 1000 milliLiter(s) IV Continuous <Continuous>                ICU Vital Signs Last 24 Hrs  T(C): 36.4 (16 Apr 2024 08:10), Max: 37.8 (15 Apr 2024 22:52)  T(F): 97.5 (16 Apr 2024 08:10), Max: 100 (15 Apr 2024 22:52)  HR: 107 (16 Apr 2024 08:10) (107 - 126)  BP: 120/71 (16 Apr 2024 08:10) (104/67 - 166/70)  BP(mean): --  ABP: --  ABP(mean): --  RR: 18 (16 Apr 2024 08:10) (18 - 18)  SpO2: 91% (16 Apr 2024 08:10) (91% - 97%)    O2 Parameters below as of 16 Apr 2024 08:10  Patient On (Oxygen Delivery Method): room air                I&O's Detail        LABS:                        10.1   16.53 )-----------( 609      ( 16 Apr 2024 05:13 )             31.0     04-16    137  |  101  |  4.3<L>  ----------------------------<  92  3.7   |  20.0<L>  |  1.15    Ca    8.4      16 Apr 2024 05:13  Mg     1.8     04-16    TPro  6.8  /  Alb  2.5<L>  /  TBili  0.5  /  DBili  x   /  AST  31  /  ALT  20  /  AlkPhos  99  04-16          CAPILLARY BLOOD GLUCOSE      POCT Blood Glucose.: 99 mg/dL (16 Apr 2024 12:19)      Urinalysis Basic - ( 16 Apr 2024 05:13 )    Color: x / Appearance: x / SG: x / pH: x  Gluc: 92 mg/dL / Ketone: x  / Bili: x / Urobili: x   Blood: x / Protein: x / Nitrite: x   Leuk Esterase: x / RBC: x / WBC x   Sq Epi: x / Non Sq Epi: x / Bacteria: x      CULTURES:  Culture Results:   No growth at 48 Hours (04-13 @ 06:50)  Culture Results:   No growth at 48 Hours (04-13 @ 06:47)  Culture Results:   Growth in aerobic bottle: Candida parapsilosis  Direct identification is available within approximately 3-5  hours either by Blood Panel Multiplexed PCR or Direct  MALDI-TOF. Details: [email or web address] (04-10 @ 05:59)  Culture Results:   No growth at 5 days (04-10 @ 05:55)      Physical Examination:    General: No acute distress.   speaking full sentences no cough     HEENT: Pupils equal, reactive to light.  Symmetric. no lesions moist     PULM: Clear to auscultation bilaterally, no significant sputum production    NECK: Supple, no lymphadenopathy, trachea midline    CVS: Regular rate and rhythm, no murmurs, rubs, or gallops    ABD: Soft, nondistended, nontender, normoactive bowel sounds, no masses    EXT:  edema   arms  bilateral   SKIN: Warm and well perfused, no rashes noted.    NEURO: Alert, oriented, interactive, nonfocal    DEVICES:     RADIOLOGY: **ACC: [id number] EXAM:  CT ABDOMEN AND PELVIS   ORDERED BY: ELDER DUQUE     ACC: [id number] EXAM:  CT CHEST   ORDERED BY: ELDER DUQUE     PROCEDURE DATE:  04/01/2024          INTERPRETATION:  CLINICAL INFORMATION: Fever    COMPARISON: CT chest 3/15/2024    CONTRAST/COMPLICATIONS:  IV Contrast: NONE  Oral Contrast: NONE  Complications: None reported at time of study completion    PROCEDURE:  CT of the Chest, Abdomen and Pelvis was performed.  Sagittal and coronal reformats were performed.    FINDINGS:  CHEST:  LUNGS AND LARGE AIRWAYS: Patent central airways. New masslike   consolidation in the right upper lobe. Nodular opacities are also noted   throughout the left upper lobe and bilateral lung bases, all of which are   new compared to CT 3/15/2024.  PLEURA: No pleural effusion.  VESSELS: Within normal limits.  HEART: Heart size is normal. No pericardial effusion.  MEDIASTINUM AND ELIECER: There are some enlarged mediastinal and hilar lymph   nodes, for example a right hilar node that measures 2.2x 1.9 cm (3-46),   nonspecific and possibly reactive.  CHEST WALL AND LOWER NECK: Within normal limits.    ABDOMEN AND PELVIS:  LIVER: Within normal limits.  BILE DUCTS: Normal caliber.  GALLBLADDER: Within normal limits.  SPLEEN: Within normal limits.  PANCREAS: Within normal limits.  ADRENALS: Within normal limits.  KIDNEYS/URETERS: Punctate nonobstructing left renal stone. No   hydronephrosis.    BLADDER: Within normal limits.  REPRODUCTIVE ORGANS: Prostate within normal limits.    BOWEL: No bowel obstruction. Appendix is normal.  PERITONEUM: No ascites.  VESSELS: Within normal limits.  RETROPERITONEUM/LYMPH NODES: No lymphadenopathy.  ABDOMINAL WALL: Within normal limits.  BONES: Within normal limits.    IMPRESSION:  Multifocal consolidations, most prominent in the right upper lobe, new   compared to CT 3/15/2024, likely representing multifocal pneumonia.        --- End of Report ---            ASHA ALAN DO; Attending Radiologist  This document has been electronically signed. Apr 1 2024  4:12PM  ACC: [id number] EXAM:  CT ABDOMEN AND PELVIS IC   ORDERED BY: KENYATTA YEBOAH     PROCEDURE DATE:  04/13/2024          INTERPRETATION:  CLINICAL INFORMATION: Bacteremia.    COMPARISON: CT scan of the abdomen and pelvis from 4/1/2024    CONTRAST/COMPLICATIONS:  IV Contrast: Omnipaque 350  95 cc administered   5 cc discarded  Oral Contrast: NONE  Complications: None reported at time of study completion    PROCEDURE:  CT of the Abdomen and Pelvis was performed.  Sagittal and coronal reformats were performed.    FINDINGS:  LOWER CHEST: Pleural based pulmonary nodules at the left lung base which   appear larger in size. For example, 3.9 x 1.9 cm pleural-based nodule on   series 3 image 32 posteriorly, previously 2.3 x 1.6 cm. Small right   pleural effusion has increased. Atelectatic changes.    LIVER: Within normal limits.  BILE DUCTS: Normal caliber.  GALLBLADDER: Within normal limits.  SPLEEN: Within normal limits.  PANCREAS: Within normal limits.  ADRENALS: Within normal limits.  KIDNEYS/URETERS: 2 mm nonobstructing calcification in the midpole the   left kidney is unchanged.    BLADDER: Within normal limits.  REPRODUCTIVE ORGANS: Mildly heterogeneous prostate gland.    BOWEL: No bowel obstruction. Appendix within normal limits.  PERITONEUM: No ascites.  VESSELS: Within normal limits.  RETROPERITONEUM/LYMPH NODES: No lymphadenopathy.  ABDOMINAL WALL: Umbilical hernia containing fat. Bilateral inguinal   hernias, right greater than left, containing fat  BONES: Degenerative changes of bone.    IMPRESSION:  Pleural-based nodules at the left lung base appears larger in size.   Please correlate clinically. Small right pleural effusion has increased.    No acute intra-abdominal pathology visualized.        --- End of Report ---            GUILLERMO GIBSON MD; Attending Radiologist  This document has been electronically signed. Apr 13 2024  1:47PM  HIV neg  quanteferon neg   *    CRITICAL CARE TIME SPENT: ***

## 2024-04-16 NOTE — CONSULT NOTE ADULT - SUBJECTIVE AND OBJECTIVE BOX
[facility]                                              CARDIOLOGY AT [place] [address]                                             Telephone: [phone]. Fax:[phone]                                                       CARDIOLOGY CONSULTATION NOTE                                                                                             History obtained by: Patient and medical record  Community Cardiologist:    obtained: Yes [  ] No [  ]  Reason for Consultation:   Available out pt records reviewed: Yes [  ] No [  ]    Chief complaint:    Patient is a 59y old  Male who presents with a chief complaint of ARF (15 Apr 2024 11:47)      HPI:  Patient seen/examined prior to midnight on 3/29/24.     58M with PMHX Asthma, HTN, HLD, DM2, Gout presents to [facility] ER for abnormal lab work. Patient recently hospitalizated for acute asthma exacerbation and viral URI found to have uncontrolled HTN and new onset DM2 at that time discharged home with outpatient follow-up and sent back to [facility] ER today by PCP for abnormal kidney function. ROS +Fatigue and +Malaise. No other complaints. Denies CVAT or back pain. No urinary complaints. Compliant with meds. Recently started on Losartan, HCTZ, and Metformin on discharge and by PCP. +Tylenol use at home. Denies NSAIDs.     PMHX: Asthma, HTN, HLD, DM2, Gout  PSHX: Denies  Social Hx: Denies etoh/tobacco/drug abuse  FamHx: +DM2 +Lung CA  NKDA (29 Mar 2024 23:47)      CARDIAC TESTING   ECHO:    STRESS:    CATH:     ELECTROPHYSIOLOGY:     PAST MEDICAL HISTORY  Gout    Asthma    Arthritis    HTN (hypertension)        PAST SURGICAL HISTORY  No significant past surgical history    No significant past surgical history        SOCIAL HISTORY:  Denies smoking/alcohol/drugs  CIGARETTES:     ALCOHOL:  DRUGS:    FAMILY HISTORY:  Family history of diabetes mellitus type II    Family history of lung cancer      Family History of Cardiovascular Disease:  Yes [  ] No [  ]  Coronary Artery Disease in first degree relative: Yes [  ] No [  ]  Sudden Cardiac Death in First degree relative: Yes [  ] No [  ]    HOME MEDICATIONS:  metFORMIN 500 mg oral tablet: 1 tab(s) orally 2 times a day (30 Mar 2024 02:19)  Wixela Inhub 500 mcg-50 mcg inhalation powder: 1 puff(s) inhaled once a day (30 Mar 2024 02:19)      CURRENT CARDIAC MEDICATIONS:  amLODIPine   Tablet 10 milliGRAM(s) Oral daily  metoprolol tartrate 12.5 milliGRAM(s) Oral every 12 hours  metoprolol tartrate Injectable 2.5 milliGRAM(s) IV Push every 6 hours, Stop order after: 6 Doses PRN HR above 110/min      CURRENT OTHER MEDICATIONS:  albuterol    0.083% 2.5 milliGRAM(s) Nebulizer every 4 hours PRN Wheezing  albuterol    90 MICROgram(s) HFA Inhaler 2 Puff(s) Inhalation every 6 hours PRN Shortness of Breath and/or Wheezing  acetaminophen     Tablet .. 650 milliGRAM(s) Oral every 6 hours PRN Temp greater or equal to 38C (100.4F), Mild Pain (1 - 3)  melatonin 3 milliGRAM(s) Oral at bedtime PRN Insomnia  ondansetron Injectable 4 milliGRAM(s) IV Push every 8 hours PRN Nausea and/or Vomiting  aluminum hydroxide/magnesium hydroxide/simethicone Suspension 30 milliLiter(s) Oral every 4 hours PRN Dyspepsia  diphenoxylate/atropine 1 Tablet(s) Oral every 6 hours PRN Diarrhea  pantoprazole    Tablet 40 milliGRAM(s) Oral before breakfast  caspofungin IVPB      caspofungin IVPB 50 milliGRAM(s) IV Intermittent every 24 hours  dextrose 5%. 1000 milliLiter(s) (50 mL/Hr) IV Continuous <Continuous>  dextrose 5%. 1000 milliLiter(s) (100 mL/Hr) IV Continuous <Continuous>  dextrose 50% Injectable 12.5 Gram(s) IV Push once, Stop order after: 1 Doses  dextrose 50% Injectable 25 Gram(s) IV Push once, Stop order after: 1 Doses  dextrose 50% Injectable 25 Gram(s) IV Push once, Stop order after: 1 Doses  dextrose Oral Gel 15 Gram(s) Oral once, Stop order after: 1 Doses PRN Blood Glucose LESS THAN 70 milliGRAM(s)/deciliter  glucagon  Injectable 1 milliGRAM(s) IntraMuscular once, Stop order after: 1 Doses  heparin   Injectable 5000 Unit(s) SubCutaneous every 12 hours  insulin lispro (ADMELOG) corrective regimen sliding scale   SubCutaneous Before meals and at bedtime  magnesium sulfate  IVPB 1 Gram(s) IV Intermittent once, Stop order after: 1 Doses  potassium chloride   Powder 20 milliEquivalent(s) Oral once, Stop order after: 1 Doses      ALLERGIES:   No Known Allergies      REVIEW OF SYMPTOMS:   CONSTITUTIONAL: No fever, no chills, no weight loss, no weight gain, no fatigue   ENMT:  No vertigo; No sinus or throat pain  NECK: No pain or stiffness  CARDIOVASCULAR: No chest pain, no dyspnea, no syncope/presyncope, no palpitations, no dizziness, no Orthopnea, no Paroxsymal nocturnal dyspnea  RESPIRATORY: no Shortness of breath, no cough, no wheezing  : No dysuria, no hematuria   GI: No dark color stool, no nausea, no diarrhea, no constipation, no abdominal pain   NEURO: No headache, no slurred speech   MUSCULOSKELETAL: No joint pain or swelling; No muscle, back, or extremity pain  PSYCH: No agitation, no anxiety.    ALL OTHER REVIEW OF SYSTEMS ARE NEGATIVE.    VITAL SIGNS:  T(C): 36.4 (04-16-24 @ 08:10), Max: 37.8 (04-15-24 @ 22:52)  T(F): 97.5 (04-16-24 @ 08:10), Max: 100 (04-15-24 @ 22:52)  HR: 107 (04-16-24 @ 08:10) (107 - 126)  BP: 120/71 (04-16-24 @ 08:10) (104/67 - 166/70)  RR: 18 (04-16-24 @ 08:10) (18 - 18)  SpO2: 91% (04-16-24 @ 08:10) (91% - 97%)    INTAKE AND OUTPUT:       PHYSICAL EXAM:  Constitutional: Comfortable . No acute distress.   HEENT: Atraumatic and normocephalic , neck is supple . no JVD. No carotid bruit.  CNS: A&Ox3. No focal deficits.   Respiratory: CTAB, unlabored   Cardiovascular: RRR normal s1 s2. No murmur. No rubs or gallop.  Gastrointestinal: Soft, non-tender. +Bowel sounds.   Extremities: 2+ Peripheral Pulses, No clubbing, cyanosis, or edema  Psychiatric: Calm . no agitation.   Skin: Warm and dry, no ulcers on extremities     LABS:                            10.1   16.53 )-----------( 609      ( 16 Apr 2024 05:13 )             31.0     04-16    137  |  101  |  4.3<L>  ----------------------------<  92  3.7   |  20.0<L>  |  1.15    Ca    8.4      16 Apr 2024 05:13  Mg     1.8     04-16    TPro  6.8  /  Alb  2.5<L>  /  TBili  0.5  /  DBili  x   /  AST  31  /  ALT  20  /  AlkPhos  99  04-16      Urinalysis Basic - ( 16 Apr 2024 05:13 )    Color: x / Appearance: x / SG: x / pH: x  Gluc: 92 mg/dL / Ketone: x  / Bili: x / Urobili: x   Blood: x / Protein: x / Nitrite: x   Leuk Esterase: x / RBC: x / WBC x   Sq Epi: x / Non Sq Epi: x / Bacteria: x    INTERPRETATION OF TELEMETRY:     ECG:   Prior ECG: Yes [  ] No [  ]    RADIOLOGY & ADDITIONAL STUDIES:    X-ray:    CT scan:   MRI:   US:                                                [facility]                                              CARDIOLOGY AT [place] [address]                                             Telephone: [phone]. Fax:[phone]                                                       CARDIOLOGY CONSULTATION NOTE                                                                                             History obtained by: Patient and medical record  Community Cardiologist:   Not sure - but has seen [facility] as per records.     obtained: Yes [  ] No [  ]  Reason for Consultation:   YASMEEN  Available out pt records reviewed: Yes [  ] No [  ]    Chief complaint:    Patient is a 59y old  Male who presents with a chief complaint of ARF.      HPI:  Patient seen/examined prior to midnight on 3/29/24.     58M with PMHX Asthma, HTN, HLD, DM2, Gout presents to [facility] ER for abnormal lab work.   Patient recently hospitalized for acute asthma exacerbation and viral URI found to have uncontrolled HTN and new onset DM2 at that time discharged home with outpatient follow-up and sent back to [facility] ER today by PCP for abnormal kidney function.   Patient now has + blood cultures and a cardiac consult is requested for YASMEEN.     ROS +Fatigue and +Malaise.   Denies headache, dizziness, chest pain, palpitations, sob, pnd, orthopnea, hemoptysis n/v/d/c/abd pain,  fever, chills, syncope, presyncope cramps or any other discomfort.      PMHX: Asthma, HTN, HLD, DM2, Gout  PSHX: Denies  Social Hx: Denies etoh/tobacco/drug abuse  FamHx: +DM2 +Lung CA  NKDA (29 Mar 2024 23:47)    CARDIAC TESTING   ECHO:    TRANSTHORACIC ECHOCARDIOGRAM REPORT  ________________________________________________________________________________                                      _______    Pt. Name:       ZAINA MILLER Study Date:    4/12/2024  MRN:            [id number]                  YOB: 1965  Accession #:    [id number]                   Age:           59 years  Account#:       [id number]                  Gender:        M  Visit ID#  Heart Rate:                                 Height:        67.00 in (170.18 cm)  Rhythm:                                     Weight:        209.00 lb (94.80 kg)  Blood Pressure: 147/80 mmHg                 BSA/BMI:       2.06 m² / 32.73 kg/m²  ________________________________________________________________________________________  Referring Physician:    [id number] oDrcas Gomez  Interpreting Physician: Raphael Tsang MD  Primary Sonographer:    Teri Loving    CPT:               ECHO TTE W/O CON F/U [facility] - 91061.m;LIMITED [facility] - 34048.m  Indication(s):     Acute and subacute infective endocarditis - I33.0  Procedure:         Limited transthoracic echocardiogram.  Ordering Location: [facility]  Admission Status:  Inpatient  Study Information: Image quality for this study is suboptimal. Technically                     difficult study due to poor echo windows.    _______________________________________________________________________________________     CONCLUSIONS:    1. Left ventricular systolic function is normal with an ejection fraction visually estimated at 65 to 70 %.   2. Normal right ventricular cavity size and normal systolic function.   3. Normal left and right atrial size.   4. Aortic valve was not well visualized.   5. Mild mitral valve leaflet calcification.   6. Thickened mitral valve leaflets.   7. Tricuspid valve was not well visualized.   8. Estimated pulmonary artery systolic pressure is 8 mmHg, consistent with normal pulmonary artery pressure.   9. No pericardial effusion seen.    ____________________________________________________________________  Recommendations:  Recommend transesophageal echocardiogram.   ________________________________________________________________________________________  FINDINGS:     Left Ventricle:  Left ventricular systolic function is normal with an ejection fraction visually estimated at 65 to 70%.     Right Ventricle:  The right ventricular cavity is normal in size and normal systolic function.     Aortic Valve:  The aortic valve was not well visualized. The aortic valve is tricuspid with normal leaflet excursion. There is no evidence of aortic regurgitation.     Mitral Valve:  There is mild leaflet calcification. Thickened mitral valve leaflets. There is trace mitral regurgitation.     Tricuspid Valve:  The tricuspid valve was not well visualized. Estimated pulmonary artery systolic pressure is 8 mmHg, consistent with normal pulmonary artery pressure.     Pulmonic Valve:  Structurally normal pulmonic valve with normal leaflet excursion. There is trace pulmonic regurgitation.     Pericardium:  No pericardial effusion seen.     Systemic Veins:  The inferior vena cava is normal in size (normal <2.1cm) with normal inspiratory collapse (normal >50%) consistent with normal right atrial pressure (~3, range 0-5mmHg).  ____________________________________________________________________  QUANTITATIVE DATA:  Left Ventricle Measurements: (Indexed to BSA)     Visualized LV EF%: 65 to 70%     MV E Vmax: 0.85 m/s  MV A Vmax: 1.04 m/s  MV E/A:    0.82  MV DT:     183 msec     Mitral Valve Measurements:     MV E Vmax: 0.8 m/s  MV A Vmax: 1.0 m/s  MV E/A:    0.8     Tricuspid Valve Measurements:     TR Vmax:          1.1 m/s  TR Peak Gradient: 4.9 mmHg  RA Pressure:      3 mmHg  PASP:             8 mmHg    ________________________________________________________________________________________  Electronically signed on 4/13/2024 at 6:40:39 AM by Raphael Tsang MD       STRESS:    Nuclear Pharmacologic Stress Test Report         Patient: ZAINA MILLER     Study Date: 3/14/2024           MRN: [id number]                  Birth Date/Age: 1965 Age: 58                                                            years      Access #: [id number]                           Gender: M     Order Loc: [facility]                                Height: 170.2 cm/ 67.0 in  Request Phys: [id number] Adalid Sukhjinder                Weight: 104.33 kg/ 230.00 lb     Procedure: Rest SESTAMIBI                    BSA/ BMI: 2.15 m²/ 36.02 kg/m²    Indication: Shortness of breath -        Admiss Status:                R06.02    Fellow/ACP: Wanda Green NP              Fellow/ACP:    ---------------------------------------------------------------------------------------------------------------------------------------------------------  Procedure Code: STRESS TEST TRACING ONLY - 95537.m;TC 99M SESTAMIBI PER DOSE -                  .m;DOBUTAMINE INJ PER 250MG - .m;MYOCARDIAL SPECT                  SINGLE - 03586.m    ---------------------------------------------------------------------------------------------------------------------------------------------------------  History:       57 yo male with shortness of breath, hypertensive urgency, URI,                 excaerbation of asthma.  Image Quality: Uninterpretable  Artifact:      Increased GI uptake of tracer located inferiorly.  Medications:   Amlodipine, heparin, hydralazine, insulin, methylpred.,                 montelukast.  NDC Number(s): 86222-203-17  Allergies:     None    --------------------------------------------------------------------------------------------------------------------------------------------------------Conclusions:   1. Myocardial Perfusion: Uninterpretible Dobutamine stress test. Resting images were only performed. Test was aborted due to patient symptoms and hypotension. Stress imaging was not done.   2. The patient underwent stress testing using pharmacological IV dobutamine protocol.      The test was stopped due to drop in HR and fall in blood pressure.         3. Baseline electrocardiogram: sinus rhythm with evidence of prior septal infarct.   4. Arrhythmias: patient had a decrease in HR and fall in BP associated with lightheadedness and lethargy, test aborted. D/W Dr. Rowell.   5. Hemodynamic Instability.   6. Patient history: 57 yo male with shortness of breath, hypertensive urgency, URI, excaerbation of asthma.    ---------------------------------------------------------------------------------------------------------------------------------------------------------     Stress Test Results:  Pretest Chest Pain: None.       Pharmacological Stress Test Results:                Protocol: IV dobutamine                    Dose: 30mcg/Kg/min  Symptoms During Stress: Shortness of breath and fatigue.  Reason for Termination: Drop in HR and fall in blood pressure.       Protocol: Regadenoson Injection  +--------+--------------+----------------+--------------+  |  Time  |              |Heart Rate (bpm)|Blood Pressure|  +--------+--------------+----------------+--------------+  |Baseline|Pre-Injection |      102       |   180/111    |  +--------+--------------+----------------+--------------+  | 03:00  |  Injection   |      114       |   185/101    |  +--------+--------------+----------------+--------------+  | 06:00  |  Injection   |       92       |    179/91    |  +--------+--------------+----------------+--------------+  | 07:00  |Post Injection|       76       |    146/64    |  +--------+--------------+----------------+--------------+  | 05:00  |   Recovery   |       69       |              |  +--------+--------------+----------------+--------------+  | 06:00  |   Recovery   |       69       |              |  +--------+--------------+----------------+--------------+  | 07:00  |   Recovery   |       77       |              |  +--------+--------------+----------------+--------------+  | 10:00  |   Recovery   |       92       |    148/88    |  +--------+--------------+----------------+--------------+  | 12:00  |   Recovery   |       86       |    143/78    |  +--------+--------------+----------------+--------------+    ---------------------------------------------------------------------------------------------------------------------------------------------------------Electrocardiogram:  Baseline electrocardiogram: Sinus rhythm with evidence of prior septal infarct.  Stress electrocardiogram: No ischemic ST segment changes.  Arrhythmias: No arrhythmia associated with stress. Patient had a decrease in HR  and fall in BP associated with lightheadedness and lethargy, test aborted. D/W  Dr. Rowell.    ---------------------------------------------------------------------------------------------------------------------------------------------------------  Procedure:  The patient was given 10.7 mCi of TC-99M Sestamibi intravenously at rest on 3/14/2024 at 7:55:00 AM. Approximately 45 minutes later, SPECT images were obtained, with patient in supine position. Images were reacquired with the patient in a prone position.  ---------------------------------------------------------------------------------------------------------------------------------------------------------    Interpreting Provider: Electronically signed on 3/14/2024 at 3:17:47 PM by Jasbir Burgess   CATH:     ELECTROPHYSIOLOGY:     PAST MEDICAL HISTORY  Gout  Asthma  Arthritis  HTN (hypertension)    PAST SURGICAL HISTORY  No significant past surgical history    SOCIAL HISTORY:  Denies smoking/alcohol/drugs  CIGARETTES:     ALCOHOL:  DRUGS:    FAMILY HISTORY:  Family history of diabetes mellitus type II    Family history of lung cancer  Family History of Cardiovascular Disease:  Yes [  ] No [  ]  Coronary Artery Disease in first degree relative: Yes [  ] No [  ]  Sudden Cardiac Death in First degree relative: Yes [  ] No [  ]    HOME MEDICATIONS:  metFORMIN 500 mg oral tablet: 1 tab(s) orally 2 times a day (30 Mar 2024 02:19)  Wixela Inhub 500 mcg-50 mcg inhalation powder: 1 puff(s) inhaled once a day (30 Mar 2024 02:19)    CURRENT CARDIAC MEDICATIONS:  amLODIPine   Tablet 10 milliGRAM(s) Oral daily  metoprolol tartrate 12.5 milliGRAM(s) Oral every 12 hours  metoprolol tartrate Injectable 2.5 milliGRAM(s) IV Push every 6 hours, Stop order after: 6 Doses PRN HR above 110/min    CURRENT OTHER MEDICATIONS:  albuterol    0.083% 2.5 milliGRAM(s) Nebulizer every 4 hours PRN Wheezing  albuterol    90 MICROgram(s) HFA Inhaler 2 Puff(s) Inhalation every 6 hours PRN Shortness of Breath and/or Wheezing  acetaminophen     Tablet .. 650 milliGRAM(s) Oral every 6 hours PRN Temp greater or equal to 38C (100.4F), Mild Pain (1 - 3)  melatonin 3 milliGRAM(s) Oral at bedtime PRN Insomnia  ondansetron Injectable 4 milliGRAM(s) IV Push every 8 hours PRN Nausea and/or Vomiting  aluminum hydroxide/magnesium hydroxide/simethicone Suspension 30 milliLiter(s) Oral every 4 hours PRN Dyspepsia  diphenoxylate/atropine 1 Tablet(s) Oral every 6 hours PRN Diarrhea  pantoprazole    Tablet 40 milliGRAM(s) Oral before breakfast  caspofungin IVPB      caspofungin IVPB 50 milliGRAM(s) IV Intermittent every 24 hours  dextrose 5%. 1000 milliLiter(s) (50 mL/Hr) IV Continuous <Continuous>  dextrose 5%. 1000 milliLiter(s) (100 mL/Hr) IV Continuous <Continuous>  dextrose Oral Gel 15 Gram(s) Oral once, Stop order after: 1 Doses PRN Blood Glucose LESS THAN 70 milliGRAM(s)/deciliter  glucagon  Injectable 1 milliGRAM(s) IntraMuscular once, Stop order after: 1 Doses  heparin   Injectable 5000 Unit(s) SubCutaneous every 12 hours  insulin lispro (ADMELOG) corrective regimen sliding scale   SubCutaneous Before meals and at bedtime  magnesium sulfate  IVPB 1 Gram(s) IV Intermittent once, Stop order after: 1 Doses  potassium chloride   Powder 20 milliEquivalent(s) Oral once, Stop order after: 1 Doses    ALLERGIES:   No Known Allergies    REVIEW OF SYMPTOMS:   CONSTITUTIONAL: No fever, no chills, no weight loss, no weight gain, no fatigue   ENMT:  No vertigo; No sinus or throat pain  NECK: No pain or stiffness  CARDIOVASCULAR: No chest pain, no dyspnea, no syncope/presyncope, no palpitations, no dizziness, no Orthopnea, no Paroxsymal nocturnal dyspnea  RESPIRATORY: no Shortness of breath, no cough, no wheezing  : No dysuria, no hematuria   GI: No dark color stool, no nausea, no diarrhea, no constipation, no abdominal pain   NEURO: No headache, no slurred speech   MUSCULOSKELETAL: No joint pain or swelling; No muscle, back, or extremity pain  PSYCH: No agitation, no anxiety.    ALL OTHER REVIEW OF SYSTEMS ARE NEGATIVE.    VITAL SIGNS:  T(C): 36.4 (04-16-24 @ 08:10), Max: 37.8 (04-15-24 @ 22:52)  T(F): 97.5 (04-16-24 @ 08:10), Max: 100 (04-15-24 @ 22:52)  HR: 107 (04-16-24 @ 08:10) (107 - 126)  BP: 120/71 (04-16-24 @ 08:10) (104/67 - 166/70)  RR: 18 (04-16-24 @ 08:10) (18 - 18)  SpO2: 91% (04-16-24 @ 08:10) (91% - 97%)      PHYSICAL EXAM:  Constitutional: Comfortable . No acute distress.   HEENT: Atraumatic and normocephalic , neck is supple . no JVD. No carotid bruit.  CNS: A&Ox3. No focal deficits.   Respiratory: CTAB, unlabored   Cardiovascular: Tachycardia normal s1 s2. No murmur. No rubs or gallop.  Gastrointestinal: Soft, non-tender. +Bowel sounds.   Extremities: + Peripheral Pulses, No clubbing, cyanosis, or edema  Psychiatric: Calm . no agitation.   Skin: Warm and dry, no ulcers on extremities     LABS:                       10.1   16.53 )-----------( 609      ( 16 Apr 2024 05:13 )             31.0     04-16    137  |  101  |  4.3<L>  ----------------------------<  92  3.7   |  20.0<L>  |  1.15    Ca    8.4      16 Apr 2024 05:13  Mg     1.8     04-16    TPro  6.8  /  Alb  2.5<L>  /  TBili  0.5  /  DBili  x   /  AST  31  /  ALT  20  /  AlkPhos  99  04-16    Urinalysis Basic - ( 16 Apr 2024 05:13 )    Color: x / Appearance: x / SG: x / pH: x  Gluc: 92 mg/dL / Ketone: x  / Bili: x / Urobili: x   Blood: x / Protein: x / Nitrite: x   Leuk Esterase: x / RBC: x / WBC x   Sq Epi: x / Non Sq Epi: x / Bacteria: x    INTERPRETATION OF TELEMETRY:     ECG:   Ventricular Rate 115 BPM  Atrial Rate 115 BPM  P-R Interval 182 ms  QRS Duration 80 ms  Q-T Interval 326 ms  QTC Calculation(Bazett) 450 ms  P Axis 73 degrees  R Axis 58 degrees  T Axis 44 degrees  Diagnosis Line Sinus tachycardia  Abnormal ECG      Prior ECG: Yes [x  ] No [  ]  Ventricular Rate 113 BPM  Atrial Rate 113 BPM  P-R Interval 180 ms  QRS Duration 78 ms  Q-T Interval 306 ms  QTC Calculation(Bazett) 419 ms  P Axis 78 degrees  R Axis 48 degrees  T Axis 37 degrees  Diagnosis Line Sinus tachycardia  Otherwise normal ECG

## 2024-04-16 NOTE — PROGRESS NOTE ADULT - PROBLEM/PLAN-2
Fatigue improved since restarted low-dose levothyroxine.  Recheck TSH today.     DISPLAY PLAN FREE TEXT

## 2024-04-16 NOTE — PROGRESS NOTE ADULT - SUBJECTIVE AND OBJECTIVE BOX
Progress note                                               [facility]                                              CARDIOLOGY AT [address], [place] [address]                                             Telephone: [phone]. Fax:[phone]      58M with PMHX Asthma, HTN, HLD, DM2, Gout presents to [facility] ER for abnormal lab work.   Patient recently hospitalized for acute asthma exacerbation and viral URI found to have uncontrolled HTN and new onset DM2 at that time discharged home with outpatient follow-up and sent back to [facility] ER today by PCP for abnormal kidney function.   Patient now has + blood cultures and a cardiac consult is requested for YASMEEN.     ROS +Fatigue and +Malaise.   Denies headache, dizziness, chest pain, palpitations, sob, pnd, orthopnea, hemoptysis n/v/d/c/abd pain,  fever, chills, syncope, presyncope cramps or any other discomfort.      PMHX: Asthma, HTN, HLD, DM2, Gout  PSHX: Denies  Social Hx: Denies etoh/tobacco/drug abuse  FamHx: +DM2 +Lung CA  NKDA (29 Mar 2024 23:47)    CARDIAC TESTING   ECHO:    TRANSTHORACIC ECHOCARDIOGRAM REPORT  ________________________________________________________________________________                                      _______    Pt. Name:       ZAINA MILLER Study Date:    4/12/2024  MRN:            [id number]                  YOB: 1965  Accession #:    [id number]                   Age:           59 years  Account#:       [id number]                  Gender:        M  Visit ID#  Heart Rate:                                 Height:        67.00 in (170.18 cm)  Rhythm:                                     Weight:        209.00 lb (94.80 kg)  Blood Pressure: 147/80 mmHg                 BSA/BMI:       2.06 m² / 32.73 kg/m²  ________________________________________________________________________________________  Referring Physician:    [id number] Dorcas Gomez  Interpreting Physician: Raphael Tsang MD  Primary Sonographer:    Teri Loving    CPT:               ECHO TTE W/O CON F/U LTD - 30834.m;LIMITED SPECTRAL - 53062.m  Indication(s):     Acute and subacute infective endocarditis - I33.0  Procedure:         Limited transthoracic echocardiogram.  Ordering Location: [facility]  Admission Status:  Inpatient  Study Information: Image quality for this study is suboptimal. Technically                     difficult study due to poor echo windows.    _______________________________________________________________________________________     CONCLUSIONS:    1. Left ventricular systolic function is normal with an ejection fraction visually estimated at 65 to 70 %.   2. Normal right ventricular cavity size and normal systolic function.   3. Normal left and right atrial size.   4. Aortic valve was not well visualized.   5. Mild mitral valve leaflet calcification.   6. Thickened mitral valve leaflets.   7. Tricuspid valve was not well visualized.   8. Estimated pulmonary artery systolic pressure is 8 mmHg, consistent with normal pulmonary artery pressure.   9. No pericardial effusion seen.    ____________________________________________________________________  Recommendations:  Recommend transesophageal echocardiogram.   ________________________________________________________________________________________  FINDINGS:     Left Ventricle:  Left ventricular systolic function is normal with an ejection fraction visually estimated at 65 to 70%.     Right Ventricle:  The right ventricular cavity is normal in size and normal systolic function.     Aortic Valve:  The aortic valve was not well visualized. The aortic valve is tricuspid with normal leaflet excursion. There is no evidence of aortic regurgitation.     Mitral Valve:  There is mild leaflet calcification. Thickened mitral valve leaflets. There is trace mitral regurgitation.     Tricuspid Valve:  The tricuspid valve was not well visualized. Estimated pulmonary artery systolic pressure is 8 mmHg, consistent with normal pulmonary artery pressure.     Pulmonic Valve:  Structurally normal pulmonic valve with normal leaflet excursion. There is trace pulmonic regurgitation.     Pericardium:  No pericardial effusion seen.     Systemic Veins:  The inferior vena cava is normal in size (normal <2.1cm) with normal inspiratory collapse (normal >50%) consistent with normal right atrial pressure (~3, range 0-5mmHg).  ____________________________________________________________________  QUANTITATIVE DATA:  Left Ventricle Measurements: (Indexed to BSA)     Visualized LV EF%: 65 to 70%     MV E Vmax: 0.85 m/s  MV A Vmax: 1.04 m/s  MV E/A:    0.82  MV DT:     183 msec     Mitral Valve Measurements:     MV E Vmax: 0.8 m/s  MV A Vmax: 1.0 m/s  MV E/A:    0.8     Tricuspid Valve Measurements:     TR Vmax:          1.1 m/s  TR Peak Gradient: 4.9 mmHg  RA Pressure:      3 mmHg  PASP:             8 mmHg    ________________________________________________________________________________________  Electronically signed on 4/13/2024 at 6:40:39 AM by Raphael Tsang MD       STRESS:    Nuclear Pharmacologic Stress Test Report         Patient: ZAINA MILLER     Study Date: 3/14/2024           MRN: [id number]                  Birth Date/Age: 1965 Age: 58                                                            years      Access #: [id number]                           Gender: M     Order Loc: [facility]                                Height: 170.2 cm/ 67.0 in  Request Phys: [id number] Adalid Slaughter                Weight: 104.33 kg/ 230.00 lb     Procedure: Rest SESTAMIBI                    BSA/ BMI: 2.15 m²/ 36.02 kg/m²    Indication: Shortness of breath -        Admiss Status:                R06.02    Fellow/ACP: Wanda Green NP              Fellow/ACP:    ---------------------------------------------------------------------------------------------------------------------------------------------------------  Procedure Code: STRESS TEST TRACING ONLY - 71280.m;TC 99M SESTAMIBI PER DOSE -                  .m;DOBUTAMINE INJ PER 250MG - .m;MYOCARDIAL SPECT                  SINGLE - 59046.m    ---------------------------------------------------------------------------------------------------------------------------------------------------------  History:       57 yo male with shortness of breath, hypertensive urgency, URI,                 excaerbation of asthma.  Image Quality: Uninterpretable  Artifact:      Increased GI uptake of tracer located inferiorly.  Medications:   Amlodipine, heparin, hydralazine, insulin, methylpred.,                 montelukast.  NDC Number(s): 85730-048-12  Allergies:     None    --------------------------------------------------------------------------------------------------------------------------------------------------------Conclusions:   1. Myocardial Perfusion: Uninterpretible Dobutamine stress test. Resting images were only performed. Test was aborted due to patient symptoms and hypotension. Stress imaging was not done.   2. The patient underwent stress testing using pharmacological IV dobutamine protocol.      The test was stopped due to drop in HR and fall in blood pressure.         3. Baseline electrocardiogram: sinus rhythm with evidence of prior septal infarct.   4. Arrhythmias: patient had a decrease in HR and fall in BP associated with lightheadedness and lethargy, test aborted. D/W Dr. Rowell.   5. Hemodynamic Instability.   6. Patient history: 57 yo male with shortness of breath, hypertensive urgency, URI, excaerbation of asthma.    ---------------------------------------------------------------------------------------------------------------------------------------------------------     Stress Test Results:  Pretest Chest Pain: None.       Pharmacological Stress Test Results:                Protocol: IV dobutamine                    Dose: 30mcg/Kg/min  Symptoms During Stress: Shortness of breath and fatigue.  Reason for Termination: Drop in HR and fall in blood pressure.       Protocol: Regadenoson Injection  +--------+--------------+----------------+--------------+  |  Time  |              |Heart Rate (bpm)|Blood Pressure|  +--------+--------------+----------------+--------------+  |Baseline|Pre-Injection |      102       |   180/111    |  +--------+--------------+----------------+--------------+  | 03:00  |  Injection   |      114       |   185/101    |  +--------+--------------+----------------+--------------+  | 06:00  |  Injection   |       92       |    179/91    |  +--------+--------------+----------------+--------------+  | 07:00  |Post Injection|       76       |    146/64    |  +--------+--------------+----------------+--------------+  | 05:00  |   Recovery   |       69       |              |  +--------+--------------+----------------+--------------+  | 06:00  |   Recovery   |       69       |              |  +--------+--------------+----------------+--------------+  | 07:00  |   Recovery   |       77       |              |  +--------+--------------+----------------+--------------+  | 10:00  |   Recovery   |       92       |    148/88    |  +--------+--------------+----------------+--------------+  | 12:00  |   Recovery   |       86       |    143/78    |  +--------+--------------+----------------+--------------+    ---------------------------------------------------------------------------------------------------------------------------------------------------------Electrocardiogram:  Baseline electrocardiogram: Sinus rhythm with evidence of prior septal infarct.  Stress electrocardiogram: No ischemic ST segment changes.  Arrhythmias: No arrhythmia associated with stress. Patient had a decrease in HR  and fall in BP associated with lightheadedness and lethargy, test aborted. D/W  Dr. Rowell.    ---------------------------------------------------------------------------------------------------------------------------------------------------------  Procedure:  The patient was given 10.7 mCi of TC-99M Sestamibi intravenously at rest on 3/14/2024 at 7:55:00 AM. Approximately 45 minutes later, SPECT images were obtained, with patient in supine position. Images were reacquired with the patient in a prone position.  ---------------------------------------------------------------------------------------------------------------------------------------------------------    Interpreting Provider: Electronically signed on 3/14/2024 at 3:17:47 PM by Jasbir Burgess   CATH:     ELECTROPHYSIOLOGY:     PAST MEDICAL HISTORY  Gout  Asthma  Arthritis  HTN (hypertension)    PAST SURGICAL HISTORY  No significant past surgical history    SOCIAL HISTORY:  Denies smoking/alcohol/drugs  CIGARETTES:     ALCOHOL:  DRUGS:    FAMILY HISTORY:  Family history of diabetes mellitus type II      HOME MEDICATIONS:  metFORMIN 500 mg oral tablet: 1 tab(s) orally 2 times a day (30 Mar 2024 02:19)  Wixela Inhub 500 mcg-50 mcg inhalation powder: 1 puff(s) inhaled once a day (30 Mar 2024 02:19)    CURRENT CARDIAC MEDICATIONS:  amLODIPine   Tablet 10 milliGRAM(s) Oral daily  metoprolol tartrate 12.5 milliGRAM(s) Oral every 12 hours  metoprolol tartrate Injectable 2.5 milliGRAM(s) IV Push every 6 hours, Stop order after: 6 Doses PRN HR above 110/min    CURRENT OTHER MEDICATIONS:  albuterol    0.083% 2.5 milliGRAM(s) Nebulizer every 4 hours PRN Wheezing  albuterol    90 MICROgram(s) HFA Inhaler 2 Puff(s) Inhalation every 6 hours PRN Shortness of Breath and/or Wheezing  acetaminophen     Tablet .. 650 milliGRAM(s) Oral every 6 hours PRN Temp greater or equal to 38C (100.4F), Mild Pain (1 - 3)  melatonin 3 milliGRAM(s) Oral at bedtime PRN Insomnia  ondansetron Injectable 4 milliGRAM(s) IV Push every 8 hours PRN Nausea and/or Vomiting  aluminum hydroxide/magnesium hydroxide/simethicone Suspension 30 milliLiter(s) Oral every 4 hours PRN Dyspepsia  diphenoxylate/atropine 1 Tablet(s) Oral every 6 hours PRN Diarrhea  pantoprazole    Tablet 40 milliGRAM(s) Oral before breakfast  caspofungin IVPB      caspofungin IVPB 50 milliGRAM(s) IV Intermittent every 24 hours  dextrose 5%. 1000 milliLiter(s) (50 mL/Hr) IV Continuous <Continuous>  dextrose 5%. 1000 milliLiter(s) (100 mL/Hr) IV Continuous <Continuous>  dextrose Oral Gel 15 Gram(s) Oral once, Stop order after: 1 Doses PRN Blood Glucose LESS THAN 70 milliGRAM(s)/deciliter  glucagon  Injectable 1 milliGRAM(s) IntraMuscular once, Stop order after: 1 Doses  heparin   Injectable 5000 Unit(s) SubCutaneous every 12 hours  insulin lispro (ADMELOG) corrective regimen sliding scale   SubCutaneous Before meals and at bedtime  magnesium sulfate  IVPB 1 Gram(s) IV Intermittent once, Stop order after: 1 Doses  potassium chloride   Powder 20 milliEquivalent(s) Oral once, Stop order after: 1 Doses    ALLERGIES:   No Known Allergies    REVIEW OF SYMPTOMS:   CONSTITUTIONAL: No fever, no chills, no weight loss, no weight gain, no fatigue   ENMT:  No vertigo; No sinus or throat pain  NECK: No pain or stiffness  CARDIOVASCULAR: No chest pain, no dyspnea, no syncope/presyncope, no palpitations, no dizziness, no Orthopnea, no Paroxsymal nocturnal dyspnea  RESPIRATORY: no Shortness of breath, no cough, no wheezing  : No dysuria, no hematuria   GI: No dark color stool, no nausea, no diarrhea, no constipation, no abdominal pain   NEURO: No headache, no slurred speech   MUSCULOSKELETAL: No joint pain or swelling; No muscle, back, or extremity pain  PSYCH: No agitation, no anxiety.    ALL OTHER REVIEW OF SYSTEMS ARE NEGATIVE.    VITAL SIGNS:  T(C): 36.4 (04-16-24 @ 08:10), Max: 37.8 (04-15-24 @ 22:52)  T(F): 97.5 (04-16-24 @ 08:10), Max: 100 (04-15-24 @ 22:52)  HR: 107 (04-16-24 @ 08:10) (107 - 126)  BP: 120/71 (04-16-24 @ 08:10) (104/67 - 166/70)  RR: 18 (04-16-24 @ 08:10) (18 - 18)  SpO2: 91% (04-16-24 @ 08:10) (91% - 97%)      PHYSICAL EXAM:  Constitutional: Comfortable . No acute distress.   HEENT: Atraumatic and normocephalic , neck is supple . no JVD. No carotid bruit.  CNS: A&Ox3. No focal deficits.   Respiratory: CTAB, unlabored   Cardiovascular: Tachycardia normal s1 s2. No murmur. No rubs or gallop.  Gastrointestinal: Soft, non-tender. +Bowel sounds.   Extremities: + Peripheral Pulses, No clubbing, cyanosis, or edema  Psychiatric: Calm . no agitation.   Skin: Warm and dry, no ulcers on extremities     LABS:                       10.1   16.53 )-----------( 609      ( 16 Apr 2024 05:13 )             31.0     04-16    137  |  101  |  4.3<L>  ----------------------------<  92  3.7   |  20.0<L>  |  1.15    Ca    8.4      16 Apr 2024 05:13  Mg     1.8     04-16    TPro  6.8  /  Alb  2.5<L>  /  TBili  0.5  /  DBili  x   /  AST  31  /  ALT  20  /  AlkPhos  99  04-16    Urinalysis Basic - ( 16 Apr 2024 05:13 )    Color: x / Appearance: x / SG: x / pH: x  Gluc: 92 mg/dL / Ketone: x  / Bili: x / Urobili: x   Blood: x / Protein: x / Nitrite: x   Leuk Esterase: x / RBC: x / WBC x   Sq Epi: x / Non Sq Epi: x / Bacteria: x    INTERPRETATION OF TELEMETRY:     ECG:   Ventricular Rate 115 BPM  Atrial Rate 115 BPM  P-R Interval 182 ms  QRS Duration 80 ms  Q-T Interval 326 ms  QTC Calculation(Bazett) 450 ms  P Axis 73 degrees  R Axis 58 degrees  T Axis 44 degrees  Diagnosis Line Sinus tachycardia  Abnormal ECG      Prior ECG: Yes [x  ] No [  ]  Ventricular Rate 113 BPM  Atrial Rate 113 BPM  P-R Interval 180 ms  QRS Duration 78 ms  Q-T Interval 306 ms  QTC Calculation(Bazett) 419 ms  P Axis 78 degrees  R Axis 48 degrees  T Axis 37 degrees  Diagnosis Line Sinus tachycardia  Otherwise normal ECG

## 2024-04-16 NOTE — PROGRESS NOTE ADULT - ASSESSMENT
58y  Male with a h/o Asthma, HTN, HLD, DM2, Gout. Patient presented to [facility] ER for abnormal lab work, elevated Cr from his PMD's office. Patient recently hospitalized for acute asthma exacerbation and viral URI found to have uncontrolled HTN and new onset DM type 2 at that time discharged home with outpatient follow-up and sent back to [facility] ER today by PCP for abnormal kidney function. During his hospital course patient developed fever to 102.3F on 3/31, was a code sepsis, was administered Azithromycin x1 and Zosyn since 4/1.      RT UE swelling and warmth   Fever  TYESHA  Transaminitis   Thrombocytosis       - Blood cultures  3/31, 4/3, 4/6 no growth   - blood cultures 4/10 reporting Candida  - Repeat blood cultures  4/13 no growth   - RVP/COVID 19 PCR 3/31, 4/4 negative   - Urine for legionella and strep negative   - CT C/A/P 4/1 reporting multifocal PNA   - UA 3/30 and 4/1 negative   - RUE CT with contrast concerning for Rt elbow OM and Advanced right wrist arthrosis without significant effusion or convincing CT evidence for osteomyelitis/septic arthritis.  - Rt Elbow MRI with no OM.   - Rheumatology consult noted  - Babesia PCR is negative   - Lyme PCR  is negative   - HIV negative   - viral hepatitis profile negative  - ERON negative   - LFTs improving   - RF is 11  - Procalcitonin 0.30, will repeat in AM   - Continue Caspofungin   - TTE with no veg  - CT A/P with contrast 4/13 reporting no acute findings  - Check YASMEEN, d/w Cardiology  - Hold off on PICC/Midline for now unless needed for reasons other than infectious diseases  - Follow up cultures  - Trend Fever  - Trend WBC      Will Follow    Discussed treatment plan with: Dr Centeno

## 2024-04-16 NOTE — PROGRESS NOTE ADULT - ASSESSMENT
58M with PMHX Asthma, HTN, HLD, DM2, Gout presents to [facility] ER for abnormal lab work.   Patient recently hospitalized for acute asthma exacerbation and viral URI found to have uncontrolled HTN and new onset DM2 at that time discharged home with outpatient follow-up and sent back to [facility] ER today by PCP for abnormal kidney function.   Patient now has + blood cultures and a cardiac consult is requested for YASMEEN.     ROS +Fatigue and +Malaise.   Denies headache, dizziness, chest pain, palpitations, sob, pnd, orthopnea, hemoptysis n/v/d/c/abd pain,  fever, chills, syncope, presyncope cramps or any other discomfort.

## 2024-04-16 NOTE — PROGRESS NOTE ADULT - SUBJECTIVE AND OBJECTIVE BOX
[facility]  INFECTIOUS DISEASES at [place] / [place] / [place]  =======================================================                               Serg Almanza MD#   Maurisio Farr MD*                             Lisy Sandhu MD*   Laila Bell MD*                              Professor Emeritus:  Dr Josr Chatman MD^            Diplomates American Board of Internal Medicine & Infectious Diseases                # [place] Office - Appt - Tel  [phone] Fax [phone]                * [place] Office - Appt - Tel [phone] Fax [phone]                      ^[place] Office - Tel  [phone] Fax [phone]                                  Hospital Consult line:  [phone]  =======================================================    ZAINA MILLER [id number]    Follow up: Fever    Fever improved  RUE swelling and pain improving  Blood cultures now with yeast    Allergies:  No Known Allergies      REVIEW OF SYSTEMS:  CONSTITUTIONAL:  Improved Fever and chills  HEENT:  No diplopia or blurred vision.  No earache, sore throat or runny nose.  CARDIOVASCULAR:  No chest pain  RESPIRATORY:  No cough, shortness of breath  GASTROINTESTINAL:  No nausea, vomiting or diarrhea.  GENITOURINARY:  No dysuria, frequency or urgency. No Blood in urine  MUSCULOSKELETAL:  no joint aches, no muscle pain  SKIN:  No change in skin, hair or nails.  NEUROLOGIC:  No Headaches      Physical Exam:  GEN: NAD  HEENT: normocephalic and atraumatic. EOMI. PERRL.    NECK: Supple.   LUNGS: CTA B/L.  HEART: RRR  ABDOMEN: Soft, NT, ND.  +BS.    : No CVA tenderness  EXTREMITIES: RUE swelling and pain  MSK: No joint swelling  NEUROLOGIC: No Focal Deficits   SKIN: Rt hand swelling improved, no findings on external Rt elbow. Nl ROM Rt elbow.       Vitals:  T(F): 97.5 (16 Apr 2024 08:10), Max: 100 (15 Apr 2024 22:52)  HR: 107 (16 Apr 2024 08:10)  BP: 120/71 (16 Apr 2024 08:10)  RR: 18 (16 Apr 2024 08:10)  SpO2: 91% (16 Apr 2024 08:10) (91% - 97%)  temp max in last 48H T(F): , Max: 100 (04-15-24 @ 22:52)      Current Antibiotics:  caspofungin IVPB 50 milliGRAM(s) IV Intermittent every 24 hours  caspofungin IVPB        Other medications:  amLODIPine   Tablet 10 milliGRAM(s) Oral daily  dextrose 5%. 1000 milliLiter(s) IV Continuous <Continuous>  dextrose 5%. 1000 milliLiter(s) IV Continuous <Continuous>  dextrose 50% Injectable 12.5 Gram(s) IV Push once  dextrose 50% Injectable 25 Gram(s) IV Push once  dextrose 50% Injectable 25 Gram(s) IV Push once  glucagon  Injectable 1 milliGRAM(s) IntraMuscular once  heparin   Injectable 5000 Unit(s) SubCutaneous every 12 hours  insulin lispro (ADMELOG) corrective regimen sliding scale   SubCutaneous Before meals and at bedtime  magnesium sulfate  IVPB 1 Gram(s) IV Intermittent once  metoprolol tartrate 12.5 milliGRAM(s) Oral every 12 hours  pantoprazole    Tablet 40 milliGRAM(s) Oral before breakfast  potassium chloride   Powder 20 milliEquivalent(s) Oral once                            10.1   16.53 )-----------( 609      ( 16 Apr 2024 05:13 )             31.0     04-16    137  |  101  |  4.3<L>  ----------------------------<  92  3.7   |  20.0<L>  |  1.15    Ca    8.4      16 Apr 2024 05:13  Mg     1.8     04-16    TPro  6.8  /  Alb  2.5<L>  /  TBili  0.5  /  DBili  x   /  AST  31  /  ALT  20  /  AlkPhos  99  04-16    RECENT CULTURES:  04-13 @ 06:50 .Blood Blood     No growth at 48 Hours    04-13 @ 06:47 .Blood Blood     No growth at 48 Hours    04-10 @ 05:59 .Blood Blood-Peripheral Blood Culture PCR  Candida parapsilosis    Growth in aerobic bottle: Candida parapsilosis  Direct identification is available within approximately 3-5  hours either by Blood Panel Multiplexed PCR or Direct  MALDI-TOF. Details: [email or web address]  Growth in aerobic bottle: Yeast like cells    04-10 @ 05:55 .Blood Blood-Peripheral     No growth at 5 days    04-06 @ 05:25 .Blood Blood     No growth at 5 days    04-06 @ 05:19 .Blood Blood     No growth at 5 days    04-04 @ 13:21    RVP  NotDetec    04-03 @ 21:30 .Blood Blood-Peripheral     No growth at 5 days    04-03 @ 21:20 .Blood Blood-Peripheral     No growth at 5 days    04-03 @ 06:26 .Blood Blood     No growth at 5 days    04-03 @ 06:20 .Blood Blood     No growth at 5 days      WBC Count: 16.53 K/uL (04-16-24 @ 05:13)  WBC Count: 11.60 K/uL (04-15-24 @ 05:25)  WBC Count: 12.59 K/uL (04-14-24 @ 05:17)  WBC Count: 12.39 K/uL (04-13-24 @ 06:05)  WBC Count: 11.41 K/uL (04-12-24 @ 06:40)    Creatinine: 1.15 mg/dL (04-16-24 @ 05:13)  Creatinine: 0.96 mg/dL (04-15-24 @ 05:25)  Creatinine: 0.89 mg/dL (04-14-24 @ 05:17)  Creatinine: 0.92 mg/dL (04-13-24 @ 06:05)  Creatinine: 1.01 mg/dL (04-12-24 @ 06:40)    C-Reactive Protein, Serum: 188 mg/L (04-12-24 @ 06:40)  C-Reactive Protein, Serum: 162 mg/L (04-09-24 @ 04:00)  C-Reactive Protein, Serum: 163 mg/L (04-06-24 @ 05:25)  C-Reactive Protein, Serum: 141 mg/L (04-05-24 @ 08:34)    Procalcitonin, Serum: 0.36 ng/mL (04-12-24 @ 06:40)  Procalcitonin, Serum: 0.30 ng/mL (04-10-24 @ 07:04)  Procalcitonin, Serum: 0.25 ng/mL (04-09-24 @ 04:00)  Procalcitonin, Serum: 0.63 ng/mL (04-06-24 @ 05:25)  Procalcitonin, Serum: 0.63 ng/mL (04-05-24 @ 06:57)  Procalcitonin, Serum: 0.64 ng/mL (04-05-24 @ 06:56)     SARS-CoV-2: NotDetec (04-04-24 @ 13:21)  COVID-19 PCR: NotDetec (03-31-24 @ 22:32)  SARS-CoV-2: NotDetec (03-31-24 @ 22:32)        Anti-Nuclear Antibody in AM (04.06.24 @ 05:25)    Anti Nuclear Factor Titer: Negative: Antinuclear AB (ERON), IFA Method    Rapid HIV-1/2 Antibody (04.05.24 @ 08:34)    Rapid HIV-1/2 Antibody: Nonreact    Legionella pneumophila Antigen, Urine (04.02.24 @ 15:51)    Legionella Antigen, Urine: Negative    Streptococcus pneumoniae Ag, Ur (04.02.24 @ 15:51)    Streptococcus pneumoniae Ag, Ur Result: Negative    Lipase (04.04.24 @ 05:05)    Lipase: 53 U/L    Acute Hepatitis Panel (04.06.24 @ 05:25)    Hepatitis C Virus Interpretation: Nonreact   Hepatitis C Virus S/CO Ratio: 0.13 S/CO   Hepatitis B Core IgM Antibody: Nonreact   Hepatitis B Surface Antigen: Nonreact   Hepatitis A IgM Antibody: Nonreact    Rheumatoid Factor Quant, Serum or Plasma in AM (04.06.24 @ 05:25)    Rheumatoid Factor Quant, Serum or Plasma: 11 IU/mL        < from: TTE W or WO Ultrasound Enhancing Agent (03.13.24 @ 21:00) >  CONCLUSIONS:      1. Mild left ventricular hypertrophy.   2. Left ventricular cavity is normal in size. Left ventricular systolic function is hyperdynamic with an ejection fraction of 71 % by Cheney's method of disks with an ejection fraction visually estimated at 70 to 75 %. There are no regional wall motion abnormalities seen.   3. Normal right ventricular cavity size and normal systolic function.   4. Trileaflet aortic valve with normal systolic excursion. mild calcification of the aortic valve leaflets.   5. Structurally normal mitral valve with normal leaflet excursion.   6. The right atrium is normal in size.   7. The left atrium is normal.   8. No pericardial effusion seen.    < end of copied text >        < from: MR Elbow No Cont, Right (04.11.24 @ 14:50) >    ACC: [id number] EXAM:  MR ELBOW RT   ORDERED BY: JAIRON CARPIO     PROCEDURE DATE:  04/11/2024      INTERPRETATION:  Clinical indication: Fever of unknown origin. Swelling   about the elbow. CT findings concerning for avulsion fracture versus   detached enthesophyte    Multiplanar multisequence noncontrast MRI of the right elbow was   performed.    Correlation is made with prior CT of the right elbow from April 10, 2024.    FINDINGS:    There is extensive circumferential subcutaneous edemathroughout the   visualized elbow consistent with cellulitis. There is mild feathery edema   within the medial flexor and to a lesser extent the dorsal extensor   musculature which is nonspecific and may be related to infectious or   inflammatory etiologies.    The previously described ossific fragment embedded within the ulnar   margin of the distal triceps tendon is again seen. This is likely related   to the sequela of remote avulsive injury. There is moderate tendinosis   and surrounding peritendinous edema within the triceps tendon. There is   no full-thickness or retracted triceps tendon tear. There is mild   psuedoarthritic changes between the olecranon and the avulsed fragment   which trace osseous edema about the pseudoarthrosis. There is mild   overlying olecranon bursitis. Trace osseous edema about the   pseudoarthrosis of the avulsed fragment which is favored to be   degenerative in nature. No convincing evidence of osteomyelitis.   Otherwise, the marrow signal within the elbow is preserved.    There is a nonspecific small elbow joint effusion. There is no evidence   of osseous erosion or subarticular osteitis. The articular surfaces are   preserved.    There is chronic mild undersurface tearing involving the origin of the  common extensor tendon. The lateral collateral ligamentous structures are   preserved.    The common flexor tendon origin is intact. Medial collateral ligamentous   structures are preserved. The distal biceps and brachialis tendinous   insertions are intact.    IMPRESSION:    Redemonstration of a chronic appearing avulsion along the ulnar margin of   the triceps insertion. There is moderate associated tendinosis and   surrounding peritendinous edema within the triceps tendon. No   full-thickness or retracted triceps tendon tear. Mild psuedoarthritic   changes between the olecranon and the avulsed fragment which trace   osseous edema about the pseudoarthrosis. Trace osseous edema about the   pseudoarthrosis of the avulsed fragment which is favored to be   degenerative in nature. No convincing evidence of osteomyelitis.    Diffuse subcutaneous edema about the elbow. Mild olecranon bursitis. Mild   edema within the medial flexor and to a lesser extent extensor   musculature. Findings may be related to underlying cellulitis.    Nonspecific small elbow joint effusion.    --- End of Report ---    < end of copied text >      < from: CT Upper Extremity w/ IV Cont, Right (04.10.24 @ 14:05) >  ACC: [id number] EXAM:  CT UPR EXT IC RT   ORDERED BY: MAURISIO FARR     PROCEDURE DATE:  04/10/2024      INTERPRETATION:  CT OF THE RIGHT UPPER EXTREMITY    CLINICAL INFORMATION: Swelling.    COMPARISON: None available.    TECHNIQUE: Axial CT images were obtained of the right upper extremity   from the shoulder through the fingertips following administration of 90   cc Omnipaque 350 intravenous contrast. Sagittal and coronal   reconstructions were provided.    FINDINGS:    Osseous: No acutefracture or dislocation. Small well-corticated chronic   nonunited cortical avulsion stress fracture fragment versus detached   enthesophyte at the olecranon insertion of the triceps. Subtle adjacent   small focal cortical erosion. No aggressive periosteal reaction.   Moderately severe wrist arthrosis without definite effusions.   Mineralization within normal limits.     Soft tissues: Inflammatory stranding near circumferentially surrounding   the elbow and proximal to mid forearm, but without discrete sizable   hyperattenuating hematoma or drainable encapsulated fluid collection. No   elbow joint effusion. Ill-defined superficial dermal ulceration overlying   the tip of the olecranon with nearly exposed bone. No tracking emphysema.   No radiopaque foreign body.    IMPRESSION:    1.  Small chronic nonunited cortical avulsion stress fracture fragment   versus detached enthesophyte at the olecranon insertion of right triceps.   Overlying dermal ulceration with likely exposed bone and CT findings   concerning for focal osteomyelitis. Correlate for probe to bone and   consider follow-up elbow MRI as clinically indicated.  2.  Acute cellulitis in the proper clinical setting. No tracking soft   tissue emphysema drainable mature abscess.  3. No right elbow joint effusion/septic arthritis. Advanced right wrist   arthrosis without significant effusion or convincing CT evidence for   osteomyelitis/septic arthritis.    --- End of Report ---    < end of copied text >        < from: CT Abdomen and Pelvis w/ IV Cont (04.13.24 @ 09:41) >  ACC: [id number] EXAM:  CT ABDOMEN AND PELVIS IC   ORDERED BY: KENYATTA YEBOAH     PROCEDURE DATE:  04/13/2024      INTERPRETATION:  CLINICAL INFORMATION: Bacteremia.    COMPARISON: CT scan of the abdomen and pelvis from 4/1/2024    CONTRAST/COMPLICATIONS:  IV Contrast: Omnipaque 350  95 cc administered   5 cc discarded  Oral Contrast: NONE  Complications: None reported at time of study completion    PROCEDURE:  CT of the Abdomen and Pelvis was performed.  Sagittal and coronal reformats were performed.    FINDINGS:  LOWER CHEST: Pleural based pulmonary nodules at the left lung base which   appear larger in size. For example, 3.9 x 1.9 cm pleural-based nodule on   series 3 image 32 posteriorly, previously 2.3 x 1.6 cm. Small right   pleural effusion has increased. Atelectatic changes.    LIVER: Within normal limits.  BILE DUCTS: Normal caliber.  GALLBLADDER: Within normal limits.  SPLEEN: Within normal limits.  PANCREAS: Within normal limits.  ADRENALS: Within normal limits.  KIDNEYS/URETERS: 2 mm nonobstructing calcification in the midpole the   left kidney is unchanged.    BLADDER: Within normal limits.  REPRODUCTIVE ORGANS: Mildly heterogeneous prostate gland.    BOWEL: No bowel obstruction. Appendix within normal limits.  PERITONEUM: No ascites.  VESSELS: Within normal limits.  RETROPERITONEUM/LYMPH NODES: No lymphadenopathy.  ABDOMINAL WALL: Umbilical hernia containing fat. Bilateral inguinal   hernias, right greater than left, containing fat  BONES: Degenerative changes of bone.    IMPRESSION:  Pleural-based nodules at the left lung base appears larger in size.   Please correlate clinically. Small right pleural effusion has increased.    No acute intra-abdominal pathology visualized.    --- End of Report ---    < end of copied text >

## 2024-04-16 NOTE — PROGRESS NOTE ADULT - ASSESSMENT
58y/o male h/o astma   HTN  DM   presented to ED   abn blood work  high creatinine  fevers  :    1- + blood  cultures  candida  parapsilosis  4/10/24   2- asthma  with eosinophilia   with concern  of  churg  junie /   CEP   AEP  finished steroids   2- TYESHA   3- SHERI  4- possible right  elbow  infection    MRI no OM        -s/p  steroids   -capsofungin   - repeat  crp     blood cultures on repeat negative   - monitor  mental status   - FOB/  BX  or     IR biopsy of lung  --  patient agrees    please set up  with ct sx    Thank you kindly for allowing us to participate in this patients care.    Please  feel free to reach out with any questions or suggestions    CHE RODRIGUEZ    PULMONARY and CRITICAL CARE   [facility] Physician [facility]     [phone]

## 2024-04-17 ENCOUNTER — RESULT REVIEW (OUTPATIENT)
Age: 59
End: 2024-04-17

## 2024-04-17 DIAGNOSIS — I38 ENDOCARDITIS, VALVE UNSPECIFIED: ICD-10-CM

## 2024-04-17 LAB
APPEARANCE UR: CLEAR — SIGNIFICANT CHANGE UP
BILIRUB UR-MCNC: NEGATIVE — SIGNIFICANT CHANGE UP
COLOR SPEC: YELLOW — SIGNIFICANT CHANGE UP
CRP SERPL-MCNC: 218 MG/L — HIGH
DIFF PNL FLD: NEGATIVE — SIGNIFICANT CHANGE UP
GLUCOSE BLDC GLUCOMTR-MCNC: 83 MG/DL — SIGNIFICANT CHANGE UP (ref 70–99)
GLUCOSE BLDC GLUCOMTR-MCNC: 87 MG/DL — SIGNIFICANT CHANGE UP (ref 70–99)
GLUCOSE BLDC GLUCOMTR-MCNC: 91 MG/DL — SIGNIFICANT CHANGE UP (ref 70–99)
GLUCOSE BLDC GLUCOMTR-MCNC: 94 MG/DL — SIGNIFICANT CHANGE UP (ref 70–99)
GLUCOSE UR QL: NEGATIVE MG/DL — SIGNIFICANT CHANGE UP
KETONES UR-MCNC: ABNORMAL MG/DL
LEUKOCYTE ESTERASE UR-ACNC: NEGATIVE — SIGNIFICANT CHANGE UP
NITRITE UR-MCNC: NEGATIVE — SIGNIFICANT CHANGE UP
PH UR: 6 — SIGNIFICANT CHANGE UP (ref 5–8)
PROT SERPL-MCNC: 6.6 G/DL — SIGNIFICANT CHANGE UP (ref 6–8.3)
PROT UR-MCNC: SIGNIFICANT CHANGE UP MG/DL
RAPID RVP RESULT: SIGNIFICANT CHANGE UP
SARS-COV-2 RNA SPEC QL NAA+PROBE: SIGNIFICANT CHANGE UP
SP GR SPEC: 1.01 — SIGNIFICANT CHANGE UP (ref 1–1.03)
UROBILINOGEN FLD QL: 1 MG/DL — SIGNIFICANT CHANGE UP (ref 0.2–1)

## 2024-04-17 PROCEDURE — 99232 SBSQ HOSP IP/OBS MODERATE 35: CPT

## 2024-04-17 PROCEDURE — 93312 ECHO TRANSESOPHAGEAL: CPT | Mod: 26

## 2024-04-17 PROCEDURE — 93320 DOPPLER ECHO COMPLETE: CPT | Mod: 26

## 2024-04-17 PROCEDURE — 93325 DOPPLER ECHO COLOR FLOW MAPG: CPT | Mod: 26

## 2024-04-17 RX ADMIN — HEPARIN SODIUM 5000 UNIT(S): 5000 INJECTION INTRAVENOUS; SUBCUTANEOUS at 05:28

## 2024-04-17 RX ADMIN — Medication 12.5 MILLIGRAM(S): at 05:28

## 2024-04-17 RX ADMIN — AMLODIPINE BESYLATE 10 MILLIGRAM(S): 2.5 TABLET ORAL at 05:42

## 2024-04-17 RX ADMIN — Medication 650 MILLIGRAM(S): at 21:21

## 2024-04-17 RX ADMIN — CASPOFUNGIN ACETATE 260 MILLIGRAM(S): 7 INJECTION, POWDER, LYOPHILIZED, FOR SOLUTION INTRAVENOUS at 13:21

## 2024-04-17 RX ADMIN — HEPARIN SODIUM 5000 UNIT(S): 5000 INJECTION INTRAVENOUS; SUBCUTANEOUS at 17:38

## 2024-04-17 RX ADMIN — Medication 12.5 MILLIGRAM(S): at 17:38

## 2024-04-17 NOTE — PROGRESS NOTE ADULT - PROBLEM SELECTOR PLAN 1
-BC from 4/10 with candida. BC from 4/13 with no growth 72 hours post.  -Per ID request to obtain YASMEEN  -YASMEEN for today

## 2024-04-17 NOTE — PROGRESS NOTE ADULT - NS ATTEND AMEND GEN_ALL_CORE FT
seen with above,    58M history significant for obesity (BMI 33), HTN, HLD, DM2 admitted for TYESHA found with pneumonia and blood culture with fungemia, R-arm swelling suspect osteomyelitis, still with low grade temp yesterday  -YASMEEN done today no vegetation or endocarditis  -continue source control and antifungal per ID  -sinus tachycardia reactive to underlying sepsis   -reconsult if new cardiac issue arise        Hesham Ribeiro DO, [facility]  Faculty Non-Invasive Cardiologist  [phone]

## 2024-04-17 NOTE — PROGRESS NOTE ADULT - ASSESSMENT
ASSESSMENT: 58M with PMHX Asthma, HTN, HLD, DM2, Gout presents to [facility] ER for abnormal lab work.   Patient recently hospitalized for acute asthma exacerbation and viral URI found to have uncontrolled HTN and new onset DM2 at that time discharged home with outpatient follow-up and sent back to [facility] ER today by PCP for abnormal kidney function.   Patient now has + blood cultures and a cardiac consult is requested for YASMEEN.      -YASMEEN as ordered  -Labs and ECG reviewed  -Procedure discussed with patient; risks and benefits explained; questions answered  -Consent obtained by Echocardiographer and anesthesiologist

## 2024-04-17 NOTE — PROGRESS NOTE ADULT - SUBJECTIVE AND OBJECTIVE BOX
Department of Cardiology                                                                  [facility]/[place] [address] [place] [address]                                                            Telephone: [phone]. Fax:[phone]                                                                                     Pre-YASMEEN Note        Narrative: This is a 57 y/o M with PMHX Asthma, HTN, HLD, DM2, Gout presents to [facility] ER for abnormal lab work. Patient recently hospitalized for acute asthma exacerbation and viral URI found to have uncontrolled HTN and new onset DM2 at that time discharged home with outpatient follow-up and sent back to [facility] ER today by PCP for abnormal kidney function. ROS +Fatigue and +Malaise, was code sepsis and Cardiology consulted due to sinus tachycardia and positive cultures. Presents for YASMEEN to r/o endocarditis.      ASA and Mallampati: Per Anesthesia    	  MEDICATIONS:  amLODIPine   Tablet 10 milliGRAM(s) Oral daily  metoprolol tartrate 12.5 milliGRAM(s) Oral every 12 hours  metoprolol tartrate Injectable 2.5 milliGRAM(s) IV Push every 6 hours PRN    caspofungin IVPB      caspofungin IVPB 50 milliGRAM(s) IV Intermittent every 24 hours    albuterol    0.083% 2.5 milliGRAM(s) Nebulizer every 4 hours PRN  albuterol    90 MICROgram(s) HFA Inhaler 2 Puff(s) Inhalation every 6 hours PRN  acetaminophen     Tablet .. 650 milliGRAM(s) Oral every 6 hours PRN  melatonin 3 milliGRAM(s) Oral at bedtime PRN  ondansetron Injectable 4 milliGRAM(s) IV Push every 8 hours PRN  aluminum hydroxide/magnesium hydroxide/simethicone Suspension 30 milliLiter(s) Oral every 4 hours PRN  diphenoxylate/atropine 1 Tablet(s) Oral every 6 hours PRN  pantoprazole    Tablet 40 milliGRAM(s) Oral before breakfast  dextrose 50% Injectable 25 Gram(s) IV Push once  dextrose 50% Injectable 25 Gram(s) IV Push once  dextrose 50% Injectable 12.5 Gram(s) IV Push once  dextrose Oral Gel 15 Gram(s) Oral once PRN  glucagon  Injectable 1 milliGRAM(s) IntraMuscular once  insulin lispro (ADMELOG) corrective regimen sliding scale   SubCutaneous Before meals and at bedtime  dextrose 5%. 1000 milliLiter(s) IV Continuous <Continuous>  dextrose 5%. 1000 milliLiter(s) IV Continuous <Continuous>  heparin   Injectable 5000 Unit(s) SubCutaneous every 12 hours        PHYSICAL EXAM:    T(C): 37 (24 @ 08:25), Max: 37.9 (24 @ 21:40)  HR: 117 (24 @ 10:38) (106 - 117)  BP: 123/70 (24 @ 10:38) (114/66 - 144/76)  RR: 21 (24 @ 10:38) (18 - 21)  SpO2: 94% (24 @ 10:38) (91% - 96%)  Wt(kg): --    I&O's Summary      Daily     Daily Weight in k.2 (2024 04:05)    Constitutional: A & O x 3  HEENT:   Normal oral mucosa, PERRL, EOMI	  Cardiovascular: Normal S1 S2, No JVD, No murmurs,  Respiratory: Lungs clear to auscultation with mild exp wheeze  Gastrointestinal:  Soft, Non-tender, + BS	  Skin: No rashes, No ecchymoses, No cyanosis  Neurologic: Non-focal  Extremities: Normal range of motion, No clubbing, cyanosis + edema  Vascular: Peripheral pulses palpable + bilaterally    TELEMETRY: SR	      ECG: ST 	    Diagnostics:   24  CONCLUSIONS:      1. Left ventricular systolic function is normal with an ejection fraction visually estimated at 65 to 70 %.   2. Normal right ventricular cavity size and normal systolic function.   3. Normal left and right atrial size.   4. Aortic valve was not well visualized.   5. Mild mitral valve leaflet calcification.   6. Thickened mitral valve leaflets.   7. Tricuspid valve was not well visualized.   8. Estimated pulmonary artery systolic pressure is 8 mmHg, consistent with normal pulmonary artery pressure.   9. No pericardial effusion seen.      LABS:	 	    CARDIAC MARKERS:                                  8.8    12.77 )-----------( 652      ( 2024 23:11 )             27.1     04-    137  |  101  |  4.3<L>  ----------------------------<  92  3.7   |  20.0<L>  |  1.15    Ca    8.4      2024 05:13  Mg     1.8     -    TPro  6.8  /  Alb  2.5<L>  /  TBili  0.5  /  DBili  x   /  AST  31  /  ALT  20  /  AlkPhos  99      proBNP:   Lipid Profile:   HgA1c:   TSH:

## 2024-04-17 NOTE — CHART NOTE - NSCHARTNOTEFT_GEN_A_CORE
Department of Cardiology                                                                  [facility]/[place] [address] [address]                                                            Telephone: [phone]. Fax:[phone]                                                                         Post-Procedure Note: YASMEEN      Narrative:  57 y/o M with PMHX Asthma, HTN, HLD, DM2, Gout presents to [facility] ER for abnormal lab work. Patient recently hospitalized for acute asthma exacerbation and viral URI found to have uncontrolled HTN and new onset DM2 at that time discharged home with outpatient follow-up and sent back to [facility] ER today by PCP for abnormal kidney function. ROS +Fatigue and +Malaise, was code sepsis and Cardiology consulted due to sinus tachycardia and positive cultures. Presents for YASMEEN to r/o endocarditis.        MEDICATIONS:  amLODIPine   Tablet 10 milliGRAM(s) Oral daily  metoprolol tartrate 12.5 milliGRAM(s) Oral every 12 hours  metoprolol tartrate Injectable 2.5 milliGRAM(s) IV Push every 6 hours PRN  caspofungin IVPB      caspofungin IVPB 50 milliGRAM(s) IV Intermittent every 24 hours  albuterol    0.083% 2.5 milliGRAM(s) Nebulizer every 4 hours PRN  albuterol    90 MICROgram(s) HFA Inhaler 2 Puff(s) Inhalation every 6 hours PRN  acetaminophen     Tablet .. 650 milliGRAM(s) Oral every 6 hours PRN  melatonin 3 milliGRAM(s) Oral at bedtime PRN  ondansetron Injectable 4 milliGRAM(s) IV Push every 8 hours PRN  aluminum hydroxide/magnesium hydroxide/simethicone Suspension 30 milliLiter(s) Oral every 4 hours PRN  diphenoxylate/atropine 1 Tablet(s) Oral every 6 hours PRN  pantoprazole    Tablet 40 milliGRAM(s) Oral before breakfast  dextrose 50% Injectable 25 Gram(s) IV Push once  dextrose 50% Injectable 25 Gram(s) IV Push once  dextrose 50% Injectable 12.5 Gram(s) IV Push once  dextrose Oral Gel 15 Gram(s) Oral once PRN  glucagon  Injectable 1 milliGRAM(s) IntraMuscular once  insulin lispro (ADMELOG) corrective regimen sliding scale   SubCutaneous Before meals and at bedtime  dextrose 5%. 1000 milliLiter(s) IV Continuous <Continuous>  dextrose 5%. 1000 milliLiter(s) IV Continuous <Continuous>  heparin   Injectable 5000 Unit(s) SubCutaneous every 12 hours          TELEMETRY: ST	      	      ASSESSMENT: s/p YASMEEN. Prelim report normal LVEF, no PFO and no vegetation    -Post YASMEEN orders per protocol  -Post YASMEEN monitoring per protocol  -Formal YASMEEN report pending

## 2024-04-17 NOTE — PROGRESS NOTE ADULT - SUBJECTIVE AND OBJECTIVE BOX
[facility]                                                         CARDIOLOGY AT [facility]                                                                  [address], [place] [address]                                                         Telephone: [phone]. Fax:[phone]                                                                             PROGRESS NOTE    Reason for follow up: fungemia  Update: for YASMEEN today      Review of symptoms:   Cardiac:  No chest pain. No dyspnea. No palpitations.  Respiratory: no cough. No dyspnea  Gastrointestinal: No diarrhea. No abdominal pain. No bleeding.   Neuro: No focal neuro complaints.    Vitals:  T(C): 37 (04-17-24 @ 08:25), Max: 37.9 (04-16-24 @ 21:40)  HR: 106 (04-17-24 @ 08:25) (106 - 116)  BP: 127/79 (04-17-24 @ 08:25) (114/66 - 144/76)  RR: 18 (04-17-24 @ 08:25) (18 - 18)  SpO2: 96% (04-17-24 @ 08:25) (91% - 96%)  Wt(kg): --  I&O's Summary        PHYSICAL EXAM:  Appearance: Comfortable. No acute distress  HEENT:  Atraumatic. Normocephalic.  Normal oral mucosa  Neurologic: A & O x 3, no gross focal deficits.  Cardiovascular: RRR S1 S2, No murmur, no rubs/gallops. No JVD  Respiratory: Lungs clear to auscultation, unlabored   Gastrointestinal:  Soft, Non-tender, + BS  Lower Extremities: 2+ Peripheral Pulses, No clubbing, cyanosis, or edema  Psychiatry: Patient is calm. No agitation.   Skin: warm and dry.    CURRENT CARDIAC MEDICATIONS:  amLODIPine   Tablet 10 milliGRAM(s) Oral daily  metoprolol tartrate 12.5 milliGRAM(s) Oral every 12 hours  metoprolol tartrate Injectable 2.5 milliGRAM(s) IV Push every 6 hours PRN      CURRENT OTHER MEDICATIONS:  albuterol    0.083% 2.5 milliGRAM(s) Nebulizer every 4 hours PRN Wheezing  albuterol    90 MICROgram(s) HFA Inhaler 2 Puff(s) Inhalation every 6 hours PRN Shortness of Breath and/or Wheezing  caspofungin IVPB      caspofungin IVPB 50 milliGRAM(s) IV Intermittent every 24 hours  acetaminophen     Tablet .. 650 milliGRAM(s) Oral every 6 hours PRN Temp greater or equal to 38C (100.4F), Mild Pain (1 - 3)  melatonin 3 milliGRAM(s) Oral at bedtime PRN Insomnia  ondansetron Injectable 4 milliGRAM(s) IV Push every 8 hours PRN Nausea and/or Vomiting  aluminum hydroxide/magnesium hydroxide/simethicone Suspension 30 milliLiter(s) Oral every 4 hours PRN Dyspepsia  diphenoxylate/atropine 1 Tablet(s) Oral every 6 hours PRN Diarrhea  pantoprazole    Tablet 40 milliGRAM(s) Oral before breakfast  dextrose 50% Injectable 12.5 Gram(s) IV Push once, Stop order after: 1 Doses  dextrose 50% Injectable 25 Gram(s) IV Push once, Stop order after: 1 Doses  dextrose 50% Injectable 25 Gram(s) IV Push once, Stop order after: 1 Doses  dextrose Oral Gel 15 Gram(s) Oral once, Stop order after: 1 Doses PRN Blood Glucose LESS THAN 70 milliGRAM(s)/deciliter  glucagon  Injectable 1 milliGRAM(s) IntraMuscular once, Stop order after: 1 Doses  insulin lispro (ADMELOG) corrective regimen sliding scale   SubCutaneous Before meals and at bedtime  dextrose 5%. 1000 milliLiter(s) (50 mL/Hr) IV Continuous <Continuous>  dextrose 5%. 1000 milliLiter(s) (100 mL/Hr) IV Continuous <Continuous>  heparin   Injectable 5000 Unit(s) SubCutaneous every 12 hours      LABS:	 	                            8.8    12.77 )-----------( 652      ( 16 Apr 2024 23:11 )             27.1     04-16    137  |  101  |  4.3<L>  ----------------------------<  92  3.7   |  20.0<L>  |  1.15    Ca    8.4      16 Apr 2024 05:13  Mg     1.8     04-16    TPro  6.8  /  Alb  2.5<L>  /  TBili  0.5  /  DBili  x   /  AST  31  /  ALT  20  /  AlkPhos  99  04-16    PT/INR/PTT ( 10 Apr 2024 05:55 )                       :                       :      15.0         :       41.1                  .        .                   .              .           .       1.36        .                                       Lipid Profile:   HgA1c:   TSH:     TELEMETRY: ST      DIAGNOSTIC TESTING:  [ ] Echocardiogram:     < from: TTE W or WO Ultrasound Enhancing Agent (04.12.24 @ 19:33) >  CONCLUSIONS:      1. Left ventricular systolic function is normal with an ejection fraction visually estimated at 65 to 70 %.   2. Normal right ventricular cavity size and normal systolic function.   3. Normal left and right atrial size.   4. Aortic valve was not well visualized.   5. Mild mitral valve leaflet calcification.   6. Thickened mitral valve leaflets.   7. Tricuspid valve was not well visualized.   8. Estimated pulmonary artery systolic pressure is 8 mmHg, consistent with normal pulmonary artery pressure.   9. No pericardial effusion seen.    < end of copied text >

## 2024-04-17 NOTE — PROGRESS NOTE ADULT - SUBJECTIVE AND OBJECTIVE BOX
[facility]  INFECTIOUS DISEASES at [place] / [place] / [place]  =======================================================                               Serg Almanza MD#   Maurisio Farr MD*                             Lisy Sandhu MD*   Laila Bell MD*                              Professor Emeritus:  Dr Josr Chatman MD^            Diplomates American Board of Internal Medicine & Infectious Diseases                # [place] Office - Appt - Tel  [phone] Fax [phone]                * [place] Office - Appt - Tel [phone] Fax [phone]                      ^[place] Office - Tel  [phone] Fax [phone]                                  Hospital Consult line:  [phone]  =======================================================    ZAINA MILLER [id number]    Follow up: Fever    Fever improved  RUE swelling and pain improving  Blood cultures now with yeast    Allergies:  No Known Allergies      REVIEW OF SYSTEMS:  CONSTITUTIONAL:  Improved Fever and chills  HEENT:  No diplopia or blurred vision.  No earache, sore throat or runny nose.  CARDIOVASCULAR:  No chest pain  RESPIRATORY:  No cough, shortness of breath  GASTROINTESTINAL:  No nausea, vomiting or diarrhea.  GENITOURINARY:  No dysuria, frequency or urgency. No Blood in urine  MUSCULOSKELETAL:  no joint aches, no muscle pain  SKIN:  No change in skin, hair or nails.  NEUROLOGIC:  No Headaches      Physical Exam:  GEN: NAD  HEENT: normocephalic and atraumatic. EOMI. PERRL.    NECK: Supple.   LUNGS: CTA B/L.  HEART: RRR  ABDOMEN: Soft, NT, ND.  +BS.    : No CVA tenderness  EXTREMITIES: RUE swelling and pain  MSK: No joint swelling  NEUROLOGIC: No Focal Deficits   SKIN: Rt hand swelling improved, no findings on external Rt elbow. Nl ROM Rt elbow.         Vitals:  T(F): 98.6 (17 Apr 2024 08:25), Max: 100.3 (16 Apr 2024 21:40)  HR: 106 (17 Apr 2024 08:25)  BP: 127/79 (17 Apr 2024 08:25)  RR: 18 (17 Apr 2024 08:25)  SpO2: 96% (17 Apr 2024 08:25) (91% - 96%)  temp max in last 48H T(F): , Max: 100.3 (04-16-24 @ 21:40)    Current Antibiotics:  caspofungin IVPB      caspofungin IVPB 50 milliGRAM(s) IV Intermittent every 24 hours    Other medications:  amLODIPine   Tablet 10 milliGRAM(s) Oral daily  dextrose 5%. 1000 milliLiter(s) IV Continuous <Continuous>  dextrose 5%. 1000 milliLiter(s) IV Continuous <Continuous>  dextrose 50% Injectable 25 Gram(s) IV Push once  dextrose 50% Injectable 25 Gram(s) IV Push once  dextrose 50% Injectable 12.5 Gram(s) IV Push once  glucagon  Injectable 1 milliGRAM(s) IntraMuscular once  heparin   Injectable 5000 Unit(s) SubCutaneous every 12 hours  insulin lispro (ADMELOG) corrective regimen sliding scale   SubCutaneous Before meals and at bedtime  metoprolol tartrate 12.5 milliGRAM(s) Oral every 12 hours  pantoprazole    Tablet 40 milliGRAM(s) Oral before breakfast                            8.8    12.77 )-----------( 652      ( 16 Apr 2024 23:11 )             27.1     04-16    137  |  101  |  4.3<L>  ----------------------------<  92  3.7   |  20.0<L>  |  1.15    Ca    8.4      16 Apr 2024 05:13  Mg     1.8     04-16    TPro  6.8  /  Alb  2.5<L>  /  TBili  0.5  /  DBili  x   /  AST  31  /  ALT  20  /  AlkPhos  99  04-16    RECENT CULTURES:  04-16 @ 23:50    RVP  NotDete    04-13 @ 06:50 .Blood Blood     No growth at 72 Hours    04-13 @ 06:47 .Blood Blood     No growth at 72 Hours    04-10 @ 05:59 .Blood Blood-Peripheral Blood Culture PCR  Candida parapsilosis    Growth in aerobic bottle: Candida parapsilosis  Direct identification is available within approximately 3-5  hours either by Blood Panel Multiplexed PCR or Direct  MALDI-TOF. Details: [email or web address]  Growth in aerobic bottle: Yeast like cells    04-10 @ 05:55 .Blood Blood-Peripheral     No growth at 5 days    04-06 @ 05:25 .Blood Blood     No growth at 5 days    04-06 @ 05:19 .Blood Blood     No growth at 5 days    04-04 @ 13:21    RVP  NotDetec    04-03 @ 21:30 .Blood Blood-Peripheral     No growth at 5 days    04-03 @ 21:20 .Blood Blood-Peripheral     No growth at 5 days      WBC Count: 12.77 K/uL (04-16-24 @ 23:11)  WBC Count: 16.53 K/uL (04-16-24 @ 05:13)  WBC Count: 11.60 K/uL (04-15-24 @ 05:25)  WBC Count: 12.59 K/uL (04-14-24 @ 05:17)  WBC Count: 12.39 K/uL (04-13-24 @ 06:05)    Creatinine: 1.15 mg/dL (04-16-24 @ 05:13)  Creatinine: 0.96 mg/dL (04-15-24 @ 05:25)  Creatinine: 0.89 mg/dL (04-14-24 @ 05:17)  Creatinine: 0.92 mg/dL (04-13-24 @ 06:05)    C-Reactive Protein, Serum: 218 mg/L (04-17-24 @ 05:14)  C-Reactive Protein, Serum: 188 mg/L (04-12-24 @ 06:40)  C-Reactive Protein, Serum: 162 mg/L (04-09-24 @ 04:00)  C-Reactive Protein, Serum: 163 mg/L (04-06-24 @ 05:25)  C-Reactive Protein, Serum: 141 mg/L (04-05-24 @ 08:34)    Procalcitonin, Serum: 0.36 ng/mL (04-12-24 @ 06:40)  Procalcitonin, Serum: 0.30 ng/mL (04-10-24 @ 07:04)  Procalcitonin, Serum: 0.25 ng/mL (04-09-24 @ 04:00)  Procalcitonin, Serum: 0.63 ng/mL (04-06-24 @ 05:25)  Procalcitonin, Serum: 0.63 ng/mL (04-05-24 @ 06:57)  Procalcitonin, Serum: 0.64 ng/mL (04-05-24 @ 06:56)     SARS-CoV-2: NotDetec (04-16-24 @ 23:50)  SARS-CoV-2: NotDetec (04-04-24 @ 13:21)  COVID-19 PCR: NotDetec (03-31-24 @ 22:32)  SARS-CoV-2: NotDetec (03-31-24 @ 22:32)      Anti-Nuclear Antibody in AM (04.06.24 @ 05:25)    Anti Nuclear Factor Titer: Negative: Antinuclear AB (ERON), IFA Method    Rapid HIV-1/2 Antibody (04.05.24 @ 08:34)    Rapid HIV-1/2 Antibody: Nonreact    Legionella pneumophila Antigen, Urine (04.02.24 @ 15:51)    Legionella Antigen, Urine: Negative    Streptococcus pneumoniae Ag, Ur (04.02.24 @ 15:51)    Streptococcus pneumoniae Ag, Ur Result: Negative    Lipase (04.04.24 @ 05:05)    Lipase: 53 U/L    Acute Hepatitis Panel (04.06.24 @ 05:25)    Hepatitis C Virus Interpretation: Nonreact   Hepatitis C Virus S/CO Ratio: 0.13 S/CO   Hepatitis B Core IgM Antibody: Nonreact   Hepatitis B Surface Antigen: Nonreact   Hepatitis A IgM Antibody: Nonreact    Rheumatoid Factor Quant, Serum or Plasma in AM (04.06.24 @ 05:25)    Rheumatoid Factor Quant, Serum or Plasma: 11 IU/mL          < from: TTE W or WO Ultrasound Enhancing Agent (03.13.24 @ 21:00) >  CONCLUSIONS:      1. Mild left ventricular hypertrophy.   2. Left ventricular cavity is normal in size. Left ventricular systolic function is hyperdynamic with an ejection fraction of 71 % by Cheney's method of disks with an ejection fraction visually estimated at 70 to 75 %. There are no regional wall motion abnormalities seen.   3. Normal right ventricular cavity size and normal systolic function.   4. Trileaflet aortic valve with normal systolic excursion. mild calcification of the aortic valve leaflets.   5. Structurally normal mitral valve with normal leaflet excursion.   6. The right atrium is normal in size.   7. The left atrium is normal.   8. No pericardial effusion seen.    < end of copied text >        < from: MR Elbow No Cont, Right (04.11.24 @ 14:50) >    ACC: [id number] EXAM:  MR ELBOW RT   ORDERED BY: JAIRON CARPIO     PROCEDURE DATE:  04/11/2024      INTERPRETATION:  Clinical indication: Fever of unknown origin. Swelling   about the elbow. CT findings concerning for avulsion fracture versus   detached enthesophyte    Multiplanar multisequence noncontrast MRI of the right elbow was   performed.    Correlation is made with prior CT of the right elbow from April 10, 2024.    FINDINGS:    There is extensive circumferential subcutaneous edemathroughout the   visualized elbow consistent with cellulitis. There is mild feathery edema   within the medial flexor and to a lesser extent the dorsal extensor   musculature which is nonspecific and may be related to infectious or   inflammatory etiologies.    The previously described ossific fragment embedded within the ulnar   margin of the distal triceps tendon is again seen. This is likely related   to the sequela of remote avulsive injury. There is moderate tendinosis   and surrounding peritendinous edema within the triceps tendon. There is   no full-thickness or retracted triceps tendon tear. There is mild   psuedoarthritic changes between the olecranon and the avulsed fragment   which trace osseous edema about the pseudoarthrosis. There is mild   overlying olecranon bursitis. Trace osseous edema about the   pseudoarthrosis of the avulsed fragment which is favored to be   degenerative in nature. No convincing evidence of osteomyelitis.   Otherwise, the marrow signal within the elbow is preserved.    There is a nonspecific small elbow joint effusion. There is no evidence   of osseous erosion or subarticular osteitis. The articular surfaces are   preserved.    There is chronic mild undersurface tearing involving the origin of the  common extensor tendon. The lateral collateral ligamentous structures are   preserved.    The common flexor tendon origin is intact. Medial collateral ligamentous   structures are preserved. The distal biceps and brachialis tendinous   insertions are intact.    IMPRESSION:    Redemonstration of a chronic appearing avulsion along the ulnar margin of   the triceps insertion. There is moderate associated tendinosis and   surrounding peritendinous edema within the triceps tendon. No   full-thickness or retracted triceps tendon tear. Mild psuedoarthritic   changes between the olecranon and the avulsed fragment which trace   osseous edema about the pseudoarthrosis. Trace osseous edema about the   pseudoarthrosis of the avulsed fragment which is favored to be   degenerative in nature. No convincing evidence of osteomyelitis.    Diffuse subcutaneous edema about the elbow. Mild olecranon bursitis. Mild   edema within the medial flexor and to a lesser extent extensor   musculature. Findings may be related to underlying cellulitis.    Nonspecific small elbow joint effusion.    --- End of Report ---    < end of copied text >      < from: CT Upper Extremity w/ IV Cont, Right (04.10.24 @ 14:05) >  ACC: [id number] EXAM:  CT UPR EXT IC RT   ORDERED BY: MAURISIO FARR     PROCEDURE DATE:  04/10/2024      INTERPRETATION:  CT OF THE RIGHT UPPER EXTREMITY    CLINICAL INFORMATION: Swelling.    COMPARISON: None available.    TECHNIQUE: Axial CT images were obtained of the right upper extremity   from the shoulder through the fingertips following administration of 90   cc Omnipaque 350 intravenous contrast. Sagittal and coronal   reconstructions were provided.    FINDINGS:    Osseous: No acutefracture or dislocation. Small well-corticated chronic   nonunited cortical avulsion stress fracture fragment versus detached   enthesophyte at the olecranon insertion of the triceps. Subtle adjacent   small focal cortical erosion. No aggressive periosteal reaction.   Moderately severe wrist arthrosis without definite effusions.   Mineralization within normal limits.     Soft tissues: Inflammatory stranding near circumferentially surrounding   the elbow and proximal to mid forearm, but without discrete sizable   hyperattenuating hematoma or drainable encapsulated fluid collection. No   elbow joint effusion. Ill-defined superficial dermal ulceration overlying   the tip of the olecranon with nearly exposed bone. No tracking emphysema.   No radiopaque foreign body.    IMPRESSION:    1.  Small chronic nonunited cortical avulsion stress fracture fragment   versus detached enthesophyte at the olecranon insertion of right triceps.   Overlying dermal ulceration with likely exposed bone and CT findings   concerning for focal osteomyelitis. Correlate for probe to bone and   consider follow-up elbow MRI as clinically indicated.  2.  Acute cellulitis in the proper clinical setting. No tracking soft   tissue emphysema drainable mature abscess.  3. No right elbow joint effusion/septic arthritis. Advanced right wrist   arthrosis without significant effusion or convincing CT evidence for   osteomyelitis/septic arthritis.    --- End of Report ---    < end of copied text >        < from: CT Abdomen and Pelvis w/ IV Cont (04.13.24 @ 09:41) >  ACC: [id number] EXAM:  CT ABDOMEN AND PELVIS IC   ORDERED BY: KENYATTA YEBOAH     PROCEDURE DATE:  04/13/2024      INTERPRETATION:  CLINICAL INFORMATION: Bacteremia.    COMPARISON: CT scan of the abdomen and pelvis from 4/1/2024    CONTRAST/COMPLICATIONS:  IV Contrast: Omnipaque 350  95 cc administered   5 cc discarded  Oral Contrast: NONE  Complications: None reported at time of study completion    PROCEDURE:  CT of the Abdomen and Pelvis was performed.  Sagittal and coronal reformats were performed.    FINDINGS:  LOWER CHEST: Pleural based pulmonary nodules at the left lung base which   appear larger in size. For example, 3.9 x 1.9 cm pleural-based nodule on   series 3 image 32 posteriorly, previously 2.3 x 1.6 cm. Small right   pleural effusion has increased. Atelectatic changes.    LIVER: Within normal limits.  BILE DUCTS: Normal caliber.  GALLBLADDER: Within normal limits.  SPLEEN: Within normal limits.  PANCREAS: Within normal limits.  ADRENALS: Within normal limits.  KIDNEYS/URETERS: 2 mm nonobstructing calcification in the midpole the   left kidney is unchanged.    BLADDER: Within normal limits.  REPRODUCTIVE ORGANS: Mildly heterogeneous prostate gland.    BOWEL: No bowel obstruction. Appendix within normal limits.  PERITONEUM: No ascites.  VESSELS: Within normal limits.  RETROPERITONEUM/LYMPH NODES: No lymphadenopathy.  ABDOMINAL WALL: Umbilical hernia containing fat. Bilateral inguinal   hernias, right greater than left, containing fat  BONES: Degenerative changes of bone.    IMPRESSION:  Pleural-based nodules at the left lung base appears larger in size.   Please correlate clinically. Small right pleural effusion has increased.    No acute intra-abdominal pathology visualized.    --- End of Report ---    < end of copied text >

## 2024-04-17 NOTE — PROGRESS NOTE ADULT - ASSESSMENT
59 yoM with Asthma, HTN, HLD, DM2, Gout admitted with TYESHA suspected 2.2 medications along with MF PNA . Course complicated by fungemia.     Sepsis due to Fungemia  Multifocal pneumonia s/p antibiotics   Bcx on 04/10 grew Solange  started on Caspofungin 04/12/2024  ID following  Rheumatology following  - cardiology following for YASMEEN   Concern for fungal pneumonia given patient was intermittently on steroid before admission  Pulmonologist following for BAL this week  - ct abd with iv contrast, within normal limits    Suspected Right elbow osteomyelitis  - ruled out on MRI   - ID following     TYESHA (now resolved)   - Possibly 2/2 medication induced ATN   - Hold ARB/HCTZ/Metformin   - Renal US negative  - Avoid Nephrotoxins. Renally dose meds  - Nephro following     HTN, HLD  - Amlodipine 10mg q24  - Not currently on statin therapy    DM2  Hold Metformin 500mg BID  ISS    Asthma  Finished steroid taper   No longer taking Montelukast  Wixela/Duoneb PRN    Gout  Uric Acid level 12.2  Takes Allopurinol as needed. Hold for now.     right arm swelling - duplex done neg for dvt  encouraged elevation     VTE PPX SQH    Dispo: Medically active   BAL this week  yasmeen today

## 2024-04-17 NOTE — PROGRESS NOTE ADULT - ASSESSMENT
58y  Male with a h/o Asthma, HTN, HLD, DM2, Gout. Patient presented to [facility] ER for abnormal lab work, elevated Cr from his PMD's office. Patient recently hospitalized for acute asthma exacerbation and viral URI found to have uncontrolled HTN and new onset DM type 2 at that time discharged home with outpatient follow-up and sent back to [facility] ER today by PCP for abnormal kidney function. During his hospital course patient developed fever to 102.3F on 3/31, was a code sepsis, was administered Azithromycin x1 and Zosyn since 4/1.      RT UE swelling and warmth   Fever  TYESHA  Transaminitis   Thrombocytosis       - Blood cultures  3/31, 4/3, 4/6 no growth   - blood cultures 4/10 reporting Candida  - Repeat blood cultures  4/13 no growth   - RVP/COVID 19 PCR 3/31, 4/4 negative   - Urine for legionella and strep negative   - CT C/A/P 4/1 reporting multifocal PNA   - UA 3/30 and 4/1 negative   - RUE CT with contrast concerning for Rt elbow OM and Advanced right wrist arthrosis without significant effusion or convincing CT evidence for osteomyelitis/septic arthritis.  - Rt Elbow MRI with no OM.   - Rheumatology consult noted  - Babesia PCR is negative   - Lyme PCR  is negative   - HIV negative   - viral hepatitis profile negative  - ERON negative   - LFTs improving   - RF is 11  - Procalcitonin 0.30, will repeat in AM   - Continue Caspofungin   - TTE with no veg  - CT A/P with contrast 4/13 reporting no acute findings  - Check YASMEEN, d/w Cardiology  - Hold off on PICC/Midline for now unless needed for reasons other than infectious diseases  - Follow up cultures  - Trend Fever  - Trend WBC      Will Follow    Discussed treatment plan with: Cardiology

## 2024-04-17 NOTE — PROGRESS NOTE ADULT - ASSESSMENT
58M with PMHX Asthma, HTN, HLD, DM2, Gout presents to [facility] ER for abnormal lab work.   Patient recently hospitalized for acute asthma exacerbation and viral URI found to have uncontrolled HTN and new onset DM2 at that time discharged home with outpatient follow-up and sent back to [facility] ER today by PCP for abnormal kidney function.   Patient now has + blood cultures and a cardiac consult is requested for YASMEEN.

## 2024-04-17 NOTE — PROGRESS NOTE ADULT - SUBJECTIVE AND OBJECTIVE BOX
Max Bellamy MD  [facility] Medicine  Contact via Teams or text/call at [phone]    Patient is a 59y old  Male who presents with a chief complaint of ARF (2024 12:55)        Patient seen and examined at bedside. No overnight events reported.     ALLERGIES:  No Known Allergies    MEDICATIONS  (STANDING):  amLODIPine   Tablet 10 milliGRAM(s) Oral daily  caspofungin IVPB      caspofungin IVPB 50 milliGRAM(s) IV Intermittent every 24 hours  dextrose 5%. 1000 milliLiter(s) (50 mL/Hr) IV Continuous <Continuous>  dextrose 5%. 1000 milliLiter(s) (100 mL/Hr) IV Continuous <Continuous>  dextrose 50% Injectable 12.5 Gram(s) IV Push once  dextrose 50% Injectable 25 Gram(s) IV Push once  dextrose 50% Injectable 25 Gram(s) IV Push once  glucagon  Injectable 1 milliGRAM(s) IntraMuscular once  heparin   Injectable 5000 Unit(s) SubCutaneous every 12 hours  insulin lispro (ADMELOG) corrective regimen sliding scale   SubCutaneous Before meals and at bedtime  metoprolol tartrate 12.5 milliGRAM(s) Oral every 12 hours  pantoprazole    Tablet 40 milliGRAM(s) Oral before breakfast    MEDICATIONS  (PRN):  acetaminophen     Tablet .. 650 milliGRAM(s) Oral every 6 hours PRN Temp greater or equal to 38C (100.4F), Mild Pain (1 - 3)  albuterol    0.083% 2.5 milliGRAM(s) Nebulizer every 4 hours PRN Wheezing  albuterol    90 MICROgram(s) HFA Inhaler 2 Puff(s) Inhalation every 6 hours PRN Shortness of Breath and/or Wheezing  aluminum hydroxide/magnesium hydroxide/simethicone Suspension 30 milliLiter(s) Oral every 4 hours PRN Dyspepsia  dextrose Oral Gel 15 Gram(s) Oral once PRN Blood Glucose LESS THAN 70 milliGRAM(s)/deciliter  diphenoxylate/atropine 1 Tablet(s) Oral every 6 hours PRN Diarrhea  melatonin 3 milliGRAM(s) Oral at bedtime PRN Insomnia  metoprolol tartrate Injectable 2.5 milliGRAM(s) IV Push every 6 hours PRN HR above 110/min  ondansetron Injectable 4 milliGRAM(s) IV Push every 8 hours PRN Nausea and/or Vomiting    Vital Signs Last 24 Hrs  T(F): 98.6 (2024 08:25), Max: 100.3 (2024 21:40)  HR: 106 (2024 08:25) (106 - 116)  BP: 127/79 (2024 08:25) (114/66 - 144/76)  RR: 18 (2024 08:25) (18 - 18)  SpO2: 96% (2024 08:25) (91% - 96%)  I&O's Summary    PHYSICAL EXAM:  General: NAD, A/O x 3  ENT: No gross hearing impairment, Moist mucous membranes, no thrush  Neck: Supple, No JVD  Lungs: Clear to auscultation bilaterally, good air entry, non-labored breathing  Cardio: RRR, S1/S2, No murmur  Abdomen: Soft, Nontender, Nondistended; Bowel sounds present  Extremities: No calf tenderness, No cyanosis, No pitting edema  Psych: Appropriate mood and affect    LABS:                        8.8    12.77 )-----------( 652      ( 2024 23:11 )             27.1     04-16    137  |  101  |  4.3  ----------------------------<  92  3.7   |  20.0  |  1.15    Ca    8.4      2024 05:13  Mg     1.8     04-16    TPro  6.8  /  Alb  2.5  /  TBili  0.5  /  DBili  x   /  AST  31  /  ALT  20  /  AlkPhos  99  04-16    03-14 Chol 180 mg/dL LDL --  mg/dL Trig 110 mg/dL    23:11 - VBG - pH:       | pCO2:       | pO2:       | Lactate: 1.00     POCT Blood Glucose.: 94 mg/dL (2024 08:32)  POCT Blood Glucose.: 99 mg/dL (2024 21:24)  POCT Blood Glucose.: 130 mg/dL (2024 18:12)  POCT Blood Glucose.: 99 mg/dL (2024 12:19)    Urinalysis Basic - ( 2024 04:17 )    Color: Yellow / Appearance: Clear / S.011 / pH: x  Gluc: x / Ketone: Trace mg/dL  / Bili: Negative / Urobili: 1.0 mg/dL   Blood: x / Protein: Trace mg/dL / Nitrite: Negative   Leuk Esterase: Negative / RBC: x / WBC x   Sq Epi: x / Non Sq Epi: x / Bacteria: x    Culture - Blood (collected 2024 06:50)  Source: .Blood Blood  Preliminary Report (2024 14:02):  No growth at 72 Hours    Culture - Blood (collected 2024 06:47)  Source: .Blood Blood  Preliminary Report (2024 14:02):  No growth at 72 Hours      COVID-19 PCR: NotDetec (24 @ 22:32)    RADIOLOGY & ADDITIONAL TESTS:    Care Discussed with Consultants/Other Providers:

## 2024-04-18 LAB
% ALBUMIN: 31.3 % — SIGNIFICANT CHANGE UP
% ALPHA 1: 8.8 % — SIGNIFICANT CHANGE UP
% ALPHA 2: 16.6 % — SIGNIFICANT CHANGE UP
% BETA: 17.8 % — SIGNIFICANT CHANGE UP
% GAMMA: 25.5 % — SIGNIFICANT CHANGE UP
% M SPIKE: SIGNIFICANT CHANGE UP
ALBUMIN SERPL ELPH-MCNC: 2.1 G/DL — LOW (ref 3.6–5.5)
ALBUMIN/GLOB SERPL ELPH: 0.5 RATIO — SIGNIFICANT CHANGE UP
ALPHA1 GLOB SERPL ELPH-MCNC: 0.6 G/DL — HIGH (ref 0.1–0.4)
ALPHA2 GLOB SERPL ELPH-MCNC: 1.1 G/DL — HIGH (ref 0.5–1)
ANION GAP SERPL CALC-SCNC: 15 MMOL/L — SIGNIFICANT CHANGE UP (ref 5–17)
B-GLOBULIN SERPL ELPH-MCNC: 1.2 G/DL — HIGH (ref 0.5–1)
BUN SERPL-MCNC: 8.1 MG/DL — SIGNIFICANT CHANGE UP (ref 8–20)
CALCIUM SERPL-MCNC: 8.6 MG/DL — SIGNIFICANT CHANGE UP (ref 8.4–10.5)
CHLORIDE SERPL-SCNC: 101 MMOL/L — SIGNIFICANT CHANGE UP (ref 96–108)
CO2 SERPL-SCNC: 21 MMOL/L — LOW (ref 22–29)
CREAT SERPL-MCNC: 0.98 MG/DL — SIGNIFICANT CHANGE UP (ref 0.5–1.3)
CULTURE RESULTS: SIGNIFICANT CHANGE UP
CULTURE RESULTS: SIGNIFICANT CHANGE UP
EGFR: 88 ML/MIN/1.73M2 — SIGNIFICANT CHANGE UP
GAMMA GLOBULIN: 1.7 G/DL — HIGH (ref 0.6–1.6)
GLUCOSE BLDC GLUCOMTR-MCNC: 106 MG/DL — HIGH (ref 70–99)
GLUCOSE BLDC GLUCOMTR-MCNC: 84 MG/DL — SIGNIFICANT CHANGE UP (ref 70–99)
GLUCOSE BLDC GLUCOMTR-MCNC: 89 MG/DL — SIGNIFICANT CHANGE UP (ref 70–99)
GLUCOSE BLDC GLUCOMTR-MCNC: 95 MG/DL — SIGNIFICANT CHANGE UP (ref 70–99)
GLUCOSE SERPL-MCNC: 71 MG/DL — SIGNIFICANT CHANGE UP (ref 70–99)
HCT VFR BLD CALC: 27.9 % — LOW (ref 39–50)
HGB BLD-MCNC: 9.1 G/DL — LOW (ref 13–17)
INTERPRETATION SERPL IFE-IMP: SIGNIFICANT CHANGE UP
M-SPIKE: SIGNIFICANT CHANGE UP (ref 0–0)
MCHC RBC-ENTMCNC: 28 PG — SIGNIFICANT CHANGE UP (ref 27–34)
MCHC RBC-ENTMCNC: 32.6 GM/DL — SIGNIFICANT CHANGE UP (ref 32–36)
MCV RBC AUTO: 85.8 FL — SIGNIFICANT CHANGE UP (ref 80–100)
PLATELET # BLD AUTO: 679 K/UL — HIGH (ref 150–400)
POTASSIUM SERPL-MCNC: 3.9 MMOL/L — SIGNIFICANT CHANGE UP (ref 3.5–5.3)
POTASSIUM SERPL-SCNC: 3.9 MMOL/L — SIGNIFICANT CHANGE UP (ref 3.5–5.3)
PROT PATTERN SERPL ELPH-IMP: SIGNIFICANT CHANGE UP
PROT SERPL-MCNC: 6.6 G/DL — SIGNIFICANT CHANGE UP (ref 6–8.3)
RBC # BLD: 3.25 M/UL — LOW (ref 4.2–5.8)
RBC # FLD: 14.1 % — SIGNIFICANT CHANGE UP (ref 10.3–14.5)
SODIUM SERPL-SCNC: 137 MMOL/L — SIGNIFICANT CHANGE UP (ref 135–145)
SPECIMEN SOURCE: SIGNIFICANT CHANGE UP
SPECIMEN SOURCE: SIGNIFICANT CHANGE UP
WBC # BLD: 12.24 K/UL — HIGH (ref 3.8–10.5)
WBC # FLD AUTO: 12.24 K/UL — HIGH (ref 3.8–10.5)

## 2024-04-18 PROCEDURE — 99221 1ST HOSP IP/OBS SF/LOW 40: CPT

## 2024-04-18 PROCEDURE — 99233 SBSQ HOSP IP/OBS HIGH 50: CPT

## 2024-04-18 PROCEDURE — 99232 SBSQ HOSP IP/OBS MODERATE 35: CPT

## 2024-04-18 RX ORDER — COLCHICINE 0.6 MG
1.2 TABLET ORAL ONCE
Refills: 0 | Status: COMPLETED | OUTPATIENT
Start: 2024-04-18 | End: 2024-04-18

## 2024-04-18 RX ORDER — COLCHICINE 0.6 MG
0.6 TABLET ORAL ONCE
Refills: 0 | Status: COMPLETED | OUTPATIENT
Start: 2024-04-18 | End: 2024-04-18

## 2024-04-18 RX ADMIN — AMLODIPINE BESYLATE 10 MILLIGRAM(S): 2.5 TABLET ORAL at 05:16

## 2024-04-18 RX ADMIN — Medication 1.2 MILLIGRAM(S): at 11:47

## 2024-04-18 RX ADMIN — Medication 650 MILLIGRAM(S): at 19:31

## 2024-04-18 RX ADMIN — Medication 0.6 MILLIGRAM(S): at 12:39

## 2024-04-18 RX ADMIN — Medication 12.5 MILLIGRAM(S): at 17:37

## 2024-04-18 RX ADMIN — Medication 12.5 MILLIGRAM(S): at 05:16

## 2024-04-18 RX ADMIN — CASPOFUNGIN ACETATE 260 MILLIGRAM(S): 7 INJECTION, POWDER, LYOPHILIZED, FOR SOLUTION INTRAVENOUS at 11:47

## 2024-04-18 RX ADMIN — HEPARIN SODIUM 5000 UNIT(S): 5000 INJECTION INTRAVENOUS; SUBCUTANEOUS at 05:15

## 2024-04-18 RX ADMIN — PANTOPRAZOLE SODIUM 40 MILLIGRAM(S): 20 TABLET, DELAYED RELEASE ORAL at 05:16

## 2024-04-18 RX ADMIN — HEPARIN SODIUM 5000 UNIT(S): 5000 INJECTION INTRAVENOUS; SUBCUTANEOUS at 17:38

## 2024-04-18 NOTE — CONSULT NOTE ADULT - ASSESSMENT
This is a 57YO M with PMHX Asthma, HTN, HLD, DM2, Gout presents to [facility] ER for abnormal lab work. Pt admitted for elevated creatinine that has now resolved. Patient became septic during inpatient stay, with episodes of fevers and tachyardia to the 140s. Pt also noted with RUE swelling, pending extremity CT w/ IV contrast. Pt at this time denying chest pain, sob, new leg swelling, MCCLURE, and orthopnea. Cardiology consulted for episodes of tachycardia. 
58y  Male with a h/o Asthma, HTN, HLD, DM2, Gout. Patient presented to [facility] ER for abnormal lab work, elevated Cr from his PMD's office. Patient recently hospitalized for acute asthma exacerbation and viral URI found to have uncontrolled HTN and new onset DM type 2 at that time discharged home with outpatient follow-up and sent back to [facility] ER today by PCP for abnormal kidney function. During his hospital course patient developed fever to 102.3F on 3/31, was a code sepsis, was administered Azithromycin x1 and Zosyn since 4/1.      Multifocal PNA  Fever  TYESHA        - Blood cultures  3/31 no growth   - Repeat blood cultures if febrile   - RVP/COVID 19 PCR3/31 negative   - Urine for legionella ordered   - CT C/A/P 4/1 reporting multifocal PNA   - UA 3/30 and 4/1 negative   - Procalcitonin level 0.39  - Continue Zosyn   - Hold off on PICC/Midline for now unless needed for reasons other than infectious diseases  - Follow up cultures  - Trend Fever  - Trend WBC      Thank you for allowing me to participate in the care of your patient.   Will Follow    Discussed treatment plan with: Dr Goddard  
59M, pmhx eosinophilia asthma, HTN, HLD, DM, gout recent hospitilization for acute asthma exacerbation and viral URI found to have uncontrolled HTN and new onset DM2 at that time discharged home with outpatient follow-up, sent to ED 3/29 by PCP for outpt abnl renal function on routine f/u labs, found to have multifocal pneumonia, hospital course c/b sepsis secondary to fungemia ( +bcx 4/10 candida, neg 4/13), being followed by ID, on capsofungin, YASMEEN neg for vegetation, unknown source but presumed fungal pneumonia given admission CT findings and recent treatment with steroids, thoracic surgery consulted for bronch/BAL.
Carroll-   pre renal+ concomitant ARB+ diuretics use  Scr 1.1 at baseline (03/16-03/18_  Scr 3.1 on presentation- now improving  UA bland, normal appearing kidneys on renal US  Continue IV hydration    HTN  BP stable  Continue Amlodipine 10mg daily  Hold ARB and HCTZ for now    DM  Metformin on hold     
-Severe persistent asthma  -Hx Eosinophilia  -Lung infiltrate, fever - infectious with atypical organisms such as fungal  Does not have other organ involvement expected for Churg Bijan but not ruled out. IgE was normal last checked. ALso in DDx is eosinophilic pna; long hx of asthma and prolonged symptoms which is seen in CE[facility]; however, given acute decompensation, could consider AEP.   -Hypoxic resp failure  -Possible elbow OM  -Renal failure - improving  -SHERI; not treated    RECC:  Antifungal per ID. Agree with repeat IgE, CBC with diff, ANCA. May need BAL to check for infection, Eos. CPAP while here. Rheum f/u.     D/W pt and wife. 
58M with PMHX Asthma, HTN, HLD, DM2, Gout presents to [facility] ER for abnormal lab work.   Patient recently hospitalized for acute asthma exacerbation and viral URI found to have uncontrolled HTN and new onset DM2 at that time discharged home with outpatient follow-up and sent back to [facility] ER today by PCP for abnormal kidney function.   Patient now has + blood cultures and a cardiac consult is requested for YASMEEN.     ROS +Fatigue and +Malaise.   Denies headache, dizziness, chest pain, palpitations, sob, pnd, orthopnea, hemoptysis n/v/d/c/abd pain,  fever, chills, syncope, presyncope cramps or any other discomfort.

## 2024-04-18 NOTE — PROGRESS NOTE ADULT - ASSESSMENT
59 yoM with Asthma, HTN, HLD, DM2, Gout admitted with TYESHA suspected 2.2 medications along with MF PNA . Course complicated by fungemia.     Sepsis due to Fungemia  Multifocal pneumonia s/p antibiotics   Bcx on 04/10 grew Solange  started on Caspofungin 04/12/2024  ID following  Rheumatology following  - YASMEEN negative for vegetation  Concern for fungal pneumonia given patient was intermittently on steroid before admission  Pulmonologist following for BAL this week  - ct abd with iv contrast, within normal limits    RULED OUT Right elbow osteomyelitis  - ruled out on MRI     TYESHA (now resolved)   - Possibly 2/2 medication induced ATN   - Hold ARB/HCTZ/Metformin   - Renal US negative  - Avoid Nephrotoxins. Renally dose meds  - Nephro following     HTN, HLD  - Amlodipine 10mg q24  - Not currently on statin therapy    DM2  Hold Metformin 500mg BID  ISS    Asthma  Finished steroid taper   No longer taking Montelukast  Wixela/Duoneb PRN    Gout  Uric Acid level 12.2  Takes Allopurinol as needed. Hold for now.     right arm swelling - duplex done neg for dvt  encouraged elevation     VTE PPX SQH    Dispo: Medically active

## 2024-04-18 NOTE — PROGRESS NOTE ADULT - ASSESSMENT
58y  Male with a h/o Asthma, HTN, HLD, DM2, Gout. Patient presented to [facility] ER for abnormal lab work, elevated Cr from his PMD's office. Patient recently hospitalized for acute asthma exacerbation and viral URI found to have uncontrolled HTN and new onset DM type 2 at that time discharged home with outpatient follow-up and sent back to [facility] ER today by PCP for abnormal kidney function. During his hospital course patient developed fever to 102.3F on 3/31, was a code sepsis, was administered Azithromycin x1 and Zosyn since 4/1.      RT UE swelling and warmth   Fever  TYESHA  Transaminitis   Thrombocytosis  Fungemia       - Blood cultures  3/31, 4/3, 4/6 no growth   - blood cultures 4/10 reporting Candida  - Repeat blood cultures  4/13 no growth   - RVP/COVID 19 PCR 3/31, 4/4 negative   - Urine for legionella and strep negative   - CT C/A/P 4/1 reporting multifocal PNA   - UA 3/30 and 4/1 negative   - RUE CT with contrast concerning for Rt elbow OM and Advanced right wrist arthrosis without significant effusion or convincing CT evidence for osteomyelitis/septic arthritis.  - Rt Elbow MRI with no OM.   - Rheumatology consult noted  - Babesia PCR is negative   - Lyme PCR  is negative   - HIV negative   - viral hepatitis profile negative  - ERON negative   - LFTs improving   - RF is 11  - Procalcitonin 0.30, will repeat in AM   - Continue Caspofungin   - TTE with no veg  - CT A/P with contrast 4/13 reporting no acute findings  - YASMEEN did not report vegetations  - Hold off on PICC/Midline for now unless needed for reasons other than infectious diseases  - started on colchicine for suspected gout, suggest rheum f/u  - plan for bronch due to suspicion for fungal pneumonia, f/u cultures and path  - Trend Fever  - Trend WBC      Will Follow    Discussed treatment plan with: hospitalist, pharmacy

## 2024-04-18 NOTE — CONSULT NOTE ADULT - CONSULT REQUESTED DATE/TIME
18-Apr-2024
16-Apr-2024 09:10
30-Mar-2024 14:48
12-Apr-2024 13:00
02-Apr-2024 12:09
10-Apr-2024
10-Apr-2024 10:17

## 2024-04-18 NOTE — PROGRESS NOTE ADULT - SUBJECTIVE AND OBJECTIVE BOX
[facility]  INFECTIOUS DISEASES at [place] / [place] / [place]  =======================================================                               Serg Almanza MD#   Maurisio Farr MD*                             Lisy Sandhu MD*   Laila Bell MD*                              Professor Emeritus:  Dr Josr Chatman MD^            Diplomates American Board of Internal Medicine & Infectious Diseases                # [place] Office - Appt - Tel  [phone] Fax [phone]                * [place] Office - Appt - Tel [phone] Fax [phone]                      ^[place] Office - Tel  [phone] Fax [phone]                                  Hospital Consult line:  [phone]  =======================================================    ZAINA MILLER [id number]    Follow up: Fever, fungemia    Fever improved  YASMEEN was neg for vegetations  pt now reports lue hand swelling and pain, right knee pain and right foot pain x 2 days    Allergies:  No Known Allergies      REVIEW OF SYSTEMS:  CONSTITUTIONAL:  Improved Fever and chills  HEENT:  No diplopia or blurred vision.  No earache, sore throat or runny nose.  CARDIOVASCULAR:  No chest pain  RESPIRATORY:  No cough, shortness of breath  GASTROINTESTINAL:  No nausea, vomiting or diarrhea.  GENITOURINARY:  No dysuria, frequency or urgency. No Blood in urine  MUSCULOSKELETAL:  as above  SKIN:  No change in skin, hair or nails.  NEUROLOGIC:  No Headaches      Physical Exam:  GEN: NAD  HEENT: normocephalic and atraumatic.   NECK: Supple.   LUNGS: CTA B/L.  HEART: RRR  ABDOMEN: Soft, NT, ND.  +BS.    : No CVA tenderness  EXTREMITIES: RUE swelling and pain  MSK: No joint swelling  NEUROLOGIC: No Focal Deficits   SKIN: Rt hand swelling improved, no findings on external Rt elbow. Nl ROM Rt elbow. left hand dorsum + swelling, tender over MCP, right knee + swelling and tender, right foot dorsum swelling        Vitals:  Vital Signs Last 24 Hrs  T(C): 37 (18 Apr 2024 08:30), Max: 37.2 (18 Apr 2024 00:12)  T(F): 98.6 (18 Apr 2024 08:30), Max: 99 (18 Apr 2024 00:12)  HR: 104 (18 Apr 2024 08:30) (86 - 115)  BP: 125/74 (18 Apr 2024 08:30) (124/71 - 137/75)  BP(mean): --  RR: 17 (18 Apr 2024 08:30) (17 - 18)  SpO2: 96% (18 Apr 2024 08:30) (94% - 96%)    Parameters below as of 18 Apr 2024 08:30  Patient On (Oxygen Delivery Method): room air      Current Antibiotics:  caspofungin IVPB      caspofungin IVPB 50 milliGRAM(s) IV Intermittent every 24 hours    Other medications:  amLODIPine   Tablet 10 milliGRAM(s) Oral daily  dextrose 5%. 1000 milliLiter(s) IV Continuous <Continuous>  dextrose 5%. 1000 milliLiter(s) IV Continuous <Continuous>  dextrose 50% Injectable 25 Gram(s) IV Push once  dextrose 50% Injectable 25 Gram(s) IV Push once  dextrose 50% Injectable 12.5 Gram(s) IV Push once  glucagon  Injectable 1 milliGRAM(s) IntraMuscular once  heparin   Injectable 5000 Unit(s) SubCutaneous every 12 hours  insulin lispro (ADMELOG) corrective regimen sliding scale   SubCutaneous Before meals and at bedtime  metoprolol tartrate 12.5 milliGRAM(s) Oral every 12 hours  pantoprazole    Tablet 40 milliGRAM(s) Oral before breakfast                                9.1    12.24 )-----------( 679      ( 18 Apr 2024 06:20 )             27.9       04-18    137  |  101  |  8.1  ----------------------------<  71  3.9   |  21.0<L>  |  0.98    Ca    8.6      18 Apr 2024 06:20    TPro  6.6  /  Alb  x   /  TBili  x   /  DBili  x   /  AST  x   /  ALT  x   /  AlkPhos  x   04-17              Urinalysis Basic - ( 18 Apr 2024 06:20 )    Color: x / Appearance: x / SG: x / pH: x  Gluc: 71 mg/dL / Ketone: x  / Bili: x / Urobili: x   Blood: x / Protein: x / Nitrite: x   Leuk Esterase: x / RBC: x / WBC x   Sq Epi: x / Non Sq Epi: x / Bacteria: x                  CAPILLARY BLOOD GLUCOSE      POCT Blood Glucose.: 89 mg/dL (18 Apr 2024 11:19)            RECENT CULTURES:  04-16 @ 23:50    RVP  NotDetec    04-13 @ 06:50 .Blood Blood     No growth at 72 Hours    04-13 @ 06:47 .Blood Blood     No growth at 72 Hours    04-10 @ 05:59 .Blood Blood-Peripheral Blood Culture PCR  Candida parapsilosis    Growth in aerobic bottle: Candida parapsilosis  Direct identification is available within approximately 3-5  hours either by Blood Panel Multiplexed PCR or Direct  MALDI-TOF. Details: [email or web address]  Growth in aerobic bottle: Yeast like cells    04-10 @ 05:55 .Blood Blood-Peripheral     No growth at 5 days    04-06 @ 05:25 .Blood Blood     No growth at 5 days    04-06 @ 05:19 .Blood Blood     No growth at 5 days    04-04 @ 13:21    RV  NotDetec    04-03 @ 21:30 .Blood Blood-Peripheral     No growth at 5 days    04-03 @ 21:20 .Blood Blood-Peripheral     No growth at 5 days      WBC Count: 12.77 K/uL (04-16-24 @ 23:11)  WBC Count: 16.53 K/uL (04-16-24 @ 05:13)  WBC Count: 11.60 K/uL (04-15-24 @ 05:25)  WBC Count: 12.59 K/uL (04-14-24 @ 05:17)  WBC Count: 12.39 K/uL (04-13-24 @ 06:05)    Creatinine: 1.15 mg/dL (04-16-24 @ 05:13)  Creatinine: 0.96 mg/dL (04-15-24 @ 05:25)  Creatinine: 0.89 mg/dL (04-14-24 @ 05:17)  Creatinine: 0.92 mg/dL (04-13-24 @ 06:05)    C-Reactive Protein, Serum: 218 mg/L (04-17-24 @ 05:14)  C-Reactive Protein, Serum: 188 mg/L (04-12-24 @ 06:40)  C-Reactive Protein, Serum: 162 mg/L (04-09-24 @ 04:00)  C-Reactive Protein, Serum: 163 mg/L (04-06-24 @ 05:25)  C-Reactive Protein, Serum: 141 mg/L (04-05-24 @ 08:34)    Procalcitonin, Serum: 0.36 ng/mL (04-12-24 @ 06:40)  Procalcitonin, Serum: 0.30 ng/mL (04-10-24 @ 07:04)  Procalcitonin, Serum: 0.25 ng/mL (04-09-24 @ 04:00)  Procalcitonin, Serum: 0.63 ng/mL (04-06-24 @ 05:25)  Procalcitonin, Serum: 0.63 ng/mL (04-05-24 @ 06:57)  Procalcitonin, Serum: 0.64 ng/mL (04-05-24 @ 06:56)     SARS-CoV-2: NotDetec (04-16-24 @ 23:50)  SARS-CoV-2: NotDetec (04-04-24 @ 13:21)  COVID-19 PCR: NotDetec (03-31-24 @ 22:32)  SARS-CoV-2: NotDetec (03-31-24 @ 22:32)      Anti-Nuclear Antibody in AM (04.06.24 @ 05:25)    Anti Nuclear Factor Titer: Negative: Antinuclear AB (ERON), IFA Method    Rapid HIV-1/2 Antibody (04.05.24 @ 08:34)    Rapid HIV-1/2 Antibody: Nonreact    Legionella pneumophila Antigen, Urine (04.02.24 @ 15:51)    Legionella Antigen, Urine: Negative    Streptococcus pneumoniae Ag, Ur (04.02.24 @ 15:51)    Streptococcus pneumoniae Ag, Ur Result: Negative    Lipase (04.04.24 @ 05:05)    Lipase: 53 U/L    Acute Hepatitis Panel (04.06.24 @ 05:25)    Hepatitis C Virus Interpretation: Nonreact   Hepatitis C Virus S/CO Ratio: 0.13 S/CO   Hepatitis B Core IgM Antibody: Nonreact   Hepatitis B Surface Antigen: Nonreact   Hepatitis A IgM Antibody: Nonreact    Rheumatoid Factor Quant, Serum or Plasma in AM (04.06.24 @ 05:25)    Rheumatoid Factor Quant, Serum or Plasma: 11 IU/mL          < from: TTE W or WO Ultrasound Enhancing Agent (03.13.24 @ 21:00) >  CONCLUSIONS:      1. Mild left ventricular hypertrophy.   2. Left ventricular cavity is normal in size. Left ventricular systolic function is hyperdynamic with an ejection fraction of 71 % by Cheney's method of disks with an ejection fraction visually estimated at 70 to 75 %. There are no regional wall motion abnormalities seen.   3. Normal right ventricular cavity size and normal systolic function.   4. Trileaflet aortic valve with normal systolic excursion. mild calcification of the aortic valve leaflets.   5. Structurally normal mitral valve with normal leaflet excursion.   6. The right atrium is normal in size.   7. The left atrium is normal.   8. No pericardial effusion seen.    < end of copied text >        < from: MR Elbow No Cont, Right (04.11.24 @ 14:50) >    ACC: [id number] EXAM:  MR ELBOW RT   ORDERED BY: JAIRON CARPIO     PROCEDURE DATE:  04/11/2024      INTERPRETATION:  Clinical indication: Fever of unknown origin. Swelling   about the elbow. CT findings concerning for avulsion fracture versus   detached enthesophyte    Multiplanar multisequence noncontrast MRI of the right elbow was   performed.    Correlation is made with prior CT of the right elbow from April 10, 2024.    FINDINGS:    There is extensive circumferential subcutaneous edemathroughout the   visualized elbow consistent with cellulitis. There is mild feathery edema   within the medial flexor and to a lesser extent the dorsal extensor   musculature which is nonspecific and may be related to infectious or   inflammatory etiologies.    The previously described ossific fragment embedded within the ulnar   margin of the distal triceps tendon is again seen. This is likely related   to the sequela of remote avulsive injury. There is moderate tendinosis   and surrounding peritendinous edema within the triceps tendon. There is   no full-thickness or retracted triceps tendon tear. There is mild   psuedoarthritic changes between the olecranon and the avulsed fragment   which trace osseous edema about the pseudoarthrosis. There is mild   overlying olecranon bursitis. Trace osseous edema about the   pseudoarthrosis of the avulsed fragment which is favored to be   degenerative in nature. No convincing evidence of osteomyelitis.   Otherwise, the marrow signal within the elbow is preserved.    There is a nonspecific small elbow joint effusion. There is no evidence   of osseous erosion or subarticular osteitis. The articular surfaces are   preserved.    There is chronic mild undersurface tearing involving the origin of the  common extensor tendon. The lateral collateral ligamentous structures are   preserved.    The common flexor tendon origin is intact. Medial collateral ligamentous   structures are preserved. The distal biceps and brachialis tendinous   insertions are intact.    IMPRESSION:    Redemonstration of a chronic appearing avulsion along the ulnar margin of   the triceps insertion. There is moderate associated tendinosis and   surrounding peritendinous edema within the triceps tendon. No   full-thickness or retracted triceps tendon tear. Mild psuedoarthritic   changes between the olecranon and the avulsed fragment which trace   osseous edema about the pseudoarthrosis. Trace osseous edema about the   pseudoarthrosis of the avulsed fragment which is favored to be   degenerative in nature. No convincing evidence of osteomyelitis.    Diffuse subcutaneous edema about the elbow. Mild olecranon bursitis. Mild   edema within the medial flexor and to a lesser extent extensor   musculature. Findings may be related to underlying cellulitis.    Nonspecific small elbow joint effusion.    --- End of Report ---    < end of copied text >      < from: CT Upper Extremity w/ IV Cont, Right (04.10.24 @ 14:05) >  ACC: [id number] EXAM:  CT UPR EXT IC RT   ORDERED BY: MAURISIO FARR     PROCEDURE DATE:  04/10/2024      INTERPRETATION:  CT OF THE RIGHT UPPER EXTREMITY    CLINICAL INFORMATION: Swelling.    COMPARISON: None available.    TECHNIQUE: Axial CT images were obtained of the right upper extremity   from the shoulder through the fingertips following administration of 90   cc Omnipaque 350 intravenous contrast. Sagittal and coronal   reconstructions were provided.    FINDINGS:    Osseous: No acutefracture or dislocation. Small well-corticated chronic   nonunited cortical avulsion stress fracture fragment versus detached   enthesophyte at the olecranon insertion of the triceps. Subtle adjacent   small focal cortical erosion. No aggressive periosteal reaction.   Moderately severe wrist arthrosis without definite effusions.   Mineralization within normal limits.     Soft tissues: Inflammatory stranding near circumferentially surrounding   the elbow and proximal to mid forearm, but without discrete sizable   hyperattenuating hematoma or drainable encapsulated fluid collection. No   elbow joint effusion. Ill-defined superficial dermal ulceration overlying   the tip of the olecranon with nearly exposed bone. No tracking emphysema.   No radiopaque foreign body.    IMPRESSION:    1.  Small chronic nonunited cortical avulsion stress fracture fragment   versus detached enthesophyte at the olecranon insertion of right triceps.   Overlying dermal ulceration with likely exposed bone and CT findings   concerning for focal osteomyelitis. Correlate for probe to bone and   consider follow-up elbow MRI as clinically indicated.  2.  Acute cellulitis in the proper clinical setting. No tracking soft   tissue emphysema drainable mature abscess.  3. No right elbow joint effusion/septic arthritis. Advanced right wrist   arthrosis without significant effusion or convincing CT evidence for   osteomyelitis/septic arthritis.    --- End of Report ---    < end of copied text >        < from: CT Abdomen and Pelvis w/ IV Cont (04.13.24 @ 09:41) >  ACC: [id number] EXAM:  CT ABDOMEN AND PELVIS IC   ORDERED BY: KENYATTA YEBOAH     PROCEDURE DATE:  04/13/2024      INTERPRETATION:  CLINICAL INFORMATION: Bacteremia.    COMPARISON: CT scan of the abdomen and pelvis from 4/1/2024    CONTRAST/COMPLICATIONS:  IV Contrast: Omnipaque 350  95 cc administered   5 cc discarded  Oral Contrast: NONE  Complications: None reported at time of study completion    PROCEDURE:  CT of the Abdomen and Pelvis was performed.  Sagittal and coronal reformats were performed.    FINDINGS:  LOWER CHEST: Pleural based pulmonary nodules at the left lung base which   appear larger in size. For example, 3.9 x 1.9 cm pleural-based nodule on   series 3 image 32 posteriorly, previously 2.3 x 1.6 cm. Small right   pleural effusion has increased. Atelectatic changes.    LIVER: Within normal limits.  BILE DUCTS: Normal caliber.  GALLBLADDER: Within normal limits.  SPLEEN: Within normal limits.  PANCREAS: Within normal limits.  ADRENALS: Within normal limits.  KIDNEYS/URETERS: 2 mm nonobstructing calcification in the midpole the   left kidney is unchanged.    BLADDER: Within normal limits.  REPRODUCTIVE ORGANS: Mildly heterogeneous prostate gland.    BOWEL: No bowel obstruction. Appendix within normal limits.  PERITONEUM: No ascites.  VESSELS: Within normal limits.  RETROPERITONEUM/LYMPH NODES: No lymphadenopathy.  ABDOMINAL WALL: Umbilical hernia containing fat. Bilateral inguinal   hernias, right greater than left, containing fat  BONES: Degenerative changes of bone.    IMPRESSION:  Pleural-based nodules at the left lung base appears larger in size.   Please correlate clinically. Small right pleural effusion has increased.    No acute intra-abdominal pathology visualized.    --- End of Report ---    < end of copied text >            < from: YASMEEN W or WO Ultrasound Enhancing Agent (04.17.24 @ 11:34) >     CONCLUSIONS:      1. Left ventricular systolic function is normal with an ejection fraction visually estimated at 60 to 65 %.   2. Normal right ventricular cavity size and normal systolic function.   3. No evidence of left atrial or left atrial appendage thrombus. The left atrial appendage emptying velocity is normal.   4. No significant valvular disease.   5. No pericardial effusion seen.   6. Agitated saline injection was negative for intracardiac shunt.   7. Compared to the transthoracic echocardiogram performed on 4/12/2024, there have been no significant interval changes.   8. No echocardiographic evidence of vegetations.    < end of copied text >

## 2024-04-18 NOTE — PROGRESS NOTE ADULT - ASSESSMENT
-Severe persistent asthma  -Hx Eosinophilia  -Lung infiltrate, fever - should be treated as infectious with atypical organisms such as fungal  Does not have other organ involvement expected for Churg Biajn but not ruled out. IgE was normal last checked. ALso in DDx is eosinophilic pna; long hx of asthma and prolonged symptoms which is seen in [facility]; however, given acute decompensation, could consider AEP.   -Increasing size of lung nodules c/w seen on admission  -Hypoxic resp failure  -Possible elbow OM  -Renal failure - improving  -SHERI; not treated    RECC:  Antifungal per ID. Agree with repeat IgE, CBC with diff, ANCA. Seen by T-surg; to schedule BAL. CPAP while here. Rheum f/u.     D/W pt.

## 2024-04-18 NOTE — PROGRESS NOTE ADULT - SUBJECTIVE AND OBJECTIVE BOX
PULMONARY PROGRESS NOTE      AMANDA MILLER-[id number]    Patient is a 59y old  Male who presents with a chief complaint of ARF (18 Apr 2024 13:47)      INTERVAL HPI/OVERNIGHT EVENTS: Pt still c/o wheeze; some sob not back to baseline. On NCO2.     MEDICATIONS  (STANDING):  amLODIPine   Tablet 10 milliGRAM(s) Oral daily  caspofungin IVPB      caspofungin IVPB 50 milliGRAM(s) IV Intermittent every 24 hours  dextrose 5%. 1000 milliLiter(s) (50 mL/Hr) IV Continuous <Continuous>  dextrose 5%. 1000 milliLiter(s) (100 mL/Hr) IV Continuous <Continuous>  dextrose 50% Injectable 12.5 Gram(s) IV Push once  dextrose 50% Injectable 25 Gram(s) IV Push once  dextrose 50% Injectable 25 Gram(s) IV Push once  glucagon  Injectable 1 milliGRAM(s) IntraMuscular once  heparin   Injectable 5000 Unit(s) SubCutaneous every 12 hours  insulin lispro (ADMELOG) corrective regimen sliding scale   SubCutaneous Before meals and at bedtime  metoprolol tartrate 12.5 milliGRAM(s) Oral every 12 hours  pantoprazole    Tablet 40 milliGRAM(s) Oral before breakfast      MEDICATIONS  (PRN):  acetaminophen     Tablet .. 650 milliGRAM(s) Oral every 6 hours PRN Temp greater or equal to 38C (100.4F), Mild Pain (1 - 3)  albuterol    0.083% 2.5 milliGRAM(s) Nebulizer every 4 hours PRN Wheezing  albuterol    90 MICROgram(s) HFA Inhaler 2 Puff(s) Inhalation every 6 hours PRN Shortness of Breath and/or Wheezing  aluminum hydroxide/magnesium hydroxide/simethicone Suspension 30 milliLiter(s) Oral every 4 hours PRN Dyspepsia  dextrose Oral Gel 15 Gram(s) Oral once PRN Blood Glucose LESS THAN 70 milliGRAM(s)/deciliter  diphenoxylate/atropine 1 Tablet(s) Oral every 6 hours PRN Diarrhea  melatonin 3 milliGRAM(s) Oral at bedtime PRN Insomnia  metoprolol tartrate Injectable 2.5 milliGRAM(s) IV Push every 6 hours PRN HR above 110/min  ondansetron Injectable 4 milliGRAM(s) IV Push every 8 hours PRN Nausea and/or Vomiting      Allergies    No Known Allergies    Intolerances        PAST MEDICAL & SURGICAL HISTORY:  Gout      Asthma      Arthritis      HTN (hypertension)      No significant past surgical history               REVIEW OF SYSTEMS:    CONSTITUTIONAL:  No distress    HEENT:  Eyes:  No diplopia or blurred vision. ENT:  No earache, sore throat or runny nose.    CARDIOVASCULAR:  No pressure, squeezing, tightness, heaviness or aching about the chest; no palpitations.    RESPIRATORY:  per HPI     GASTROINTESTINAL:  No nausea, vomiting or diarrhea.    GENITOURINARY:  No dysuria, frequency or urgency.    NEUROLOGIC:  No paresthesias, fasciculations, seizures or weakness.    PSYCHIATRIC:  No disorder of thought or mood.    Vital Signs Last 24 Hrs  T(C): 37.6 (18 Apr 2024 14:37), Max: 37.6 (18 Apr 2024 14:37)  T(F): 99.6 (18 Apr 2024 14:37), Max: 99.6 (18 Apr 2024 14:37)  HR: 119 (18 Apr 2024 14:37) (86 - 119)  BP: 129/67 (18 Apr 2024 14:37) (124/71 - 137/75)  BP(mean): --  RR: 17 (18 Apr 2024 14:37) (17 - 18)  SpO2: 91% (18 Apr 2024 14:37) (91% - 96%)    Parameters below as of 18 Apr 2024 14:37  Patient On (Oxygen Delivery Method): room air        PHYSICAL EXAMINATION:    GENERAL: The patient is awake and alert in no apparent distress.     HEENT: Head is normocephalic and atraumatic.  Mucous membranes are moist.    NECK: Supple.    LUNGS: scattered wheeze, respirations unlabored    HEART: Regular rate and rhythm      ABDOMEN: Soft, nontender, and nondistended.      EXTREMITIES: Without any cyanosis, clubbing, rash, lesions or edema.    NEUROLOGIC: Grossly intact.    LABS:                        9.1    12.24 )-----------( 679      ( 18 Apr 2024 06:20 )             27.9     04-18    137  |  101  |  8.1  ----------------------------<  71  3.9   |  21.0<L>  |  0.98    Ca    8.6      18 Apr 2024 06:20    TPro  6.6  /  Alb  2.1<L>  /  TBili  x   /  DBili  x   /  AST  x   /  ALT  x   /  AlkPhos  x   04-17        MICROBIOLOGY:    RADIOLOGY & ADDITIONAL STUDIES:  < from: Xray Chest 1 View- PORTABLE-Urgent (Xray Chest 1 View- PORTABLE-Urgent .) (04.16.24 @ 23:18) >    ACC: [id number] EXAM:  XR CHEST PORTABLE URGENT 1V   ORDERED BY: YU NAGY     PROCEDURE DATE:  04/16/2024          INTERPRETATION:  Portable AP chest radiograph    COMPARISON: 4/10/2024 chest x-ray.    CLINICAL INFORMATION: Fever.    FINDINGS:  CATHETERS AND TUBES: None    PULMONARY:  RIGHT upper lobe airspace consolidation.  Remaining visualized lung parenchyma clear.  No pleural effusion or pneumothorax.    HEART/VASCULAR: The heart size and mediastinum configuration are within   the limits of normal.    BONES: The visualized osseous thorax is intact.    IMPRESSION:    Persistent RIGHT upper lobe airspace consolidation/infectious pneumonia.  Continued surveillance and follow-up radiograph recommended to complete   resolution to exclude other etiologies such as  carcinoma.    --- End of Report ---            TARAH BUITRAGO MD; Attending Radiologist  This document has been electronically signed. Apr 17 2024  1:40PM    < end of copied text >    < from: YASMEEN W or WO Ultrasound Enhancing Agent (04.17.24 @ 11:34) >  CONCLUSIONS:      1. Left ventricular systolic function is normal with an ejection fraction visually estimated at 60 to 65 %.   2. Normal right ventricular cavity size and normal systolic function.   3. No evidence of left atrial or left atrial appendage thrombus. The left atrial appendage emptying velocity is normal.   4. No significant valvular disease.   5. No pericardial effusion seen.   6. Agitated saline injection was negative for intracardiac shunt.   7. Compared to the transthoracic echocardiogram performed on 4/12/2024, there have been no significant interval changes.   8. No echocardiographic evidence of vegetations.      < end of copied text >

## 2024-04-18 NOTE — CONSULT NOTE ADULT - SUBJECTIVE AND OBJECTIVE BOX
Surgeon: hebert  Consult requesting by: pulmonary     HISTORY OF PRESENT ILLNESS:  59M, pmhx eosinophilia asthma, HTN, HLD, DM, gout recent hospitilization for acute asthma exacerbation and viral URI found to have uncontrolled HTN and new onset DM2 at that time discharged home with outpatient follow-up, sent to ED 3/29 by PCP for outpt abnl renal function on routine f/u labs, found to have multifocal pneumonia, hospital course c/b sepsis secondary to fungemia ( +bcx 4/10 candida, neg 4/13), being followed by ID, on capsofungin, YASMEEN neg for vegetation, unknown source but presumed fungal pneumonia given admission CT findings and recent treatment with steroids, thoracic surgery consulted for bronch/BAL.     PAST MEDICAL & SURGICAL HISTORY:  Gout  Asthma  Arthritis  HTN (hypertension)  No significant past surgical history      MEDICATIONS  (STANDING):  amLODIPine   Tablet 10 milliGRAM(s) Oral daily  caspofungin IVPB      caspofungin IVPB 50 milliGRAM(s) IV Intermittent every 24 hours  dextrose 5%. 1000 milliLiter(s) (50 mL/Hr) IV Continuous <Continuous>  dextrose 5%. 1000 milliLiter(s) (100 mL/Hr) IV Continuous <Continuous>  dextrose 50% Injectable 12.5 Gram(s) IV Push once  dextrose 50% Injectable 25 Gram(s) IV Push once  dextrose 50% Injectable 25 Gram(s) IV Push once  glucagon  Injectable 1 milliGRAM(s) IntraMuscular once  heparin   Injectable 5000 Unit(s) SubCutaneous every 12 hours  insulin lispro (ADMELOG) corrective regimen sliding scale   SubCutaneous Before meals and at bedtime  metoprolol tartrate 12.5 milliGRAM(s) Oral every 12 hours  pantoprazole    Tablet 40 milliGRAM(s) Oral before breakfast    MEDICATIONS  (PRN):  acetaminophen     Tablet .. 650 milliGRAM(s) Oral every 6 hours PRN Temp greater or equal to 38C (100.4F), Mild Pain (1 - 3)  albuterol    0.083% 2.5 milliGRAM(s) Nebulizer every 4 hours PRN Wheezing  albuterol    90 MICROgram(s) HFA Inhaler 2 Puff(s) Inhalation every 6 hours PRN Shortness of Breath and/or Wheezing  aluminum hydroxide/magnesium hydroxide/simethicone Suspension 30 milliLiter(s) Oral every 4 hours PRN Dyspepsia  dextrose Oral Gel 15 Gram(s) Oral once PRN Blood Glucose LESS THAN 70 milliGRAM(s)/deciliter  diphenoxylate/atropine 1 Tablet(s) Oral every 6 hours PRN Diarrhea  melatonin 3 milliGRAM(s) Oral at bedtime PRN Insomnia  metoprolol tartrate Injectable 2.5 milliGRAM(s) IV Push every 6 hours PRN HR above 110/min  ondansetron Injectable 4 milliGRAM(s) IV Push every 8 hours PRN Nausea and/or Vomiting    Allergies: No Known Allergies    SOCIAL HISTORY:      FAMILY HISTORY:  Family history of diabetes mellitus type II  Family history of lung cancer    Review of Systems: negative x 10 systems except as noted above    PHYSICAL EXAM  Vital Signs Last 24 Hrs  T(C): 37.6 (18 Apr 2024 14:37), Max: 37.6 (18 Apr 2024 14:37)  T(F): 99.6 (18 Apr 2024 14:37), Max: 99.6 (18 Apr 2024 14:37)  HR: 119 (18 Apr 2024 14:37) (86 - 119)  BP: 129/67 (18 Apr 2024 14:37) (124/71 - 137/75)  BP(mean): --  RR: 17 (18 Apr 2024 14:37) (17 - 18)  SpO2: 91% (18 Apr 2024 14:37) (91% - 96%)    Parameters below as of 18 Apr 2024 14:37  Patient On (Oxygen Delivery Method): room air    PE:  Gen:   Neuro:  Neck:  Pulm:  CV:  Abd:  Ext:  skin:     LABS:                        9.1    12.24 )-----------( 679      ( 18 Apr 2024 06:20 )             27.9     04-18    137  |  101  |  8.1  ----------------------------<  71  3.9   |  21.0<L>  |  0.98    Ca    8.6      18 Apr 2024 06:20    TPro  6.6  /  Alb  2.1<L>  /  TBili  x   /  DBili  x   /  AST  x   /  ALT  x   /  AlkPhos  x   04-17    Urinalysis Basic - ( 18 Apr 2024 06:20 )    Color: x / Appearance: x / SG: x / pH: x  Gluc: 71 mg/dL / Ketone: x  / Bili: x / Urobili: x   Blood: x / Protein: x / Nitrite: x   Leuk Esterase: x / RBC: x / WBC x   Sq Epi: x / Non Sq Epi: x / Bacteria: x                           Surgeon: hebert  Consult requesting by: pulmonary     HISTORY OF PRESENT ILLNESS:  59M, pmhx eosinophilia asthma, HTN, HLD, DM, gout recent hospitilization for acute asthma exacerbation and viral URI found to have uncontrolled HTN and new onset DM2 at that time discharged home with outpatient follow-up, sent to ED 3/29 by PCP for outpt abnl renal function on routine f/u labs, found to have multifocal pneumonia, hospital course c/b sepsis secondary to fungemia ( +bcx 4/10 candida, neg 4/13), being followed by ID, on capsofungin, YASMEEN neg for vegetation, unknown source but presumed fungal pneumonia given admission CT findings and recent treatment with steroids, thoracic surgery consulted for bronch/BAL. Pt states he never felt back to himself after last discharge which he initially attributed to being on 3 new blood pressure medications. he reports feeling faigued, no cough/SOB or fevers at home. Pt denies current CP, palpitations, SOB, cough, fever, chills, itchiness/rash, diaphoresis, vision changes, HA, dizziness/lightheadedness, numbness/tingling, abd pain, N/V.     PAST MEDICAL & SURGICAL HISTORY:  Gout  Asthma  Arthritis  HTN (hypertension)  No significant past surgical history    MEDICATIONS  (STANDING):  amLODIPine   Tablet 10 milliGRAM(s) Oral daily  caspofungin IVPB      caspofungin IVPB 50 milliGRAM(s) IV Intermittent every 24 hours  dextrose 5%. 1000 milliLiter(s) (50 mL/Hr) IV Continuous <Continuous>  dextrose 5%. 1000 milliLiter(s) (100 mL/Hr) IV Continuous <Continuous>  dextrose 50% Injectable 12.5 Gram(s) IV Push once  dextrose 50% Injectable 25 Gram(s) IV Push once  dextrose 50% Injectable 25 Gram(s) IV Push once  glucagon  Injectable 1 milliGRAM(s) IntraMuscular once  heparin   Injectable 5000 Unit(s) SubCutaneous every 12 hours  insulin lispro (ADMELOG) corrective regimen sliding scale   SubCutaneous Before meals and at bedtime  metoprolol tartrate 12.5 milliGRAM(s) Oral every 12 hours  pantoprazole    Tablet 40 milliGRAM(s) Oral before breakfast    MEDICATIONS  (PRN):  acetaminophen     Tablet .. 650 milliGRAM(s) Oral every 6 hours PRN Temp greater or equal to 38C (100.4F), Mild Pain (1 - 3)  albuterol    0.083% 2.5 milliGRAM(s) Nebulizer every 4 hours PRN Wheezing  albuterol    90 MICROgram(s) HFA Inhaler 2 Puff(s) Inhalation every 6 hours PRN Shortness of Breath and/or Wheezing  aluminum hydroxide/magnesium hydroxide/simethicone Suspension 30 milliLiter(s) Oral every 4 hours PRN Dyspepsia  dextrose Oral Gel 15 Gram(s) Oral once PRN Blood Glucose LESS THAN 70 milliGRAM(s)/deciliter  diphenoxylate/atropine 1 Tablet(s) Oral every 6 hours PRN Diarrhea  melatonin 3 milliGRAM(s) Oral at bedtime PRN Insomnia  metoprolol tartrate Injectable 2.5 milliGRAM(s) IV Push every 6 hours PRN HR above 110/min  ondansetron Injectable 4 milliGRAM(s) IV Push every 8 hours PRN Nausea and/or Vomiting    Allergies: No Known Allergies    SOCIAL HISTORY:  non smoker, denies ETOH/illcit drugs  lives with wife in apartment  independent  works as postal  at [employer]    FAMILY HISTORY:  Family history of diabetes mellitus type II  Family history of lung cancer    Review of Systems: negative x 10 systems except as noted above    PHYSICAL EXAM  Vital Signs Last 24 Hrs  T(C): 37.6 (18 Apr 2024 14:37), Max: 37.6 (18 Apr 2024 14:37)  T(F): 99.6 (18 Apr 2024 14:37), Max: 99.6 (18 Apr 2024 14:37)  HR: 119 (18 Apr 2024 14:37) (86 - 119)  BP: 129/67 (18 Apr 2024 14:37) (124/71 - 137/75)  BP(mean): --  RR: 17 (18 Apr 2024 14:37) (17 - 18)  SpO2: 91% (18 Apr 2024 14:37) (91% - 96%)    Parameters below as of 18 Apr 2024 14:37  Patient On (Oxygen Delivery Method): room air    PE:  Gen: NAD  Neuro:A&Ox3 non focal speech clear and intact   Neck: no JVD, supple   Pulm: decreased BS b/l no wheezing  CV:S1S2 RRR  Abd: soft NT ND  Ext: RUE/RLE edema no edema of left   skin:  no rashes WWP     LABS:                        9.1    12.24 )-----------( 679      ( 18 Apr 2024 06:20 )             27.9     04-18    137  |  101  |  8.1  ----------------------------<  71  3.9   |  21.0<L>  |  0.98    Ca    8.6      18 Apr 2024 06:20    TPro  6.6  /  Alb  2.1<L>  /  TBili  x   /  DBili  x   /  AST  x   /  ALT  x   /  AlkPhos  x   04-17    Urinalysis Basic - ( 18 Apr 2024 06:20 )    Color: x / Appearance: x / SG: x / pH: x  Gluc: 71 mg/dL / Ketone: x  / Bili: x / Urobili: x   Blood: x / Protein: x / Nitrite: x   Leuk Esterase: x / RBC: x / WBC x   Sq Epi: x / Non Sq Epi: x / Bacteria: x

## 2024-04-18 NOTE — PROGRESS NOTE ADULT - SUBJECTIVE AND OBJECTIVE BOX
Max Bellamy MD  [facility] Medicine  Contact via Teams or text/call at [phone]    Patient is a 59y old  Male who presents with a chief complaint of ARF (17 Apr 2024 11:06)    Feels okay.  Denies chest pain, sob, nausea, vomiting, fever.     Patient seen and examined at bedside. No overnight events reported.     ALLERGIES:  No Known Allergies    MEDICATIONS  (STANDING):  amLODIPine   Tablet 10 milliGRAM(s) Oral daily  caspofungin IVPB      caspofungin IVPB 50 milliGRAM(s) IV Intermittent every 24 hours  dextrose 5%. 1000 milliLiter(s) (50 mL/Hr) IV Continuous <Continuous>  dextrose 5%. 1000 milliLiter(s) (100 mL/Hr) IV Continuous <Continuous>  dextrose 50% Injectable 12.5 Gram(s) IV Push once  dextrose 50% Injectable 25 Gram(s) IV Push once  dextrose 50% Injectable 25 Gram(s) IV Push once  glucagon  Injectable 1 milliGRAM(s) IntraMuscular once  heparin   Injectable 5000 Unit(s) SubCutaneous every 12 hours  insulin lispro (ADMELOG) corrective regimen sliding scale   SubCutaneous Before meals and at bedtime  metoprolol tartrate 12.5 milliGRAM(s) Oral every 12 hours  pantoprazole    Tablet 40 milliGRAM(s) Oral before breakfast    MEDICATIONS  (PRN):  acetaminophen     Tablet .. 650 milliGRAM(s) Oral every 6 hours PRN Temp greater or equal to 38C (100.4F), Mild Pain (1 - 3)  albuterol    0.083% 2.5 milliGRAM(s) Nebulizer every 4 hours PRN Wheezing  albuterol    90 MICROgram(s) HFA Inhaler 2 Puff(s) Inhalation every 6 hours PRN Shortness of Breath and/or Wheezing  aluminum hydroxide/magnesium hydroxide/simethicone Suspension 30 milliLiter(s) Oral every 4 hours PRN Dyspepsia  dextrose Oral Gel 15 Gram(s) Oral once PRN Blood Glucose LESS THAN 70 milliGRAM(s)/deciliter  diphenoxylate/atropine 1 Tablet(s) Oral every 6 hours PRN Diarrhea  melatonin 3 milliGRAM(s) Oral at bedtime PRN Insomnia  metoprolol tartrate Injectable 2.5 milliGRAM(s) IV Push every 6 hours PRN HR above 110/min  ondansetron Injectable 4 milliGRAM(s) IV Push every 8 hours PRN Nausea and/or Vomiting    Vital Signs Last 24 Hrs  T(F): 98.6 (18 Apr 2024 08:30), Max: 99 (18 Apr 2024 00:12)  HR: 104 (18 Apr 2024 08:30) (86 - 118)  BP: 125/74 (18 Apr 2024 08:30) (123/70 - 168/79)  RR: 17 (18 Apr 2024 08:30) (14 - 26)  SpO2: 96% (18 Apr 2024 08:30) (94% - 99%)  I&O's Summary    PHYSICAL EXAM:  General: NAD, A/O x 3  ENT: No gross hearing impairment, Moist mucous membranes, no thrush  Neck: Supple, No JVD  Lungs: Clear to auscultation bilaterally, good air entry, non-labored breathing  Cardio: RRR, S1/S2, No murmur  Abdomen: Soft, Nontender, Nondistended; Bowel sounds present  Extremities: No calf tenderness, No cyanosis, No pitting edema  Psych: Appropriate mood and affect    LABS:                        9.1    12.24 )-----------( 679      ( 18 Apr 2024 06:20 )             27.9     04-18    137  |  101  |  8.1  ----------------------------<  71  3.9   |  21.0  |  0.98    Ca    8.6      18 Apr 2024 06:20  Mg     1.8     04-16    TPro  6.6  /  Alb  x   /  TBili  x   /  DBili  x   /  AST  x   /  ALT  x   /  AlkPhos  x   04-17    03-14 Chol 180 mg/dL LDL --  mg/dL Trig 110 mg/dL    23:11 - VBG - pH:       | pCO2:       | pO2:       | Lactate: 1.00     POCT Blood Glucose.: 84 mg/dL (18 Apr 2024 07:47)  POCT Blood Glucose.: 91 mg/dL (17 Apr 2024 21:21)  POCT Blood Glucose.: 87 mg/dL (17 Apr 2024 16:16)  POCT Blood Glucose.: 83 mg/dL (17 Apr 2024 12:53)    Urinalysis Basic - ( 18 Apr 2024 06:20 )    Color: x / Appearance: x / SG: x / pH: x  Gluc: 71 mg/dL / Ketone: x  / Bili: x / Urobili: x   Blood: x / Protein: x / Nitrite: x   Leuk Esterase: x / RBC: x / WBC x   Sq Epi: x / Non Sq Epi: x / Bacteria: x    Culture - Blood (collected 16 Apr 2024 23:11)  Source: .Blood Blood-Venous  Preliminary Report (18 Apr 2024 09:02):    No growth at 24 hours    Culture - Blood (collected 16 Apr 2024 23:00)  Source: .Blood Blood-Venous  Preliminary Report (18 Apr 2024 09:02):    No growth at 24 hours    Culture - Blood (collected 13 Apr 2024 06:50)  Source: .Blood Blood  Preliminary Report (17 Apr 2024 14:01):    No growth at 4 days    Culture - Blood (collected 13 Apr 2024 06:47)  Source: .Blood Blood  Preliminary Report (17 Apr 2024 14:01):    No growth at 4 days      COVID-19 PCR: NotDetec (03-31-24 @ 22:32)    RADIOLOGY & ADDITIONAL TESTS:    Care Discussed with Consultants/Other Providers:

## 2024-04-18 NOTE — CONSULT NOTE ADULT - CONSULT REASON
[place]
FOU
asthma, hypoxia
b/l opacities/infiltrate on CT
PNA
YASMEEN/sepsis
Persistent tachycardia

## 2024-04-18 NOTE — CONSULT NOTE ADULT - PROBLEM SELECTOR RECOMMENDATION 9
CT abd 4/13: pleural based pulmonary nodules at the left lung base which appear larger in size. For example, 3.9 x 1.9 cm pleural-based nodule on series 3 image 32 posteriorly, previously 2.3 x 1.6 cm. Small right pleural effusion has increased. Atelectatic changes.  4/10 blood cultures + candida, on capsofungin, negative blood cx 4/13  ID following  pulm following  concern for atypical pneumonia given recent admission for asthma exacerbation and IV steroids  plan for bronch/BAL monday if stable/cleared from medicine stand point  thoracic surgery to follow  d/w Dr. Albrecht/Ling

## 2024-04-19 LAB
ANION GAP SERPL CALC-SCNC: 15 MMOL/L — SIGNIFICANT CHANGE UP (ref 5–17)
BUN SERPL-MCNC: 6.6 MG/DL — LOW (ref 8–20)
CALCIUM SERPL-MCNC: 8.2 MG/DL — LOW (ref 8.4–10.5)
CHLORIDE SERPL-SCNC: 101 MMOL/L — SIGNIFICANT CHANGE UP (ref 96–108)
CO2 SERPL-SCNC: 21 MMOL/L — LOW (ref 22–29)
CREAT SERPL-MCNC: 0.83 MG/DL — SIGNIFICANT CHANGE UP (ref 0.5–1.3)
EGFR: 100 ML/MIN/1.73M2 — SIGNIFICANT CHANGE UP
GLUCOSE BLDC GLUCOMTR-MCNC: 109 MG/DL — HIGH (ref 70–99)
GLUCOSE BLDC GLUCOMTR-MCNC: 112 MG/DL — HIGH (ref 70–99)
GLUCOSE BLDC GLUCOMTR-MCNC: 84 MG/DL — SIGNIFICANT CHANGE UP (ref 70–99)
GLUCOSE BLDC GLUCOMTR-MCNC: 93 MG/DL — SIGNIFICANT CHANGE UP (ref 70–99)
GLUCOSE SERPL-MCNC: 84 MG/DL — SIGNIFICANT CHANGE UP (ref 70–99)
HCT VFR BLD CALC: 26.7 % — LOW (ref 39–50)
HGB BLD-MCNC: 8.6 G/DL — LOW (ref 13–17)
MCHC RBC-ENTMCNC: 27.5 PG — SIGNIFICANT CHANGE UP (ref 27–34)
MCHC RBC-ENTMCNC: 32.2 GM/DL — SIGNIFICANT CHANGE UP (ref 32–36)
MCV RBC AUTO: 85.3 FL — SIGNIFICANT CHANGE UP (ref 80–100)
PLATELET # BLD AUTO: 642 K/UL — HIGH (ref 150–400)
POTASSIUM SERPL-MCNC: 3.4 MMOL/L — LOW (ref 3.5–5.3)
POTASSIUM SERPL-SCNC: 3.4 MMOL/L — LOW (ref 3.5–5.3)
RAPID RVP RESULT: SIGNIFICANT CHANGE UP
RBC # BLD: 3.13 M/UL — LOW (ref 4.2–5.8)
RBC # FLD: 14 % — SIGNIFICANT CHANGE UP (ref 10.3–14.5)
SARS-COV-2 RNA SPEC QL NAA+PROBE: SIGNIFICANT CHANGE UP
SODIUM SERPL-SCNC: 137 MMOL/L — SIGNIFICANT CHANGE UP (ref 135–145)
WBC # BLD: 11.02 K/UL — HIGH (ref 3.8–10.5)
WBC # FLD AUTO: 11.02 K/UL — HIGH (ref 3.8–10.5)

## 2024-04-19 PROCEDURE — 71250 CT THORAX DX C-: CPT | Mod: 26

## 2024-04-19 PROCEDURE — 99232 SBSQ HOSP IP/OBS MODERATE 35: CPT

## 2024-04-19 PROCEDURE — 99231 SBSQ HOSP IP/OBS SF/LOW 25: CPT

## 2024-04-19 RX ORDER — COLCHICINE 0.6 MG
0.6 TABLET ORAL
Refills: 0 | Status: DISCONTINUED | OUTPATIENT
Start: 2024-04-19 | End: 2024-05-01

## 2024-04-19 RX ORDER — IPRATROPIUM/ALBUTEROL SULFATE 18-103MCG
3 AEROSOL WITH ADAPTER (GRAM) INHALATION EVERY 6 HOURS
Refills: 0 | Status: DISCONTINUED | OUTPATIENT
Start: 2024-04-19 | End: 2024-05-01

## 2024-04-19 RX ADMIN — CASPOFUNGIN ACETATE 260 MILLIGRAM(S): 7 INJECTION, POWDER, LYOPHILIZED, FOR SOLUTION INTRAVENOUS at 13:58

## 2024-04-19 RX ADMIN — HEPARIN SODIUM 5000 UNIT(S): 5000 INJECTION INTRAVENOUS; SUBCUTANEOUS at 06:07

## 2024-04-19 RX ADMIN — Medication 12.5 MILLIGRAM(S): at 06:09

## 2024-04-19 RX ADMIN — Medication 650 MILLIGRAM(S): at 18:23

## 2024-04-19 RX ADMIN — AMLODIPINE BESYLATE 10 MILLIGRAM(S): 2.5 TABLET ORAL at 06:10

## 2024-04-19 RX ADMIN — HEPARIN SODIUM 5000 UNIT(S): 5000 INJECTION INTRAVENOUS; SUBCUTANEOUS at 17:24

## 2024-04-19 RX ADMIN — PANTOPRAZOLE SODIUM 40 MILLIGRAM(S): 20 TABLET, DELAYED RELEASE ORAL at 06:10

## 2024-04-19 RX ADMIN — Medication 0.6 MILLIGRAM(S): at 18:42

## 2024-04-19 RX ADMIN — Medication 3 MILLILITER(S): at 19:57

## 2024-04-19 RX ADMIN — Medication 650 MILLIGRAM(S): at 17:23

## 2024-04-19 RX ADMIN — Medication 12.5 MILLIGRAM(S): at 17:23

## 2024-04-19 NOTE — PROGRESS NOTE ADULT - ASSESSMENT
59M, pmhx eosinophilia asthma, HTN, HLD, DM, gout recent hospitilization for acute asthma exacerbation and viral URI found to have uncontrolled HTN and new onset DM2 at that time discharged home with outpatient follow-up, sent to ED 3/29 by PCP for outpt abnl renal function on routine f/u labs, found to have multifocal pneumonia, hospital course c/b sepsis secondary to fungemia ( +bcx 4/10 candida, neg 4/13), being followed by ID, on capsofungin, YASMEEN neg for vegetation, unknown source but presumed fungal pneumonia given admission CT findings and recent treatment with steroids, thoracic surgery consulted for bronch/BAL.

## 2024-04-19 NOTE — PHYSICAL THERAPY INITIAL EVALUATION ADULT - PERTINENT HX OF CURRENT PROBLEM, REHAB EVAL
Pt is 59 yoM with Asthma, HTN, HLD, DM2, Gout admitted with TYESHA suspected 2.2 medications along with MF PNA . Course complicated by fungemia.

## 2024-04-19 NOTE — PHYSICAL THERAPY INITIAL EVALUATION ADULT - ADDITIONAL COMMENTS
Pt reports independent PTA, owns no DME. Wife can assist if needed. Pt has 2 ZANE with handrail, no steps inside

## 2024-04-19 NOTE — PROGRESS NOTE ADULT - PROBLEM SELECTOR PLAN 1
CT abd 4/13: pleural based pulmonary nodules at the left lung base which appear larger in size. For example, 3.9 x 1.9 cm pleural-based nodule on series 3 image 32 posteriorly, previously 2.3 x 1.6 cm. Small right pleural effusion has increased. Atelectatic changes.  4/10 blood cultures + candida, on capsofungin, negative blood cx 4/13  ID following  pulm following  concern for atypical pneumonia given recent admission for asthma exacerbation and IV steroids  obtain dedicated non con CT chest today, pending results will plan for for bronch/BAL monday if stable/cleared from medicine stand point  thoracic surgery to follow  d/w Dr. Dubon in AM rounds CT abd 4/13: pleural based pulmonary nodules at the left lung base which appear larger in size. For example, 3.9 x 1.9 cm pleural-based nodule on series 3 image 32 posteriorly, previously 2.3 x 1.6 cm. Small right pleural effusion has increased. Atelectatic changes.  4/10 blood cultures + candida, on capsofungin, negative blood cx 4/13  ID following  pulm following  concern for atypical pneumonia given recent admission for asthma exacerbation and IV steroidsobtain dedicated non con CT chest today persistent RUL opacity, plan for for bronch/BAL monday if stable/cleared from medicine stand point  thoracic surgery to follow  d/w Dr. Dubon in AM rounds

## 2024-04-19 NOTE — PROGRESS NOTE ADULT - ASSESSMENT
58y  Male with a h/o Asthma, HTN, HLD, DM2, Gout. Patient presented to [facility] ER for abnormal lab work, elevated Cr from his PMD's office. Patient recently hospitalized for acute asthma exacerbation and viral URI found to have uncontrolled HTN and new onset DM type 2 at that time discharged home with outpatient follow-up and sent back to [facility] ER today by PCP for abnormal kidney function. During his hospital course patient developed fever to 102.3F on 3/31, was a code sepsis, was administered Azithromycin x1 and Zosyn since 4/1.      RT UE swelling and warmth   Fever  TYESHA  Transaminitis   Thrombocytosis  Fungemia       - Blood cultures  3/31, 4/3, 4/6 no growth   - blood cultures 4/10 reporting Candida  - Repeat blood cultures  4/13 no growth   - RVP/COVID 19 PCR 3/31, 4/4 negative   - Urine for legionella and strep negative   - CT C/A/P 4/1 reporting multifocal PNA   - UA 3/30 and 4/1 negative   - RUE CT with contrast concerning for Rt elbow OM and Advanced right wrist arthrosis without significant effusion or convincing CT evidence for osteomyelitis/septic arthritis.  - Rt Elbow MRI with no OM.   - Rheumatology consult noted  - Babesia PCR is negative   - Lyme PCR  is negative   - HIV negative   - viral hepatitis profile negative  - ERON negative   - LFTs improving   - RF is 11  - Procalcitonin 0.30-->0.36  - Continue Caspofungin   - TTE with no veg  - CT A/P with contrast 4/13 reporting no acute findings  - YASMEEN did not report vegetations  - Hold off on PICC/Midline for now unless needed for reasons other than infectious diseases  - started on colchicine for suspected gout, febrile last night may be from gout- suggest rheum f/u  - repeat RVP and bCX  - plan for bronch due to suspicion for fungal pneumonia, f/u cultures and path  - Trend Fever  - Trend WBC      Will Follow

## 2024-04-19 NOTE — PROGRESS NOTE ADULT - SUBJECTIVE AND OBJECTIVE BOX
[facility]  INFECTIOUS DISEASES at [place] / [place] / [place]  =======================================================                               Serg Almanza MD#   Maurisio Farr MD*                             Lisy Sandhu MD*   Laila Bell MD*                              Professor Emeritus:  Dr Josr Chatman MD^            Diplomates American Board of Internal Medicine & Infectious Diseases                # [place] Office - Appt - Tel  [phone] Fax [phone]                * [place] Office - Appt - Tel [phone] Fax [phone]                      ^[place] Office - Tel  [phone] Fax [phone]                                  Hospital Consult line:  [phone]  =======================================================    ZAINA MILLER [id number]    Follow up: Fever, fungemia    febrile last ight  c/o lue hand swelling and pain, right knee pain and right foot pain   +dry cough  Allergies:  No Known Allergies      REVIEW OF SYSTEMS:  CONSTITUTIONAL: +Fever + dry cough  HEENT:  No diplopia or blurred vision.  No earache, sore throat or runny nose.  CARDIOVASCULAR:  No chest pain  RESPIRATORY:  No cough, shortness of breath  GASTROINTESTINAL:  No nausea, vomiting or diarrhea.  GENITOURINARY:  No dysuria, frequency or urgency. No Blood in urine  MUSCULOSKELETAL:  as above  SKIN:  No change in skin, hair or nails.  NEUROLOGIC:  No Headaches      Physical Exam:  GEN: NAD  HEENT: normocephalic and atraumatic.   NECK: Supple.   LUNGS: CTA B/L.  HEART: RRR  ABDOMEN: Soft, NT, ND.  +BS.    : No CVA tenderness  EXTREMITIES: RUE dorsum swelling and pain  MSK: No joint swelling  NEUROLOGIC: No Focal Deficits   SKIN: Rt hand swelling improved, no findings on external Rt elbow. Nl ROM Rt elbow. left hand dorsum + swelling, tender over MCP, right knee + swelling and tender, right foot dorsum swelling tender at right hallux        Vitals:  Vital Signs Last 24 Hrs  T(C): 36.9 (19 Apr 2024 11:04), Max: 38.4 (18 Apr 2024 17:21)  T(F): 98.5 (19 Apr 2024 11:04), Max: 101.1 (18 Apr 2024 17:21)  HR: 110 (19 Apr 2024 11:04) (110 - 119)  BP: 129/76 (19 Apr 2024 11:04) (129/67 - 142/72)  BP(mean): --  RR: 18 (19 Apr 2024 11:04) (17 - 18)  SpO2: 91% (19 Apr 2024 11:04) (91% - 95%)    Parameters below as of 19 Apr 2024 11:04  Patient On (Oxygen Delivery Method): room air      Current Antibiotics:  caspofungin IVPB      caspofungin IVPB 50 milliGRAM(s) IV Intermittent every 24 hours    Other medications:  amLODIPine   Tablet 10 milliGRAM(s) Oral daily  dextrose 5%. 1000 milliLiter(s) IV Continuous <Continuous>  dextrose 5%. 1000 milliLiter(s) IV Continuous <Continuous>  dextrose 50% Injectable 25 Gram(s) IV Push once  dextrose 50% Injectable 25 Gram(s) IV Push once  dextrose 50% Injectable 12.5 Gram(s) IV Push once  glucagon  Injectable 1 milliGRAM(s) IntraMuscular once  heparin   Injectable 5000 Unit(s) SubCutaneous every 12 hours  insulin lispro (ADMELOG) corrective regimen sliding scale   SubCutaneous Before meals and at bedtime  metoprolol tartrate 12.5 milliGRAM(s) Oral every 12 hours  pantoprazole    Tablet 40 milliGRAM(s) Oral before breakfast          Urinalysis Basic - ( 18 Apr 2024 06:20 )    Color: x / Appearance: x / SG: x / pH: x  Gluc: 71 mg/dL / Ketone: x  / Bili: x / Urobili: x   Blood: x / Protein: x / Nitrite: x   Leuk Esterase: x / RBC: x / WBC x   Sq Epi: x / Non Sq Epi: x / Bacteria: x                  CAPILLARY BLOOD GLUCOSE      POCT Blood Glucose.: 89 mg/dL (18 Apr 2024 11:19)            RECENT CULTURES:  04-16 @ 23:50    RVP  NotDetec    04-13 @ 06:50 .Blood Blood     No growth at 72 Hours    04-13 @ 06:47 .Blood Blood     No growth at 72 Hours    04-10 @ 05:59 .Blood Blood-Peripheral Blood Culture PCR  Candida parapsilosis    Growth in aerobic bottle: Candida parapsilosis  Direct identification is available within approximately 3-5  hours either by Blood Panel Multiplexed PCR or Direct  MALDI-TOF. Details: [email or web address]  Growth in aerobic bottle: Yeast like cells    04-10 @ 05:55 .Blood Blood-Peripheral     No growth at 5 days    04-06 @ 05:25 .Blood Blood     No growth at 5 days    04-06 @ 05:19 .Blood Blood     No growth at 5 days    04-04 @ 13:21    RVP  NotDetec    04-03 @ 21:30 .Blood Blood-Peripheral     No growth at 5 days    04-03 @ 21:20 .Blood Blood-Peripheral     No growth at 5 days      WBC Count: 12.77 K/uL (04-16-24 @ 23:11)  WBC Count: 16.53 K/uL (04-16-24 @ 05:13)  WBC Count: 11.60 K/uL (04-15-24 @ 05:25)  WBC Count: 12.59 K/uL (04-14-24 @ 05:17)  WBC Count: 12.39 K/uL (04-13-24 @ 06:05)    Creatinine: 1.15 mg/dL (04-16-24 @ 05:13)  Creatinine: 0.96 mg/dL (04-15-24 @ 05:25)  Creatinine: 0.89 mg/dL (04-14-24 @ 05:17)  Creatinine: 0.92 mg/dL (04-13-24 @ 06:05)    C-Reactive Protein, Serum: 218 mg/L (04-17-24 @ 05:14)  C-Reactive Protein, Serum: 188 mg/L (04-12-24 @ 06:40)  C-Reactive Protein, Serum: 162 mg/L (04-09-24 @ 04:00)  C-Reactive Protein, Serum: 163 mg/L (04-06-24 @ 05:25)  C-Reactive Protein, Serum: 141 mg/L (04-05-24 @ 08:34)    Procalcitonin, Serum: 0.36 ng/mL (04-12-24 @ 06:40)  Procalcitonin, Serum: 0.30 ng/mL (04-10-24 @ 07:04)  Procalcitonin, Serum: 0.25 ng/mL (04-09-24 @ 04:00)  Procalcitonin, Serum: 0.63 ng/mL (04-06-24 @ 05:25)  Procalcitonin, Serum: 0.63 ng/mL (04-05-24 @ 06:57)  Procalcitonin, Serum: 0.64 ng/mL (04-05-24 @ 06:56)     SARS-CoV-2: NotDetec (04-16-24 @ 23:50)  SARS-CoV-2: NotDetec (04-04-24 @ 13:21)  COVID-19 PCR: NotDetec (03-31-24 @ 22:32)  SARS-CoV-2: NotDetec (03-31-24 @ 22:32)      Anti-Nuclear Antibody in AM (04.06.24 @ 05:25)    Anti Nuclear Factor Titer: Negative: Antinuclear AB (ERON), IFA Method    Rapid HIV-1/2 Antibody (04.05.24 @ 08:34)    Rapid HIV-1/2 Antibody: Nonreact    Legionella pneumophila Antigen, Urine (04.02.24 @ 15:51)    Legionella Antigen, Urine: Negative    Streptococcus pneumoniae Ag, Ur (04.02.24 @ 15:51)    Streptococcus pneumoniae Ag, Ur Result: Negative    Lipase (04.04.24 @ 05:05)    Lipase: 53 U/L    Acute Hepatitis Panel (04.06.24 @ 05:25)    Hepatitis C Virus Interpretation: Nonreact   Hepatitis C Virus S/CO Ratio: 0.13 S/CO   Hepatitis B Core IgM Antibody: Nonreact   Hepatitis B Surface Antigen: Nonreact   Hepatitis A IgM Antibody: Nonreact    Rheumatoid Factor Quant, Serum or Plasma in AM (04.06.24 @ 05:25)    Rheumatoid Factor Quant, Serum or Plasma: 11 IU/mL          < from: TTE W or WO Ultrasound Enhancing Agent (03.13.24 @ 21:00) >  CONCLUSIONS:      1. Mild left ventricular hypertrophy.   2. Left ventricular cavity is normal in size. Left ventricular systolic function is hyperdynamic with an ejection fraction of 71 % by Cheney's method of disks with an ejection fraction visually estimated at 70 to 75 %. There are no regional wall motion abnormalities seen.   3. Normal right ventricular cavity size and normal systolic function.   4. Trileaflet aortic valve with normal systolic excursion. mild calcification of the aortic valve leaflets.   5. Structurally normal mitral valve with normal leaflet excursion.   6. The right atrium is normal in size.   7. The left atrium is normal.   8. No pericardial effusion seen.    < end of copied text >        < from: MR Elbow No Cont, Right (04.11.24 @ 14:50) >    ACC: [id number] EXAM:  MR ELBOW RT   ORDERED BY: JAIRON CARPIO     PROCEDURE DATE:  04/11/2024      INTERPRETATION:  Clinical indication: Fever of unknown origin. Swelling   about the elbow. CT findings concerning for avulsion fracture versus   detached enthesophyte    Multiplanar multisequence noncontrast MRI of the right elbow was   performed.    Correlation is made with prior CT of the right elbow from April 10, 2024.    FINDINGS:    There is extensive circumferential subcutaneous edemathroughout the   visualized elbow consistent with cellulitis. There is mild feathery edema   within the medial flexor and to a lesser extent the dorsal extensor   musculature which is nonspecific and may be related to infectious or   inflammatory etiologies.    The previously described ossific fragment embedded within the ulnar   margin of the distal triceps tendon is again seen. This is likely related   to the sequela of remote avulsive injury. There is moderate tendinosis   and surrounding peritendinous edema within the triceps tendon. There is   no full-thickness or retracted triceps tendon tear. There is mild   psuedoarthritic changes between the olecranon and the avulsed fragment   which trace osseous edema about the pseudoarthrosis. There is mild   overlying olecranon bursitis. Trace osseous edema about the   pseudoarthrosis of the avulsed fragment which is favored to be   degenerative in nature. No convincing evidence of osteomyelitis.   Otherwise, the marrow signal within the elbow is preserved.    There is a nonspecific small elbow joint effusion. There is no evidence   of osseous erosion or subarticular osteitis. The articular surfaces are   preserved.    There is chronic mild undersurface tearing involving the origin of the  common extensor tendon. The lateral collateral ligamentous structures are   preserved.    The common flexor tendon origin is intact. Medial collateral ligamentous   structures are preserved. The distal biceps and brachialis tendinous   insertions are intact.    IMPRESSION:    Redemonstration of a chronic appearing avulsion along the ulnar margin of   the triceps insertion. There is moderate associated tendinosis and   surrounding peritendinous edema within the triceps tendon. No   full-thickness or retracted triceps tendon tear. Mild psuedoarthritic   changes between the olecranon and the avulsed fragment which trace   osseous edema about the pseudoarthrosis. Trace osseous edema about the   pseudoarthrosis of the avulsed fragment which is favored to be   degenerative in nature. No convincing evidence of osteomyelitis.    Diffuse subcutaneous edema about the elbow. Mild olecranon bursitis. Mild   edema within the medial flexor and to a lesser extent extensor   musculature. Findings may be related to underlying cellulitis.    Nonspecific small elbow joint effusion.    --- End of Report ---    < end of copied text >      < from: CT Upper Extremity w/ IV Cont, Right (04.10.24 @ 14:05) >  ACC: [id number] EXAM:  CT UPR EXT IC RT   ORDERED BY: MAURISIO FARR     PROCEDURE DATE:  04/10/2024      INTERPRETATION:  CT OF THE RIGHT UPPER EXTREMITY    CLINICAL INFORMATION: Swelling.    COMPARISON: None available.    TECHNIQUE: Axial CT images were obtained of the right upper extremity   from the shoulder through the fingertips following administration of 90   cc Omnipaque 350 intravenous contrast. Sagittal and coronal   reconstructions were provided.    FINDINGS:    Osseous: No acutefracture or dislocation. Small well-corticated chronic   nonunited cortical avulsion stress fracture fragment versus detached   enthesophyte at the olecranon insertion of the triceps. Subtle adjacent   small focal cortical erosion. No aggressive periosteal reaction.   Moderately severe wrist arthrosis without definite effusions.   Mineralization within normal limits.     Soft tissues: Inflammatory stranding near circumferentially surrounding   the elbow and proximal to mid forearm, but without discrete sizable   hyperattenuating hematoma or drainable encapsulated fluid collection. No   elbow joint effusion. Ill-defined superficial dermal ulceration overlying   the tip of the olecranon with nearly exposed bone. No tracking emphysema.   No radiopaque foreign body.    IMPRESSION:    1.  Small chronic nonunited cortical avulsion stress fracture fragment   versus detached enthesophyte at the olecranon insertion of right triceps.   Overlying dermal ulceration with likely exposed bone and CT findings   concerning for focal osteomyelitis. Correlate for probe to bone and   consider follow-up elbow MRI as clinically indicated.  2.  Acute cellulitis in the proper clinical setting. No tracking soft   tissue emphysema drainable mature abscess.  3. No right elbow joint effusion/septic arthritis. Advanced right wrist   arthrosis without significant effusion or convincing CT evidence for   osteomyelitis/septic arthritis.    --- End of Report ---    < end of copied text >        < from: CT Abdomen and Pelvis w/ IV Cont (04.13.24 @ 09:41) >  ACC: [id number] EXAM:  CT ABDOMEN AND PELVIS IC   ORDERED BY: KENYATTA YEBOAH     PROCEDURE DATE:  04/13/2024      INTERPRETATION:  CLINICAL INFORMATION: Bacteremia.    COMPARISON: CT scan of the abdomen and pelvis from 4/1/2024    CONTRAST/COMPLICATIONS:  IV Contrast: Omnipaque 350  95 cc administered   5 cc discarded  Oral Contrast: NONE  Complications: None reported at time of study completion    PROCEDURE:  CT of the Abdomen and Pelvis was performed.  Sagittal and coronal reformats were performed.    FINDINGS:  LOWER CHEST: Pleural based pulmonary nodules at the left lung base which   appear larger in size. For example, 3.9 x 1.9 cm pleural-based nodule on   series 3 image 32 posteriorly, previously 2.3 x 1.6 cm. Small right   pleural effusion has increased. Atelectatic changes.    LIVER: Within normal limits.  BILE DUCTS: Normal caliber.  GALLBLADDER: Within normal limits.  SPLEEN: Within normal limits.  PANCREAS: Within normal limits.  ADRENALS: Within normal limits.  KIDNEYS/URETERS: 2 mm nonobstructing calcification in the midpole the   left kidney is unchanged.    BLADDER: Within normal limits.  REPRODUCTIVE ORGANS: Mildly heterogeneous prostate gland.    BOWEL: No bowel obstruction. Appendix within normal limits.  PERITONEUM: No ascites.  VESSELS: Within normal limits.  RETROPERITONEUM/LYMPH NODES: No lymphadenopathy.  ABDOMINAL WALL: Umbilical hernia containing fat. Bilateral inguinal   hernias, right greater than left, containing fat  BONES: Degenerative changes of bone.    IMPRESSION:  Pleural-based nodules at the left lung base appears larger in size.   Please correlate clinically. Small right pleural effusion has increased.    No acute intra-abdominal pathology visualized.    --- End of Report ---    < end of copied text >            < from: YASMEEN W or WO Ultrasound Enhancing Agent (04.17.24 @ 11:34) >     CONCLUSIONS:      1. Left ventricular systolic function is normal with an ejection fraction visually estimated at 60 to 65 %.   2. Normal right ventricular cavity size and normal systolic function.   3. No evidence of left atrial or left atrial appendage thrombus. The left atrial appendage emptying velocity is normal.   4. No significant valvular disease.   5. No pericardial effusion seen.   6. Agitated saline injection was negative for intracardiac shunt.   7. Compared to the transthoracic echocardiogram performed on 4/12/2024, there have been no significant interval changes.   8. No echocardiographic evidence of vegetations.    < end of copied text >

## 2024-04-19 NOTE — PROGRESS NOTE ADULT - SUBJECTIVE AND OBJECTIVE BOX
Subjective:    T(C): 37.2 (04-19-24 @ 05:04), Max: 38.4 (04-18-24 @ 17:21)  HR: 112 (04-19-24 @ 05:04) (104 - 119)  BP: 138/73 (04-19-24 @ 05:04) (125/74 - 142/72)  RR: 18 (04-19-24 @ 05:04) (17 - 18)  SpO2: 95% (04-19-24 @ 05:04) (91% - 96%)    04-19    137  |  101  |  6.6<L>  ----------------------------<  84  3.4<L>   |  21.0<L>  |  0.83    Ca    8.2<L>      19 Apr 2024 06:33                                 8.6    11.02 )-----------( 642      ( 19 Apr 2024 06:33 )             26.7                    CAPILLARY BLOOD GLUCOSE  POCT Blood Glucose.: 93 mg/dL (19 Apr 2024 08:09)  POCT Blood Glucose.: 106 mg/dL (18 Apr 2024 21:15)  POCT Blood Glucose.: 95 mg/dL (18 Apr 2024 17:08)  POCT Blood Glucose.: 89 mg/dL (18 Apr 2024 11:19)    I&O's Detail    18 Apr 2024 07:01  -  19 Apr 2024 07:00  --------------------------------------------------------  IN:  Total IN: 0 mL    OUT:    VAC (Vacuum Assisted Closure) System (mL): 250 mL  Total OUT: 250 mL  Total NET: -250 mL    Drug Dosing Weight  Height (cm): 170.2 (29 Mar 2024 17:32)  Weight (kg): 95.3 (29 Mar 2024 17:32)  BMI (kg/m2): 32.9 (29 Mar 2024 17:32)  BSA (m2): 2.07 (29 Mar 2024 17:32)    MEDICATIONS  (STANDING):  amLODIPine   Tablet 10 milliGRAM(s) Oral daily  caspofungin IVPB 50 milliGRAM(s) IV Intermittent every 24 hours  caspofungin IVPB      dextrose 5%. 1000 milliLiter(s) (100 mL/Hr) IV Continuous <Continuous>  dextrose 5%. 1000 milliLiter(s) (50 mL/Hr) IV Continuous <Continuous>  dextrose 50% Injectable 25 Gram(s) IV Push once  dextrose 50% Injectable 25 Gram(s) IV Push once  dextrose 50% Injectable 12.5 Gram(s) IV Push once  glucagon  Injectable 1 milliGRAM(s) IntraMuscular once  heparin   Injectable 5000 Unit(s) SubCutaneous every 12 hours  insulin lispro (ADMELOG) corrective regimen sliding scale   SubCutaneous Before meals and at bedtime  metoprolol tartrate 12.5 milliGRAM(s) Oral every 12 hours  pantoprazole    Tablet 40 milliGRAM(s) Oral before breakfast    MEDICATIONS  (PRN):  acetaminophen     Tablet .. 650 milliGRAM(s) Oral every 6 hours PRN Temp greater or equal to 38C (100.4F), Mild Pain (1 - 3)  albuterol    0.083% 2.5 milliGRAM(s) Nebulizer every 4 hours PRN Wheezing  albuterol    90 MICROgram(s) HFA Inhaler 2 Puff(s) Inhalation every 6 hours PRN Shortness of Breath and/or Wheezing  aluminum hydroxide/magnesium hydroxide/simethicone Suspension 30 milliLiter(s) Oral every 4 hours PRN Dyspepsia  dextrose Oral Gel 15 Gram(s) Oral once PRN Blood Glucose LESS THAN 70 milliGRAM(s)/deciliter  diphenoxylate/atropine 1 Tablet(s) Oral every 6 hours PRN Diarrhea  melatonin 3 milliGRAM(s) Oral at bedtime PRN Insomnia  metoprolol tartrate Injectable 2.5 milliGRAM(s) IV Push every 6 hours PRN HR above 110/min  ondansetron Injectable 4 milliGRAM(s) IV Push every 8 hours PRN Nausea and/or Vomiting    Physical Exam  Gen:   Neuro:   Pulm:   CV:   Abd:   Extrem/MS:        Subjective: feels like he is wheezing denies CP, palpitations, SOB, cough, fever, chills, itchiness/rash, diaphoresis, vision changes, HA, dizziness/lightheadedness, numbness/tingling, abd pain, N/V     T(C): 37.2 (04-19-24 @ 05:04), Max: 38.4 (04-18-24 @ 17:21)  HR: 112 (04-19-24 @ 05:04) (104 - 119)  BP: 138/73 (04-19-24 @ 05:04) (125/74 - 142/72)  RR: 18 (04-19-24 @ 05:04) (17 - 18)  SpO2: 95% (04-19-24 @ 05:04) (91% - 96%)    04-19    137  |  101  |  6.6<L>  ----------------------------<  84  3.4<L>   |  21.0<L>  |  0.83    Ca    8.2<L>      19 Apr 2024 06:33                                 8.6    11.02 )-----------( 642      ( 19 Apr 2024 06:33 )             26.7                    CAPILLARY BLOOD GLUCOSE  POCT Blood Glucose.: 93 mg/dL (19 Apr 2024 08:09)  POCT Blood Glucose.: 106 mg/dL (18 Apr 2024 21:15)  POCT Blood Glucose.: 95 mg/dL (18 Apr 2024 17:08)  POCT Blood Glucose.: 89 mg/dL (18 Apr 2024 11:19)    I&O's Detail    18 Apr 2024 07:01  -  19 Apr 2024 07:00  --------------------------------------------------------  IN:  Total IN: 0 mL    OUT:    VAC (Vacuum Assisted Closure) System (mL): 250 mL  Total OUT: 250 mL  Total NET: -250 mL    Drug Dosing Weight  Height (cm): 170.2 (29 Mar 2024 17:32)  Weight (kg): 95.3 (29 Mar 2024 17:32)  BMI (kg/m2): 32.9 (29 Mar 2024 17:32)  BSA (m2): 2.07 (29 Mar 2024 17:32)    MEDICATIONS  (STANDING):  amLODIPine   Tablet 10 milliGRAM(s) Oral daily  caspofungin IVPB 50 milliGRAM(s) IV Intermittent every 24 hours  caspofungin IVPB      dextrose 5%. 1000 milliLiter(s) (100 mL/Hr) IV Continuous <Continuous>  dextrose 5%. 1000 milliLiter(s) (50 mL/Hr) IV Continuous <Continuous>  dextrose 50% Injectable 25 Gram(s) IV Push once  dextrose 50% Injectable 25 Gram(s) IV Push once  dextrose 50% Injectable 12.5 Gram(s) IV Push once  glucagon  Injectable 1 milliGRAM(s) IntraMuscular once  heparin   Injectable 5000 Unit(s) SubCutaneous every 12 hours  insulin lispro (ADMELOG) corrective regimen sliding scale   SubCutaneous Before meals and at bedtime  metoprolol tartrate 12.5 milliGRAM(s) Oral every 12 hours  pantoprazole    Tablet 40 milliGRAM(s) Oral before breakfast    MEDICATIONS  (PRN):  acetaminophen     Tablet .. 650 milliGRAM(s) Oral every 6 hours PRN Temp greater or equal to 38C (100.4F), Mild Pain (1 - 3)  albuterol    0.083% 2.5 milliGRAM(s) Nebulizer every 4 hours PRN Wheezing  albuterol    90 MICROgram(s) HFA Inhaler 2 Puff(s) Inhalation every 6 hours PRN Shortness of Breath and/or Wheezing  aluminum hydroxide/magnesium hydroxide/simethicone Suspension 30 milliLiter(s) Oral every 4 hours PRN Dyspepsia  dextrose Oral Gel 15 Gram(s) Oral once PRN Blood Glucose LESS THAN 70 milliGRAM(s)/deciliter  diphenoxylate/atropine 1 Tablet(s) Oral every 6 hours PRN Diarrhea  melatonin 3 milliGRAM(s) Oral at bedtime PRN Insomnia  metoprolol tartrate Injectable 2.5 milliGRAM(s) IV Push every 6 hours PRN HR above 110/min  ondansetron Injectable 4 milliGRAM(s) IV Push every 8 hours PRN Nausea and/or Vomiting    Physical Exam  Gen: NAD  Neuro: A&Ox3 non focal speech clear and intact   Pulm: exp wheeze b/l   CV: S1S2  Abd: soft NT ND  Extrem/MS: R upper and lower extremity edema, no cyanosis

## 2024-04-19 NOTE — PROGRESS NOTE ADULT - SUBJECTIVE AND OBJECTIVE BOX
Max Bellamy MD  [facility] Medicine  Contact via Teams or text/call at [phone]    Patient is a 59y old  Male who presents with a chief complaint of ARF (19 Apr 2024 08:22)    Feels okay.  Denies chest pain, sob.     Patient seen and examined at bedside. No overnight events reported.     ALLERGIES:  No Known Allergies    MEDICATIONS  (STANDING):  amLODIPine   Tablet 10 milliGRAM(s) Oral daily  caspofungin IVPB 50 milliGRAM(s) IV Intermittent every 24 hours  caspofungin IVPB      dextrose 5%. 1000 milliLiter(s) (50 mL/Hr) IV Continuous <Continuous>  dextrose 5%. 1000 milliLiter(s) (100 mL/Hr) IV Continuous <Continuous>  dextrose 50% Injectable 12.5 Gram(s) IV Push once  dextrose 50% Injectable 25 Gram(s) IV Push once  dextrose 50% Injectable 25 Gram(s) IV Push once  glucagon  Injectable 1 milliGRAM(s) IntraMuscular once  heparin   Injectable 5000 Unit(s) SubCutaneous every 12 hours  insulin lispro (ADMELOG) corrective regimen sliding scale   SubCutaneous Before meals and at bedtime  metoprolol tartrate 12.5 milliGRAM(s) Oral every 12 hours  pantoprazole    Tablet 40 milliGRAM(s) Oral before breakfast    MEDICATIONS  (PRN):  acetaminophen     Tablet .. 650 milliGRAM(s) Oral every 6 hours PRN Temp greater or equal to 38C (100.4F), Mild Pain (1 - 3)  albuterol    0.083% 2.5 milliGRAM(s) Nebulizer every 4 hours PRN Wheezing  albuterol    90 MICROgram(s) HFA Inhaler 2 Puff(s) Inhalation every 6 hours PRN Shortness of Breath and/or Wheezing  aluminum hydroxide/magnesium hydroxide/simethicone Suspension 30 milliLiter(s) Oral every 4 hours PRN Dyspepsia  dextrose Oral Gel 15 Gram(s) Oral once PRN Blood Glucose LESS THAN 70 milliGRAM(s)/deciliter  diphenoxylate/atropine 1 Tablet(s) Oral every 6 hours PRN Diarrhea  melatonin 3 milliGRAM(s) Oral at bedtime PRN Insomnia  metoprolol tartrate Injectable 2.5 milliGRAM(s) IV Push every 6 hours PRN HR above 110/min  ondansetron Injectable 4 milliGRAM(s) IV Push every 8 hours PRN Nausea and/or Vomiting    Vital Signs Last 24 Hrs  T(F): 99 (19 Apr 2024 05:04), Max: 101.1 (18 Apr 2024 17:21)  HR: 112 (19 Apr 2024 05:04) (104 - 119)  BP: 138/73 (19 Apr 2024 05:04) (125/74 - 142/72)  RR: 18 (19 Apr 2024 05:04) (17 - 18)  SpO2: 95% (19 Apr 2024 05:04) (91% - 96%)  I&O's Summary    18 Apr 2024 07:01  -  19 Apr 2024 07:00  --------------------------------------------------------  IN: 0 mL / OUT: 250 mL / NET: -250 mL    PHYSICAL EXAM:  General: NAD, A/O x 3  ENT: No gross hearing impairment, Moist mucous membranes, no thrush  Neck: Supple, No JVD  Lungs: Clear to auscultation bilaterally, good air entry, non-labored breathing  Cardio: RRR, S1/S2, No murmur  Abdomen: Soft, Nontender, Nondistended; Bowel sounds present  Extremities: No calf tenderness, No cyanosis, No pitting edema  Psych: Appropriate mood and affect    LABS:                        8.6    11.02 )-----------( 642      ( 19 Apr 2024 06:33 )             26.7     04-19    137  |  101  |  6.6  ----------------------------<  84  3.4   |  21.0  |  0.83    Ca    8.2      19 Apr 2024 06:33    TPro  6.6  /  Alb  2.1  /  TBili  x   /  DBili  x   /  AST  x   /  ALT  x   /  AlkPhos  x   04-17                    03-14 Chol 180 mg/dL LDL --  mg/dL Trig 110 mg/dL    23:11 - VBG - pH:       | pCO2:       | pO2:       | Lactate: 1.00             POCT Blood Glucose.: 93 mg/dL (19 Apr 2024 08:09)  POCT Blood Glucose.: 106 mg/dL (18 Apr 2024 21:15)  POCT Blood Glucose.: 95 mg/dL (18 Apr 2024 17:08)  POCT Blood Glucose.: 89 mg/dL (18 Apr 2024 11:19)      Urinalysis Basic - ( 19 Apr 2024 06:33 )    Color: x / Appearance: x / SG: x / pH: x  Gluc: 84 mg/dL / Ketone: x  / Bili: x / Urobili: x   Blood: x / Protein: x / Nitrite: x   Leuk Esterase: x / RBC: x / WBC x   Sq Epi: x / Non Sq Epi: x / Bacteria: x        Culture - Blood (collected 16 Apr 2024 23:11)  Source: .Blood Blood-Venous  Preliminary Report (18 Apr 2024 09:02):    No growth at 24 hours    Culture - Blood (collected 16 Apr 2024 23:00)  Source: .Blood Blood-Venous  Preliminary Report (18 Apr 2024 09:02):    No growth at 24 hours    Culture - Blood (collected 13 Apr 2024 06:50)  Source: .Blood Blood  Final Report (18 Apr 2024 14:01):    No growth at 5 days    Culture - Blood (collected 13 Apr 2024 06:47)  Source: .Blood Blood  Final Report (18 Apr 2024 14:01):    No growth at 5 days      COVID-19 PCR: NotDetec (03-31-24 @ 22:32)    RADIOLOGY & ADDITIONAL TESTS:    Care Discussed with Consultants/Other Providers:

## 2024-04-20 LAB
ANION GAP SERPL CALC-SCNC: 14 MMOL/L — SIGNIFICANT CHANGE UP (ref 5–17)
BUN SERPL-MCNC: 6.6 MG/DL — LOW (ref 8–20)
CALCIUM SERPL-MCNC: 8.3 MG/DL — LOW (ref 8.4–10.5)
CHLORIDE SERPL-SCNC: 102 MMOL/L — SIGNIFICANT CHANGE UP (ref 96–108)
CO2 SERPL-SCNC: 22 MMOL/L — SIGNIFICANT CHANGE UP (ref 22–29)
CREAT SERPL-MCNC: 0.85 MG/DL — SIGNIFICANT CHANGE UP (ref 0.5–1.3)
EGFR: 99 ML/MIN/1.73M2 — SIGNIFICANT CHANGE UP
GLUCOSE BLDC GLUCOMTR-MCNC: 123 MG/DL — HIGH (ref 70–99)
GLUCOSE BLDC GLUCOMTR-MCNC: 200 MG/DL — HIGH (ref 70–99)
GLUCOSE BLDC GLUCOMTR-MCNC: 87 MG/DL — SIGNIFICANT CHANGE UP (ref 70–99)
GLUCOSE BLDC GLUCOMTR-MCNC: 93 MG/DL — SIGNIFICANT CHANGE UP (ref 70–99)
GLUCOSE SERPL-MCNC: 82 MG/DL — SIGNIFICANT CHANGE UP (ref 70–99)
HCT VFR BLD CALC: 26 % — LOW (ref 39–50)
HGB BLD-MCNC: 8.4 G/DL — LOW (ref 13–17)
MCHC RBC-ENTMCNC: 28 PG — SIGNIFICANT CHANGE UP (ref 27–34)
MCHC RBC-ENTMCNC: 32.3 GM/DL — SIGNIFICANT CHANGE UP (ref 32–36)
MCV RBC AUTO: 86.7 FL — SIGNIFICANT CHANGE UP (ref 80–100)
PLATELET # BLD AUTO: 628 K/UL — HIGH (ref 150–400)
POTASSIUM SERPL-MCNC: 3.5 MMOL/L — SIGNIFICANT CHANGE UP (ref 3.5–5.3)
POTASSIUM SERPL-SCNC: 3.5 MMOL/L — SIGNIFICANT CHANGE UP (ref 3.5–5.3)
RBC # BLD: 3 M/UL — LOW (ref 4.2–5.8)
RBC # FLD: 14 % — SIGNIFICANT CHANGE UP (ref 10.3–14.5)
SODIUM SERPL-SCNC: 138 MMOL/L — SIGNIFICANT CHANGE UP (ref 135–145)
URATE SERPL-MCNC: 9.1 MG/DL — HIGH (ref 3.4–7)
WBC # BLD: 12.56 K/UL — HIGH (ref 3.8–10.5)
WBC # FLD AUTO: 12.56 K/UL — HIGH (ref 3.8–10.5)

## 2024-04-20 PROCEDURE — 99232 SBSQ HOSP IP/OBS MODERATE 35: CPT

## 2024-04-20 RX ORDER — FUROSEMIDE 40 MG
20 TABLET ORAL ONCE
Refills: 0 | Status: COMPLETED | OUTPATIENT
Start: 2024-04-20 | End: 2024-04-20

## 2024-04-20 RX ADMIN — Medication 0.6 MILLIGRAM(S): at 17:34

## 2024-04-20 RX ADMIN — Medication 40 MILLIGRAM(S): at 12:34

## 2024-04-20 RX ADMIN — AMLODIPINE BESYLATE 10 MILLIGRAM(S): 2.5 TABLET ORAL at 05:24

## 2024-04-20 RX ADMIN — CASPOFUNGIN ACETATE 260 MILLIGRAM(S): 7 INJECTION, POWDER, LYOPHILIZED, FOR SOLUTION INTRAVENOUS at 12:34

## 2024-04-20 RX ADMIN — Medication 0.6 MILLIGRAM(S): at 05:24

## 2024-04-20 RX ADMIN — PANTOPRAZOLE SODIUM 40 MILLIGRAM(S): 20 TABLET, DELAYED RELEASE ORAL at 05:28

## 2024-04-20 RX ADMIN — HEPARIN SODIUM 5000 UNIT(S): 5000 INJECTION INTRAVENOUS; SUBCUTANEOUS at 05:24

## 2024-04-20 RX ADMIN — Medication 20 MILLIGRAM(S): at 12:35

## 2024-04-20 RX ADMIN — Medication 3 MILLILITER(S): at 04:06

## 2024-04-20 RX ADMIN — HEPARIN SODIUM 5000 UNIT(S): 5000 INJECTION INTRAVENOUS; SUBCUTANEOUS at 17:34

## 2024-04-20 RX ADMIN — Medication 12.5 MILLIGRAM(S): at 17:35

## 2024-04-20 RX ADMIN — Medication 1: at 21:46

## 2024-04-20 RX ADMIN — Medication 12.5 MILLIGRAM(S): at 05:24

## 2024-04-20 RX ADMIN — Medication 3 MILLILITER(S): at 11:12

## 2024-04-20 NOTE — PROGRESS NOTE ADULT - ASSESSMENT
59M, pmhx eosinophilia asthma, HTN, HLD, DM, gout recent hospitilization for acute asthma exacerbation and viral URI found to have uncontrolled HTN and new onset DM2 at that time discharged home with outpatient follow-up, sent to ED 3/29 by PCP for outpt abnl renal function on routine f/u labs, found to have multifocal pneumonia, hospital course c/b sepsis secondary to fungemia ( +bcx 4/10 candida, neg 4/13), being followed by ID, on capsofungin, YASMEEN neg for vegetation, unknown source but presumed fungal pneumonia given admission CT findings and recent treatment with steroids, thoracic surgery consulted for bronch/BAL.  4/20 OOB chair  appears comfortable Medical clearance prior to bronch Monday

## 2024-04-20 NOTE — PROGRESS NOTE ADULT - PROBLEM SELECTOR PLAN 1
CT abd 4/13: pleural based pulmonary nodules at the left lung base which appear larger in size. For example, 3.9 x 1.9 cm pleural-based nodule on series 3 image 32 posteriorly, previously 2.3 x 1.6 cm. Small right pleural effusion has increased. Atelectatic changes.  4/10 blood cultures + candida, on capsofungin, negative blood cx 4/13  ID following  pulm following  concern for atypical pneumonia given recent admission for asthma exacerbation and IV steroids  non con CT chest w/ persistent RUL opacity,   plan for for bronch/BAL monday   Await Medical clearance  thoracic surgery to follow  d/w Dr. Dubon in AM rounds

## 2024-04-20 NOTE — PROGRESS NOTE ADULT - ASSESSMENT
59 yoM with Asthma, HTN, HLD, DM2, Gout admitted with TYESHA suspected 2.2 medications along with MF PNA . Course complicated by fungemia.     Sepsis due to Fungemia  Multifocal pneumonia s/p antibiotics   Bcx on 04/10 grew Solange  Caspofungin since 04/12/2024  ID following  Rheumatology following  - YASMEEN negative for vegetation  Concern for fungal pneumonia given patient was intermittently on steroid before admission  for BAL 4/22/24  - ct chest with slightly increased mass compared to previous    Patient to under a BAL on 4/22/24     Patient medically optimized.  His RCRI is 0.  There are no contraindications to undergoing a bronchoscopy.      RULED OUT Right elbow osteomyelitis  - ruled out on MRI     Right Knee swelling and pain - suspected gout flare  - started on colchicine  - pain improved     Bilateral hand swelling   - iv lasix x1 dose 20 mg     TYESHA (now resolved)   - Possibly 2/2 medication induced ATN   - Hold ARB/HCTZ/Metformin   - Renal US negative  - Avoid Nephrotoxins. Renally dose meds  - Nephro following     HTN, HLD  - Amlodipine 10mg q24  - Not currently on statin therapy    DM2  Hold Metformin 500mg BID  ISS    Asthma  Finished steroid taper   No longer taking Montelukast  Wixela/Duoneb PRN    Gout  Uric Acid level 12.2  Takes Allopurinol as needed. Hold for now.     right arm swelling - duplex done neg for dvt  encouraged elevation     VTE PPX SQH    Dispo: Medically active     will discuss with sister

## 2024-04-20 NOTE — PROGRESS NOTE ADULT - SUBJECTIVE AND OBJECTIVE BOX
Subjective:  "Hello"  OOB watching TV      V/S  T(C): 36.9 (04-20-24 @ 09:37), Max: 38.2 (04-19-24 @ 17:05)  HR: 110 (04-20-24 @ 11:15) (107 - 116)  BP: 130/76 (04-20-24 @ 09:37) (130/76 - 149/74)  RR: 18 (04-20-24 @ 09:37) (18 - 18)  SpO2: 97% (04-20-24 @ 11:15) (93% - 100%)      I&O's Detail    19 Apr 2024 07:01  -  20 Apr 2024 07:00  --------------------------------------------------------  IN:    Oral Fluid: 230 mL  Total IN: 230 mL    OUT:  Total OUT: 0 mL    Total NET: 230 mL          04-19-24 @ 07:01  -  04-20-24 @ 07:00  --------------------------------------------------------  IN: 230 mL / OUT: 0 mL / NET: 230 mL      MEDICATIONS  (STANDING):  amLODIPine   Tablet 10 milliGRAM(s) Oral daily  caspofungin IVPB      caspofungin IVPB 50 milliGRAM(s) IV Intermittent every 24 hours  colchicine 0.6 milliGRAM(s) Oral two times a day  dextrose 5%. 1000 milliLiter(s) (100 mL/Hr) IV Continuous <Continuous>  dextrose 5%. 1000 milliLiter(s) (50 mL/Hr) IV Continuous <Continuous>  dextrose 50% Injectable 25 Gram(s) IV Push once  dextrose 50% Injectable 25 Gram(s) IV Push once  dextrose 50% Injectable 12.5 Gram(s) IV Push once  furosemide   Injectable 20 milliGRAM(s) IV Push once  glucagon  Injectable 1 milliGRAM(s) IntraMuscular once  heparin   Injectable 5000 Unit(s) SubCutaneous every 12 hours  insulin lispro (ADMELOG) corrective regimen sliding scale   SubCutaneous Before meals and at bedtime  methylPREDNISolone sodium succinate Injectable 40 milliGRAM(s) IV Push once  metoprolol tartrate 12.5 milliGRAM(s) Oral every 12 hours  pantoprazole    Tablet 40 milliGRAM(s) Oral before breakfast      04-20    138  |  102  |  6.6<L>  ----------------------------<  82  3.5   |  22.0  |  0.85    Ca    8.3<L>      20 Apr 2024 07:18                                 8.4    12.56 )-----------( 628      ( 20 Apr 2024 07:18 )             26.0                 CAPILLARY BLOOD GLUCOSE      POCT Blood Glucose.: 93 mg/dL (20 Apr 2024 10:58)  POCT Blood Glucose.: 87 mg/dL (20 Apr 2024 07:46)  POCT Blood Glucose.: 84 mg/dL (19 Apr 2024 21:07)  POCT Blood Glucose.: 109 mg/dL (19 Apr 2024 17:10)  POCT Blood Glucose.: 112 mg/dL (19 Apr 2024 12:40)           CXR:      Physical Exam:  Gen: NAD  Neuro: A&Ox3   Pulm: Diminished at bilateral bases, sl exp wheeze b/l   CV: S1S2  Abd: soft NT ND  Extrem/MS: R upper and lower extremity edema, no cyanosis        PAST MEDICAL & SURGICAL HISTORY:  Gout      Asthma      Arthritis      HTN (hypertension)      No significant past surgical history

## 2024-04-20 NOTE — PROGRESS NOTE ADULT - SUBJECTIVE AND OBJECTIVE BOX
Max Bellamy MD  [facility] Medicine  Contact via Teams or text/call at [phone]    Patient is a 59y old  Male who presents with a chief complaint of ARF (19 Apr 2024 12:36)    Feels okay. Pain improved in right knee and bilateral hands     Patient seen and examined at bedside. No overnight events reported.     ALLERGIES:  No Known Allergies    MEDICATIONS  (STANDING):  amLODIPine   Tablet 10 milliGRAM(s) Oral daily  caspofungin IVPB      caspofungin IVPB 50 milliGRAM(s) IV Intermittent every 24 hours  colchicine 0.6 milliGRAM(s) Oral two times a day  dextrose 5%. 1000 milliLiter(s) (100 mL/Hr) IV Continuous <Continuous>  dextrose 5%. 1000 milliLiter(s) (50 mL/Hr) IV Continuous <Continuous>  dextrose 50% Injectable 25 Gram(s) IV Push once  dextrose 50% Injectable 25 Gram(s) IV Push once  dextrose 50% Injectable 12.5 Gram(s) IV Push once  glucagon  Injectable 1 milliGRAM(s) IntraMuscular once  heparin   Injectable 5000 Unit(s) SubCutaneous every 12 hours  insulin lispro (ADMELOG) corrective regimen sliding scale   SubCutaneous Before meals and at bedtime  metoprolol tartrate 12.5 milliGRAM(s) Oral every 12 hours  pantoprazole    Tablet 40 milliGRAM(s) Oral before breakfast    MEDICATIONS  (PRN):  acetaminophen     Tablet .. 650 milliGRAM(s) Oral every 6 hours PRN Temp greater or equal to 38C (100.4F), Mild Pain (1 - 3)  albuterol    0.083% 2.5 milliGRAM(s) Nebulizer every 4 hours PRN Wheezing  albuterol    90 MICROgram(s) HFA Inhaler 2 Puff(s) Inhalation every 6 hours PRN Shortness of Breath and/or Wheezing  albuterol/ipratropium for Nebulization 3 milliLiter(s) Nebulizer every 6 hours PRN Shortness of Breath and/or Wheezing  aluminum hydroxide/magnesium hydroxide/simethicone Suspension 30 milliLiter(s) Oral every 4 hours PRN Dyspepsia  dextrose Oral Gel 15 Gram(s) Oral once PRN Blood Glucose LESS THAN 70 milliGRAM(s)/deciliter  melatonin 3 milliGRAM(s) Oral at bedtime PRN Insomnia  metoprolol tartrate Injectable 2.5 milliGRAM(s) IV Push every 6 hours PRN HR above 110/min  ondansetron Injectable 4 milliGRAM(s) IV Push every 8 hours PRN Nausea and/or Vomiting    Vital Signs Last 24 Hrs  T(F): 98 (20 Apr 2024 05:14), Max: 100.7 (19 Apr 2024 17:05)  HR: 116 (20 Apr 2024 05:14) (107 - 116)  BP: 132/74 (20 Apr 2024 05:14) (129/76 - 149/74)  RR: 18 (20 Apr 2024 05:14) (18 - 18)  SpO2: 93% (20 Apr 2024 05:14) (91% - 100%)  I&O's Summary    19 Apr 2024 07:01  -  20 Apr 2024 07:00  --------------------------------------------------------  IN: 230 mL / OUT: 0 mL / NET: 230 mL    PHYSICAL EXAM:  General: NAD, A/O x 3  ENT: No gross hearing impairment, Moist mucous membranes, no thrush  Neck: Supple, No JVD  Lungs: Clear to auscultation bilaterally, good air entry, non-labored breathing  Cardio: RRR, S1/S2, No murmur  Abdomen: Soft, Nontender, Nondistended; Bowel sounds present  Extremities: No calf tenderness, No cyanosis, No pitting edema  Psych: Appropriate mood and affect    LABS:                        8.4    12.56 )-----------( 628      ( 20 Apr 2024 07:18 )             26.0     04-20    138  |  102  |  6.6  ----------------------------<  82  3.5   |  22.0  |  0.85    Ca    8.3      20 Apr 2024 07:18    03-14 Chol 180 mg/dL LDL --  mg/dL Trig 110 mg/dL    POCT Blood Glucose.: 87 mg/dL (20 Apr 2024 07:46)  POCT Blood Glucose.: 84 mg/dL (19 Apr 2024 21:07)  POCT Blood Glucose.: 109 mg/dL (19 Apr 2024 17:10)  POCT Blood Glucose.: 112 mg/dL (19 Apr 2024 12:40)    Urinalysis Basic - ( 20 Apr 2024 07:18 )    Color: x / Appearance: x / SG: x / pH: x  Gluc: 82 mg/dL / Ketone: x  / Bili: x / Urobili: x   Blood: x / Protein: x / Nitrite: x   Leuk Esterase: x / RBC: x / WBC x   Sq Epi: x / Non Sq Epi: x / Bacteria: x        Culture - Blood (collected 16 Apr 2024 23:11)  Source: .Blood Blood-Venous  Preliminary Report (20 Apr 2024 09:00):    No growth at 72 Hours    Culture - Blood (collected 16 Apr 2024 23:00)  Source: .Blood Blood-Venous  Preliminary Report (20 Apr 2024 09:00):    No growth at 72 Hours      COVID-19 PCR: NotDetec (03-31-24 @ 22:32)    RADIOLOGY & ADDITIONAL TESTS:    Care Discussed with Consultants/Other Providers:

## 2024-04-21 LAB
ALBUMIN SERPL ELPH-MCNC: 2.4 G/DL — LOW (ref 3.3–5.2)
ALP SERPL-CCNC: 123 U/L — HIGH (ref 40–120)
ALT FLD-CCNC: 13 U/L — SIGNIFICANT CHANGE UP
ANION GAP SERPL CALC-SCNC: 17 MMOL/L — SIGNIFICANT CHANGE UP (ref 5–17)
APTT BLD: 40.4 SEC — HIGH (ref 24.5–35.6)
AST SERPL-CCNC: 36 U/L — SIGNIFICANT CHANGE UP
BILIRUB SERPL-MCNC: 0.3 MG/DL — LOW (ref 0.4–2)
BLD GP AB SCN SERPL QL: SIGNIFICANT CHANGE UP
BUN SERPL-MCNC: 10.3 MG/DL — SIGNIFICANT CHANGE UP (ref 8–20)
CALCIUM SERPL-MCNC: 8.8 MG/DL — SIGNIFICANT CHANGE UP (ref 8.4–10.5)
CHLORIDE SERPL-SCNC: 99 MMOL/L — SIGNIFICANT CHANGE UP (ref 96–108)
CO2 SERPL-SCNC: 21 MMOL/L — LOW (ref 22–29)
CREAT SERPL-MCNC: 0.84 MG/DL — SIGNIFICANT CHANGE UP (ref 0.5–1.3)
EGFR: 99 ML/MIN/1.73M2 — SIGNIFICANT CHANGE UP
GLUCOSE BLDC GLUCOMTR-MCNC: 118 MG/DL — HIGH (ref 70–99)
GLUCOSE BLDC GLUCOMTR-MCNC: 129 MG/DL — HIGH (ref 70–99)
GLUCOSE BLDC GLUCOMTR-MCNC: 132 MG/DL — HIGH (ref 70–99)
GLUCOSE BLDC GLUCOMTR-MCNC: 158 MG/DL — HIGH (ref 70–99)
GLUCOSE SERPL-MCNC: 134 MG/DL — HIGH (ref 70–99)
HCT VFR BLD CALC: 25.8 % — LOW (ref 39–50)
HGB BLD-MCNC: 8.6 G/DL — LOW (ref 13–17)
INR BLD: 1.2 RATIO — HIGH (ref 0.85–1.18)
MCHC RBC-ENTMCNC: 28.2 PG — SIGNIFICANT CHANGE UP (ref 27–34)
MCHC RBC-ENTMCNC: 33.3 GM/DL — SIGNIFICANT CHANGE UP (ref 32–36)
MCV RBC AUTO: 84.6 FL — SIGNIFICANT CHANGE UP (ref 80–100)
PLATELET # BLD AUTO: 690 K/UL — HIGH (ref 150–400)
POTASSIUM SERPL-MCNC: 3.9 MMOL/L — SIGNIFICANT CHANGE UP (ref 3.5–5.3)
POTASSIUM SERPL-SCNC: 3.9 MMOL/L — SIGNIFICANT CHANGE UP (ref 3.5–5.3)
PROT SERPL-MCNC: 7.5 G/DL — SIGNIFICANT CHANGE UP (ref 6.6–8.7)
PROTHROM AB SERPL-ACNC: 13.2 SEC — HIGH (ref 9.5–13)
RBC # BLD: 3.05 M/UL — LOW (ref 4.2–5.8)
RBC # FLD: 13.7 % — SIGNIFICANT CHANGE UP (ref 10.3–14.5)
SODIUM SERPL-SCNC: 137 MMOL/L — SIGNIFICANT CHANGE UP (ref 135–145)
WBC # BLD: 16.15 K/UL — HIGH (ref 3.8–10.5)
WBC # FLD AUTO: 16.15 K/UL — HIGH (ref 3.8–10.5)

## 2024-04-21 PROCEDURE — 99232 SBSQ HOSP IP/OBS MODERATE 35: CPT

## 2024-04-21 RX ADMIN — HEPARIN SODIUM 5000 UNIT(S): 5000 INJECTION INTRAVENOUS; SUBCUTANEOUS at 06:07

## 2024-04-21 RX ADMIN — AMLODIPINE BESYLATE 10 MILLIGRAM(S): 2.5 TABLET ORAL at 06:06

## 2024-04-21 RX ADMIN — Medication 12.5 MILLIGRAM(S): at 06:05

## 2024-04-21 RX ADMIN — Medication 0.6 MILLIGRAM(S): at 17:25

## 2024-04-21 RX ADMIN — Medication 12.5 MILLIGRAM(S): at 17:26

## 2024-04-21 RX ADMIN — CASPOFUNGIN ACETATE 260 MILLIGRAM(S): 7 INJECTION, POWDER, LYOPHILIZED, FOR SOLUTION INTRAVENOUS at 12:21

## 2024-04-21 RX ADMIN — Medication 1: at 12:21

## 2024-04-21 RX ADMIN — PANTOPRAZOLE SODIUM 40 MILLIGRAM(S): 20 TABLET, DELAYED RELEASE ORAL at 06:06

## 2024-04-21 RX ADMIN — Medication 0.6 MILLIGRAM(S): at 06:05

## 2024-04-21 RX ADMIN — HEPARIN SODIUM 5000 UNIT(S): 5000 INJECTION INTRAVENOUS; SUBCUTANEOUS at 17:26

## 2024-04-21 NOTE — PROGRESS NOTE ADULT - SUBJECTIVE AND OBJECTIVE BOX
Subjective:  "Hello"  OOB chair      V/S  T(C): 36.6 (04-21-24 @ 09:54), Max: 37.7 (04-20-24 @ 17:00)  HR: 102 (04-21-24 @ 09:54) (100 - 120)  BP: 126/66 (04-21-24 @ 09:54) (125/72 - 148/76)  RR: 20 (04-21-24 @ 09:54) (18 - 20)  SpO2: 94% (04-21-24 @ 09:54) (92% - 98%)      I&O's Detail    20 Apr 2024 07:01  -  21 Apr 2024 07:00  --------------------------------------------------------  IN:  Total IN: 0 mL    OUT:    Voided (mL): 600 mL  Total OUT: 600 mL    Total NET: -600 mL          04-20-24 @ 07:01  -  04-21-24 @ 07:00  --------------------------------------------------------  IN: 0 mL / OUT: 600 mL / NET: -600 mL      MEDICATIONS  (STANDING):  amLODIPine   Tablet 10 milliGRAM(s) Oral daily  caspofungin IVPB      caspofungin IVPB 50 milliGRAM(s) IV Intermittent every 24 hours  colchicine 0.6 milliGRAM(s) Oral two times a day  dextrose 5%. 1000 milliLiter(s) (100 mL/Hr) IV Continuous <Continuous>  dextrose 5%. 1000 milliLiter(s) (50 mL/Hr) IV Continuous <Continuous>  dextrose 50% Injectable 25 Gram(s) IV Push once  dextrose 50% Injectable 25 Gram(s) IV Push once  dextrose 50% Injectable 12.5 Gram(s) IV Push once  glucagon  Injectable 1 milliGRAM(s) IntraMuscular once  heparin   Injectable 5000 Unit(s) SubCutaneous every 12 hours  insulin lispro (ADMELOG) corrective regimen sliding scale   SubCutaneous Before meals and at bedtime  metoprolol tartrate 12.5 milliGRAM(s) Oral every 12 hours  pantoprazole    Tablet 40 milliGRAM(s) Oral before breakfast      04-21    137  |  99  |  10.3  ----------------------------<  134<H>  3.9   |  21.0<L>  |  0.84    Ca    8.8      21 Apr 2024 06:53    TPro  7.5  /  Alb  2.4<L>  /  TBili  0.3<L>  /  DBili  x   /  AST  36  /  ALT  13  /  AlkPhos  123<H>  04-21                               8.6    16.15 )-----------( 690      ( 21 Apr 2024 06:53 )             25.8        PT/INR - ( 21 Apr 2024 06:53 )   PT: 13.2 sec;   INR: 1.20 ratio         PTT - ( 21 Apr 2024 06:53 )  PTT:40.4 sec         CAPILLARY BLOOD GLUCOSE      POCT Blood Glucose.: 132 mg/dL (21 Apr 2024 07:55)  POCT Blood Glucose.: 200 mg/dL (20 Apr 2024 21:45)  POCT Blood Glucose.: 123 mg/dL (20 Apr 2024 17:05)          Physical Exam:    Gen: NAD  Neuro: A&Ox3   Pulm: Diminished at bilateral bases, sl exp wheeze b/l   CV: S1S2  Abd: soft NT ND  Extrem/MS: R upper and lower extremity edema, no cyanosis      PAST MEDICAL & SURGICAL HISTORY:  Gout      Asthma      Arthritis      HTN (hypertension)      No significant past surgical history

## 2024-04-21 NOTE — PROGRESS NOTE ADULT - ASSESSMENT
59M, pmhx eosinophilia asthma, HTN, HLD, DM, gout recent hospitilization for acute asthma exacerbation and viral URI found to have uncontrolled HTN and new onset DM2 at that time discharged home with outpatient follow-up, sent to ED 3/29 by PCP for outpt abnl renal function on routine f/u labs, found to have multifocal pneumonia, hospital course c/b sepsis secondary to fungemia ( +bcx 4/10 candida, neg 4/13), being followed by ID, on capsofungin, YASMEEN neg for vegetation, unknown source but presumed fungal pneumonia given admission CT findings and recent treatment with steroids, thoracic surgery consulted for bronch/BAL.  4/20 OOB chair  appears comfortable Medical clearance prior to bronch Monday 4/21 Bronch am

## 2024-04-21 NOTE — PROGRESS NOTE ADULT - SUBJECTIVE AND OBJECTIVE BOX
Max Bellamy MD  [facility] Medicine  Contact via Teams or text/call at [phone]    Patient is a 59y old  Male who presents with a chief complaint of ARF (20 Apr 2024 12:11)    FEels okay.  States right hand and right knee with improved pain.      Patient seen and examined at bedside. No overnight events reported.     ALLERGIES:  No Known Allergies    MEDICATIONS  (STANDING):  amLODIPine   Tablet 10 milliGRAM(s) Oral daily  caspofungin IVPB      caspofungin IVPB 50 milliGRAM(s) IV Intermittent every 24 hours  colchicine 0.6 milliGRAM(s) Oral two times a day  dextrose 5%. 1000 milliLiter(s) (100 mL/Hr) IV Continuous <Continuous>  dextrose 5%. 1000 milliLiter(s) (50 mL/Hr) IV Continuous <Continuous>  dextrose 50% Injectable 25 Gram(s) IV Push once  dextrose 50% Injectable 25 Gram(s) IV Push once  dextrose 50% Injectable 12.5 Gram(s) IV Push once  glucagon  Injectable 1 milliGRAM(s) IntraMuscular once  heparin   Injectable 5000 Unit(s) SubCutaneous every 12 hours  insulin lispro (ADMELOG) corrective regimen sliding scale   SubCutaneous Before meals and at bedtime  metoprolol tartrate 12.5 milliGRAM(s) Oral every 12 hours  pantoprazole    Tablet 40 milliGRAM(s) Oral before breakfast    MEDICATIONS  (PRN):  acetaminophen     Tablet .. 650 milliGRAM(s) Oral every 6 hours PRN Temp greater or equal to 38C (100.4F), Mild Pain (1 - 3)  albuterol    0.083% 2.5 milliGRAM(s) Nebulizer every 4 hours PRN Wheezing  albuterol    90 MICROgram(s) HFA Inhaler 2 Puff(s) Inhalation every 6 hours PRN Shortness of Breath and/or Wheezing  albuterol/ipratropium for Nebulization 3 milliLiter(s) Nebulizer every 6 hours PRN Shortness of Breath and/or Wheezing  aluminum hydroxide/magnesium hydroxide/simethicone Suspension 30 milliLiter(s) Oral every 4 hours PRN Dyspepsia  dextrose Oral Gel 15 Gram(s) Oral once PRN Blood Glucose LESS THAN 70 milliGRAM(s)/deciliter  melatonin 3 milliGRAM(s) Oral at bedtime PRN Insomnia  metoprolol tartrate Injectable 2.5 milliGRAM(s) IV Push every 6 hours PRN HR above 110/min  ondansetron Injectable 4 milliGRAM(s) IV Push every 8 hours PRN Nausea and/or Vomiting    Vital Signs Last 24 Hrs  T(F): 98.3 (21 Apr 2024 04:07), Max: 99.8 (20 Apr 2024 17:00)  HR: 103 (21 Apr 2024 04:07) (100 - 120)  BP: 136/79 (21 Apr 2024 04:07) (125/72 - 148/76)  RR: 18 (21 Apr 2024 04:07) (18 - 18)  SpO2: 92% (21 Apr 2024 04:07) (92% - 98%)  I&O's Summary    20 Apr 2024 07:01  -  21 Apr 2024 07:00  --------------------------------------------------------  IN: 0 mL / OUT: 600 mL / NET: -600 mL    PHYSICAL EXAM:  General: NAD, A/O x 3  ENT: No gross hearing impairment, Moist mucous membranes, no thrush  Neck: Supple, No JVD  Lungs: Clear to auscultation bilaterally, good air entry, non-labored breathing  Cardio: RRR, S1/S2, No murmur  Abdomen: Soft, Nontender, Nondistended; Bowel sounds present  Extremities: No calf tenderness, No cyanosis, No pitting edema, decreased pain to movement of right knee and right hand, decreased swelling right knee and right hand  Psych: Appropriate mood and affect    LABS:                        8.6    16.15 )-----------( 690      ( 21 Apr 2024 06:53 )             25.8     04-21    137  |  99  |  10.3  ----------------------------<  134  3.9   |  21.0  |  0.84    Ca    8.8      21 Apr 2024 06:53    TPro  7.5  /  Alb  2.4  /  TBili  0.3  /  DBili  x   /  AST  36  /  ALT  13  /  AlkPhos  123  04-21          PT/INR - ( 21 Apr 2024 06:53 )   PT: 13.2 sec;   INR: 1.20 ratio         PTT - ( 21 Apr 2024 06:53 )  PTT:40.4 sec          03-14 Chol 180 mg/dL LDL --  mg/dL Trig 110 mg/dL              POCT Blood Glucose.: 132 mg/dL (21 Apr 2024 07:55)  POCT Blood Glucose.: 200 mg/dL (20 Apr 2024 21:45)  POCT Blood Glucose.: 123 mg/dL (20 Apr 2024 17:05)  POCT Blood Glucose.: 93 mg/dL (20 Apr 2024 10:58)      Urinalysis Basic - ( 21 Apr 2024 06:53 )    Color: x / Appearance: x / SG: x / pH: x  Gluc: 134 mg/dL / Ketone: x  / Bili: x / Urobili: x   Blood: x / Protein: x / Nitrite: x   Leuk Esterase: x / RBC: x / WBC x   Sq Epi: x / Non Sq Epi: x / Bacteria: x    Culture - Blood (collected 19 Apr 2024 11:30)  Source: .Blood Blood-Venous  Preliminary Report (20 Apr 2024 15:01):    No growth at 24 hours    Culture - Blood (collected 19 Apr 2024 11:25)  Source: .Blood Blood-Peripheral  Preliminary Report (20 Apr 2024 15:01):    No growth at 24 hours    Culture - Blood (collected 16 Apr 2024 23:11)  Source: .Blood Blood-Venous  Preliminary Report (20 Apr 2024 09:00):    No growth at 72 Hours    Culture - Blood (collected 16 Apr 2024 23:00)  Source: .Blood Blood-Venous  Preliminary Report (20 Apr 2024 09:00):    No growth at 72 Hours      COVID-19 PCR: NotDetec (03-31-24 @ 22:32)    RADIOLOGY & ADDITIONAL TESTS:    Care Discussed with Consultants/Other Providers:

## 2024-04-21 NOTE — PROGRESS NOTE ADULT - ASSESSMENT
59 yoM with Asthma, HTN, HLD, DM2, Gout admitted with TYESHA suspected 2.2 medications along with MF PNA . Course complicated by fungemia.     Sepsis due to Fungemia  Multifocal pneumonia s/p antibiotics   Bcx on 04/10 grew Solange  Caspofungin since 04/12/2024  ID following  Rheumatology following  - YASMEEN negative for vegetation  Concern for fungal pneumonia given patient was intermittently on steroid before admission  for BAL 4/22/24  - repeat ct chest with slightly increased mass compared to previous    Patient medically optimized.  His RCRI is 0.  There are no contraindications to undergoing a BAL    RULED OUT Right elbow osteomyelitis  - ruled out on MRI     Right hand Right Knee swelling and pain - suspected gout flare  - s/p colchicine   - on steroids   - pain improved     TYESHA (now resolved)   - Possibly 2/2 medication induced ATN   - Hold ARB/HCTZ/Metformin   - Renal US negative  - Avoid Nephrotoxins. Renally dose meds  - Nephro following     HTN, HLD  - Amlodipine 10mg q24  - Not currently on statin therapy    DM2  Hold Metformin 500mg BID  ISS    Asthma  Finished steroid taper   No longer taking Montelukast  Wixela/Duoneb PRN    Gout  Uric Acid level 12.2  Takes Allopurinol as needed. Hold for now.     right arm swelling - duplex done neg for dvt  encouraged elevation     VTE PPX SQH    Dispo: Medically active     BAL on 4/22/24 with thoracic   59 yoM with Asthma, HTN, HLD, DM2, Gout admitted with TYESHA suspected 2.2 medications along with MF PNA . Course complicated by fungemia.     Sepsis due to Fungemia  Multifocal pneumonia s/p antibiotics   Bcx on 04/10 grew Solange  Caspofungin since 04/12/2024  ID following  Rheumatology following  - YASMEEN negative for vegetation  Concern for fungal pneumonia given patient was intermittently on steroid before admission  for BAL 4/22/24  - repeat ct chest with slightly increased mass compared to previous    Patient medically optimized.  His RCRI is 0.  There are no contraindications to undergoing a BAL    RULED OUT Right elbow osteomyelitis  - ruled out on MRI     Right hand Right Knee swelling and pain - suspected gout flare  - s/p colchicine   - on steroids   - pain and swelling improved, continue oral steroids     Leukocytosis  - secondary to steroids     TYESHA (now resolved)   - Possibly 2/2 medication induced ATN   - Hold ARB/HCTZ/Metformin   - Renal US negative  - Avoid Nephrotoxins. Renally dose meds  - Nephro following     HTN, HLD  - Amlodipine 10mg q24  - Not currently on statin therapy    DM2  Hold Metformin 500mg BID  ISS    Asthma  Finished steroid taper   No longer taking Montelukast  Wixela/Duoneb PRN    Gout  Uric Acid level 12.2  Takes Allopurinol as needed. Hold for now.     right arm swelling - duplex done neg for dvt  encouraged elevation     VTE PPX SQH    Dispo: Medically active     BAL on 4/22/24 with thoracic

## 2024-04-21 NOTE — PROGRESS NOTE ADULT - PROBLEM SELECTOR PLAN 1
CT abd 4/13: pleural based pulmonary nodules at the left lung base which appear larger in size. For example, 3.9 x 1.9 cm pleural-based nodule on series 3 image 32 posteriorly, previously 2.3 x 1.6 cm. Small right pleural effusion has increased. Atelectatic changes.  4/10 blood cultures + candida, on capsofungin, negative blood cx 4/13  ID following  pulm following  concern for atypical pneumonia given recent admission for asthma exacerbation and IV steroids  non con CT chest w/ persistent RUL opacity,   plan for for bronch/BAL monday   thoracic surgery to follow  d/w Dr. Dubon in AM rounds

## 2024-04-22 LAB
ANION GAP SERPL CALC-SCNC: 13 MMOL/L — SIGNIFICANT CHANGE UP (ref 5–17)
BUN SERPL-MCNC: 13.3 MG/DL — SIGNIFICANT CHANGE UP (ref 8–20)
CALCIUM SERPL-MCNC: 8.5 MG/DL — SIGNIFICANT CHANGE UP (ref 8.4–10.5)
CHLORIDE SERPL-SCNC: 107 MMOL/L — SIGNIFICANT CHANGE UP (ref 96–108)
CO2 SERPL-SCNC: 23 MMOL/L — SIGNIFICANT CHANGE UP (ref 22–29)
CREAT SERPL-MCNC: 0.74 MG/DL — SIGNIFICANT CHANGE UP (ref 0.5–1.3)
CULTURE RESULTS: SIGNIFICANT CHANGE UP
CULTURE RESULTS: SIGNIFICANT CHANGE UP
EGFR: 103 ML/MIN/1.73M2 — SIGNIFICANT CHANGE UP
GLUCOSE BLDC GLUCOMTR-MCNC: 100 MG/DL — HIGH (ref 70–99)
GLUCOSE BLDC GLUCOMTR-MCNC: 102 MG/DL — HIGH (ref 70–99)
GLUCOSE BLDC GLUCOMTR-MCNC: 91 MG/DL — SIGNIFICANT CHANGE UP (ref 70–99)
GLUCOSE BLDC GLUCOMTR-MCNC: 97 MG/DL — SIGNIFICANT CHANGE UP (ref 70–99)
GLUCOSE SERPL-MCNC: 93 MG/DL — SIGNIFICANT CHANGE UP (ref 70–99)
HCT VFR BLD CALC: 26.4 % — LOW (ref 39–50)
HGB BLD-MCNC: 8.5 G/DL — LOW (ref 13–17)
MCHC RBC-ENTMCNC: 27.8 PG — SIGNIFICANT CHANGE UP (ref 27–34)
MCHC RBC-ENTMCNC: 32.2 GM/DL — SIGNIFICANT CHANGE UP (ref 32–36)
MCV RBC AUTO: 86.3 FL — SIGNIFICANT CHANGE UP (ref 80–100)
PLATELET # BLD AUTO: 620 K/UL — HIGH (ref 150–400)
POTASSIUM SERPL-MCNC: 3.5 MMOL/L — SIGNIFICANT CHANGE UP (ref 3.5–5.3)
POTASSIUM SERPL-SCNC: 3.5 MMOL/L — SIGNIFICANT CHANGE UP (ref 3.5–5.3)
RBC # BLD: 3.06 M/UL — LOW (ref 4.2–5.8)
RBC # FLD: 13.9 % — SIGNIFICANT CHANGE UP (ref 10.3–14.5)
SODIUM SERPL-SCNC: 143 MMOL/L — SIGNIFICANT CHANGE UP (ref 135–145)
SPECIMEN SOURCE: SIGNIFICANT CHANGE UP
SPECIMEN SOURCE: SIGNIFICANT CHANGE UP
WBC # BLD: 15.77 K/UL — HIGH (ref 3.8–10.5)
WBC # FLD AUTO: 15.77 K/UL — HIGH (ref 3.8–10.5)

## 2024-04-22 PROCEDURE — 99232 SBSQ HOSP IP/OBS MODERATE 35: CPT

## 2024-04-22 RX ADMIN — Medication 12.5 MILLIGRAM(S): at 17:10

## 2024-04-22 RX ADMIN — Medication 12.5 MILLIGRAM(S): at 06:11

## 2024-04-22 RX ADMIN — AMLODIPINE BESYLATE 10 MILLIGRAM(S): 2.5 TABLET ORAL at 06:10

## 2024-04-22 RX ADMIN — CASPOFUNGIN ACETATE 260 MILLIGRAM(S): 7 INJECTION, POWDER, LYOPHILIZED, FOR SOLUTION INTRAVENOUS at 11:43

## 2024-04-22 RX ADMIN — PANTOPRAZOLE SODIUM 40 MILLIGRAM(S): 20 TABLET, DELAYED RELEASE ORAL at 06:10

## 2024-04-22 RX ADMIN — Medication 0.6 MILLIGRAM(S): at 17:10

## 2024-04-22 RX ADMIN — HEPARIN SODIUM 5000 UNIT(S): 5000 INJECTION INTRAVENOUS; SUBCUTANEOUS at 17:10

## 2024-04-22 RX ADMIN — Medication 0.6 MILLIGRAM(S): at 06:10

## 2024-04-22 NOTE — PROGRESS NOTE ADULT - SUBJECTIVE AND OBJECTIVE BOX
Max Bellamy MD  [facility]  Contact via Teams or text/call at [phone]    Patient is a 59y old  Male who presents with a chief complaint of pleural effusion (21 Apr 2024 12:14)    Feels good.  Pain continues to improve in right hand and right knee.      Patient seen and examined at bedside. No overnight events reported.     ALLERGIES:  No Known Allergies    MEDICATIONS  (STANDING):  amLODIPine   Tablet 10 milliGRAM(s) Oral daily  caspofungin IVPB      caspofungin IVPB 50 milliGRAM(s) IV Intermittent every 24 hours  colchicine 0.6 milliGRAM(s) Oral two times a day  dextrose 5%. 1000 milliLiter(s) (100 mL/Hr) IV Continuous <Continuous>  dextrose 5%. 1000 milliLiter(s) (50 mL/Hr) IV Continuous <Continuous>  dextrose 50% Injectable 25 Gram(s) IV Push once  dextrose 50% Injectable 25 Gram(s) IV Push once  dextrose 50% Injectable 12.5 Gram(s) IV Push once  glucagon  Injectable 1 milliGRAM(s) IntraMuscular once  heparin   Injectable 5000 Unit(s) SubCutaneous every 12 hours  insulin lispro (ADMELOG) corrective regimen sliding scale   SubCutaneous Before meals and at bedtime  metoprolol tartrate 12.5 milliGRAM(s) Oral every 12 hours  pantoprazole    Tablet 40 milliGRAM(s) Oral before breakfast    MEDICATIONS  (PRN):  acetaminophen     Tablet .. 650 milliGRAM(s) Oral every 6 hours PRN Temp greater or equal to 38C (100.4F), Mild Pain (1 - 3)  albuterol    0.083% 2.5 milliGRAM(s) Nebulizer every 4 hours PRN Wheezing  albuterol    90 MICROgram(s) HFA Inhaler 2 Puff(s) Inhalation every 6 hours PRN Shortness of Breath and/or Wheezing  albuterol/ipratropium for Nebulization 3 milliLiter(s) Nebulizer every 6 hours PRN Shortness of Breath and/or Wheezing  aluminum hydroxide/magnesium hydroxide/simethicone Suspension 30 milliLiter(s) Oral every 4 hours PRN Dyspepsia  dextrose Oral Gel 15 Gram(s) Oral once PRN Blood Glucose LESS THAN 70 milliGRAM(s)/deciliter  melatonin 3 milliGRAM(s) Oral at bedtime PRN Insomnia  metoprolol tartrate Injectable 2.5 milliGRAM(s) IV Push every 6 hours PRN HR above 110/min  ondansetron Injectable 4 milliGRAM(s) IV Push every 8 hours PRN Nausea and/or Vomiting    Vital Signs Last 24 Hrs  T(F): 97.9 (22 Apr 2024 04:42), Max: 98.3 (21 Apr 2024 22:48)  HR: 108 (22 Apr 2024 04:42) (102 - 108)  BP: 146/72 (22 Apr 2024 04:42) (126/66 - 146/72)  RR: 16 (22 Apr 2024 04:42) (16 - 20)  SpO2: 95% (22 Apr 2024 04:42) (94% - 100%)  I&O's Summary    PHYSICAL EXAM:  General: NAD, A/O x 3  ENT: No gross hearing impairment, Moist mucous membranes, no thrush  Neck: Supple, No JVD  Lungs: Clear to auscultation bilaterally, good air entry, non-labored breathing  Cardio: RRR, S1/S2, No murmur  Abdomen: Soft, Nontender, Nondistended; Bowel sounds present  Extremities: No calf tenderness, No cyanosis, No pitting edema, decreased swelling and tenderness to palpation of right hand and right knee  Psych: Appropriate mood and affect    LABS:                        8.5    15.77 )-----------( 620      ( 22 Apr 2024 05:55 )             26.4     04-22    143  |  107  |  13.3  ----------------------------<  93  3.5   |  23.0  |  0.74    Ca    8.5      22 Apr 2024 05:55    TPro  7.5  /  Alb  2.4  /  TBili  0.3  /  DBili  x   /  AST  36  /  ALT  13  /  AlkPhos  123  04-21          PT/INR - ( 21 Apr 2024 06:53 )   PT: 13.2 sec;   INR: 1.20 ratio         PTT - ( 21 Apr 2024 06:53 )  PTT:40.4 sec          03-14 Chol 180 mg/dL LDL --  mg/dL Trig 110 mg/dL              POCT Blood Glucose.: 102 mg/dL (22 Apr 2024 06:15)  POCT Blood Glucose.: 118 mg/dL (21 Apr 2024 21:38)  POCT Blood Glucose.: 129 mg/dL (21 Apr 2024 17:19)  POCT Blood Glucose.: 158 mg/dL (21 Apr 2024 12:19)  POCT Blood Glucose.: 132 mg/dL (21 Apr 2024 07:55)      Urinalysis Basic - ( 22 Apr 2024 05:55 )    Color: x / Appearance: x / SG: x / pH: x  Gluc: 93 mg/dL / Ketone: x  / Bili: x / Urobili: x   Blood: x / Protein: x / Nitrite: x   Leuk Esterase: x / RBC: x / WBC x   Sq Epi: x / Non Sq Epi: x / Bacteria: x    Culture - Blood (collected 19 Apr 2024 11:30)  Source: .Blood Blood-Venous  Preliminary Report (21 Apr 2024 15:01):    No growth at 48 Hours    Culture - Blood (collected 19 Apr 2024 11:25)  Source: .Blood Blood-Peripheral  Preliminary Report (21 Apr 2024 15:01):    No growth at 48 Hours    Culture - Blood (collected 16 Apr 2024 23:11)  Source: .Blood Blood-Venous  Preliminary Report (21 Apr 2024 09:01):    No growth at 4 days    Culture - Blood (collected 16 Apr 2024 23:00)  Source: .Blood Blood-Venous  Preliminary Report (21 Apr 2024 09:01):    No growth at 4 days      COVID-19 PCR: NotDetec (03-31-24 @ 22:32)    RADIOLOGY & ADDITIONAL TESTS:    Care Discussed with Consultants/Other Providers:

## 2024-04-22 NOTE — CHART NOTE - NSCHARTNOTEFT_GEN_A_CORE
Source: Patient [x ]  Family [ ]   other [ ]  59 yoM with Asthma, HTN, HLD, DM2, Gout admitted with TYESHA suspected 2.2 medications along with MF PNA . Course complicated by fungemia.   Sepsis due to Fungemia  Multifocal pneumonia s/p antibiotics     Current Diet: Diet, NPO after Midnight:      NPO Start Date: 21-Apr-2024,   NPO Start Time: 23:59  Except Medications (04-21-24 @ 07:42)  Diet, DASH/TLC:   Sodium & Cholesterol Restricted (03-30-24 @ 01:28)      Patient reports [ ] nausea  [ ] vomiting [ ] diarrhea [ ] constipation  [ ]chewing problems [ ] swallowing issues  [ ] other:     PO intake:  < 50% [ ]   50-75%  [ ]   %  [x ]  other :    Source for PO intake [x ] Patient [ ] family [ ] chart [ ] staff [ ] other        Current Weight:   4/17 104.2 kg  4/10 108.4 kg  4/3 105.1 kg  3/29 95.3 kg  % Weight Change   fluctuations may be reflective of fluid shifts  3+ edema to LE noted    Pertinent Medications: MEDICATIONS  (STANDING):  amLODIPine   Tablet 10 milliGRAM(s) Oral daily  caspofungin IVPB      caspofungin IVPB 50 milliGRAM(s) IV Intermittent every 24 hours  colchicine 0.6 milliGRAM(s) Oral two times a day  dextrose 5%. 1000 milliLiter(s) (100 mL/Hr) IV Continuous <Continuous>  glucagon  Injectable 1 milliGRAM(s) IntraMuscular once  heparin   Injectable 5000 Unit(s) SubCutaneous every 12 hours  insulin lispro (ADMELOG) corrective regimen sliding scale   SubCutaneous Before meals and at bedtime  metoprolol tartrate 12.5 milliGRAM(s) Oral every 12 hours  pantoprazole    Tablet 40 milliGRAM(s) Oral before breakfast    MEDICATIONS  (PRN):  acetaminophen     Tablet .. 650 milliGRAM(s) Oral every 6 hours PRN Temp greater or equal to 38C (100.4F), Mild Pain (1 - 3)  albuterol    0.083% 2.5 milliGRAM(s) Nebulizer every 4 hours PRN Wheezing  dextrose Oral Gel 15 Gram(s) Oral once PRN Blood Glucose LESS THAN 70 milliGRAM(s)/deciliter  melatonin 3 milliGRAM(s) Oral at bedtime PRN Insomnia  metoprolol tartrate Injectable 2.5 milliGRAM(s) IV Push every 6 hours PRN HR above 110/min  ondansetron Injectable 4 milliGRAM(s) IV Push every 8 hours PRN Nausea and/or Vomiting    Pertinent Labs: CBC Full  -  ( 22 Apr 2024 05:55 )  WBC Count : 15.77 K/uL  RBC Count : 3.06 M/uL  Hemoglobin : 8.5 g/dL  Hematocrit : 26.4 %  Platelet Count - Automated : 620 K/uL  Mean Cell Volume : 86.3 fl  Mean Cell Hemoglobin : 27.8 pg  Mean Cell Hemoglobin Concentration : 32.2 gm/dL          Skin: no breakdown noted    Nutrition focused physical exam conducted - found signs of malnutrition [ ]absent [ ]present    Subcutaneous fat loss: [ ] Orbital fat pads region, [ ]Buccal fat region, [ ]Triceps region,  [ ]Ribs region    Muscle wasting: [ ]Temples region, [ ]Clavicle region, [ ]Shoulder region, [ ]Scapula region, [ ]Interosseous region,  [ ]thigh region, [ ]Calf region    Estimated Needs:   [ ] no change since previous assessment  [ ] recalculated:     Current Nutrition Diagnosis: Pt presents at risk secondary to increased protein calorie needs, related to increased  physiologic demand stress factor, as evidenced by sepsis due to fungemia. Po intake approx 75% of meals. Education provided on Con cho diet. Pt receptive. All questions answered. 3+ edema to LE noted. Pt for BAL today. BM 4/20- per documentation.       Recommendations:   1) Add Con Cho to Diet order  2) Weekly weight  3) RX MVI, Vit C    Monitoring and Evaluation:   [ ] PO intake [ ] Tolerance to diet prescription [X] Weights  [X] Follow up per protocol [X] Labs:

## 2024-04-22 NOTE — PROGRESS NOTE ADULT - ASSESSMENT
59 yoM with Asthma, HTN, HLD, DM2, Gout admitted with TYESHA suspected 2.2 medications along with MF PNA . Course complicated by fungemia.     Sepsis due to Fungemia  Multifocal pneumonia s/p antibiotics   Bcx on 04/10 grew Solange  Caspofungin since 04/12/2024  ID following  Rheumatology following  - YASMEEN negative for vegetation  Concern for fungal pneumonia given patient was intermittently on steroid before admission  for BAL TODAY 4/22/24  - repeat ct chest with slightly increased mass compared to previous    Patient medically optimized.  His RCRI is 0.  There are no contraindications to undergoing a BAL    RULED OUT Right elbow osteomyelitis  - ruled out on MRI     Right hand Right Knee swelling and pain - suspected gout flare  - s/p colchicine   - on steroids 20 mg PO  - pain and swelling improved, continue oral steroids     Leukocytosis  - secondary to steroids     TYESHA (now resolved)   - Possibly 2/2 medication induced ATN   - Hold ARB/HCTZ/Metformin   - Renal US negative  - Avoid Nephrotoxins. Renally dose meds  - Nephro following     HTN, HLD  - Amlodipine 10mg q24  - Not currently on statin therapy    DM2  Hold Metformin 500mg BID  ISS    Asthma  Finished steroid taper   No longer taking Montelukast  Wixela/Duoneb PRN    Gout  Uric Acid level 12.2  Takes Allopurinol as needed. Hold for now.     right arm swelling - duplex done neg for dvt  encouraged elevation     VTE PPX SQH    Dispo: Medically active     BAL on 4/22/24 with thoracic

## 2024-04-22 NOTE — CHART NOTE - NSCHARTNOTEFT_GEN_A_CORE
59M, pmhx eosinophilia asthma, HTN, HLD, DM, gout recent hospitilization for acute asthma exacerbation and viral URI found to have uncontrolled HTN and new onset DM2 at that time discharged home with outpatient follow-up, sent to ED 3/29 by PCP for outpt abnl renal function on routine f/u labs, found to have multifocal pneumonia, hospital course c/b sepsis secondary to fungemia ( +bcx 4/10 candida, neg 4/13), being followed by ID, on capsofungin, YASMEEN neg for vegetation, unknown source but presumed fungal pneumonia given admission CT findings and recent treatment with steroids, thoracic surgery consulted for bronch/BAL.      Pt was planned for bronchoscopy with BAL.  However, case cancelled 2/2 pt having drink prior to procedure.  Will need to reschedule. 59M, pmhx eosinophilia asthma, HTN, HLD, DM, gout recent hospitilization for acute asthma exacerbation and viral URI found to have uncontrolled HTN and new onset DM2 at that time discharged home with outpatient follow-up, sent to ED 3/29 by PCP for outpt abnl renal function on routine f/u labs, found to have multifocal pneumonia, hospital course c/b sepsis secondary to fungemia ( +bcx 4/10 candida, neg 4/13), being followed by ID, on capsofungin, YASMEEN neg for vegetation, unknown source but presumed fungal pneumonia given admission CT findings and recent treatment with steroids, thoracic surgery consulted for bronch/BAL.      Pt was planned for bronchoscopy with BAL.  However, case cancelled 2/2 pt having drink prior to procedure.  Pt to be rescheduled for Thursday 4/25.

## 2024-04-23 LAB
BASOPHILS # BLD AUTO: 0 K/UL — SIGNIFICANT CHANGE UP (ref 0–0.2)
BASOPHILS NFR BLD AUTO: 0 % — SIGNIFICANT CHANGE UP (ref 0–2)
EOSINOPHIL # BLD AUTO: 0.3 K/UL — SIGNIFICANT CHANGE UP (ref 0–0.5)
EOSINOPHIL NFR BLD AUTO: 2.6 % — SIGNIFICANT CHANGE UP (ref 0–6)
GLUCOSE BLDC GLUCOMTR-MCNC: 102 MG/DL — HIGH (ref 70–99)
GLUCOSE BLDC GLUCOMTR-MCNC: 105 MG/DL — HIGH (ref 70–99)
GLUCOSE BLDC GLUCOMTR-MCNC: 91 MG/DL — SIGNIFICANT CHANGE UP (ref 70–99)
GLUCOSE BLDC GLUCOMTR-MCNC: 97 MG/DL — SIGNIFICANT CHANGE UP (ref 70–99)
HCT VFR BLD CALC: 27.3 % — LOW (ref 39–50)
HGB BLD-MCNC: 8.9 G/DL — LOW (ref 13–17)
LYMPHOCYTES # BLD AUTO: 2.73 K/UL — SIGNIFICANT CHANGE UP (ref 1–3.3)
LYMPHOCYTES # BLD AUTO: 23.7 % — SIGNIFICANT CHANGE UP (ref 13–44)
MANUAL SMEAR VERIFICATION: SIGNIFICANT CHANGE UP
MCHC RBC-ENTMCNC: 27.8 PG — SIGNIFICANT CHANGE UP (ref 27–34)
MCHC RBC-ENTMCNC: 32.6 GM/DL — SIGNIFICANT CHANGE UP (ref 32–36)
MCV RBC AUTO: 85.3 FL — SIGNIFICANT CHANGE UP (ref 80–100)
MONOCYTES # BLD AUTO: 1.01 K/UL — HIGH (ref 0–0.9)
MONOCYTES NFR BLD AUTO: 8.8 % — SIGNIFICANT CHANGE UP (ref 2–14)
MYELOCYTES NFR BLD: 1.7 % — HIGH (ref 0–0)
NEUTROPHILS # BLD AUTO: 7.27 K/UL — SIGNIFICANT CHANGE UP (ref 1.8–7.4)
NEUTROPHILS NFR BLD AUTO: 63.2 % — SIGNIFICANT CHANGE UP (ref 43–77)
PLAT MORPH BLD: NORMAL — SIGNIFICANT CHANGE UP
PLATELET # BLD AUTO: 627 K/UL — HIGH (ref 150–400)
POLYCHROMASIA BLD QL SMEAR: SLIGHT — SIGNIFICANT CHANGE UP
RBC # BLD: 3.2 M/UL — LOW (ref 4.2–5.8)
RBC # FLD: 13.9 % — SIGNIFICANT CHANGE UP (ref 10.3–14.5)
RBC BLD AUTO: NORMAL — SIGNIFICANT CHANGE UP
SMUDGE CELLS # BLD: PRESENT — SIGNIFICANT CHANGE UP
WBC # BLD: 11.5 K/UL — HIGH (ref 3.8–10.5)
WBC # FLD AUTO: 11.5 K/UL — HIGH (ref 3.8–10.5)

## 2024-04-23 PROCEDURE — 99231 SBSQ HOSP IP/OBS SF/LOW 25: CPT

## 2024-04-23 PROCEDURE — 71045 X-RAY EXAM CHEST 1 VIEW: CPT | Mod: 26

## 2024-04-23 PROCEDURE — 99232 SBSQ HOSP IP/OBS MODERATE 35: CPT

## 2024-04-23 RX ORDER — METOPROLOL TARTRATE 50 MG
25 TABLET ORAL
Refills: 0 | Status: DISCONTINUED | OUTPATIENT
Start: 2024-04-23 | End: 2024-05-01

## 2024-04-23 RX ORDER — ALLOPURINOL 300 MG
100 TABLET ORAL DAILY
Refills: 0 | Status: DISCONTINUED | OUTPATIENT
Start: 2024-04-23 | End: 2024-05-01

## 2024-04-23 RX ORDER — METOPROLOL TARTRATE 50 MG
12.5 TABLET ORAL ONCE
Refills: 0 | Status: COMPLETED | OUTPATIENT
Start: 2024-04-23 | End: 2024-04-23

## 2024-04-23 RX ADMIN — HEPARIN SODIUM 5000 UNIT(S): 5000 INJECTION INTRAVENOUS; SUBCUTANEOUS at 05:07

## 2024-04-23 RX ADMIN — AMLODIPINE BESYLATE 10 MILLIGRAM(S): 2.5 TABLET ORAL at 05:06

## 2024-04-23 RX ADMIN — Medication 0.6 MILLIGRAM(S): at 18:17

## 2024-04-23 RX ADMIN — Medication 3 MILLILITER(S): at 18:25

## 2024-04-23 RX ADMIN — Medication 0.6 MILLIGRAM(S): at 05:06

## 2024-04-23 RX ADMIN — PANTOPRAZOLE SODIUM 40 MILLIGRAM(S): 20 TABLET, DELAYED RELEASE ORAL at 05:06

## 2024-04-23 RX ADMIN — Medication 25 MILLIGRAM(S): at 18:17

## 2024-04-23 RX ADMIN — Medication 12.5 MILLIGRAM(S): at 05:06

## 2024-04-23 RX ADMIN — CASPOFUNGIN ACETATE 260 MILLIGRAM(S): 7 INJECTION, POWDER, LYOPHILIZED, FOR SOLUTION INTRAVENOUS at 12:07

## 2024-04-23 RX ADMIN — Medication 12.5 MILLIGRAM(S): at 08:45

## 2024-04-23 RX ADMIN — HEPARIN SODIUM 5000 UNIT(S): 5000 INJECTION INTRAVENOUS; SUBCUTANEOUS at 18:16

## 2024-04-23 NOTE — PROGRESS NOTE ADULT - SUBJECTIVE AND OBJECTIVE BOX
Acute renal failure        HPI:  Patient seen/examined prior to midnight on 3/29/24.     58M with PMHX Asthma, HTN, HLD, DM2, Gout presents to [facility] ER for abnormal lab work. Patient recently hospitalizated for acute asthma exacerbation and viral URI found to have uncontrolled HTN and new onset DM2 at that time discharged home with outpatient follow-up and sent back to [facility] ER today by PCP for abnormal kidney function. ROS +Fatigue and +Malaise. No other complaints. Denies CVAT or back pain. No urinary complaints. Compliant with meds. Recently started on Losartan, HCTZ, and Metformin on discharge and by PCP. +Tylenol use at home. Denies NSAIDs. (29 Mar 2024 23:47)    Interval History:  Patient was seen and examined at bedside around 8:45 am.  Feels better.  Pain in arms / hands / knees is getting better.   Denies chest pain, palpitations, shortness of breath, headache, dizziness, visual symptoms, nausea, vomiting or abdominal pain.    ROS:  As per interval history otherwise unremarkable.    PHYSICAL EXAM:  Vital Signs   T(C): 36.9 (23 Apr 2024 11:02), Max: 36.9 (23 Apr 2024 11:02)  T(F): 98.5 (23 Apr 2024 11:02), Max: 98.5 (23 Apr 2024 11:02)  HR: 97 (23 Apr 2024 11:02) (97 - 105)  BP: 133/75 (23 Apr 2024 11:02) (133/75 - 148/72)  RR: 18 (23 Apr 2024 11:02) (18 - 18)  SpO2: 98% (23 Apr 2024 11:02) (93% - 100%)  Parameters below as of 23 Apr 2024 11:02  Patient On (Oxygen Delivery Method): room air  General: Middle aged male sitting in bed comfortably. No acute distress  HEENT: EOMI. Clear conjunctivae. Moist mucus membrane  Neck: Supple.   Chest: Good air entry. No wheezing, rales or rhonchi.   Heart: Normal S1 & S2. RRR.   Abdomen: Non distended. Soft. Non-tender. + BS  Ext: No pedal edema. No calf tenderness. ROM limited in elbows and knees due to pain / stiffness.   Neuro: Awake and alert. No focal deficit. Speech clear.   Skin: Warm and Dry  Psychiatry: Normal mood and affect    LABS:  CAPILLARY BLOOD GLUCOSE  POCT Blood Glucose.: 105 mg/dL (23 Apr 2024 11:37)  POCT Blood Glucose.: 91 mg/dL (23 Apr 2024 07:52)  POCT Blood Glucose.: 100 mg/dL (22 Apr 2024 21:12)  POCT Blood Glucose.: 97 mg/dL (22 Apr 2024 16:31)                      8.9    11.50 )-----------( 627      ( 23 Apr 2024 06:05 )             27.3     04-22    143  |  107  |  13.3  ----------------------------<  93  3.5   |  23.0  |  0.74    Ca    8.5      22 Apr 2024 05:55    Urinalysis Basic - ( 22 Apr 2024 05:55 )    Color: x / Appearance: x / SG: x / pH: x  Gluc: 93 mg/dL / Ketone: x  / Bili: x / Urobili: x   Blood: x / Protein: x / Nitrite: x   Leuk Esterase: x / RBC: x / WBC x   Sq Epi: x / Non Sq Epi: x / Bacteria: x    RADIOLOGY & ADDITIONAL STUDIES:  Reviewed     MEDICATIONS  (STANDING):  amLODIPine   Tablet 10 milliGRAM(s) Oral daily  caspofungin IVPB      caspofungin IVPB 50 milliGRAM(s) IV Intermittent every 24 hours  colchicine 0.6 milliGRAM(s) Oral two times a day  dextrose 5%. 1000 milliLiter(s) (100 mL/Hr) IV Continuous <Continuous>  dextrose 5%. 1000 milliLiter(s) (50 mL/Hr) IV Continuous <Continuous>  dextrose 50% Injectable 25 Gram(s) IV Push once  dextrose 50% Injectable 25 Gram(s) IV Push once  dextrose 50% Injectable 12.5 Gram(s) IV Push once  glucagon  Injectable 1 milliGRAM(s) IntraMuscular once  heparin   Injectable 5000 Unit(s) SubCutaneous every 12 hours  insulin lispro (ADMELOG) corrective regimen sliding scale   SubCutaneous Before meals and at bedtime  metoprolol tartrate 25 milliGRAM(s) Oral two times a day  pantoprazole    Tablet 40 milliGRAM(s) Oral before breakfast    MEDICATIONS  (PRN):  acetaminophen     Tablet .. 650 milliGRAM(s) Oral every 6 hours PRN Temp greater or equal to 38C (100.4F), Mild Pain (1 - 3)  albuterol    90 MICROgram(s) HFA Inhaler 2 Puff(s) Inhalation every 6 hours PRN Shortness of Breath and/or Wheezing  albuterol/ipratropium for Nebulization 3 milliLiter(s) Nebulizer every 6 hours PRN Shortness of Breath and/or Wheezing  aluminum hydroxide/magnesium hydroxide/simethicone Suspension 30 milliLiter(s) Oral every 4 hours PRN Dyspepsia  dextrose Oral Gel 15 Gram(s) Oral once PRN Blood Glucose LESS THAN 70 milliGRAM(s)/deciliter  melatonin 3 milliGRAM(s) Oral at bedtime PRN Insomnia  ondansetron Injectable 4 milliGRAM(s) IV Push every 8 hours PRN Nausea and/or Vomiting

## 2024-04-23 NOTE — PROGRESS NOTE ADULT - SUBJECTIVE AND OBJECTIVE BOX
BRIEF HOSPITAL COURSE  59M, pmhx eosinophilia asthma, HTN, HLD, DM, gout recent hospitilization for acute asthma exacerbation and viral URI found to have uncontrolled HTN and new onset DM2 at that time discharged home with outpatient follow-up, sent to ED 3/29 by PCP for outpt abnl renal function on routine f/u labs, found to have multifocal pneumonia, hospital course c/b sepsis secondary to fungemia ( +bcx 4/10 candida, neg 4/13), being followed by ID, on capsofungin, YASMEEN neg for vegetation, unknown source but presumed fungal pneumonia given admission CT findings and recent treatment with steroids, thoracic surgery consulted for bronch/BAL.    SIGNIFICANT RECENT/PAST 24 HR EVENTS  No acute events reported overnight.     SUBJECTIVE  Patient seen and examined on follow up. Pt currently lying in bed in NAD. States " breathing okay, doing all right." Denies fevers, chills, HA, dizziness, CP, SOB, abd pain, N/V/D, numbness/tingling in extremities, or any other acute complaints.  ROS negative x 10 systems except as noted above.    PAST MEDICAL & SURGICAL HISTORY  Gout  Asthma  Arthritis  HTN (hypertension)  No significant past surgical history    DAILY REVIEW  Vital Signs Last 24 Hrs  T(C): 36.7 (23 Apr 2024 16:30), Max: 37 (23 Apr 2024 14:42)  T(F): 98.1 (23 Apr 2024 16:30), Max: 98.6 (23 Apr 2024 14:42)  HR: 93 (23 Apr 2024 16:30) (93 - 105)  BP: 135/75 (23 Apr 2024 16:30) (130/71 - 148/72)  BP(mean): --  RR: 18 (23 Apr 2024 16:30) (18 - 18)  SpO2: 94% (23 Apr 2024 16:30) (94% - 100%)    Parameters below as of 23 Apr 2024 16:30  Patient On (Oxygen Delivery Method): room air     Admit Wt: Drug Dosing Weight  Height (cm): 170.2 (29 Mar 2024 17:32)  Weight (kg): 95.3 (29 Mar 2024 17:32)  BMI (kg/m2): 32.9 (29 Mar 2024 17:32)  BSA (m2): 2.07 (29 Mar 2024 17:32)    LABS                        8.9    11.50 )-----------( 627      ( 23 Apr 2024 06:05 )             27.3     04-22    143  |  107  |  13.3  ----------------------------<  93  3.5   |  23.0  |  0.74    Ca    8.5      22 Apr 2024 05:55    POCT Blood Glucose.: 97 mg/dL (04-23-24 @ 16:52)  POCT Blood Glucose.: 105 mg/dL (04-23-24 @ 11:37)  POCT Blood Glucose.: 91 mg/dL (04-23-24 @ 07:52)  POCT Blood Glucose.: 100 mg/dL (04-22-24 @ 21:12)    Culture - Blood (collected 04-19-24 @ 11:30)  Source: .Blood Blood-Venous  Preliminary Report (04-23-24 @ 15:01):    No growth at 4 days    Culture - Blood (collected 04-19-24 @ 11:25)  Source: .Blood Blood-Peripheral  Preliminary Report (04-23-24 @ 15:01):    No growth at 4 days    Culture - Blood (collected 04-16-24 @ 23:11)  Source: .Blood Blood-Venous  Final Report (04-22-24 @ 09:00):    No growth at 5 days    Culture - Blood (collected 04-16-24 @ 23:00)  Source: .Blood Blood-Venous  Final Report (04-22-24 @ 09:00):    No growth at 5 days    MEDICATIONS  Home Medications:  metFORMIN 500 mg oral tablet: 1 tab(s) orally 2 times a day (30 Mar 2024 02:19)  Wixela Inhub 500 mcg-50 mcg inhalation powder: 1 puff(s) inhaled once a day (30 Mar 2024 02:19)    MEDICATIONS  (STANDING):  allopurinol 100 milliGRAM(s) Oral daily  amLODIPine   Tablet 10 milliGRAM(s) Oral daily  caspofungin IVPB      caspofungin IVPB 50 milliGRAM(s) IV Intermittent every 24 hours  colchicine 0.6 milliGRAM(s) Oral two times a day  dextrose 5%. 1000 milliLiter(s) (100 mL/Hr) IV Continuous <Continuous>  dextrose 5%. 1000 milliLiter(s) (50 mL/Hr) IV Continuous <Continuous>  dextrose 50% Injectable 25 Gram(s) IV Push once  dextrose 50% Injectable 25 Gram(s) IV Push once  dextrose 50% Injectable 12.5 Gram(s) IV Push once  glucagon  Injectable 1 milliGRAM(s) IntraMuscular once  heparin   Injectable 5000 Unit(s) SubCutaneous every 12 hours  insulin lispro (ADMELOG) corrective regimen sliding scale   SubCutaneous Before meals and at bedtime  metoprolol tartrate 25 milliGRAM(s) Oral two times a day  pantoprazole    Tablet 40 milliGRAM(s) Oral before breakfast    MEDICATIONS  (PRN):  acetaminophen     Tablet .. 650 milliGRAM(s) Oral every 6 hours PRN Temp greater or equal to 38C (100.4F), Mild Pain (1 - 3)  albuterol    90 MICROgram(s) HFA Inhaler 2 Puff(s) Inhalation every 6 hours PRN Shortness of Breath and/or Wheezing  albuterol/ipratropium for Nebulization 3 milliLiter(s) Nebulizer every 6 hours PRN Shortness of Breath and/or Wheezing  aluminum hydroxide/magnesium hydroxide/simethicone Suspension 30 milliLiter(s) Oral every 4 hours PRN Dyspepsia  dextrose Oral Gel 15 Gram(s) Oral once PRN Blood Glucose LESS THAN 70 milliGRAM(s)/deciliter  melatonin 3 milliGRAM(s) Oral at bedtime PRN Insomnia  ondansetron Injectable 4 milliGRAM(s) IV Push every 8 hours PRN Nausea and/or Vomiting    ALLERGIES  No Known Allergies    DIAGNOSTICS  All relevant and available laboratory results, radiology and medications reviewed.    PHYSICAL EXAM  Constitutional: NAD  Neck: supple, trachea midline. No JVD   Respiratory: Breath sounds diminished b/l lower fields to auscultation, no accessory muscle use noted. No wheezing, rales, or rhonchi noted b/l   Cardiovascular: Regular rate, regular rhythm, normal S1, S2; no murmurs or rub   Gastrointestinal: Soft, non-tender, non-distended, + bowel sounds   Extremities: DELEON x 4, trace LE peripheral edema, no cyanosis, no clubbing    Vascular: Equal and normal pulses: 2+ peripheral pulses throughout  Neurological: A+O x 3; speech clear and intact; no gross sensory/motor deficits  Psychiatric: calm, normal mood, normal affect   Skin: warm, dry, well perfused, no rashes      (4) no impairment

## 2024-04-23 NOTE — PROGRESS NOTE ADULT - ASSESSMENT
58M with PMHX Asthma, HTN, HLD, DM2, Gout presents to [facility] ER for abnormal lab work. Patient recently hospitalizated for acute asthma exacerbation and viral URI found to have uncontrolled HTN and new onset DM2 at that time discharged home with outpatient follow-up and sent back to [facility] ER today by PCP for abnormal kidney function. ROS +Fatigue and +Malaise. No other complaints. Denies CVAT or back pain. No urinary complaints. Compliant with meds. Recently started on Losartan, HCTZ, and Metformin on discharge and by PCP. +Tylenol use at home. Denies NSAIDs.     Sepsis due to Fungemia  Multifocal pneumonia s/p antibiotics   Bcx on 04/10 grew Solange  Caspofungin since 04/12/2024  YASMEEN negative for vegetation  Concern for fungal pneumonia given patient was intermittently on steroid before admission  repeat ct chest with slightly increased mass compared to previous  BAL was cancelled on 4/22, now scheduled for 4/25. Medically optimized. No absolute contraindications.   ID following  Rheumatology recs noted     Right Elbow Pain   Ruled out Osteomyelitis      Right Hand & Arm / Right Knee swelling and pain - suspected gout flare  - s/p Solumedrol 40 mg IVP x 1  - Continue Colchicine   - Improving     Leukocytosis  - Likely reactive  - Monitor     TYESHA (now resolved)   - Possibly 2/2 medication induced ATN   - Hold ARB/HCTZ/Metformin   - Renal US negative  - Avoid Nephrotoxins  - Nephro follow up noted     HTN, HLD  - Amlodipine 10mg q24  - Increase Metoprolol to 25 mg BID   - Not currently on statin therapy    DM2  A1c 7.1  Hold Metformin 500mg BID  ISS    Asthma  Finished steroid taper   No longer taking Montelukast  Nebs PRN    Gout  Uric Acid level 12.2  Continue Colchicine   Resume Allopurinol     DVT Prophylaxis -- Heparin SQ    Dispo: Home once stable.

## 2024-04-23 NOTE — PROGRESS NOTE ADULT - PROBLEM SELECTOR PLAN 1
CT abd 4/13: pleural based pulmonary nodules at the left lung base which appear larger in size. For example, 3.9 x 1.9 cm pleural-based nodule on series 3 image 32 posteriorly, previously 2.3 x 1.6 cm. Small right pleural effusion has increased. Atelectatic changes.  4/10 blood cultures + candida, on Caspofungin, negative blood cx 4/13  ID following  pulm following  concern for atypical pneumonia given recent admission for asthma exacerbation and IV steroids  non con CT chest w/ persistent RUL opacity,   Planned bronch/BAL cancelled Monday as patient was not NPO  Now planned for Thursday, discussed compliance with NPO with patient  Thoracic surgery to follow  d/w Dr. Dubon in AM rounds CT abd 4/13: pleural based pulmonary nodules at the left lung base which appear larger in size. For example, 3.9 x 1.9 cm pleural-based nodule on series 3 image 32 posteriorly, previously 2.3 x 1.6 cm. Small right pleural effusion has increased. Atelectatic changes.  4/10 blood cultures + candida, on Caspofungin, negative blood cx 4/13  ID following  pulm following  concern for atypical pneumonia given recent admission for asthma exacerbation and IV steroids  non con CT chest w/ persistent RUL opacity,   Planned bronch/BAL cancelled Monday as patient was not NPO  Now planned for Thursday vs Friday pending OR schedule, discussed compliance with NPO with patient  Thoracic surgery to follow  d/w Dr. Dubon in AM rounds

## 2024-04-24 LAB
CULTURE RESULTS: SIGNIFICANT CHANGE UP
CULTURE RESULTS: SIGNIFICANT CHANGE UP
GLUCOSE BLDC GLUCOMTR-MCNC: 108 MG/DL — HIGH (ref 70–99)
GLUCOSE BLDC GLUCOMTR-MCNC: 156 MG/DL — HIGH (ref 70–99)
GLUCOSE BLDC GLUCOMTR-MCNC: 190 MG/DL — HIGH (ref 70–99)
GLUCOSE BLDC GLUCOMTR-MCNC: 94 MG/DL — SIGNIFICANT CHANGE UP (ref 70–99)
SPECIMEN SOURCE: SIGNIFICANT CHANGE UP
SPECIMEN SOURCE: SIGNIFICANT CHANGE UP

## 2024-04-24 PROCEDURE — 99232 SBSQ HOSP IP/OBS MODERATE 35: CPT

## 2024-04-24 RX ADMIN — Medication 0.6 MILLIGRAM(S): at 05:45

## 2024-04-24 RX ADMIN — PANTOPRAZOLE SODIUM 40 MILLIGRAM(S): 20 TABLET, DELAYED RELEASE ORAL at 05:45

## 2024-04-24 RX ADMIN — CASPOFUNGIN ACETATE 260 MILLIGRAM(S): 7 INJECTION, POWDER, LYOPHILIZED, FOR SOLUTION INTRAVENOUS at 10:44

## 2024-04-24 RX ADMIN — Medication 40 MILLIGRAM(S): at 12:06

## 2024-04-24 RX ADMIN — Medication 3 MILLILITER(S): at 03:14

## 2024-04-24 RX ADMIN — HEPARIN SODIUM 5000 UNIT(S): 5000 INJECTION INTRAVENOUS; SUBCUTANEOUS at 05:45

## 2024-04-24 RX ADMIN — Medication 100 MILLIGRAM(S): at 10:44

## 2024-04-24 RX ADMIN — Medication 1: at 21:48

## 2024-04-24 RX ADMIN — Medication 40 MILLIGRAM(S): at 21:48

## 2024-04-24 RX ADMIN — Medication 0.6 MILLIGRAM(S): at 17:33

## 2024-04-24 RX ADMIN — Medication 25 MILLIGRAM(S): at 17:33

## 2024-04-24 RX ADMIN — Medication 3 MILLILITER(S): at 21:37

## 2024-04-24 RX ADMIN — Medication 25 MILLIGRAM(S): at 05:45

## 2024-04-24 RX ADMIN — AMLODIPINE BESYLATE 10 MILLIGRAM(S): 2.5 TABLET ORAL at 05:45

## 2024-04-24 RX ADMIN — Medication 1: at 17:33

## 2024-04-24 RX ADMIN — HEPARIN SODIUM 5000 UNIT(S): 5000 INJECTION INTRAVENOUS; SUBCUTANEOUS at 17:33

## 2024-04-24 NOTE — PROGRESS NOTE ADULT - SUBJECTIVE AND OBJECTIVE BOX
Acute renal failure        HPI:  Patient seen/examined prior to midnight on 3/29/24.     58M with PMHX Asthma, HTN, HLD, DM2, Gout presents to [facility] ER for abnormal lab work. Patient recently hospitalizated for acute asthma exacerbation and viral URI found to have uncontrolled HTN and new onset DM2 at that time discharged home with outpatient follow-up and sent back to [facility] ER today by PCP for abnormal kidney function. ROS +Fatigue and +Malaise. No other complaints. Denies CVAT or back pain. No urinary complaints. Compliant with meds. Recently started on Losartan, HCTZ, and Metformin on discharge and by PCP. +Tylenol use at home. Denies NSAIDs. (29 Mar 2024 23:47)    Interval History:  Patient was seen and examined at bedside around 11 am.  Was getting nebulizer treatment due to wheezing.   Pain in arms / hands / knees is getting better.     ROS:  As per interval history otherwise unremarkable.    PHYSICAL EXAM:  Vital Signs   T(C): 36.9 (24 Apr 2024 07:40), Max: 37.1 (23 Apr 2024 21:15)  T(F): 98.5 (24 Apr 2024 07:40), Max: 98.7 (23 Apr 2024 21:15)  HR: 94 (24 Apr 2024 07:40) (93 - 103)  BP: 135/75 (24 Apr 2024 07:40) (133/77 - 146/76)  RR: 18 (24 Apr 2024 07:40) (18 - 18)  SpO2: 98% (24 Apr 2024 07:40) (94% - 98%)  Parameters below as of 24 Apr 2024 07:40  Patient On (Oxygen Delivery Method): room air  General: Middle aged male sitting in bed comfortably. No acute distress  HEENT: EOMI. Clear conjunctivae. Moist mucus membrane  Neck: Supple.   Chest: Good air entry. Diffuse wheezing. No rales or rhonchi.   Heart: Normal S1 & S2. RRR.   Abdomen: Non distended. Soft. Non-tender. + BS  Ext: No pedal edema. No calf tenderness. ROM limited in elbows and knees due to pain / stiffness.   Neuro: Awake and alert. No focal deficit. Speech clear.   Skin: Warm and Dry  Psychiatry: Normal mood and affect    LABS:  CAPILLARY BLOOD GLUCOSE  POCT Blood Glucose.: 108 mg/dL (24 Apr 2024 12:04)  POCT Blood Glucose.: 94 mg/dL (24 Apr 2024 07:55)  POCT Blood Glucose.: 102 mg/dL (23 Apr 2024 21:30)  POCT Blood Glucose.: 97 mg/dL (23 Apr 2024 16:52)                  8.9    11.50 )-----------( 627      ( 23 Apr 2024 06:05 )             27.3     04-22    143  |  107  |  13.3  ----------------------------<  93  3.5   |  23.0  |  0.74    Ca    8.5      22 Apr 2024 05:55    Urinalysis Basic - ( 22 Apr 2024 05:55 )    Color: x / Appearance: x / SG: x / pH: x  Gluc: 93 mg/dL / Ketone: x  / Bili: x / Urobili: x   Blood: x / Protein: x / Nitrite: x   Leuk Esterase: x / RBC: x / WBC x   Sq Epi: x / Non Sq Epi: x / Bacteria: x    RADIOLOGY & ADDITIONAL STUDIES:  Reviewed     MEDICATIONS  (STANDING):  allopurinol 100 milliGRAM(s) Oral daily  amLODIPine   Tablet 10 milliGRAM(s) Oral daily  caspofungin IVPB      caspofungin IVPB 50 milliGRAM(s) IV Intermittent every 24 hours  colchicine 0.6 milliGRAM(s) Oral two times a day  dextrose 5%. 1000 milliLiter(s) (50 mL/Hr) IV Continuous <Continuous>  dextrose 5%. 1000 milliLiter(s) (100 mL/Hr) IV Continuous <Continuous>  dextrose 50% Injectable 25 Gram(s) IV Push once  dextrose 50% Injectable 25 Gram(s) IV Push once  dextrose 50% Injectable 12.5 Gram(s) IV Push once  glucagon  Injectable 1 milliGRAM(s) IntraMuscular once  heparin   Injectable 5000 Unit(s) SubCutaneous every 12 hours  insulin lispro (ADMELOG) corrective regimen sliding scale   SubCutaneous Before meals and at bedtime  methylPREDNISolone sodium succinate Injectable 40 milliGRAM(s) IV Push every 8 hours  metoprolol tartrate 25 milliGRAM(s) Oral two times a day  pantoprazole    Tablet 40 milliGRAM(s) Oral before breakfast    MEDICATIONS  (PRN):  acetaminophen     Tablet .. 650 milliGRAM(s) Oral every 6 hours PRN Temp greater or equal to 38C (100.4F), Mild Pain (1 - 3)  albuterol    90 MICROgram(s) HFA Inhaler 2 Puff(s) Inhalation every 6 hours PRN Shortness of Breath and/or Wheezing  albuterol/ipratropium for Nebulization 3 milliLiter(s) Nebulizer every 6 hours PRN Shortness of Breath and/or Wheezing  aluminum hydroxide/magnesium hydroxide/simethicone Suspension 30 milliLiter(s) Oral every 4 hours PRN Dyspepsia  dextrose Oral Gel 15 Gram(s) Oral once PRN Blood Glucose LESS THAN 70 milliGRAM(s)/deciliter  melatonin 3 milliGRAM(s) Oral at bedtime PRN Insomnia  ondansetron Injectable 4 milliGRAM(s) IV Push every 8 hours PRN Nausea and/or Vomiting

## 2024-04-24 NOTE — PROGRESS NOTE ADULT - ASSESSMENT
58M with PMHX Asthma, HTN, HLD, DM2, Gout presents to [facility] ER for abnormal lab work. Patient recently hospitalizated for acute asthma exacerbation and viral URI found to have uncontrolled HTN and new onset DM2 at that time discharged home with outpatient follow-up and sent back to [facility] ER today by PCP for abnormal kidney function. ROS +Fatigue and +Malaise. No other complaints. Denies CVAT or back pain. No urinary complaints. Compliant with meds. Recently started on Losartan, HCTZ, and Metformin on discharge and by PCP. +Tylenol use at home. Denies NSAIDs.     Sepsis due to Fungemia  Multifocal pneumonia s/p antibiotics   Bcx on 04/10 grew Solange  Caspofungin since 04/12/2024  YASMEEN negative for vegetation  Concern for fungal pneumonia given patient was intermittently on steroid before admission  repeat ct chest with slightly increased mass compared to previous  BAL was cancelled on 4/22, now scheduled for 4/25. Medically optimized. No absolute contraindications.   ID following  Rheumatology recs noted     Right Elbow Pain   Ruled out Osteomyelitis      Right Hand & Arm / Right Knee swelling and pain - suspected gout flare  - s/p Solumedrol 40 mg IVP x 1  - Continue Colchicine   - Improving     Leukocytosis  - Likely reactive  - Monitor     TYESHA (now resolved)   - Possibly 2/2 medication induced ATN   - Hold ARB/HCTZ/Metformin   - Renal US negative  - Avoid Nephrotoxins  - Nephro follow up noted     HTN, HLD  - Amlodipine 10mg q24  - Increase Metoprolol to 25 mg BID   - Not currently on statin therapy    DM2  A1c 7.1  Hold Metformin 500mg BID  ISS    Asthma  Finished steroid taper. Giving Solumedrol 40 mg x 3 due to wheezing.   No longer taking Montelukast  Nebs PRN    Gout  Uric Acid level 12.2  Continue Colchicine   Resumed Allopurinol     DVT Prophylaxis -- Heparin SQ    Dispo: Home once stable.

## 2024-04-24 NOTE — PROGRESS NOTE ADULT - ASSESSMENT
58y  Male with a h/o Asthma, HTN, HLD, DM2, Gout. Patient presented to [facility] ER for abnormal lab work, elevated Cr from his PMD's office. Patient recently hospitalized for acute asthma exacerbation and viral URI found to have uncontrolled HTN and new onset DM type 2 at that time discharged home with outpatient follow-up and sent back to [facility] ER today by PCP for abnormal kidney function. During his hospital course patient developed fever to 102.3F on 3/31, was a code sepsis, was administered Azithromycin x1 and Zosyn since 4/1.      RT UE swelling and warmth   Fever  TYESHA  Transaminitis   Thrombocytosis  Fungemia       - Blood cultures  3/31, 4/3, 4/6 no growth   - blood cultures 4/10 reporting Candida  - Repeat blood cultures  4/13 no growth   - RVP/COVID 19 PCR 3/31, 4/4 negative   - Urine for legionella and strep negative   - CT C/A/P 4/1 reporting multifocal PNA   - UA 3/30 and 4/1 negative   - RUE CT with contrast concerning for Rt elbow OM and Advanced right wrist arthrosis without significant effusion or convincing CT evidence for osteomyelitis/septic arthritis.  - Rt Elbow MRI with no OM.   - Rheumatology consult noted  - Babesia PCR is negative   - Lyme PCR  is negative   - HIV negative   - viral hepatitis profile negative  - ERON negative   - LFTs improving   - RF is 11  - Procalcitonin 0.30-->0.36  - Continue Caspofungin   - TTE with no veg  - CT A/P with contrast 4/13 reporting no acute findings  - YASMEEN did not report vegetations  - Hold off on PICC/Midline for now unless needed for reasons other than infectious diseases  - started on colchicine for suspected gout, fever likely from gout, improved with colchine  - repeat RVP and bCX ngtd  - plan for bronch/bal due to suspicion for fungal pneumonia, f/u cultures, beta d glucan  - Trend Fever  - Trend WBC    d/w thoracic and pulm  Will Follow

## 2024-04-24 NOTE — PROGRESS NOTE ADULT - SUBJECTIVE AND OBJECTIVE BOX
[facility]  INFECTIOUS DISEASES at [place] / [place] / [place]  =======================================================                               Serg Almanza MD#   Maurisio Guillory MD*                             Lisy Sandhu MD*   aLila Bell MD*                              Professor Emeritus:  Dr Josr Chatman MD^            Diplomates American Board of Internal Medicine & Infectious Diseases                # [place] Office - Appt - Tel  [phone] Fax [phone]                * [place] Office - Appt - Tel [phone] Fax [phone]                      ^[place] Office - Tel  [phone] Fax [phone]                                  Hospital Consult line:  [phone]  =======================================================    ZAINA MILLER [id number]    Follow up: Fever, fungemia    afebrile  feels better  swelling of hands improved  BAL postponed to FRiday due to scheduling    Allergies:  No Known Allergies      REVIEW OF SYSTEMS:  CONSTITUTIONAL: +Fever + dry cough  HEENT:  No diplopia or blurred vision.  No earache, sore throat or runny nose.  CARDIOVASCULAR:  No chest pain  RESPIRATORY:  No cough, shortness of breath  GASTROINTESTINAL:  No nausea, vomiting or diarrhea.  GENITOURINARY:  No dysuria, frequency or urgency. No Blood in urine  MUSCULOSKELETAL:  as above  SKIN:  No change in skin, hair or nails.  NEUROLOGIC:  No Headaches      Physical Exam:  GEN: NAD  HEENT: normocephalic and atraumatic.   NECK: Supple.   LUNGS: CTA B/L.  HEART: RRR  ABDOMEN: Soft, NT, ND.  +BS.    : No CVA tenderness  EXTREMITIES: RUE dorsum swelling and pain  MSK: No joint swelling  NEUROLOGIC: No Focal Deficits   SKIN: Rt hand swelling improved, no findings on external Rt elbow. Nl ROM Rt elbow. swelling of hands better, tenderness resolved      Vitals:  Vital Signs Last 24 Hrs  T(C): 36.7 (24 Apr 2024 20:00), Max: 36.9 (24 Apr 2024 07:40)  T(F): 98.1 (24 Apr 2024 20:00), Max: 98.5 (24 Apr 2024 07:40)  HR: 101 (24 Apr 2024 20:00) (94 - 103)  BP: 144/77 (24 Apr 2024 20:00) (134/76 - 144/77)  BP(mean): --  RR: 17 (24 Apr 2024 20:00) (17 - 18)  SpO2: 99% (24 Apr 2024 20:00) (95% - 99%)    Parameters below as of 24 Apr 2024 20:00  Patient On (Oxygen Delivery Method): room air        Current Antibiotics:  caspofungin IVPB      caspofungin IVPB 50 milliGRAM(s) IV Intermittent every 24 hours    Other medications:  amLODIPine   Tablet 10 milliGRAM(s) Oral daily  dextrose 5%. 1000 milliLiter(s) IV Continuous <Continuous>  dextrose 5%. 1000 milliLiter(s) IV Continuous <Continuous>  dextrose 50% Injectable 25 Gram(s) IV Push once  dextrose 50% Injectable 25 Gram(s) IV Push once  dextrose 50% Injectable 12.5 Gram(s) IV Push once  glucagon  Injectable 1 milliGRAM(s) IntraMuscular once  heparin   Injectable 5000 Unit(s) SubCutaneous every 12 hours  insulin lispro (ADMELOG) corrective regimen sliding scale   SubCutaneous Before meals and at bedtime  metoprolol tartrate 12.5 milliGRAM(s) Oral every 12 hours  pantoprazole    Tablet 40 milliGRAM(s) Oral before breakfast          Urinalysis Basic - ( 18 Apr 2024 06:20 )    Color: x / Appearance: x / SG: x / pH: x  Gluc: 71 mg/dL / Ketone: x  / Bili: x / Urobili: x   Blood: x / Protein: x / Nitrite: x   Leuk Esterase: x / RBC: x / WBC x   Sq Epi: x / Non Sq Epi: x / Bacteria: x                  CAPILLARY BLOOD GLUCOSE      POCT Blood Glucose.: 89 mg/dL (18 Apr 2024 11:19)            RECENT CULTURES:  04-16 @ 23:50    [facility]    04-13 @ 06:50 .Blood Blood     No growth at 72 Hours    04-13 @ 06:47 .Blood Blood     No growth at 72 Hours    04-10 @ 05:59 .Blood Blood-Peripheral Blood Culture PCR  Candida parapsilosis    Growth in aerobic bottle: Candida parapsilosis  Direct identification is available within approximately 3-5  hours either by Blood Panel Multiplexed PCR or Direct  MALDI-TOF. Details: [email or web address]  Growth in aerobic bottle: Yeast like cells    04-10 @ 05:55 .Blood Blood-Peripheral     No growth at 5 days    04-06 @ 05:25 .Blood Blood     No growth at 5 days    04-06 @ 05:19 .Blood Blood     No growth at 5 days    04-04 @ 13:21    RVP  NotDetec    04-03 @ 21:30 .Blood Blood-Peripheral     No growth at 5 days    04-03 @ 21:20 .Blood Blood-Peripheral     No growth at 5 days      WBC Count: 12.77 K/uL (04-16-24 @ 23:11)  WBC Count: 16.53 K/uL (04-16-24 @ 05:13)  WBC Count: 11.60 K/uL (04-15-24 @ 05:25)  WBC Count: 12.59 K/uL (04-14-24 @ 05:17)  WBC Count: 12.39 K/uL (04-13-24 @ 06:05)    Creatinine: 1.15 mg/dL (04-16-24 @ 05:13)  Creatinine: 0.96 mg/dL (04-15-24 @ 05:25)  Creatinine: 0.89 mg/dL (04-14-24 @ 05:17)  Creatinine: 0.92 mg/dL (04-13-24 @ 06:05)    C-Reactive Protein, Serum: 218 mg/L (04-17-24 @ 05:14)  C-Reactive Protein, Serum: 188 mg/L (04-12-24 @ 06:40)  C-Reactive Protein, Serum: 162 mg/L (04-09-24 @ 04:00)  C-Reactive Protein, Serum: 163 mg/L (04-06-24 @ 05:25)  C-Reactive Protein, Serum: 141 mg/L (04-05-24 @ 08:34)    Procalcitonin, Serum: 0.36 ng/mL (04-12-24 @ 06:40)  Procalcitonin, Serum: 0.30 ng/mL (04-10-24 @ 07:04)  Procalcitonin, Serum: 0.25 ng/mL (04-09-24 @ 04:00)  Procalcitonin, Serum: 0.63 ng/mL (04-06-24 @ 05:25)  Procalcitonin, Serum: 0.63 ng/mL (04-05-24 @ 06:57)  Procalcitonin, Serum: 0.64 ng/mL (04-05-24 @ 06:56)     SARS-CoV-2: NotDetec (04-16-24 @ 23:50)  SARS-CoV-2: NotDetec (04-04-24 @ 13:21)  COVID-19 PCR: NotDetec (03-31-24 @ 22:32)  SARS-CoV-2: NotDetec (03-31-24 @ 22:32)      Anti-Nuclear Antibody in AM (04.06.24 @ 05:25)    Anti Nuclear Factor Titer: Negative: Antinuclear AB (ERON), IFA Method    Rapid HIV-1/2 Antibody (04.05.24 @ 08:34)    Rapid HIV-1/2 Antibody: Nonreact    Legionella pneumophila Antigen, Urine (04.02.24 @ 15:51)    Legionella Antigen, Urine: Negative    Streptococcus pneumoniae Ag, Ur (04.02.24 @ 15:51)    Streptococcus pneumoniae Ag, Ur Result: Negative    Lipase (04.04.24 @ 05:05)    Lipase: 53 U/L    Acute Hepatitis Panel (04.06.24 @ 05:25)    Hepatitis C Virus Interpretation: Nonreact   Hepatitis C Virus S/CO Ratio: 0.13 S/CO   Hepatitis B Core IgM Antibody: Nonreact   Hepatitis B Surface Antigen: Nonreact   Hepatitis A IgM Antibody: Nonreact    Rheumatoid Factor Quant, Serum or Plasma in AM (04.06.24 @ 05:25)    Rheumatoid Factor Quant, Serum or Plasma: 11 IU/mL          < from: TTE W or WO Ultrasound Enhancing Agent (03.13.24 @ 21:00) >  CONCLUSIONS:      1. Mild left ventricular hypertrophy.   2. Left ventricular cavity is normal in size. Left ventricular systolic function is hyperdynamic with an ejection fraction of 71 % by Cheney's method of disks with an ejection fraction visually estimated at 70 to 75 %. There are no regional wall motion abnormalities seen.   3. Normal right ventricular cavity size and normal systolic function.   4. Trileaflet aortic valve with normal systolic excursion. mild calcification of the aortic valve leaflets.   5. Structurally normal mitral valve with normal leaflet excursion.   6. The right atrium is normal in size.   7. The left atrium is normal.   8. No pericardial effusion seen.    < end of copied text >        < from: MR Elbow No Cont, Right (04.11.24 @ 14:50) >    ACC: [id number] EXAM:  MR ELBOW RT   ORDERED BY: JAIRON CARPIO     PROCEDURE DATE:  04/11/2024      INTERPRETATION:  Clinical indication: Fever of unknown origin. Swelling   about the elbow. CT findings concerning for avulsion fracture versus   detached enthesophyte    Multiplanar multisequence noncontrast MRI of the right elbow was   performed.    Correlation is made with prior CT of the right elbow from April 10, 2024.    FINDINGS:    There is extensive circumferential subcutaneous edemathroughout the   visualized elbow consistent with cellulitis. There is mild feathery edema   within the medial flexor and to a lesser extent the dorsal extensor   musculature which is nonspecific and may be related to infectious or   inflammatory etiologies.    The previously described ossific fragment embedded within the ulnar   margin of the distal triceps tendon is again seen. This is likely related   to the sequela of remote avulsive injury. There is moderate tendinosis   and surrounding peritendinous edema within the triceps tendon. There is   no full-thickness or retracted triceps tendon tear. There is mild   psuedoarthritic changes between the olecranon and the avulsed fragment   which trace osseous edema about the pseudoarthrosis. There is mild   overlying olecranon bursitis. Trace osseous edema about the   pseudoarthrosis of the avulsed fragment which is favored to be   degenerative in nature. No convincing evidence of osteomyelitis.   Otherwise, the marrow signal within the elbow is preserved.    There is a nonspecific small elbow joint effusion. There is no evidence   of osseous erosion or subarticular osteitis. The articular surfaces are   preserved.    There is chronic mild undersurface tearing involving the origin of the  common extensor tendon. The lateral collateral ligamentous structures are   preserved.    The common flexor tendon origin is intact. Medial collateral ligamentous   structures are preserved. The distal biceps and brachialis tendinous   insertions are intact.    IMPRESSION:    Redemonstration of a chronic appearing avulsion along the ulnar margin of   the triceps insertion. There is moderate associated tendinosis and   surrounding peritendinous edema within the triceps tendon. No   full-thickness or retracted triceps tendon tear. Mild psuedoarthritic   changes between the olecranon and the avulsed fragment which trace   osseous edema about the pseudoarthrosis. Trace osseous edema about the   pseudoarthrosis of the avulsed fragment which is favored to be   degenerative in nature. No convincing evidence of osteomyelitis.    Diffuse subcutaneous edema about the elbow. Mild olecranon bursitis. Mild   edema within the medial flexor and to a lesser extent extensor   musculature. Findings may be related to underlying cellulitis.    Nonspecific small elbow joint effusion.    --- End of Report ---    < end of copied text >      < from: CT Upper Extremity w/ IV Cont, Right (04.10.24 @ 14:05) >  ACC: [id number] EXAM:  CT UPR EXT IC RT   ORDERED BY: MAURISIO HUERTA DATE:  04/10/2024      INTERPRETATION:  CT OF THE RIGHT UPPER EXTREMITY    CLINICAL INFORMATION: Swelling.    COMPARISON: None available.    TECHNIQUE: Axial CT images were obtained of the right upper extremity   from the shoulder through the fingertips following administration of 90   cc Omnipaque 350 intravenous contrast. Sagittal and coronal   reconstructions were provided.    FINDINGS:    Osseous: No acutefracture or dislocation. Small well-corticated chronic   nonunited cortical avulsion stress fracture fragment versus detached   enthesophyte at the olecranon insertion of the triceps. Subtle adjacent   small focal cortical erosion. No aggressive periosteal reaction.   Moderately severe wrist arthrosis without definite effusions.   Mineralization within normal limits.     Soft tissues: Inflammatory stranding near circumferentially surrounding   the elbow and proximal to mid forearm, but without discrete sizable   hyperattenuating hematoma or drainable encapsulated fluid collection. No   elbow joint effusion. Ill-defined superficial dermal ulceration overlying   the tip of the olecranon with nearly exposed bone. No tracking emphysema.   No radiopaque foreign body.    IMPRESSION:    1.  Small chronic nonunited cortical avulsion stress fracture fragment   versus detached enthesophyte at the olecranon insertion of right triceps.   Overlying dermal ulceration with likely exposed bone and CT findings   concerning for focal osteomyelitis. Correlate for probe to bone and   consider follow-up elbow MRI as clinically indicated.  2.  Acute cellulitis in the proper clinical setting. No tracking soft   tissue emphysema drainable mature abscess.  3. No right elbow joint effusion/septic arthritis. Advanced right wrist   arthrosis without significant effusion or convincing CT evidence for   osteomyelitis/septic arthritis.    --- End of Report ---    < end of copied text >        < from: CT Abdomen and Pelvis w/ IV Cont (04.13.24 @ 09:41) >  ACC: [id number] EXAM:  CT ABDOMEN AND PELVIS IC   ORDERED BY: KENYATTA YEBOAH     PROCEDURE DATE:  04/13/2024      INTERPRETATION:  CLINICAL INFORMATION: Bacteremia.    COMPARISON: CT scan of the abdomen and pelvis from 4/1/2024    CONTRAST/COMPLICATIONS:  IV Contrast: Omnipaque 350  95 cc administered   5 cc discarded  Oral Contrast: NONE  Complications: None reported at time of study completion    PROCEDURE:  CT of the Abdomen and Pelvis was performed.  Sagittal and coronal reformats were performed.    FINDINGS:  LOWER CHEST: Pleural based pulmonary nodules at the left lung base which   appear larger in size. For example, 3.9 x 1.9 cm pleural-based nodule on   series 3 image 32 posteriorly, previously 2.3 x 1.6 cm. Small right   pleural effusion has increased. Atelectatic changes.    LIVER: Within normal limits.  BILE DUCTS: Normal caliber.  GALLBLADDER: Within normal limits.  SPLEEN: Within normal limits.  PANCREAS: Within normal limits.  ADRENALS: Within normal limits.  KIDNEYS/URETERS: 2 mm nonobstructing calcification in the midpole the   left kidney is unchanged.    BLADDER: Within normal limits.  REPRODUCTIVE ORGANS: Mildly heterogeneous prostate gland.    BOWEL: No bowel obstruction. Appendix within normal limits.  PERITONEUM: No ascites.  VESSELS: Within normal limits.  RETROPERITONEUM/LYMPH NODES: No lymphadenopathy.  ABDOMINAL WALL: Umbilical hernia containing fat. Bilateral inguinal   hernias, right greater than left, containing fat  BONES: Degenerative changes of bone.    IMPRESSION:  Pleural-based nodules at the left lung base appears larger in size.   Please correlate clinically. Small right pleural effusion has increased.    No acute intra-abdominal pathology visualized.    --- End of Report ---    < end of copied text >            < from: YASMEEN W or WO Ultrasound Enhancing Agent (04.17.24 @ 11:34) >     CONCLUSIONS:      1. Left ventricular systolic function is normal with an ejection fraction visually estimated at 60 to 65 %.   2. Normal right ventricular cavity size and normal systolic function.   3. No evidence of left atrial or left atrial appendage thrombus. The left atrial appendage emptying velocity is normal.   4. No significant valvular disease.   5. No pericardial effusion seen.   6. Agitated saline injection was negative for intracardiac shunt.   7. Compared to the transthoracic echocardiogram performed on 4/12/2024, there have been no significant interval changes.   8. No echocardiographic evidence of vegetations.    < end of copied text >

## 2024-04-25 ENCOUNTER — TRANSCRIPTION ENCOUNTER (OUTPATIENT)
Age: 59
End: 2024-04-25

## 2024-04-25 LAB
ANION GAP SERPL CALC-SCNC: 15 MMOL/L — SIGNIFICANT CHANGE UP (ref 5–17)
BASOPHILS # BLD AUTO: 0.03 K/UL — SIGNIFICANT CHANGE UP (ref 0–0.2)
BASOPHILS NFR BLD AUTO: 0.2 % — SIGNIFICANT CHANGE UP (ref 0–2)
BLD GP AB SCN SERPL QL: SIGNIFICANT CHANGE UP
BUN SERPL-MCNC: 10.2 MG/DL — SIGNIFICANT CHANGE UP (ref 8–20)
CALCIUM SERPL-MCNC: 9.1 MG/DL — SIGNIFICANT CHANGE UP (ref 8.4–10.5)
CHLORIDE SERPL-SCNC: 100 MMOL/L — SIGNIFICANT CHANGE UP (ref 96–108)
CO2 SERPL-SCNC: 23 MMOL/L — SIGNIFICANT CHANGE UP (ref 22–29)
CREAT SERPL-MCNC: 0.72 MG/DL — SIGNIFICANT CHANGE UP (ref 0.5–1.3)
EGFR: 104 ML/MIN/1.73M2 — SIGNIFICANT CHANGE UP
EOSINOPHIL # BLD AUTO: 0.01 K/UL — SIGNIFICANT CHANGE UP (ref 0–0.5)
EOSINOPHIL NFR BLD AUTO: 0.1 % — SIGNIFICANT CHANGE UP (ref 0–6)
GLUCOSE BLDC GLUCOMTR-MCNC: 164 MG/DL — HIGH (ref 70–99)
GLUCOSE BLDC GLUCOMTR-MCNC: 177 MG/DL — HIGH (ref 70–99)
GLUCOSE BLDC GLUCOMTR-MCNC: 182 MG/DL — HIGH (ref 70–99)
GLUCOSE BLDC GLUCOMTR-MCNC: 213 MG/DL — HIGH (ref 70–99)
GLUCOSE SERPL-MCNC: 170 MG/DL — HIGH (ref 70–99)
HCT VFR BLD CALC: 27.5 % — LOW (ref 39–50)
HGB BLD-MCNC: 9 G/DL — LOW (ref 13–17)
IMM GRANULOCYTES NFR BLD AUTO: 2.9 % — HIGH (ref 0–0.9)
LYMPHOCYTES # BLD AUTO: 17.3 % — SIGNIFICANT CHANGE UP (ref 13–44)
LYMPHOCYTES # BLD AUTO: 2.85 K/UL — SIGNIFICANT CHANGE UP (ref 1–3.3)
MAGNESIUM SERPL-MCNC: 1.8 MG/DL — SIGNIFICANT CHANGE UP (ref 1.6–2.6)
MCHC RBC-ENTMCNC: 27.6 PG — SIGNIFICANT CHANGE UP (ref 27–34)
MCHC RBC-ENTMCNC: 32.7 GM/DL — SIGNIFICANT CHANGE UP (ref 32–36)
MCV RBC AUTO: 84.4 FL — SIGNIFICANT CHANGE UP (ref 80–100)
MONOCYTES # BLD AUTO: 0.25 K/UL — SIGNIFICANT CHANGE UP (ref 0–0.9)
MONOCYTES NFR BLD AUTO: 1.5 % — LOW (ref 2–14)
NEUTROPHILS # BLD AUTO: 12.85 K/UL — HIGH (ref 1.8–7.4)
NEUTROPHILS NFR BLD AUTO: 78 % — HIGH (ref 43–77)
PLATELET # BLD AUTO: 608 K/UL — HIGH (ref 150–400)
POTASSIUM SERPL-MCNC: 3.6 MMOL/L — SIGNIFICANT CHANGE UP (ref 3.5–5.3)
POTASSIUM SERPL-SCNC: 3.6 MMOL/L — SIGNIFICANT CHANGE UP (ref 3.5–5.3)
RBC # BLD: 3.26 M/UL — LOW (ref 4.2–5.8)
RBC # FLD: 13.9 % — SIGNIFICANT CHANGE UP (ref 10.3–14.5)
SODIUM SERPL-SCNC: 138 MMOL/L — SIGNIFICANT CHANGE UP (ref 135–145)
WBC # BLD: 16.47 K/UL — HIGH (ref 3.8–10.5)
WBC # FLD AUTO: 16.47 K/UL — HIGH (ref 3.8–10.5)

## 2024-04-25 PROCEDURE — 99232 SBSQ HOSP IP/OBS MODERATE 35: CPT

## 2024-04-25 PROCEDURE — 99231 SBSQ HOSP IP/OBS SF/LOW 25: CPT

## 2024-04-25 RX ORDER — POTASSIUM CHLORIDE 20 MEQ
40 PACKET (EA) ORAL ONCE
Refills: 0 | Status: COMPLETED | OUTPATIENT
Start: 2024-04-25 | End: 2024-04-25

## 2024-04-25 RX ORDER — MAGNESIUM OXIDE 400 MG ORAL TABLET 241.3 MG
400 TABLET ORAL
Refills: 0 | Status: COMPLETED | OUTPATIENT
Start: 2024-04-25 | End: 2024-04-27

## 2024-04-25 RX ADMIN — PANTOPRAZOLE SODIUM 40 MILLIGRAM(S): 20 TABLET, DELAYED RELEASE ORAL at 05:43

## 2024-04-25 RX ADMIN — AMLODIPINE BESYLATE 10 MILLIGRAM(S): 2.5 TABLET ORAL at 05:44

## 2024-04-25 RX ADMIN — Medication 25 MILLIGRAM(S): at 16:59

## 2024-04-25 RX ADMIN — MAGNESIUM OXIDE 400 MG ORAL TABLET 400 MILLIGRAM(S): 241.3 TABLET ORAL at 16:58

## 2024-04-25 RX ADMIN — MAGNESIUM OXIDE 400 MG ORAL TABLET 400 MILLIGRAM(S): 241.3 TABLET ORAL at 12:42

## 2024-04-25 RX ADMIN — Medication 2: at 12:43

## 2024-04-25 RX ADMIN — Medication 0.6 MILLIGRAM(S): at 16:58

## 2024-04-25 RX ADMIN — Medication 1: at 21:08

## 2024-04-25 RX ADMIN — Medication 3 MILLILITER(S): at 19:52

## 2024-04-25 RX ADMIN — Medication 1: at 08:49

## 2024-04-25 RX ADMIN — Medication 100 MILLIGRAM(S): at 12:42

## 2024-04-25 RX ADMIN — Medication 40 MILLIGRAM(S): at 05:43

## 2024-04-25 RX ADMIN — Medication 0.6 MILLIGRAM(S): at 05:44

## 2024-04-25 RX ADMIN — Medication 1: at 16:59

## 2024-04-25 RX ADMIN — HEPARIN SODIUM 5000 UNIT(S): 5000 INJECTION INTRAVENOUS; SUBCUTANEOUS at 05:43

## 2024-04-25 RX ADMIN — CASPOFUNGIN ACETATE 260 MILLIGRAM(S): 7 INJECTION, POWDER, LYOPHILIZED, FOR SOLUTION INTRAVENOUS at 12:45

## 2024-04-25 RX ADMIN — HEPARIN SODIUM 5000 UNIT(S): 5000 INJECTION INTRAVENOUS; SUBCUTANEOUS at 16:59

## 2024-04-25 RX ADMIN — Medication 25 MILLIGRAM(S): at 05:44

## 2024-04-25 RX ADMIN — Medication 40 MILLIEQUIVALENT(S): at 12:42

## 2024-04-25 NOTE — PROGRESS NOTE ADULT - SUBJECTIVE AND OBJECTIVE BOX
Acute renal failure        HPI:  Patient seen/examined prior to midnight on 3/29/24.     58M with PMHX Asthma, HTN, HLD, DM2, Gout presents to [facility] for abnormal lab work. Patient recently hospitalizated for acute asthma exacerbation and viral URI found to have uncontrolled HTN and new onset DM2 at that time discharged home with outpatient follow-up and sent back to [facility] today by PCP for abnormal kidney function. ROS +Fatigue and +Malaise. No other complaints. Denies CVAT or back pain. No urinary complaints. Compliant with meds. Recently started on Losartan, HCTZ, and Metformin on discharge and by PCP. +Tylenol use at home. Denies NSAIDs. (29 Mar 2024 23:47)    Interval History:  Patient was seen and examined at bedside around 11:15 am.  Feels better today.   Denies shortness of breath, wheezing, chest pain, palpitations, nausea or vomiting.   Arm / leg pain is better.     ROS:  As per interval history otherwise unremarkable.    PHYSICAL EXAM:  Vital Signs   T(C): 36.6 (25 Apr 2024 07:50), Max: 36.9 (25 Apr 2024 05:42)  T(F): 97.8 (25 Apr 2024 07:50), Max: 98.4 (25 Apr 2024 05:42)  HR: 86 (25 Apr 2024 07:50) (10 - 101)  BP: 124/71 (25 Apr 2024 07:50) (124/71 - 144/77)  RR: 18 (25 Apr 2024 07:50) (17 - 18)  SpO2: 97% (25 Apr 2024 07:50) (97% - 99%)  Parameters below as of 25 Apr 2024 07:50  Patient On (Oxygen Delivery Method): room air  General: Middle aged male sitting in chair comfortably. No acute distress  HEENT: EOMI. Clear conjunctivae. Moist mucus membrane  Neck: Supple.   Chest: Good air entry. No wheezing, rales or rhonchi.   Heart: Normal S1 & S2. RRR.   Abdomen: Non distended. Soft. Non-tender. + BS  Ext: No pedal edema. No calf tenderness. ROM limited in elbows and knees due to stiffness.   Neuro: Awake and alert. No focal deficit. Speech clear.   Skin: Warm and Dry  Psychiatry: Normal mood and affect    LABS:  CAPILLARY BLOOD GLUCOSE  POCT Blood Glucose.: 213 mg/dL (25 Apr 2024 12:10)  POCT Blood Glucose.: 164 mg/dL (25 Apr 2024 07:58)  POCT Blood Glucose.: 190 mg/dL (24 Apr 2024 21:19)  POCT Blood Glucose.: 156 mg/dL (24 Apr 2024 17:08)               8.9    11.50 )-----------( 627      ( 23 Apr 2024 06:05 )             27.3     04-22    143  |  107  |  13.3  ----------------------------<  93  3.5   |  23.0  |  0.74    Ca    8.5      22 Apr 2024 05:55    Urinalysis Basic - ( 22 Apr 2024 05:55 )    Color: x / Appearance: x / SG: x / pH: x  Gluc: 93 mg/dL / Ketone: x  / Bili: x / Urobili: x   Blood: x / Protein: x / Nitrite: x   Leuk Esterase: x / RBC: x / WBC x   Sq Epi: x / Non Sq Epi: x / Bacteria: x    RADIOLOGY & ADDITIONAL STUDIES:  Reviewed     MEDICATIONS  (STANDING):  allopurinol 100 milliGRAM(s) Oral daily  amLODIPine   Tablet 10 milliGRAM(s) Oral daily  caspofungin IVPB      caspofungin IVPB 50 milliGRAM(s) IV Intermittent every 24 hours  colchicine 0.6 milliGRAM(s) Oral two times a day  dextrose 5%. 1000 milliLiter(s) (100 mL/Hr) IV Continuous <Continuous>  dextrose 5%. 1000 milliLiter(s) (50 mL/Hr) IV Continuous <Continuous>  dextrose 50% Injectable 25 Gram(s) IV Push once  dextrose 50% Injectable 25 Gram(s) IV Push once  dextrose 50% Injectable 12.5 Gram(s) IV Push once  glucagon  Injectable 1 milliGRAM(s) IntraMuscular once  heparin   Injectable 5000 Unit(s) SubCutaneous every 12 hours  insulin lispro (ADMELOG) corrective regimen sliding scale   SubCutaneous Before meals and at bedtime  magnesium oxide 400 milliGRAM(s) Oral three times a day with meals  metoprolol tartrate 25 milliGRAM(s) Oral two times a day  pantoprazole    Tablet 40 milliGRAM(s) Oral before breakfast    MEDICATIONS  (PRN):  acetaminophen     Tablet .. 650 milliGRAM(s) Oral every 6 hours PRN Temp greater or equal to 38C (100.4F), Mild Pain (1 - 3)  albuterol    90 MICROgram(s) HFA Inhaler 2 Puff(s) Inhalation every 6 hours PRN Shortness of Breath and/or Wheezing  albuterol/ipratropium for Nebulization 3 milliLiter(s) Nebulizer every 6 hours PRN Shortness of Breath and/or Wheezing  aluminum hydroxide/magnesium hydroxide/simethicone Suspension 30 milliLiter(s) Oral every 4 hours PRN Dyspepsia  dextrose Oral Gel 15 Gram(s) Oral once PRN Blood Glucose LESS THAN 70 milliGRAM(s)/deciliter  melatonin 3 milliGRAM(s) Oral at bedtime PRN Insomnia  ondansetron Injectable 4 milliGRAM(s) IV Push every 8 hours PRN Nausea and/or Vomiting

## 2024-04-25 NOTE — PROGRESS NOTE ADULT - SUBJECTIVE AND OBJECTIVE BOX
Subjective - patient seen and evaluated bedside. Sitting comfortably in bed. Denies CP, SOB, HA, dizziness, n/v/d    Review of Systems: negative x 10 systems except as noted above    Brief summary:  59yMale with PNA, RUL opacity, pending bronchoscopy    Significant/[id number] events: no acute events      PAST MEDICAL & SURGICAL HISTORY:  Gout      Asthma      Arthritis      HTN (hypertension)      No significant past surgical history      No significant past surgical history            acetaminophen     Tablet .. 650 milliGRAM(s) Oral every 6 hours PRN  albuterol    90 MICROgram(s) HFA Inhaler 2 Puff(s) Inhalation every 6 hours PRN  albuterol/ipratropium for Nebulization 3 milliLiter(s) Nebulizer every 6 hours PRN  allopurinol 100 milliGRAM(s) Oral daily  aluminum hydroxide/magnesium hydroxide/simethicone Suspension 30 milliLiter(s) Oral every 4 hours PRN  amLODIPine   Tablet 10 milliGRAM(s) Oral daily  caspofungin IVPB      caspofungin IVPB 50 milliGRAM(s) IV Intermittent every 24 hours  colchicine 0.6 milliGRAM(s) Oral two times a day  dextrose 5%. 1000 milliLiter(s) IV Continuous <Continuous>  dextrose 5%. 1000 milliLiter(s) IV Continuous <Continuous>  dextrose 50% Injectable 25 Gram(s) IV Push once  dextrose 50% Injectable 25 Gram(s) IV Push once  dextrose 50% Injectable 12.5 Gram(s) IV Push once  dextrose Oral Gel 15 Gram(s) Oral once PRN  glucagon  Injectable 1 milliGRAM(s) IntraMuscular once  heparin   Injectable 5000 Unit(s) SubCutaneous every 12 hours  insulin lispro (ADMELOG) corrective regimen sliding scale   SubCutaneous Before meals and at bedtime  magnesium oxide 400 milliGRAM(s) Oral three times a day with meals  melatonin 3 milliGRAM(s) Oral at bedtime PRN  metoprolol tartrate 25 milliGRAM(s) Oral two times a day  ondansetron Injectable 4 milliGRAM(s) IV Push every 8 hours PRN  pantoprazole    Tablet 40 milliGRAM(s) Oral before breakfast  potassium chloride    Tablet ER 40 milliEquivalent(s) Oral once  MEDICATIONS  (PRN):  acetaminophen     Tablet .. 650 milliGRAM(s) Oral every 6 hours PRN Temp greater or equal to 38C (100.4F), Mild Pain (1 - 3)  albuterol    90 MICROgram(s) HFA Inhaler 2 Puff(s) Inhalation every 6 hours PRN Shortness of Breath and/or Wheezing  albuterol/ipratropium for Nebulization 3 milliLiter(s) Nebulizer every 6 hours PRN Shortness of Breath and/or Wheezing  aluminum hydroxide/magnesium hydroxide/simethicone Suspension 30 milliLiter(s) Oral every 4 hours PRN Dyspepsia  dextrose Oral Gel 15 Gram(s) Oral once PRN Blood Glucose LESS THAN 70 milliGRAM(s)/deciliter  melatonin 3 milliGRAM(s) Oral at bedtime PRN Insomnia  ondansetron Injectable 4 milliGRAM(s) IV Push every 8 hours PRN Nausea and/or Vomiting      Daily     Daily                               9.0    16.47 )-----------( 608      ( 25 Apr 2024 04:56 )             27.5   04-25    138  |  100  |  10.2  ----------------------------<  170<H>  3.6   |  23.0  |  0.72    Ca    9.1      25 Apr 2024 04:56  Mg     1.8     04-25              Objective:  T(C): 36.6 (04-25-24 @ 07:50), Max: 36.9 (04-25-24 @ 05:42)  HR: 86 (04-25-24 @ 07:50) (10 - 102)  BP: 124/71 (04-25-24 @ 07:50) (124/71 - 144/77)  RR: 18 (04-25-24 @ 07:50) (17 - 18)  SpO2: 97% (04-25-24 @ 07:50) (97% - 99%)  Wt(kg): --CAPILLARY BLOOD GLUCOSE      POCT Blood Glucose.: 164 mg/dL (25 Apr 2024 07:58)  POCT Blood Glucose.: 190 mg/dL (24 Apr 2024 21:19)  POCT Blood Glucose.: 156 mg/dL (24 Apr 2024 17:08)  POCT Blood Glucose.: 108 mg/dL (24 Apr 2024 12:04)  I&O's Summary    24 Apr 2024 07:01  -  25 Apr 2024 07:00  --------------------------------------------------------  IN: 1060 mL / OUT: 1950 mL / NET: -890 mL        Physical Exam  General: NAD  Neuro: A+O x 3, non-focal, speech clear and intact  Psych: Appropriate affect  Pulm: CTA, equal bilaterally  CV: RRR, +S1S2  Abd: soft, NT, ND,   Ext: +DP Pulses b/l, no edema  Skin: Warm, dry, intact          Imaging:  < from: Xray Chest 1 View- PORTABLE-Routine (Xray Chest 1 View- PORTABLE-Routine in AM.) (04.23.24 @ 05:44) >  TECHNIQUE: AP radiograph of the chest.    FINDINGS:  The cardiomediastinal silhouette is within normal limits.  Minimal improvement in right upper lobe airspace consolidation.  Remainder of the lungs are clear.  No pleural effusionor pneumothorax.    IMPRESSION:    Minimal improvement in upper right lung masslike consolidation.    --- End of Report ---    < end of copied text >

## 2024-04-25 NOTE — PROGRESS NOTE ADULT - ASSESSMENT
58M with PMHX Asthma, HTN, HLD, DM2, Gout presents to [facility] ER for abnormal lab work. Patient recently hospitalizated for acute asthma exacerbation and viral URI found to have uncontrolled HTN and new onset DM2 at that time discharged home with outpatient follow-up and sent back to [facility] ER today by PCP for abnormal kidney function. ROS +Fatigue and +Malaise. No other complaints. Denies CVAT or back pain. No urinary complaints. Compliant with meds. Recently started on Losartan, HCTZ, and Metformin on discharge and by PCP. +Tylenol use at home. Denies NSAIDs.     Sepsis due to Fungemia  Multifocal pneumonia s/p antibiotics   Bcx on 04/10 grew Solange  Caspofungin since 04/12/2024  YASMEEN negative for vegetation  Concern for fungal pneumonia given patient was intermittently on steroid before admission  repeat ct chest with slightly increased mass compared to previous  BAL was cancelled on 4/22, now scheduled for 4/26. Medically optimized. No absolute contraindications.   ID following  Rheumatology recs noted     Right Elbow Pain   Ruled out Osteomyelitis      Right Hand & Arm / Right Knee swelling and pain - suspected gout flare  - s/p Solumedrol 40 mg IVP x 1  - Continue Colchicine   - Improving     Leukocytosis  - Likely reactive from steroids   - Monitor     TYESHA (now resolved)   - Possibly 2/2 medication induced ATN   - Hold ARB/HCTZ/Metformin   - Renal US negative  - Avoid Nephrotoxins  - Nephro follow up noted     HTN, HLD  - Amlodipine 10mg q24  - Increased Metoprolol to 25 mg BID   - Not currently on statin therapy    DM2  A1c 7.1  Hold Metformin 500mg BID  ISS    Asthma  Finished steroid taper. Gave Solumedrol 40 mg x 3 due to wheezing on 4/24.   No longer taking Montelukast  Nebs PRN    Gout  Uric Acid level 12.2  Continue Colchicine   Resumed Allopurinol     DVT Prophylaxis -- Heparin SQ    Dispo: Home once stable.

## 2024-04-25 NOTE — PROGRESS NOTE ADULT - PROBLEM SELECTOR PLAN 1
CT abd 4/13: pleural based pulmonary nodules at the left lung base which appear larger in size. For example, 3.9 x 1.9 cm pleural-based nodule on series 3 image 32 posteriorly, previously 2.3 x 1.6 cm. Small right pleural effusion has increased. Atelectatic changes.  4/10 blood cultures + candida, on Caspofungin, negative blood cx 4/13  ID following  pulm following  concern for atypical pneumonia given recent admission for asthma exacerbation and IV steroids  non con CT chest w/ persistent RUL opacity,   Planned bronch/BAL cancelled Monday as patient was not NPO  Now planned for Friday 4/26  NPO after MN, AM labs, maintain current T&S  Thoracic surgery to follow  d/w Dr. Dubon in AM rounds

## 2024-04-26 ENCOUNTER — APPOINTMENT (OUTPATIENT)
Dept: THORACIC SURGERY | Facility: HOSPITAL | Age: 59
End: 2024-04-26

## 2024-04-26 LAB
ANION GAP SERPL CALC-SCNC: 8 MMOL/L — SIGNIFICANT CHANGE UP (ref 5–17)
ANISOCYTOSIS BLD QL: SLIGHT — SIGNIFICANT CHANGE UP
APTT BLD: 29.7 SEC — SIGNIFICANT CHANGE UP (ref 24.5–35.6)
B PERT IGG+IGM PNL SER: ABNORMAL
B PERT IGG+IGM PNL SER: ABNORMAL
BASOPHILS # BLD AUTO: 0 K/UL — SIGNIFICANT CHANGE UP (ref 0–0.2)
BASOPHILS NFR BLD AUTO: 0 % — SIGNIFICANT CHANGE UP (ref 0–2)
BUN SERPL-MCNC: 16.5 MG/DL — SIGNIFICANT CHANGE UP (ref 8–20)
CALCIUM SERPL-MCNC: 9.3 MG/DL — SIGNIFICANT CHANGE UP (ref 8.4–10.5)
CHLORIDE SERPL-SCNC: 108 MMOL/L — SIGNIFICANT CHANGE UP (ref 96–108)
CO2 SERPL-SCNC: 26 MMOL/L — SIGNIFICANT CHANGE UP (ref 22–29)
COLOR FLD: SIGNIFICANT CHANGE UP
COLOR FLD: SIGNIFICANT CHANGE UP
CREAT SERPL-MCNC: 1.03 MG/DL — SIGNIFICANT CHANGE UP (ref 0.5–1.3)
EGFR: 83 ML/MIN/1.73M2 — SIGNIFICANT CHANGE UP
EOSINOPHIL # BLD AUTO: 0 K/UL — SIGNIFICANT CHANGE UP (ref 0–0.5)
EOSINOPHIL NFR BLD AUTO: 0 % — SIGNIFICANT CHANGE UP (ref 0–6)
FLUID INTAKE SUBSTANCE CLASS: SIGNIFICANT CHANGE UP
FLUID INTAKE SUBSTANCE CLASS: SIGNIFICANT CHANGE UP
GLUCOSE BLDC GLUCOMTR-MCNC: 100 MG/DL — HIGH (ref 70–99)
GLUCOSE BLDC GLUCOMTR-MCNC: 118 MG/DL — HIGH (ref 70–99)
GLUCOSE BLDC GLUCOMTR-MCNC: 123 MG/DL — HIGH (ref 70–99)
GLUCOSE BLDC GLUCOMTR-MCNC: 123 MG/DL — HIGH (ref 70–99)
GLUCOSE SERPL-MCNC: 106 MG/DL — HIGH (ref 70–99)
HCT VFR BLD CALC: 27.3 % — LOW (ref 39–50)
HGB BLD-MCNC: 8.9 G/DL — LOW (ref 13–17)
INR BLD: 1.17 RATIO — SIGNIFICANT CHANGE UP (ref 0.85–1.18)
LYMPHOCYTES # BLD AUTO: 1.26 K/UL — SIGNIFICANT CHANGE UP (ref 1–3.3)
LYMPHOCYTES # BLD AUTO: 5.3 % — LOW (ref 13–44)
LYMPHOCYTES # FLD: 7 % — SIGNIFICANT CHANGE UP
LYMPHOCYTES # FLD: 91 % — SIGNIFICANT CHANGE UP
MANUAL SMEAR VERIFICATION: SIGNIFICANT CHANGE UP
MCHC RBC-ENTMCNC: 27.9 PG — SIGNIFICANT CHANGE UP (ref 27–34)
MCHC RBC-ENTMCNC: 32.6 GM/DL — SIGNIFICANT CHANGE UP (ref 32–36)
MCV RBC AUTO: 85.6 FL — SIGNIFICANT CHANGE UP (ref 80–100)
MONOCYTES # BLD AUTO: 2.52 K/UL — HIGH (ref 0–0.9)
MONOCYTES NFR BLD AUTO: 10.6 % — SIGNIFICANT CHANGE UP (ref 2–14)
MONOS+MACROS # FLD: 10 % — SIGNIFICANT CHANGE UP
NEUTROPHILS # BLD AUTO: 19.56 K/UL — HIGH (ref 1.8–7.4)
NEUTROPHILS NFR BLD AUTO: 82.3 % — HIGH (ref 43–77)
NEUTROPHILS-BODY FLUID: 83 % — SIGNIFICANT CHANGE UP
NEUTROPHILS-BODY FLUID: 9 % — SIGNIFICANT CHANGE UP
PLAT MORPH BLD: NORMAL — SIGNIFICANT CHANGE UP
PLATELET # BLD AUTO: 558 K/UL — HIGH (ref 150–400)
POLYCHROMASIA BLD QL SMEAR: SLIGHT — SIGNIFICANT CHANGE UP
POTASSIUM SERPL-MCNC: 3.8 MMOL/L — SIGNIFICANT CHANGE UP (ref 3.5–5.3)
POTASSIUM SERPL-SCNC: 3.8 MMOL/L — SIGNIFICANT CHANGE UP (ref 3.5–5.3)
PROTHROM AB SERPL-ACNC: 12.9 SEC — SIGNIFICANT CHANGE UP (ref 9.5–13)
RBC # BLD: 3.19 M/UL — LOW (ref 4.2–5.8)
RBC # FLD: 14.6 % — HIGH (ref 10.3–14.5)
RBC BLD AUTO: NORMAL — SIGNIFICANT CHANGE UP
RCV VOL RI: 38 /UL — HIGH (ref 0–0)
RCV VOL RI: 8 /UL — HIGH (ref 0–0)
SMUDGE CELLS # BLD: PRESENT — SIGNIFICANT CHANGE UP
SODIUM SERPL-SCNC: 142 MMOL/L — SIGNIFICANT CHANGE UP (ref 135–145)
SPECIMEN SOURCE FLD: SIGNIFICANT CHANGE UP
TOTAL NUCLEATED CELL COUNT, BODY FLUID: 27 /UL — SIGNIFICANT CHANGE UP
TOTAL NUCLEATED CELL COUNT, BODY FLUID: 62 /UL — SIGNIFICANT CHANGE UP
TUBE TYPE: SIGNIFICANT CHANGE UP
TUBE TYPE: SIGNIFICANT CHANGE UP
VARIANT LYMPHS # BLD: 1.8 % — SIGNIFICANT CHANGE UP (ref 0–6)
WBC # BLD: 23.77 K/UL — HIGH (ref 3.8–10.5)
WBC # FLD AUTO: 23.77 K/UL — HIGH (ref 3.8–10.5)

## 2024-04-26 PROCEDURE — 31624 DX BRONCHOSCOPE/LAVAGE: CPT

## 2024-04-26 PROCEDURE — 88312 SPECIAL STAINS GROUP 1: CPT | Mod: 26

## 2024-04-26 PROCEDURE — 99232 SBSQ HOSP IP/OBS MODERATE 35: CPT

## 2024-04-26 PROCEDURE — 88305 TISSUE EXAM BY PATHOLOGIST: CPT | Mod: 26

## 2024-04-26 PROCEDURE — 71045 X-RAY EXAM CHEST 1 VIEW: CPT | Mod: 26

## 2024-04-26 PROCEDURE — 88112 CYTOPATH CELL ENHANCE TECH: CPT | Mod: 26

## 2024-04-26 RX ORDER — ENOXAPARIN SODIUM 100 MG/ML
40 INJECTION SUBCUTANEOUS EVERY 24 HOURS
Refills: 0 | Status: DISCONTINUED | OUTPATIENT
Start: 2024-04-27 | End: 2024-05-01

## 2024-04-26 RX ADMIN — Medication 0.6 MILLIGRAM(S): at 05:29

## 2024-04-26 RX ADMIN — Medication 100 MILLIGRAM(S): at 12:56

## 2024-04-26 RX ADMIN — MAGNESIUM OXIDE 400 MG ORAL TABLET 400 MILLIGRAM(S): 241.3 TABLET ORAL at 17:29

## 2024-04-26 RX ADMIN — MAGNESIUM OXIDE 400 MG ORAL TABLET 400 MILLIGRAM(S): 241.3 TABLET ORAL at 12:56

## 2024-04-26 RX ADMIN — Medication 25 MILLIGRAM(S): at 17:30

## 2024-04-26 RX ADMIN — AMLODIPINE BESYLATE 10 MILLIGRAM(S): 2.5 TABLET ORAL at 05:30

## 2024-04-26 RX ADMIN — Medication 0.6 MILLIGRAM(S): at 17:29

## 2024-04-26 RX ADMIN — Medication 25 MILLIGRAM(S): at 05:29

## 2024-04-26 RX ADMIN — PANTOPRAZOLE SODIUM 40 MILLIGRAM(S): 20 TABLET, DELAYED RELEASE ORAL at 05:30

## 2024-04-26 RX ADMIN — CASPOFUNGIN ACETATE 260 MILLIGRAM(S): 7 INJECTION, POWDER, LYOPHILIZED, FOR SOLUTION INTRAVENOUS at 12:57

## 2024-04-26 NOTE — BRIEF OPERATIVE NOTE - NSICDXBRIEFPROCEDURE_GEN_ALL_CORE_FT
PROCEDURES:  Bronchoscopy, flexible, adult 26-Apr-2024 08:02:28  Lisette Miranda  Diagnostic bronchoalveolar lavage (BAL) 26-Apr-2024 08:02:34  Lisette Miranda

## 2024-04-26 NOTE — PROGRESS NOTE ADULT - SUBJECTIVE AND OBJECTIVE BOX
Acute renal failure    HPI:  Patient seen/examined prior to midnight on 3/29/24.     58M with PMHX Asthma, HTN, HLD, DM2, Gout presents to [facility] ER for abnormal lab work. Patient recently hospitalizated for acute asthma exacerbation and viral URI found to have uncontrolled HTN and new onset DM2 at that time discharged home with outpatient follow-up and sent back to [facility] ER today by PCP for abnormal kidney function. ROS +Fatigue and +Malaise. No other complaints. Denies CVAT or back pain. No urinary complaints. Compliant with meds. Recently started on Losartan, HCTZ, and Metformin on discharge and by PCP. +Tylenol use at home. Denies NSAIDs. (29 Mar 2024 23:47)    Interval History:  Patient was seen and examined at bedside around 11 am.  Doing well.  Had Bronch this morning.   No active complaints.     ROS:  As per interval history otherwise unremarkable.    PHYSICAL EXAM:  Vital Signs  T(C): 36.6 (26 Apr 2024 16:30), Max: 36.7 (25 Apr 2024 20:00)  T(F): 97.9 (26 Apr 2024 16:30), Max: 98.1 (25 Apr 2024 20:00)  HR: 88 (26 Apr 2024 16:30) (88 - 98)  BP: 126/73 (26 Apr 2024 16:30) (112/58 - 148/82)  RR: 18 (26 Apr 2024 16:30) (17 - 18)  SpO2: 98% (26 Apr 2024 16:30) (96% - 98%)  Parameters below as of 26 Apr 2024 16:30  Patient On (Oxygen Delivery Method): room air  General: Middle aged male sitting in chair comfortably. No acute distress  HEENT: EOMI. Clear conjunctivae. Moist mucus membrane  Neck: Supple.   Chest: Good air entry. No wheezing, rales or rhonchi.   Heart: Normal S1 & S2. RRR.   Abdomen: Non distended. Soft. Non-tender. + BS  Ext: 1+ pedal edema. No calf tenderness. ROM improving in elbows, hands and knees.    Neuro: Awake and alert. No focal deficit. Speech clear.   Skin: Warm and Dry  Psychiatry: Normal mood and affect    LABS:  CAPILLARY BLOOD GLUCOSE  POCT Blood Glucose.: 100 mg/dL (26 Apr 2024 17:14)  POCT Blood Glucose.: 123 mg/dL (26 Apr 2024 12:03)  POCT Blood Glucose.: 123 mg/dL (26 Apr 2024 06:31)  POCT Blood Glucose.: 182 mg/dL (25 Apr 2024 20:58)                      8.9    23.77 )-----------( 558      ( 26 Apr 2024 06:07 )             27.3     04-26    142  |  108  |  16.5  ----------------------------<  106<H>  3.8   |  26.0  |  1.03    Ca    9.3      26 Apr 2024 04:46  Mg     1.8     04-25    PT/INR - ( 26 Apr 2024 04:46 )   PT: 12.9 sec;   INR: 1.17 ratio       PTT - ( 26 Apr 2024 04:46 )  PTT:29.7 sec    RADIOLOGY & ADDITIONAL STUDIES:  Reviewed     MEDICATIONS  (STANDING):  allopurinol 100 milliGRAM(s) Oral daily  amLODIPine   Tablet 10 milliGRAM(s) Oral daily  caspofungin IVPB      caspofungin IVPB 50 milliGRAM(s) IV Intermittent every 24 hours  colchicine 0.6 milliGRAM(s) Oral two times a day  dextrose 5%. 1000 milliLiter(s) (50 mL/Hr) IV Continuous <Continuous>  dextrose 5%. 1000 milliLiter(s) (100 mL/Hr) IV Continuous <Continuous>  dextrose 50% Injectable 25 Gram(s) IV Push once  dextrose 50% Injectable 25 Gram(s) IV Push once  dextrose 50% Injectable 12.5 Gram(s) IV Push once  glucagon  Injectable 1 milliGRAM(s) IntraMuscular once  insulin lispro (ADMELOG) corrective regimen sliding scale   SubCutaneous Before meals and at bedtime  magnesium oxide 400 milliGRAM(s) Oral three times a day with meals  metoprolol tartrate 25 milliGRAM(s) Oral two times a day  pantoprazole    Tablet 40 milliGRAM(s) Oral before breakfast    MEDICATIONS  (PRN):  acetaminophen     Tablet .. 650 milliGRAM(s) Oral every 6 hours PRN Temp greater or equal to 38C (100.4F), Mild Pain (1 - 3)  albuterol    90 MICROgram(s) HFA Inhaler 2 Puff(s) Inhalation every 6 hours PRN Shortness of Breath and/or Wheezing  albuterol/ipratropium for Nebulization 3 milliLiter(s) Nebulizer every 6 hours PRN Shortness of Breath and/or Wheezing  aluminum hydroxide/magnesium hydroxide/simethicone Suspension 30 milliLiter(s) Oral every 4 hours PRN Dyspepsia  dextrose Oral Gel 15 Gram(s) Oral once PRN Blood Glucose LESS THAN 70 milliGRAM(s)/deciliter  melatonin 3 milliGRAM(s) Oral at bedtime PRN Insomnia  ondansetron Injectable 4 milliGRAM(s) IV Push every 8 hours PRN Nausea and/or Vomiting

## 2024-04-26 NOTE — PROGRESS NOTE ADULT - ASSESSMENT
58M with PMHX Asthma, HTN, HLD, DM2, Gout presents to [facility] ER for abnormal lab work. Patient recently hospitalizated for acute asthma exacerbation and viral URI found to have uncontrolled HTN and new onset DM2 at that time discharged home with outpatient follow-up and sent back to [facility] ER today by PCP for abnormal kidney function. ROS +Fatigue and +Malaise. No other complaints. Denies CVAT or back pain. No urinary complaints. Compliant with meds. Recently started on Losartan, HCTZ, and Metformin on discharge and by PCP. +Tylenol use at home. Denies NSAIDs.     Sepsis due to Fungemia  Multifocal pneumonia s/p antibiotics   Bcx on 04/10 grew Solange  YASMEEN negative for vegetation  Concern for fungal pneumonia given patient was intermittently on steroid before admission  repeat ct chest with slightly increased mass compared to previous  BAL was cancelled on 4/22, now it was performed today. Follow cultures.   Caspofungin since 04/12/2024  ID following  Rheumatology recs noted     Right Elbow Pain   Ruled out Osteomyelitis      Right Hand & Arm / Right Knee swelling and pain - suspected gout flare  - s/p Solumedrol 40 mg IVP x 1  - Continue Colchicine   - Improving     TYESHA (now resolved)   - Possibly 2/2 medication induced ATN   - Hold ARB/HCTZ/Metformin   - Renal US negative  - Avoid Nephrotoxins  - Nephro follow up noted     HTN, HLD  - Amlodipine 10mg q24  - Increased Metoprolol to 25 mg BID   - Not currently on statin therapy    DM2  A1c 7.1  Hold Metformin 500mg BID  ISS    Asthma  Finished steroid taper. Gave Solumedrol 40 mg x 3 due to wheezing on 4/24.   No longer taking Montelukast  Nebs PRN    Gout  Uric Acid level 12.2  Continue Colchicine   Resumed Allopurinol     Leukocytosis  - Likely reactive from steroids   - Monitor     DVT Prophylaxis -- Lovenox SQ    Dispo: Home once stable.

## 2024-04-27 LAB
BASOPHILS # BLD AUTO: 0.02 K/UL — SIGNIFICANT CHANGE UP (ref 0–0.2)
BASOPHILS NFR BLD AUTO: 0.2 % — SIGNIFICANT CHANGE UP (ref 0–2)
EOSINOPHIL # BLD AUTO: 0.06 K/UL — SIGNIFICANT CHANGE UP (ref 0–0.5)
EOSINOPHIL NFR BLD AUTO: 0.5 % — SIGNIFICANT CHANGE UP (ref 0–6)
GLUCOSE BLDC GLUCOMTR-MCNC: 104 MG/DL — HIGH (ref 70–99)
GLUCOSE BLDC GLUCOMTR-MCNC: 105 MG/DL — HIGH (ref 70–99)
GLUCOSE BLDC GLUCOMTR-MCNC: 135 MG/DL — HIGH (ref 70–99)
GLUCOSE BLDC GLUCOMTR-MCNC: 94 MG/DL — SIGNIFICANT CHANGE UP (ref 70–99)
GRAM STN FLD: SIGNIFICANT CHANGE UP
GRAM STN FLD: SIGNIFICANT CHANGE UP
HCT VFR BLD CALC: 28.9 % — LOW (ref 39–50)
HGB BLD-MCNC: 9.3 G/DL — LOW (ref 13–17)
IMM GRANULOCYTES NFR BLD AUTO: 3.5 % — HIGH (ref 0–0.9)
LYMPHOCYTES # BLD AUTO: 31.4 % — SIGNIFICANT CHANGE UP (ref 13–44)
LYMPHOCYTES # BLD AUTO: 4.13 K/UL — HIGH (ref 1–3.3)
MCHC RBC-ENTMCNC: 28 PG — SIGNIFICANT CHANGE UP (ref 27–34)
MCHC RBC-ENTMCNC: 32.2 GM/DL — SIGNIFICANT CHANGE UP (ref 32–36)
MCV RBC AUTO: 87 FL — SIGNIFICANT CHANGE UP (ref 80–100)
MONOCYTES # BLD AUTO: 1.28 K/UL — HIGH (ref 0–0.9)
MONOCYTES NFR BLD AUTO: 9.7 % — SIGNIFICANT CHANGE UP (ref 2–14)
NEUTROPHILS # BLD AUTO: 7.19 K/UL — SIGNIFICANT CHANGE UP (ref 1.8–7.4)
NEUTROPHILS NFR BLD AUTO: 54.7 % — SIGNIFICANT CHANGE UP (ref 43–77)
NIGHT BLUE STAIN TISS: SIGNIFICANT CHANGE UP
NIGHT BLUE STAIN TISS: SIGNIFICANT CHANGE UP
PLATELET # BLD AUTO: 502 K/UL — HIGH (ref 150–400)
RBC # BLD: 3.32 M/UL — LOW (ref 4.2–5.8)
RBC # FLD: 15 % — HIGH (ref 10.3–14.5)
SPECIMEN SOURCE: SIGNIFICANT CHANGE UP
WBC # BLD: 13.14 K/UL — HIGH (ref 3.8–10.5)
WBC # FLD AUTO: 13.14 K/UL — HIGH (ref 3.8–10.5)

## 2024-04-27 PROCEDURE — 99232 SBSQ HOSP IP/OBS MODERATE 35: CPT

## 2024-04-27 RX ADMIN — MAGNESIUM OXIDE 400 MG ORAL TABLET 400 MILLIGRAM(S): 241.3 TABLET ORAL at 08:35

## 2024-04-27 RX ADMIN — Medication 25 MILLIGRAM(S): at 05:31

## 2024-04-27 RX ADMIN — ENOXAPARIN SODIUM 40 MILLIGRAM(S): 100 INJECTION SUBCUTANEOUS at 22:07

## 2024-04-27 RX ADMIN — Medication 0.6 MILLIGRAM(S): at 17:32

## 2024-04-27 RX ADMIN — Medication 3 MILLILITER(S): at 21:25

## 2024-04-27 RX ADMIN — Medication 0.6 MILLIGRAM(S): at 05:31

## 2024-04-27 RX ADMIN — AMLODIPINE BESYLATE 10 MILLIGRAM(S): 2.5 TABLET ORAL at 05:31

## 2024-04-27 RX ADMIN — PANTOPRAZOLE SODIUM 40 MILLIGRAM(S): 20 TABLET, DELAYED RELEASE ORAL at 05:31

## 2024-04-27 RX ADMIN — Medication 100 MILLIGRAM(S): at 12:20

## 2024-04-27 RX ADMIN — CASPOFUNGIN ACETATE 260 MILLIGRAM(S): 7 INJECTION, POWDER, LYOPHILIZED, FOR SOLUTION INTRAVENOUS at 12:20

## 2024-04-27 RX ADMIN — Medication 25 MILLIGRAM(S): at 17:32

## 2024-04-27 NOTE — PROGRESS NOTE ADULT - ASSESSMENT
58M with PMHX Asthma, HTN, HLD, DM2, Gout presents to [facility] ER for abnormal lab work. Patient recently hospitalizated for acute asthma exacerbation and viral URI found to have uncontrolled HTN and new onset DM2 at that time discharged home with outpatient follow-up and sent back to [facility] ER today by PCP for abnormal kidney function. ROS +Fatigue and +Malaise. No other complaints. Denies CVAT or back pain. No urinary complaints. Compliant with meds. Recently started on Losartan, HCTZ, and Metformin on discharge and by PCP. +Tylenol use at home. Denies NSAIDs.     #Sepsis due to Fungemia  Multifocal pneumonia s/p antibiotics   Bcx on 04/10 grew Solange  YASMEEN negative for vegetation  Concern for fungal pneumonia given patient was intermittently on steroid before admission  repeat ct chest with slightly increased mass compared to previous  s/p BAL 4/26, Follow cultures  Caspofungin since 04/12/2024  ID following  Rheumatology recs noted     #Right Elbow Pain   Ruled out Osteomyelitis      #Right Hand & Arm / Right Knee swelling and pain - suspected gout flare  s/p Solumedrol 40 mg IVP x 1  Continue Colchicine   Improving     #TYESHA (now resolved)   Possibly 2/2 medication induced ATN   Hold ARB/HCTZ/Metformin   Renal US negative  Avoid Nephrotoxins  Nephro follow up noted     #HTN, HLD  Amlodipine 10mg q24  Increased Metoprolol to 25 mg BID   Not currently on statin therapy    #DM2  A1c 7.1  Hold Metformin 500mg BID  ISS    #Asthma  Finished steroid taper. Gave Solumedrol 40 mg x 3 due to wheezing on 4/24.   No longer taking Montelukast  Nebs PRN    #Gout  Uric Acid level 12.2  Continue Colchicine   Resumed Allopurinol     #Leukocytosis  Likely reactive from steroids   Monitor     DVT Prophylaxis -- Lovenox SQ  Dispo: Home pending BAL cultures, likely early next week

## 2024-04-27 NOTE — PROGRESS NOTE ADULT - SUBJECTIVE AND OBJECTIVE BOX
Francis Luciano M.D.    Patient is a 59y old  Male who presents with a chief complaint of ARF (26 Apr 2024 17:54)      SUBJECTIVE / OVERNIGHT EVENTS: no event overnight.     Patient denies chest pain, SOB, abd pain, N/V, fever, chills, dysuria or any other complaints. All remainder ROS negative.     MEDICATIONS  (STANDING):  allopurinol 100 milliGRAM(s) Oral daily  amLODIPine   Tablet 10 milliGRAM(s) Oral daily  caspofungin IVPB      caspofungin IVPB 50 milliGRAM(s) IV Intermittent every 24 hours  colchicine 0.6 milliGRAM(s) Oral two times a day  dextrose 5%. 1000 milliLiter(s) (50 mL/Hr) IV Continuous <Continuous>  dextrose 5%. 1000 milliLiter(s) (100 mL/Hr) IV Continuous <Continuous>  dextrose 50% Injectable 12.5 Gram(s) IV Push once  dextrose 50% Injectable 25 Gram(s) IV Push once  dextrose 50% Injectable 25 Gram(s) IV Push once  enoxaparin Injectable 40 milliGRAM(s) SubCutaneous every 24 hours  glucagon  Injectable 1 milliGRAM(s) IntraMuscular once  insulin lispro (ADMELOG) corrective regimen sliding scale   SubCutaneous Before meals and at bedtime  metoprolol tartrate 25 milliGRAM(s) Oral two times a day  pantoprazole    Tablet 40 milliGRAM(s) Oral before breakfast    MEDICATIONS  (PRN):  acetaminophen     Tablet .. 650 milliGRAM(s) Oral every 6 hours PRN Temp greater or equal to 38C (100.4F), Mild Pain (1 - 3)  albuterol    90 MICROgram(s) HFA Inhaler 2 Puff(s) Inhalation every 6 hours PRN Shortness of Breath and/or Wheezing  albuterol/ipratropium for Nebulization 3 milliLiter(s) Nebulizer every 6 hours PRN Shortness of Breath and/or Wheezing  aluminum hydroxide/magnesium hydroxide/simethicone Suspension 30 milliLiter(s) Oral every 4 hours PRN Dyspepsia  dextrose Oral Gel 15 Gram(s) Oral once PRN Blood Glucose LESS THAN 70 milliGRAM(s)/deciliter  melatonin 3 milliGRAM(s) Oral at bedtime PRN Insomnia  ondansetron Injectable 4 milliGRAM(s) IV Push every 8 hours PRN Nausea and/or Vomiting      I&O's Summary    26 Apr 2024 07:01  -  27 Apr 2024 07:00  --------------------------------------------------------  IN: 2160 mL / OUT: 1675 mL / NET: 485 mL    27 Apr 2024 07:01  -  27 Apr 2024 12:25  --------------------------------------------------------  IN: 0 mL / OUT: 425 mL / NET: -425 mL        PHYSICAL EXAM:  Vital Signs Last 24 Hrs  T(C): 36.8 (27 Apr 2024 08:05), Max: 36.8 (26 Apr 2024 21:29)  T(F): 98.2 (27 Apr 2024 08:05), Max: 98.2 (26 Apr 2024 21:29)  HR: 80 (27 Apr 2024 08:05) (80 - 99)  BP: 121/71 (27 Apr 2024 08:05) (121/71 - 135/73)  BP(mean): --  RR: 18 (27 Apr 2024 08:05) (18 - 18)  SpO2: 98% (27 Apr 2024 08:05) (98% - 98%)    Parameters below as of 27 Apr 2024 09:27  Patient On (Oxygen Delivery Method): room air    CONSTITUTIONAL: NAD, well-groomed  RESPIRATORY: Normal respiratory effort; lungs are clear to auscultation bilaterally  CARDIOVASCULAR: Regular rate and rhythm, no LE edema  ABDOMEN: Nontender to palpation, normoactive bowel sounds  PSYCH: A+O x3; affect appropriate  NEUROLOGY: CN 2-12 are intact and symmetric; no gross sensory deficits;   SKIN: No rashes; no palpable lesions    LABS:                        9.3    13.14 )-----------( 502      ( 27 Apr 2024 04:22 )             28.9     04-26    142  |  108  |  16.5  ----------------------------<  106<H>  3.8   |  26.0  |  1.03    Ca    9.3      26 Apr 2024 04:46      PT/INR - ( 26 Apr 2024 04:46 )   PT: 12.9 sec;   INR: 1.17 ratio         PTT - ( 26 Apr 2024 04:46 )  PTT:29.7 sec      Urinalysis Basic - ( 26 Apr 2024 04:46 )    Color: x / Appearance: x / SG: x / pH: x  Gluc: 106 mg/dL / Ketone: x  / Bili: x / Urobili: x   Blood: x / Protein: x / Nitrite: x   Leuk Esterase: x / RBC: x / WBC x   Sq Epi: x / Non Sq Epi: x / Bacteria: x        Culture - Bronchial (collected 26 Apr 2024 15:35)  Source: .Bronchial RLL BAL  Gram Stain (27 Apr 2024 06:29):    No polymorphonuclear cells seen per low power field    No squamous epithelial cells per low power field    No organisms seen per oil power field    Culture - Fungal, Bronchial (collected 26 Apr 2024 15:35)  Source: .Bronchial RLL BAL  Preliminary Report (27 Apr 2024 07:33):    Testing in progress    Culture - Bronchial (collected 26 Apr 2024 15:30)  Source: .Bronchial LLL BAL  Gram Stain (27 Apr 2024 06:27):    No polymorphonuclear cells seen per low power field    No squamous epithelial cells seen per low power field    No organisms seen per oil power field    Culture - Fungal, Bronchial (collected 26 Apr 2024 15:30)  Source: .Bronchial LLL BAL  Preliminary Report (27 Apr 2024 07:32):    Testing in progress      CAPILLARY BLOOD GLUCOSE      POCT Blood Glucose.: 135 mg/dL (27 Apr 2024 12:14)  POCT Blood Glucose.: 94 mg/dL (27 Apr 2024 08:25)  POCT Blood Glucose.: 118 mg/dL (26 Apr 2024 21:08)  POCT Blood Glucose.: 100 mg/dL (26 Apr 2024 17:14)      RADIOLOGY & ADDITIONAL TESTS:  Results Reviewed:   Imaging Personally Reviewed:  Electrocardiogram Personally Reviewed:

## 2024-04-27 NOTE — CHART NOTE - NSCHARTNOTEFT_GEN_A_CORE
59M, pmhx eosinophilia asthma, HTN, HLD, DM, gout recent hospitilization for acute asthma exacerbation and viral URI found to have uncontrolled HTN and new onset DM2 at that time discharged home with outpatient follow-up, sent to ED 3/29 by PCP for outpt abnl renal function on routine f/u labs, found to have multifocal pneumonia, hospital course c/b sepsis secondary to fungemia ( +bcx 4/10 candida, neg 4/13), being followed by ID, on capsofungin, YASMEEN neg for vegetation, unknown source but presumed fungal pneumonia given admission CT findings and recent treatment with steroids, thoracic surgery consulted for bronch/BAL.    A/P: PNA  CT abd 4/13: pleural based pulmonary nodules at the left lung base which appear larger in size. For example, 3.9 x 1.9 cm pleural-based nodule on series 3 image 32 posteriorly, previously 2.3 x 1.6 cm. Small right pleural effusion has increased. Atelectatic changes.  4/10 blood cultures + candida, on Caspofungin, negative blood cx 4/13  ID following, Pulm following  concern for atypical pneumonia given recent admission for asthma exacerbation and IV steroids  non con CT chest w/ persistent RUL opacity,     s/p Bronch/BAL on 4/26, post procedure pt doing well, xray stable  Primary team to follow BAL culture and fluid cytology  No further thoracic interventions warranted  Thoracic surgery will sign off, please reconsult if need arises.  Discussed/reviewed case with Dr. Dubon

## 2024-04-28 LAB
ANION GAP SERPL CALC-SCNC: 12 MMOL/L — SIGNIFICANT CHANGE UP (ref 5–17)
BUN SERPL-MCNC: 10.1 MG/DL — SIGNIFICANT CHANGE UP (ref 8–20)
CALCIUM SERPL-MCNC: 8.5 MG/DL — SIGNIFICANT CHANGE UP (ref 8.4–10.5)
CHLORIDE SERPL-SCNC: 100 MMOL/L — SIGNIFICANT CHANGE UP (ref 96–108)
CO2 SERPL-SCNC: 26 MMOL/L — SIGNIFICANT CHANGE UP (ref 22–29)
CREAT SERPL-MCNC: 0.8 MG/DL — SIGNIFICANT CHANGE UP (ref 0.5–1.3)
CULTURE RESULTS: ABNORMAL
CULTURE RESULTS: SIGNIFICANT CHANGE UP
EGFR: 101 ML/MIN/1.73M2 — SIGNIFICANT CHANGE UP
GLUCOSE BLDC GLUCOMTR-MCNC: 101 MG/DL — HIGH (ref 70–99)
GLUCOSE BLDC GLUCOMTR-MCNC: 105 MG/DL — HIGH (ref 70–99)
GLUCOSE BLDC GLUCOMTR-MCNC: 107 MG/DL — HIGH (ref 70–99)
GLUCOSE BLDC GLUCOMTR-MCNC: 119 MG/DL — HIGH (ref 70–99)
GLUCOSE SERPL-MCNC: 98 MG/DL — SIGNIFICANT CHANGE UP (ref 70–99)
GRAM STN FLD: ABNORMAL
HCT VFR BLD CALC: 30.2 % — LOW (ref 39–50)
HGB BLD-MCNC: 9.6 G/DL — LOW (ref 13–17)
MCHC RBC-ENTMCNC: 27.7 PG — SIGNIFICANT CHANGE UP (ref 27–34)
MCHC RBC-ENTMCNC: 31.8 GM/DL — LOW (ref 32–36)
MCV RBC AUTO: 87 FL — SIGNIFICANT CHANGE UP (ref 80–100)
PLATELET # BLD AUTO: 475 K/UL — HIGH (ref 150–400)
POTASSIUM SERPL-MCNC: 3.1 MMOL/L — LOW (ref 3.5–5.3)
POTASSIUM SERPL-SCNC: 3.1 MMOL/L — LOW (ref 3.5–5.3)
RBC # BLD: 3.47 M/UL — LOW (ref 4.2–5.8)
RBC # FLD: 15 % — HIGH (ref 10.3–14.5)
SODIUM SERPL-SCNC: 137 MMOL/L — SIGNIFICANT CHANGE UP (ref 135–145)
SPECIMEN SOURCE: SIGNIFICANT CHANGE UP
SPECIMEN SOURCE: SIGNIFICANT CHANGE UP
WBC # BLD: 12.44 K/UL — HIGH (ref 3.8–10.5)
WBC # FLD AUTO: 12.44 K/UL — HIGH (ref 3.8–10.5)

## 2024-04-28 PROCEDURE — 99232 SBSQ HOSP IP/OBS MODERATE 35: CPT

## 2024-04-28 RX ORDER — POTASSIUM CHLORIDE 20 MEQ
40 PACKET (EA) ORAL ONCE
Refills: 0 | Status: COMPLETED | OUTPATIENT
Start: 2024-04-28 | End: 2024-04-28

## 2024-04-28 RX ADMIN — Medication 25 MILLIGRAM(S): at 06:26

## 2024-04-28 RX ADMIN — Medication 100 MILLIGRAM(S): at 12:32

## 2024-04-28 RX ADMIN — AMLODIPINE BESYLATE 10 MILLIGRAM(S): 2.5 TABLET ORAL at 06:27

## 2024-04-28 RX ADMIN — ENOXAPARIN SODIUM 40 MILLIGRAM(S): 100 INJECTION SUBCUTANEOUS at 21:27

## 2024-04-28 RX ADMIN — CASPOFUNGIN ACETATE 260 MILLIGRAM(S): 7 INJECTION, POWDER, LYOPHILIZED, FOR SOLUTION INTRAVENOUS at 13:26

## 2024-04-28 RX ADMIN — PANTOPRAZOLE SODIUM 40 MILLIGRAM(S): 20 TABLET, DELAYED RELEASE ORAL at 06:27

## 2024-04-28 RX ADMIN — Medication 0.6 MILLIGRAM(S): at 17:42

## 2024-04-28 RX ADMIN — Medication 40 MILLIEQUIVALENT(S): at 08:25

## 2024-04-28 RX ADMIN — Medication 0.6 MILLIGRAM(S): at 06:26

## 2024-04-28 RX ADMIN — Medication 25 MILLIGRAM(S): at 17:41

## 2024-04-28 NOTE — PROGRESS NOTE ADULT - SUBJECTIVE AND OBJECTIVE BOX
Francis Luciano M.D.    Patient is a 59y old  Male who presents with a chief complaint of ARF (27 Apr 2024 12:25)      SUBJECTIVE / OVERNIGHT EVENTS: no event overnight.     Patient denies chest pain, SOB, abd pain, N/V, fever, chills, dysuria or any other complaints. All remainder ROS negative.     MEDICATIONS  (STANDING):  allopurinol 100 milliGRAM(s) Oral daily  amLODIPine   Tablet 10 milliGRAM(s) Oral daily  caspofungin IVPB      caspofungin IVPB 50 milliGRAM(s) IV Intermittent every 24 hours  colchicine 0.6 milliGRAM(s) Oral two times a day  dextrose 5%. 1000 milliLiter(s) (100 mL/Hr) IV Continuous <Continuous>  dextrose 5%. 1000 milliLiter(s) (50 mL/Hr) IV Continuous <Continuous>  dextrose 50% Injectable 25 Gram(s) IV Push once  dextrose 50% Injectable 25 Gram(s) IV Push once  dextrose 50% Injectable 12.5 Gram(s) IV Push once  enoxaparin Injectable 40 milliGRAM(s) SubCutaneous every 24 hours  glucagon  Injectable 1 milliGRAM(s) IntraMuscular once  insulin lispro (ADMELOG) corrective regimen sliding scale   SubCutaneous Before meals and at bedtime  metoprolol tartrate 25 milliGRAM(s) Oral two times a day  pantoprazole    Tablet 40 milliGRAM(s) Oral before breakfast    MEDICATIONS  (PRN):  acetaminophen     Tablet .. 650 milliGRAM(s) Oral every 6 hours PRN Temp greater or equal to 38C (100.4F), Mild Pain (1 - 3)  albuterol    90 MICROgram(s) HFA Inhaler 2 Puff(s) Inhalation every 6 hours PRN Shortness of Breath and/or Wheezing  albuterol/ipratropium for Nebulization 3 milliLiter(s) Nebulizer every 6 hours PRN Shortness of Breath and/or Wheezing  aluminum hydroxide/magnesium hydroxide/simethicone Suspension 30 milliLiter(s) Oral every 4 hours PRN Dyspepsia  dextrose Oral Gel 15 Gram(s) Oral once PRN Blood Glucose LESS THAN 70 milliGRAM(s)/deciliter  melatonin 3 milliGRAM(s) Oral at bedtime PRN Insomnia  ondansetron Injectable 4 milliGRAM(s) IV Push every 8 hours PRN Nausea and/or Vomiting      I&O's Summary    27 Apr 2024 07:01  -  28 Apr 2024 07:00  --------------------------------------------------------  IN: 480 mL / OUT: 2025 mL / NET: -1545 mL    28 Apr 2024 07:01  -  28 Apr 2024 11:57  --------------------------------------------------------  IN: 240 mL / OUT: 0 mL / NET: 240 mL        PHYSICAL EXAM:  Vital Signs Last 24 Hrs  T(C): 36.9 (28 Apr 2024 08:40), Max: 36.9 (28 Apr 2024 08:40)  T(F): 98.4 (28 Apr 2024 08:40), Max: 98.4 (28 Apr 2024 08:40)  HR: 82 (28 Apr 2024 08:40) (82 - 97)  BP: 119/70 (28 Apr 2024 08:40) (119/70 - 133/77)  BP(mean): --  RR: 18 (28 Apr 2024 08:40) (17 - 18)  SpO2: 98% (28 Apr 2024 08:40) (98% - 100%)    Parameters below as of 28 Apr 2024 08:40  Patient On (Oxygen Delivery Method): room air    CONSTITUTIONAL: NAD, well-groomed  RESPIRATORY: Normal respiratory effort; lungs are clear to auscultation bilaterally  CARDIOVASCULAR: Regular rate and rhythm, no LE edema  ABDOMEN: Nontender to palpation, normoactive bowel sounds  PSYCH: A+O x3; affect appropriate  NEUROLOGY: CN 2-12 are intact and symmetric; no gross sensory deficits;   SKIN: No rashes; no palpable lesions    LABS:                        9.6    12.44 )-----------( 475      ( 28 Apr 2024 06:45 )             30.2     04-28    137  |  100  |  10.1  ----------------------------<  98  3.1<L>   |  26.0  |  0.80    Ca    8.5      28 Apr 2024 06:45            Urinalysis Basic - ( 28 Apr 2024 06:45 )    Color: x / Appearance: x / SG: x / pH: x  Gluc: 98 mg/dL / Ketone: x  / Bili: x / Urobili: x   Blood: x / Protein: x / Nitrite: x   Leuk Esterase: x / RBC: x / WBC x   Sq Epi: x / Non Sq Epi: x / Bacteria: x        Culture - Bronchial (collected 26 Apr 2024 15:35)  Source: .Bronchial RLL BAL  Gram Stain (27 Apr 2024 06:29):    No polymorphonuclear cells seen per low power field    No squamous epithelial cells per low power field    No organisms seen per oil power field  Preliminary Report (27 Apr 2024 17:08):    Normal Respiratory Chloe present    Culture - Acid Fast - Bronchial w/Smear (collected 26 Apr 2024 15:35)  Source: .Bronchial RLL BAL  Preliminary Report (27 Apr 2024 23:05):    Culture is being performed.    Culture - Fungal, Bronchial (collected 26 Apr 2024 15:35)  Source: .Bronchial RLL BAL  Preliminary Report (27 Apr 2024 07:33):    Testing in progress    Culture - Bronchial (collected 26 Apr 2024 15:30)  Source: .Bronchial LLL BAL  Gram Stain (27 Apr 2024 06:27):    No polymorphonuclear cells seen per low power field    No squamous epithelial cells seen per low power field    No organisms seen per oil power field  Preliminary Report (27 Apr 2024 16:50):    Normal Respiratory Chloe present    Culture - Acid Fast - Bronchial w/Smear (collected 26 Apr 2024 15:30)  Source: .Bronchial LLL BAL  Preliminary Report (27 Apr 2024 23:05):    Culture is being performed.    Culture - Fungal, Bronchial (collected 26 Apr 2024 15:30)  Source: .Bronchial LLL BAL  Preliminary Report (27 Apr 2024 07:32):    Testing in progress      CAPILLARY BLOOD GLUCOSE      POCT Blood Glucose.: 105 mg/dL (28 Apr 2024 08:19)  POCT Blood Glucose.: 105 mg/dL (27 Apr 2024 21:22)  POCT Blood Glucose.: 104 mg/dL (27 Apr 2024 17:04)  POCT Blood Glucose.: 135 mg/dL (27 Apr 2024 12:14)      RADIOLOGY & ADDITIONAL TESTS:  Results Reviewed:   Imaging Personally Reviewed:  Electrocardiogram Personally Reviewed:

## 2024-04-28 NOTE — PROGRESS NOTE ADULT - ASSESSMENT
58M with PMHX Asthma, HTN, HLD, DM2, Gout presents to [facility] ER for abnormal lab work. Patient recently hospitalizated for acute asthma exacerbation and viral URI found to have uncontrolled HTN and new onset DM2 at that time discharged home with outpatient follow-up and sent back to [facility] ER today by PCP for abnormal kidney function. ROS +Fatigue and +Malaise. No other complaints. Denies CVAT or back pain. No urinary complaints. Compliant with meds. Recently started on Losartan, HCTZ, and Metformin on discharge and by PCP. +Tylenol use at home. Denies NSAIDs.     #Sepsis due to Fungemia  Multifocal pneumonia s/p antibiotics   Bcx on 04/10 grew Solange  YASMEEN negative for vegetation  Concern for fungal pneumonia given patient was intermittently on steroid before admission  repeat ct chest with slightly increased mass compared to previous  s/p BAL 4/26, AFB negative  f/u PJP PCR and cytology   Caspofungin since 04/12/2024  ID following  Rheumatology recs noted     #Right Elbow Pain   Ruled out Osteomyelitis      #Right Hand & Arm / Right Knee swelling and pain - suspected gout flare  s/p Solumedrol 40 mg IVP x 1  Continue Colchicine   Improving     #TYESHA (now resolved)   Possibly 2/2 medication induced ATN   Hold ARB/HCTZ/Metformin   Renal US negative  Avoid Nephrotoxins  Nephro follow up noted     #HTN, HLD  Amlodipine 10mg q24  Increased Metoprolol to 25 mg BID   Not currently on statin therapy    #DM2  A1c 7.1  Hold Metformin 500mg BID  ISS    #Asthma  Finished steroid taper. Gave Solumedrol 40 mg x 3 due to wheezing on 4/24.   No longer taking Montelukast  Nebs PRN    #Gout  Uric Acid level 12.2  Continue Colchicine   Resumed Allopurinol     #Leukocytosis  Likely reactive from steroids   Monitor     DVT Prophylaxis -- Lovenox SQ  Dispo: Home pending BAL cytology, likely early next week

## 2024-04-29 LAB
ANION GAP SERPL CALC-SCNC: 13 MMOL/L — SIGNIFICANT CHANGE UP (ref 5–17)
BUN SERPL-MCNC: 11.5 MG/DL — SIGNIFICANT CHANGE UP (ref 8–20)
CALCIUM SERPL-MCNC: 8.9 MG/DL — SIGNIFICANT CHANGE UP (ref 8.4–10.5)
CHLORIDE SERPL-SCNC: 100 MMOL/L — SIGNIFICANT CHANGE UP (ref 96–108)
CO2 SERPL-SCNC: 25 MMOL/L — SIGNIFICANT CHANGE UP (ref 22–29)
CREAT SERPL-MCNC: 0.8 MG/DL — SIGNIFICANT CHANGE UP (ref 0.5–1.3)
EGFR: 101 ML/MIN/1.73M2 — SIGNIFICANT CHANGE UP
GLUCOSE BLDC GLUCOMTR-MCNC: 101 MG/DL — HIGH (ref 70–99)
GLUCOSE BLDC GLUCOMTR-MCNC: 120 MG/DL — HIGH (ref 70–99)
GLUCOSE BLDC GLUCOMTR-MCNC: 128 MG/DL — HIGH (ref 70–99)
GLUCOSE BLDC GLUCOMTR-MCNC: 137 MG/DL — HIGH (ref 70–99)
GLUCOSE SERPL-MCNC: 100 MG/DL — HIGH (ref 70–99)
HCT VFR BLD CALC: 31.8 % — LOW (ref 39–50)
HGB BLD-MCNC: 10.2 G/DL — LOW (ref 13–17)
MCHC RBC-ENTMCNC: 28.1 PG — SIGNIFICANT CHANGE UP (ref 27–34)
MCHC RBC-ENTMCNC: 32.1 GM/DL — SIGNIFICANT CHANGE UP (ref 32–36)
MCV RBC AUTO: 87.6 FL — SIGNIFICANT CHANGE UP (ref 80–100)
P JIROVECII DNA L RESP QL NAA+NON-PROBE: NEGATIVE — SIGNIFICANT CHANGE UP
P JIROVECII DNA L RESP QL NAA+NON-PROBE: NEGATIVE — SIGNIFICANT CHANGE UP
PLATELET # BLD AUTO: 463 K/UL — HIGH (ref 150–400)
POTASSIUM SERPL-MCNC: 3.5 MMOL/L — SIGNIFICANT CHANGE UP (ref 3.5–5.3)
POTASSIUM SERPL-SCNC: 3.5 MMOL/L — SIGNIFICANT CHANGE UP (ref 3.5–5.3)
RBC # BLD: 3.63 M/UL — LOW (ref 4.2–5.8)
RBC # FLD: 15.7 % — HIGH (ref 10.3–14.5)
SODIUM SERPL-SCNC: 138 MMOL/L — SIGNIFICANT CHANGE UP (ref 135–145)
SPECIMEN SOURCE: SIGNIFICANT CHANGE UP
SPECIMEN SOURCE: SIGNIFICANT CHANGE UP
WBC # BLD: 14.01 K/UL — HIGH (ref 3.8–10.5)
WBC # FLD AUTO: 14.01 K/UL — HIGH (ref 3.8–10.5)

## 2024-04-29 PROCEDURE — 99233 SBSQ HOSP IP/OBS HIGH 50: CPT

## 2024-04-29 PROCEDURE — 99232 SBSQ HOSP IP/OBS MODERATE 35: CPT

## 2024-04-29 RX ADMIN — Medication 1 TABLET(S): at 17:31

## 2024-04-29 RX ADMIN — Medication 100 MILLIGRAM(S): at 11:18

## 2024-04-29 RX ADMIN — Medication 25 MILLIGRAM(S): at 17:30

## 2024-04-29 RX ADMIN — CASPOFUNGIN ACETATE 260 MILLIGRAM(S): 7 INJECTION, POWDER, LYOPHILIZED, FOR SOLUTION INTRAVENOUS at 12:49

## 2024-04-29 RX ADMIN — Medication 0.6 MILLIGRAM(S): at 05:32

## 2024-04-29 RX ADMIN — ENOXAPARIN SODIUM 40 MILLIGRAM(S): 100 INJECTION SUBCUTANEOUS at 21:04

## 2024-04-29 RX ADMIN — Medication 3 MILLILITER(S): at 04:38

## 2024-04-29 RX ADMIN — Medication 1 TABLET(S): at 11:16

## 2024-04-29 RX ADMIN — AMLODIPINE BESYLATE 10 MILLIGRAM(S): 2.5 TABLET ORAL at 05:32

## 2024-04-29 RX ADMIN — Medication 25 MILLIGRAM(S): at 05:32

## 2024-04-29 RX ADMIN — PANTOPRAZOLE SODIUM 40 MILLIGRAM(S): 20 TABLET, DELAYED RELEASE ORAL at 05:32

## 2024-04-29 RX ADMIN — Medication 0.6 MILLIGRAM(S): at 17:31

## 2024-04-29 NOTE — PROGRESS NOTE ADULT - SUBJECTIVE AND OBJECTIVE BOX
[facility]  INFECTIOUS DISEASES at [place] / [place] / [place]  =======================================================                               Serg Almanza MD#   Rony Guillory MD*                             Lisy Sandhu MD*   Laila Bell MD*                              Professor Emeritus:  Dr Josr Chatman MD^            Diplomates American Board of Internal Medicine & Infectious Diseases                # [place] Office - Appt - Tel  [phone] Fax [phone]                * [place] Office - Appt - Tel [phone] Fax [phone]                      ^[place] Office - Tel  [phone] Fax [phone]                                  Hospital Consult line:  [phone]  =======================================================    ZAINA MILLER [id number]    Follow up: Fever, fungemia    afebrile  feels better  s/p bronch 4/26      Allergies:  No Known Allergies           REVIEW OF SYSTEMS:  CONSTITUTIONAL:  No Fever or chills  HEENT:  No diplopia or blurred vision.  No earache, sore throat or runny nose.  CARDIOVASCULAR:  No chest pain  RESPIRATORY:  No cough, shortness of breath  GASTROINTESTINAL:  No nausea, vomiting or diarrhea.  GENITOURINARY:  No dysuria, frequency or urgency. No Blood in urine  MUSCULOSKELETAL:  no joint pains   SKIN:  No change in skin, hair or nails.  NEUROLOGIC:  No Headaches      Physical Exam:  GEN: NAD  HEENT: normocephalic and atraumatic.   NECK: Supple.   LUNGS: CTA B/L.  HEART: RRR  ABDOMEN: Soft, NT, ND.  +BS.    : No CVA tenderness  EXTREMITIES: No edema   MSK: No joint swelling  NEUROLOGIC: No Focal Deficits   SKIN: No rash      Vitals:  T(F): 97.5 (29 Apr 2024 07:58), Max: 98.4 (28 Apr 2024 21:22)  HR: 85 (29 Apr 2024 07:58)  BP: 126/75 (29 Apr 2024 07:58)  RR: 18 (29 Apr 2024 07:58)  SpO2: 95% (29 Apr 2024 07:58) (95% - 100%)  temp max in last 48H T(F): , Max: 98.4 (04-28-24 @ 08:40)    Current Antibiotics:  caspofungin IVPB 50 milliGRAM(s) IV Intermittent every 24 hours  caspofungin IVPB        Other medications:  allopurinol 100 milliGRAM(s) Oral daily  amLODIPine   Tablet 10 milliGRAM(s) Oral daily  colchicine 0.6 milliGRAM(s) Oral two times a day  dextrose 5%. 1000 milliLiter(s) IV Continuous <Continuous>  dextrose 5%. 1000 milliLiter(s) IV Continuous <Continuous>  dextrose 50% Injectable 25 Gram(s) IV Push once  dextrose 50% Injectable 25 Gram(s) IV Push once  dextrose 50% Injectable 12.5 Gram(s) IV Push once  enoxaparin Injectable 40 milliGRAM(s) SubCutaneous every 24 hours  glucagon  Injectable 1 milliGRAM(s) IntraMuscular once  insulin lispro (ADMELOG) corrective regimen sliding scale   SubCutaneous Before meals and at bedtime  metoprolol tartrate 25 milliGRAM(s) Oral two times a day  pantoprazole    Tablet 40 milliGRAM(s) Oral before breakfast                            10.2   14.01 )-----------( 463      ( 29 Apr 2024 05:20 )             31.8     04-29    138  |  100  |  11.5  ----------------------------<  100<H>  3.5   |  25.0  |  0.80    Ca    8.9      29 Apr 2024 05:20      RECENT CULTURES:  04-26 @ 15:35 .Bronchial RLL BAL     Testing in progress  No polymorphonuclear cells seen per low power field  No squamous epithelial cells per low power field  No organisms seen per oil power field    04-26 @ 15:30 .Bronchial LLL BAL     Testing in progress  No polymorphonuclear cells seen per low power field  No squamous epithelial cells seen per low power field  No organisms seen per oil power field    Culture - Bronchial (04.26.24 @ 15:30)    Gram Stain:   No polymorphonuclear cells seen per low power field  No squamous epithelial cells seen per low power field  No organisms seen per oil power field   Specimen Source: .Bronchial LLL BAL   Culture Results:   Rare Haemophilus parainfluenzae "Susceptibilities not performed"  Normal Respiratory Chloe present        04-19 @ 17:35    RVP  NotDetec    04-19 @ 11:30 .Blood Blood-Venous     No growth at 5 days    04-19 @ 11:25 .Blood Blood-Peripheral     No growth at 5 days    04-16 @ 23:50    RVP  NotDetec    04-16 @ 23:11 .Blood Blood-Venous     No growth at 5 days    04-16 @ 23:00 .Blood Blood-Venous     No growth at 5 days      WBC Count: 14.01 K/uL (04-29-24 @ 05:20)  WBC Count: 12.44 K/uL (04-28-24 @ 06:45)  WBC Count: 13.14 K/uL (04-27-24 @ 04:22)  WBC Count: 23.77 K/uL (04-26-24 @ 06:07)  WBC Count: 16.47 K/uL (04-25-24 @ 04:56)    Creatinine: 0.80 mg/dL (04-29-24 @ 05:20)  Creatinine: 0.80 mg/dL (04-28-24 @ 06:45)  Creatinine: 1.03 mg/dL (04-26-24 @ 04:46)  Creatinine: 0.72 mg/dL (04-25-24 @ 04:56)    C-Reactive Protein, Serum: 218 mg/L (04-17-24 @ 05:14)    SARS-CoV-2: NotDetec (04-19-24 @ 17:35)  SARS-CoV-2: NotDetec (04-16-24 @ 23:50)  SARS-CoV-2: NotDetec (04-04-24 @ 13:21)  COVID-19 PCR: NotDetec (03-31-24 @ 22:32)  SARS-CoV-2: NotDetec (03-31-24 @ 22:32)        Anti-Nuclear Antibody in AM (04.06.24 @ 05:25)    Anti Nuclear Factor Titer: Negative: Antinuclear AB (ERON), IFA Method    Rapid HIV-1/2 Antibody (04.05.24 @ 08:34)    Rapid HIV-1/2 Antibody: Nonreact    Legionella pneumophila Antigen, Urine (04.02.24 @ 15:51)    Legionella Antigen, Urine: Negative    Streptococcus pneumoniae Ag, Ur (04.02.24 @ 15:51)    Streptococcus pneumoniae Ag, Ur Result: Negative    Acute Hepatitis Panel (04.06.24 @ 05:25)    Hepatitis C Virus Interpretation: Nonreact   Hepatitis C Virus S/CO Ratio: 0.13 S/CO   Hepatitis B Core IgM Antibody: Nonreact   Hepatitis B Surface Antigen: Nonreact   Hepatitis A IgM Antibody: Nonreact    Rheumatoid Factor Quant, Serum or Plasma in AM (04.06.24 @ 05:25)    Rheumatoid Factor Quant, Serum or Plasma: 11 IU/mL      < from: YASMEEN W or WO Ultrasound Enhancing Agent (04.17.24 @ 11:34) >  CONCLUSIONS:      1. Left ventricular systolic function is normal with an ejection fraction visually estimated at 60 to 65 %.   2. Normal right ventricular cavity size and normal systolic function.   3. No evidence of left atrial or left atrial appendage thrombus. The left atrial appendage emptying velocity is normal.   4. No significant valvular disease.   5. No pericardial effusion seen.   6. Agitated saline injection was negative for intracardiac shunt.   7. Compared to the transthoracic echocardiogram performed on 4/12/2024, there have been no significant interval changes.   8. No echocardiographic evidence of vegetations.    < end of copied text >

## 2024-04-29 NOTE — PROGRESS NOTE ADULT - SUBJECTIVE AND OBJECTIVE BOX
Francis Luciano M.D.    Patient is a 59y old  Male who presents with a chief complaint of ARF (28 Apr 2024 11:56)      SUBJECTIVE / OVERNIGHT EVENTS: no event overnight.     Patient denies chest pain, SOB, abd pain, N/V, fever, chills, dysuria or any other complaints. All remainder ROS negative.     MEDICATIONS  (STANDING):  allopurinol 100 milliGRAM(s) Oral daily  amLODIPine   Tablet 10 milliGRAM(s) Oral daily  amoxicillin  875 milliGRAM(s)/clavulanate 1 Tablet(s) Oral every 12 hours  caspofungin IVPB      caspofungin IVPB 50 milliGRAM(s) IV Intermittent every 24 hours  colchicine 0.6 milliGRAM(s) Oral two times a day  dextrose 5%. 1000 milliLiter(s) (100 mL/Hr) IV Continuous <Continuous>  dextrose 5%. 1000 milliLiter(s) (50 mL/Hr) IV Continuous <Continuous>  dextrose 50% Injectable 25 Gram(s) IV Push once  dextrose 50% Injectable 25 Gram(s) IV Push once  dextrose 50% Injectable 12.5 Gram(s) IV Push once  enoxaparin Injectable 40 milliGRAM(s) SubCutaneous every 24 hours  glucagon  Injectable 1 milliGRAM(s) IntraMuscular once  insulin lispro (ADMELOG) corrective regimen sliding scale   SubCutaneous Before meals and at bedtime  metoprolol tartrate 25 milliGRAM(s) Oral two times a day  pantoprazole    Tablet 40 milliGRAM(s) Oral before breakfast    MEDICATIONS  (PRN):  acetaminophen     Tablet .. 650 milliGRAM(s) Oral every 6 hours PRN Temp greater or equal to 38C (100.4F), Mild Pain (1 - 3)  albuterol    90 MICROgram(s) HFA Inhaler 2 Puff(s) Inhalation every 6 hours PRN Shortness of Breath and/or Wheezing  albuterol/ipratropium for Nebulization 3 milliLiter(s) Nebulizer every 6 hours PRN Shortness of Breath and/or Wheezing  aluminum hydroxide/magnesium hydroxide/simethicone Suspension 30 milliLiter(s) Oral every 4 hours PRN Dyspepsia  dextrose Oral Gel 15 Gram(s) Oral once PRN Blood Glucose LESS THAN 70 milliGRAM(s)/deciliter  melatonin 3 milliGRAM(s) Oral at bedtime PRN Insomnia  ondansetron Injectable 4 milliGRAM(s) IV Push every 8 hours PRN Nausea and/or Vomiting      I&O's Summary    28 Apr 2024 07:01  -  29 Apr 2024 07:00  --------------------------------------------------------  IN: 960 mL / OUT: 1250 mL / NET: -290 mL        PHYSICAL EXAM:  Vital Signs Last 24 Hrs  T(C): 36.4 (29 Apr 2024 07:58), Max: 36.9 (28 Apr 2024 21:22)  T(F): 97.5 (29 Apr 2024 07:58), Max: 98.4 (28 Apr 2024 21:22)  HR: 85 (29 Apr 2024 07:58) (85 - 95)  BP: 126/75 (29 Apr 2024 07:58) (126/75 - 140/67)  BP(mean): --  RR: 18 (29 Apr 2024 07:58) (18 - 18)  SpO2: 95% (29 Apr 2024 07:58) (95% - 100%)    Parameters below as of 29 Apr 2024 07:58  Patient On (Oxygen Delivery Method): room air      CONSTITUTIONAL: NAD, well-groomed  RESPIRATORY: Normal respiratory effort; lungs are clear to auscultation bilaterally  CARDIOVASCULAR: Regular rate and rhythm, on LE edema  ABDOMEN: Nontender to palpation, normoactive bowel sounds  PSYCH: A+O x3; affect appropriate  NEUROLOGY: CN 2-12 are intact and symmetric; no gross sensory deficits;   SKIN: No rashes; no palpable lesions    LABS:                        10.2   14.01 )-----------( 463      ( 29 Apr 2024 05:20 )             31.8     04-29    138  |  100  |  11.5  ----------------------------<  100<H>  3.5   |  25.0  |  0.80    Ca    8.9      29 Apr 2024 05:20            Urinalysis Basic - ( 29 Apr 2024 05:20 )    Color: x / Appearance: x / SG: x / pH: x  Gluc: 100 mg/dL / Ketone: x  / Bili: x / Urobili: x   Blood: x / Protein: x / Nitrite: x   Leuk Esterase: x / RBC: x / WBC x   Sq Epi: x / Non Sq Epi: x / Bacteria: x        Culture - Bronchial (collected 26 Apr 2024 15:35)  Source: .Bronchial RLL BAL  Gram Stain (27 Apr 2024 06:29):    No polymorphonuclear cells seen per low power field    No squamous epithelial cells per low power field    No organisms seen per oil power field  Final Report (28 Apr 2024 18:12):    Normal Respiratory Chloe present    Culture - Acid Fast - Bronchial w/Smear (collected 26 Apr 2024 15:35)  Source: .Bronchial RLL BAL  Preliminary Report (27 Apr 2024 23:05):    Culture is being performed.    Culture - Fungal, Bronchial (collected 26 Apr 2024 15:35)  Source: .Bronchial RLL BAL  Preliminary Report (27 Apr 2024 07:33):    Testing in progress    Culture - Bronchial (collected 26 Apr 2024 15:30)  Source: .Bronchial LLL BAL  Gram Stain (28 Apr 2024 13:30):    No polymorphonuclear cells seen per low power field    No squamous epithelial cells seen per low power field    No organisms seen per oil power field  Final Report (28 Apr 2024 13:30):    Rare Haemophilus parainfluenzae "Susceptibilities not performed"    Normal Respiratory Chloe present    Culture - Acid Fast - Bronchial w/Smear (collected 26 Apr 2024 15:30)  Source: .Bronchial LLL BAL  Preliminary Report (27 Apr 2024 23:05):    Culture is being performed.    Culture - Fungal, Bronchial (collected 26 Apr 2024 15:30)  Source: .Bronchial LLL BAL  Preliminary Report (27 Apr 2024 07:32):    Testing in progress      CAPILLARY BLOOD GLUCOSE      POCT Blood Glucose.: 137 mg/dL (29 Apr 2024 12:05)  POCT Blood Glucose.: 128 mg/dL (29 Apr 2024 08:19)  POCT Blood Glucose.: 119 mg/dL (28 Apr 2024 21:13)  POCT Blood Glucose.: 101 mg/dL (28 Apr 2024 17:00)      RADIOLOGY & ADDITIONAL TESTS:  Results Reviewed:   Imaging Personally Reviewed:  Electrocardiogram Personally Reviewed:

## 2024-04-29 NOTE — PROGRESS NOTE ADULT - ASSESSMENT
1y  Male with a h/o Asthma, HTN, HLD, DM2, Gout. Patient presented to [facility] ER for abnormal lab work, elevated Cr from his PMD's office. Patient recently hospitalized for acute asthma exacerbation and viral URI found to have uncontrolled HTN and new onset DM type 2 at that time discharged home with outpatient follow-up and sent back to [facility] ER today by PCP for abnormal kidney function. During his hospital course patient developed fever to 102.3F on 3/31, was a code sepsis, was administered Azithromycin x1 and Zosyn since 4/1.    Fungemia  Fever  TYESHA  Transaminitis   Thrombocytosis         - Blood cultures  3/31, 4/3, 4/6 no growth   - blood cultures 4/10 reporting Candida  - Repeat blood cultures  4/13, 4/16, 4/19 no growth   - RVP/COVID 19 PCR 3/31, 4/4 negative   - Urine for legionella and strep negative   - CT C/A/P 4/1 reporting multifocal PNA   - RUE CT with contrast concerning for Rt elbow OM and Advanced right wrist arthrosis without significant effusion or convincing CT evidence for osteomyelitis/septic arthritis.  - Rt Elbow MRI with no OM.   - Rheumatology consult noted  - Babesia PCR is negative   - Lyme PCR  is negative   - HIV negative   - viral hepatitis profile negative  - ERON negative   - LFTs improved  - RF is 11  - Procalcitonin 0.30-->0.36  - Will order CRP to trend   - Continue Caspofungin, pending bronch Cx  - TTE with no veg  - CT A/P with contrast 4/13 reporting no acute findings  - YASMEEN did not report vegetations  - Hold off on PICC/Midline for now unless needed for reasons other than infectious diseases  - started on colchicine for suspected gout, fever likely from gout, improved with colchine  - s/p Bronch 4/26  - Bronch Cx reporting Haemophilus parainfluenzae  - Will start a 7 day course of Augmentin  - Trend Fever  - Trend WBC      Will Follow      Discussed treatment plan with: Thoracic surgery

## 2024-04-30 LAB
CRP SERPL-MCNC: 7 MG/L — HIGH
GLUCOSE BLDC GLUCOMTR-MCNC: 105 MG/DL — HIGH (ref 70–99)
GLUCOSE BLDC GLUCOMTR-MCNC: 119 MG/DL — HIGH (ref 70–99)
GLUCOSE BLDC GLUCOMTR-MCNC: 119 MG/DL — HIGH (ref 70–99)
GLUCOSE BLDC GLUCOMTR-MCNC: 99 MG/DL — SIGNIFICANT CHANGE UP (ref 70–99)
NON-GYNECOLOGICAL CYTOLOGY STUDY: SIGNIFICANT CHANGE UP
PROCALCITONIN SERPL-MCNC: 0.09 NG/ML — SIGNIFICANT CHANGE UP (ref 0.02–0.1)

## 2024-04-30 PROCEDURE — 99232 SBSQ HOSP IP/OBS MODERATE 35: CPT

## 2024-04-30 RX ADMIN — Medication 25 MILLIGRAM(S): at 17:50

## 2024-04-30 RX ADMIN — ENOXAPARIN SODIUM 40 MILLIGRAM(S): 100 INJECTION SUBCUTANEOUS at 21:10

## 2024-04-30 RX ADMIN — CASPOFUNGIN ACETATE 260 MILLIGRAM(S): 7 INJECTION, POWDER, LYOPHILIZED, FOR SOLUTION INTRAVENOUS at 12:57

## 2024-04-30 RX ADMIN — Medication 1 TABLET(S): at 05:26

## 2024-04-30 RX ADMIN — Medication 1 TABLET(S): at 17:50

## 2024-04-30 RX ADMIN — Medication 100 MILLIGRAM(S): at 12:35

## 2024-04-30 RX ADMIN — PANTOPRAZOLE SODIUM 40 MILLIGRAM(S): 20 TABLET, DELAYED RELEASE ORAL at 05:28

## 2024-04-30 RX ADMIN — Medication 0.6 MILLIGRAM(S): at 05:26

## 2024-04-30 RX ADMIN — Medication 3 MILLILITER(S): at 20:10

## 2024-04-30 RX ADMIN — Medication 25 MILLIGRAM(S): at 05:28

## 2024-04-30 RX ADMIN — Medication 0.6 MILLIGRAM(S): at 17:50

## 2024-04-30 RX ADMIN — AMLODIPINE BESYLATE 10 MILLIGRAM(S): 2.5 TABLET ORAL at 05:28

## 2024-04-30 NOTE — PROGRESS NOTE ADULT - ASSESSMENT
1y  Male with a h/o Asthma, HTN, HLD, DM2, Gout. Patient presented to [facility] ER for abnormal lab work, elevated Cr from his PMD's office. Patient recently hospitalized for acute asthma exacerbation and viral URI found to have uncontrolled HTN and new onset DM type 2 at that time discharged home with outpatient follow-up and sent back to [facility] ER today by PCP for abnormal kidney function. During his hospital course patient developed fever to 102.3F on 3/31, was a code sepsis, was administered Azithromycin x1 and Zosyn since 4/1.    Fungemia  Fever  TYESHA  Transaminitis   Thrombocytosis         - Blood cultures  3/31, 4/3, 4/6 no growth   - blood cultures 4/10 reporting Candida  - Repeat blood cultures  4/13, 4/16, 4/19 no growth   - RVP/COVID 19 PCR 3/31, 4/4 negative   - Urine for legionella and strep negative   - CT C/A/P 4/1 reporting multifocal PNA   - RUE CT with contrast concerning for Rt elbow OM and Advanced right wrist arthrosis without significant effusion or convincing CT evidence for osteomyelitis/septic arthritis.  - Rt Elbow MRI with no OM.   - Rheumatology consult noted  - Babesia PCR is negative   - Lyme PCR  is negative   - HIV negative   - viral hepatitis profile negative  - ERON negative   - LFTs improved  - RF is 11  - Procalcitonin 0.30-->0.36 -> 0.09  -  --> 7  - Continue Caspofungin, pending bronch Cx  - TTE with no veg  - CT A/P with contrast 4/13 reporting no acute findings  - YASMEEN did not report vegetations  - Hold off on PICC/Midline for now unless needed for reasons other than infectious diseases  - started on colchicine for suspected gout, fever likely from gout, improved with colchine  - s/p Bronch 4/26  - Bronch Cx reporting Haemophilus parainfluenzae  - Continue  7 day course of Augmentin  - Trend Fever  - Trend WBC      Will Follow      Discussed treatment plan with: Thoracic surgery

## 2024-04-30 NOTE — PROGRESS NOTE ADULT - SUBJECTIVE AND OBJECTIVE BOX
Francis Luciano M.D.    Patient is a 59y old  Male who presents with a chief complaint of ARF (29 Apr 2024 13:58)      SUBJECTIVE / OVERNIGHT EVENTS: no event overnight.     Patient denies chest pain, SOB, abd pain, N/V, fever, chills, dysuria or any other complaints. All remainder ROS negative.     MEDICATIONS  (STANDING):  allopurinol 100 milliGRAM(s) Oral daily  amLODIPine   Tablet 10 milliGRAM(s) Oral daily  amoxicillin  875 milliGRAM(s)/clavulanate 1 Tablet(s) Oral every 12 hours  caspofungin IVPB      caspofungin IVPB 50 milliGRAM(s) IV Intermittent every 24 hours  colchicine 0.6 milliGRAM(s) Oral two times a day  dextrose 5%. 1000 milliLiter(s) (100 mL/Hr) IV Continuous <Continuous>  dextrose 5%. 1000 milliLiter(s) (50 mL/Hr) IV Continuous <Continuous>  dextrose 50% Injectable 25 Gram(s) IV Push once  dextrose 50% Injectable 25 Gram(s) IV Push once  dextrose 50% Injectable 12.5 Gram(s) IV Push once  enoxaparin Injectable 40 milliGRAM(s) SubCutaneous every 24 hours  glucagon  Injectable 1 milliGRAM(s) IntraMuscular once  insulin lispro (ADMELOG) corrective regimen sliding scale   SubCutaneous Before meals and at bedtime  metoprolol tartrate 25 milliGRAM(s) Oral two times a day  pantoprazole    Tablet 40 milliGRAM(s) Oral before breakfast    MEDICATIONS  (PRN):  acetaminophen     Tablet .. 650 milliGRAM(s) Oral every 6 hours PRN Temp greater or equal to 38C (100.4F), Mild Pain (1 - 3)  albuterol    90 MICROgram(s) HFA Inhaler 2 Puff(s) Inhalation every 6 hours PRN Shortness of Breath and/or Wheezing  albuterol/ipratropium for Nebulization 3 milliLiter(s) Nebulizer every 6 hours PRN Shortness of Breath and/or Wheezing  aluminum hydroxide/magnesium hydroxide/simethicone Suspension 30 milliLiter(s) Oral every 4 hours PRN Dyspepsia  dextrose Oral Gel 15 Gram(s) Oral once PRN Blood Glucose LESS THAN 70 milliGRAM(s)/deciliter  melatonin 3 milliGRAM(s) Oral at bedtime PRN Insomnia  ondansetron Injectable 4 milliGRAM(s) IV Push every 8 hours PRN Nausea and/or Vomiting      I&O's Summary    29 Apr 2024 07:01  -  30 Apr 2024 07:00  --------------------------------------------------------  IN: 240 mL / OUT: 1400 mL / NET: -1160 mL        PHYSICAL EXAM:  Vital Signs Last 24 Hrs  T(C): 36.7 (30 Apr 2024 07:54), Max: 36.9 (29 Apr 2024 16:40)  T(F): 98 (30 Apr 2024 07:54), Max: 98.5 (29 Apr 2024 16:40)  HR: 88 (30 Apr 2024 07:54) (88 - 94)  BP: 128/75 (30 Apr 2024 07:54) (116/68 - 142/78)  BP(mean): --  RR: 18 (30 Apr 2024 07:54) (18 - 18)  SpO2: 95% (30 Apr 2024 07:54) (95% - 98%)    Parameters below as of 30 Apr 2024 07:54  Patient On (Oxygen Delivery Method): room air      CONSTITUTIONAL: NAD, well-groomed  RESPIRATORY: Normal respiratory effort; lungs are clear to auscultation bilaterally  CARDIOVASCULAR: Regular rate and rhythm, on LE edema  ABDOMEN: Nontender to palpation, normoactive bowel sounds  PSYCH: A+O x3; affect appropriate  NEUROLOGY: CN 2-12 are intact and symmetric; no gross sensory deficits;   SKIN: No rashes; no palpable lesions    LABS:                        10.2   14.01 )-----------( 463      ( 29 Apr 2024 05:20 )             31.8     04-29    138  |  100  |  11.5  ----------------------------<  100<H>  3.5   |  25.0  |  0.80    Ca    8.9      29 Apr 2024 05:20            Urinalysis Basic - ( 29 Apr 2024 05:20 )    Color: x / Appearance: x / SG: x / pH: x  Gluc: 100 mg/dL / Ketone: x  / Bili: x / Urobili: x   Blood: x / Protein: x / Nitrite: x   Leuk Esterase: x / RBC: x / WBC x   Sq Epi: x / Non Sq Epi: x / Bacteria: x        CAPILLARY BLOOD GLUCOSE      POCT Blood Glucose.: 119 mg/dL (30 Apr 2024 08:22)  POCT Blood Glucose.: 120 mg/dL (29 Apr 2024 20:50)  POCT Blood Glucose.: 101 mg/dL (29 Apr 2024 16:48)  POCT Blood Glucose.: 137 mg/dL (29 Apr 2024 12:05)      RADIOLOGY & ADDITIONAL TESTS:  Results Reviewed:   Imaging Personally Reviewed:  Electrocardiogram Personally Reviewed:

## 2024-04-30 NOTE — PROGRESS NOTE ADULT - SUBJECTIVE AND OBJECTIVE BOX
[facility]  INFECTIOUS DISEASES at [place] / [place] / [place]  =======================================================                               Serg Almanza MD#   Rony Guillory MD*                             Lisy Sandhu MD*   Laila Bell MD*                              Professor Emeritus:  Dr Josr Chatman MD^            Diplomates American Board of Internal Medicine & Infectious Diseases                # [place] Office - Appt - Tel  [phone] Fax [phone]                * [place] Office - Appt - Tel [phone] Fax [phone]                      ^[place] Office - Tel  [phone] Fax [phone]                                  Hospital Consult line:  [phone]  =======================================================    ZAINA MILLER [id number]    Follow up: Fever, fungemia    afebrile  feels better  s/p bronch 4/26      Allergies:  No Known Allergies       REVIEW OF SYSTEMS:  CONSTITUTIONAL:  No Fever or chills  HEENT:  No diplopia or blurred vision.  No earache, sore throat or runny nose.  CARDIOVASCULAR:  No chest pain  RESPIRATORY:  No cough, shortness of breath  GASTROINTESTINAL:  No nausea, vomiting or diarrhea.  GENITOURINARY:  No dysuria, frequency or urgency. No Blood in urine  MUSCULOSKELETAL:  no joint pains   SKIN:  No change in skin, hair or nails.  NEUROLOGIC:  No Headaches      Physical Exam:  GEN: NAD  HEENT: normocephalic and atraumatic.   NECK: Supple.   LUNGS: CTA B/L.  HEART: RRR  ABDOMEN: Soft, NT, ND.  +BS.    : No CVA tenderness  EXTREMITIES: No edema   MSK: No joint swelling  NEUROLOGIC: No Focal Deficits   SKIN: No rash      Vitals:  T(F): 98 (30 Apr 2024 07:54), Max: 98.5 (29 Apr 2024 16:40)  HR: 88 (30 Apr 2024 07:54)  BP: 128/75 (30 Apr 2024 07:54)  RR: 18 (30 Apr 2024 07:54)  SpO2: 95% (30 Apr 2024 07:54) (95% - 98%)  temp max in last 48H T(F): , Max: 98.5 (04-29-24 @ 16:40)    Current Antibiotics:  amoxicillin  875 milliGRAM(s)/clavulanate 1 Tablet(s) Oral every 12 hours  caspofungin IVPB      caspofungin IVPB 50 milliGRAM(s) IV Intermittent every 24 hours    Other medications:  allopurinol 100 milliGRAM(s) Oral daily  amLODIPine   Tablet 10 milliGRAM(s) Oral daily  colchicine 0.6 milliGRAM(s) Oral two times a day  dextrose 5%. 1000 milliLiter(s) IV Continuous <Continuous>  dextrose 5%. 1000 milliLiter(s) IV Continuous <Continuous>  dextrose 50% Injectable 12.5 Gram(s) IV Push once  dextrose 50% Injectable 25 Gram(s) IV Push once  dextrose 50% Injectable 25 Gram(s) IV Push once  enoxaparin Injectable 40 milliGRAM(s) SubCutaneous every 24 hours  glucagon  Injectable 1 milliGRAM(s) IntraMuscular once  insulin lispro (ADMELOG) corrective regimen sliding scale   SubCutaneous Before meals and at bedtime  metoprolol tartrate 25 milliGRAM(s) Oral two times a day  pantoprazole    Tablet 40 milliGRAM(s) Oral before breakfast                            10.2   14.01 )-----------( 463      ( 29 Apr 2024 05:20 )             31.8     04-29    138  |  100  |  11.5  ----------------------------<  100<H>  3.5   |  25.0  |  0.80    Ca    8.9      29 Apr 2024 05:20      RECENT CULTURES:  04-26 @ 15:35 .Bronchial RLL BAL     Testing in progress  No polymorphonuclear cells seen per low power field  No squamous epithelial cells per low power field  No organisms seen per oil power field    04-26 @ 15:30 .Bronchial LLL BAL     Testing in progress  No polymorphonuclear cells seen per low power field  No squamous epithelial cells seen per low power field  No organisms seen per oil power field    04-19 @ 17:35    RVP  NotDetec    04-19 @ 11:30 .Blood Blood-Venous     No growth at 5 days    04-19 @ 11:25 .Blood Blood-Peripheral     No growth at 5 days     04-16 @ 23:50    RVP  NotDetec    04-16 @ 23:11 .Blood Blood-Venous     No growth at 5 days    04-16 @ 23:00 .Blood Blood-Venous     No growth at 5 days      WBC Count: 14.01 K/uL (04-29-24 @ 05:20)  WBC Count: 12.44 K/uL (04-28-24 @ 06:45)  WBC Count: 13.14 K/uL (04-27-24 @ 04:22)  WBC Count: 23.77 K/uL (04-26-24 @ 06:07)    Creatinine: 0.80 mg/dL (04-29-24 @ 05:20)  Creatinine: 0.80 mg/dL (04-28-24 @ 06:45)  Creatinine: 1.03 mg/dL (04-26-24 @ 04:46)    C-Reactive Protein: 7 mg/L (04-30-24 @ 04:12)  C-Reactive Protein, Serum: 218 mg/L (04-17-24 @ 05:14)    Procalcitonin: 0.09 ng/mL (04-30-24 @ 04:12)     SARS-CoV-2: NotDetec (04-19-24 @ 17:35)  SARS-CoV-2: NotDetec (04-16-24 @ 23:50)  SARS-CoV-2: NotDetec (04-04-24 @ 13:21)  COVID-19 PCR: NotDetec (03-31-24 @ 22:32)  SARS-CoV-2: NotDetec (03-31-24 @ 22:32)            < from: YASMEEN W or WO Ultrasound Enhancing Agent (04.17.24 @ 11:34) >  CONCLUSIONS:      1. Left ventricular systolic function is normal with an ejection fraction visually estimated at 60 to 65 %.   2. Normal right ventricular cavity size and normal systolic function.   3. No evidence of left atrial or left atrial appendage thrombus. The left atrial appendage emptying velocity is normal.   4. No significant valvular disease.   5. No pericardial effusion seen.   6. Agitated saline injection was negative for intracardiac shunt.   7. Compared to the transthoracic echocardiogram performed on 4/12/2024, there have been no significant interval changes.   8. No echocardiographic evidence of vegetations.    < end of copied text >

## 2024-04-30 NOTE — PROGRESS NOTE ADULT - ASSESSMENT
58M with PMHX Asthma, HTN, HLD, DM2, Gout presents to [facility] ER for abnormal lab work. Patient recently hospitalizated for acute asthma exacerbation and viral URI found to have uncontrolled HTN and new onset DM2 at that time discharged home with outpatient follow-up and sent back to [facility] ER today by PCP for abnormal kidney function. ROS +Fatigue and +Malaise. No other complaints. Denies CVAT or back pain. No urinary complaints. Compliant with meds. Recently started on Losartan, HCTZ, and Metformin on discharge and by PCP. +Tylenol use at home. Denies NSAIDs.     #Sepsis due to Fungemia  Multifocal pneumonia s/p antibiotics   Bcx on 04/10 grew Solange  YASMEEN negative for vegetation  Concern for fungal pneumonia given patient was intermittently on steroid before admission  repeat ct chest with slightly increased mass compared to previous  s/p BAL 4/26, AFB negative  Bronch cx +H Flu, added Augmentin   PJP PCR negative   f/u cytology   Caspofungin since 04/12/2024  ID following  Rheumatology recs noted     #Right Elbow Pain   Ruled out Osteomyelitis      #Right Hand & Arm / Right Knee swelling and pain - suspected gout flare  s/p Solumedrol 40 mg IVP x 1  Continue Colchicine   Improving     #TYESHA (now resolved)   Possibly 2/2 medication induced ATN   Hold ARB/HCTZ/Metformin   Renal US negative  Avoid Nephrotoxins  Nephro follow up noted     #HTN, HLD  Amlodipine 10mg q24  Increased Metoprolol to 25 mg BID   Not currently on statin therapy    #DM2  A1c 7.1  Hold Metformin 500mg BID  ISS    #Asthma  Finished steroid taper. Gave Solumedrol 40 mg x 3 due to wheezing on 4/24.   No longer taking Montelukast  Nebs PRN    #Gout  Uric Acid level 12.2  Continue Colchicine   Resumed Allopurinol     #Leukocytosis  Likely reactive from steroids   Monitor     DVT Prophylaxis -- Lovenox SQ  Dispo: Home pending ID recs for dispo

## 2024-05-01 ENCOUNTER — TRANSCRIPTION ENCOUNTER (OUTPATIENT)
Age: 59
End: 2024-05-01

## 2024-05-01 VITALS
TEMPERATURE: 98 F | DIASTOLIC BLOOD PRESSURE: 77 MMHG | OXYGEN SATURATION: 98 % | RESPIRATION RATE: 18 BRPM | SYSTOLIC BLOOD PRESSURE: 144 MMHG | HEART RATE: 116 BPM

## 2024-05-01 LAB
ANION GAP SERPL CALC-SCNC: 11 MMOL/L — SIGNIFICANT CHANGE UP (ref 5–17)
BUN SERPL-MCNC: 7.7 MG/DL — LOW (ref 8–20)
CALCIUM SERPL-MCNC: 9.3 MG/DL — SIGNIFICANT CHANGE UP (ref 8.4–10.5)
CHLORIDE SERPL-SCNC: 100 MMOL/L — SIGNIFICANT CHANGE UP (ref 96–108)
CO2 SERPL-SCNC: 25 MMOL/L — SIGNIFICANT CHANGE UP (ref 22–29)
CREAT SERPL-MCNC: 0.95 MG/DL — SIGNIFICANT CHANGE UP (ref 0.5–1.3)
EGFR: 91 ML/MIN/1.73M2 — SIGNIFICANT CHANGE UP
GLUCOSE SERPL-MCNC: 98 MG/DL — SIGNIFICANT CHANGE UP (ref 70–99)
HCT VFR BLD CALC: 33.4 % — LOW (ref 39–50)
HGB BLD-MCNC: 10.9 G/DL — LOW (ref 13–17)
MCHC RBC-ENTMCNC: 28.2 PG — SIGNIFICANT CHANGE UP (ref 27–34)
MCHC RBC-ENTMCNC: 32.6 GM/DL — SIGNIFICANT CHANGE UP (ref 32–36)
MCV RBC AUTO: 86.3 FL — SIGNIFICANT CHANGE UP (ref 80–100)
PLATELET # BLD AUTO: 381 K/UL — SIGNIFICANT CHANGE UP (ref 150–400)
POTASSIUM SERPL-MCNC: 3.6 MMOL/L — SIGNIFICANT CHANGE UP (ref 3.5–5.3)
POTASSIUM SERPL-SCNC: 3.6 MMOL/L — SIGNIFICANT CHANGE UP (ref 3.5–5.3)
RBC # BLD: 3.87 M/UL — LOW (ref 4.2–5.8)
RBC # FLD: 16.4 % — HIGH (ref 10.3–14.5)
SODIUM SERPL-SCNC: 136 MMOL/L — SIGNIFICANT CHANGE UP (ref 135–145)
WBC # BLD: 12.53 K/UL — HIGH (ref 3.8–10.5)
WBC # FLD AUTO: 12.53 K/UL — HIGH (ref 3.8–10.5)

## 2024-05-01 PROCEDURE — 84100 ASSAY OF PHOSPHORUS: CPT

## 2024-05-01 PROCEDURE — 73201 CT UPPER EXTREMITY W/DYE: CPT | Mod: MC

## 2024-05-01 PROCEDURE — 85610 PROTHROMBIN TIME: CPT

## 2024-05-01 PROCEDURE — 84155 ASSAY OF PROTEIN SERUM: CPT

## 2024-05-01 PROCEDURE — 82553 CREATINE MB FRACTION: CPT

## 2024-05-01 PROCEDURE — 82962 GLUCOSE BLOOD TEST: CPT

## 2024-05-01 PROCEDURE — 83550 IRON BINDING TEST: CPT

## 2024-05-01 PROCEDURE — 99239 HOSP IP/OBS DSCHRG MGMT >30: CPT

## 2024-05-01 PROCEDURE — 83735 ASSAY OF MAGNESIUM: CPT

## 2024-05-01 PROCEDURE — 99233 SBSQ HOSP IP/OBS HIGH 50: CPT

## 2024-05-01 PROCEDURE — 82785 ASSAY OF IGE: CPT

## 2024-05-01 PROCEDURE — 71045 X-RAY EXAM CHEST 1 VIEW: CPT

## 2024-05-01 PROCEDURE — 86431 RHEUMATOID FACTOR QUANT: CPT

## 2024-05-01 PROCEDURE — 84300 ASSAY OF URINE SODIUM: CPT

## 2024-05-01 PROCEDURE — 86901 BLOOD TYPING SEROLOGIC RH(D): CPT

## 2024-05-01 PROCEDURE — 93005 ELECTROCARDIOGRAM TRACING: CPT

## 2024-05-01 PROCEDURE — 83935 ASSAY OF URINE OSMOLALITY: CPT

## 2024-05-01 PROCEDURE — 87186 SC STD MICRODIL/AGAR DIL: CPT

## 2024-05-01 PROCEDURE — 71046 X-RAY EXAM CHEST 2 VIEWS: CPT

## 2024-05-01 PROCEDURE — 93308 TTE F-UP OR LMTD: CPT

## 2024-05-01 PROCEDURE — 83690 ASSAY OF LIPASE: CPT

## 2024-05-01 PROCEDURE — 84165 PROTEIN E-PHORESIS SERUM: CPT

## 2024-05-01 PROCEDURE — 71250 CT THORAX DX C-: CPT | Mod: MC

## 2024-05-01 PROCEDURE — 84133 ASSAY OF URINE POTASSIUM: CPT

## 2024-05-01 PROCEDURE — 0225U NFCT DS DNA&RNA 21 SARSCOV2: CPT

## 2024-05-01 PROCEDURE — 93321 DOPPLER ECHO F-UP/LMTD STD: CPT

## 2024-05-01 PROCEDURE — 87206 SMEAR FLUORESCENT/ACID STAI: CPT

## 2024-05-01 PROCEDURE — 85652 RBC SED RATE AUTOMATED: CPT

## 2024-05-01 PROCEDURE — 87150 DNA/RNA AMPLIFIED PROBE: CPT

## 2024-05-01 PROCEDURE — 86038 ANTINUCLEAR ANTIBODIES: CPT

## 2024-05-01 PROCEDURE — 88112 CYTOPATH CELL ENHANCE TECH: CPT

## 2024-05-01 PROCEDURE — 73221 MRI JOINT UPR EXTREM W/O DYE: CPT | Mod: MC

## 2024-05-01 PROCEDURE — 87070 CULTURE OTHR SPECIMN AEROBIC: CPT

## 2024-05-01 PROCEDURE — 86900 BLOOD TYPING SEROLOGIC ABO: CPT

## 2024-05-01 PROCEDURE — 86480 TB TEST CELL IMMUN MEASURE: CPT

## 2024-05-01 PROCEDURE — 82728 ASSAY OF FERRITIN: CPT

## 2024-05-01 PROCEDURE — 87116 MYCOBACTERIA CULTURE: CPT

## 2024-05-01 PROCEDURE — 81003 URINALYSIS AUTO W/O SCOPE: CPT

## 2024-05-01 PROCEDURE — 86140 C-REACTIVE PROTEIN: CPT

## 2024-05-01 PROCEDURE — 84550 ASSAY OF BLOOD/URIC ACID: CPT

## 2024-05-01 PROCEDURE — 82550 ASSAY OF CK (CPK): CPT

## 2024-05-01 PROCEDURE — 87507 IADNA-DNA/RNA PROBE TQ 12-25: CPT

## 2024-05-01 PROCEDURE — 80076 HEPATIC FUNCTION PANEL: CPT

## 2024-05-01 PROCEDURE — 87635 SARS-COV-2 COVID-19 AMP PRB: CPT

## 2024-05-01 PROCEDURE — 87449 NOS EACH ORGANISM AG IA: CPT

## 2024-05-01 PROCEDURE — 84466 ASSAY OF TRANSFERRIN: CPT

## 2024-05-01 PROCEDURE — 74176 CT ABD & PELVIS W/O CONTRAST: CPT | Mod: MC

## 2024-05-01 PROCEDURE — 93971 EXTREMITY STUDY: CPT

## 2024-05-01 PROCEDURE — 74177 CT ABD & PELVIS W/CONTRAST: CPT | Mod: MC

## 2024-05-01 PROCEDURE — 36415 COLL VENOUS BLD VENIPUNCTURE: CPT

## 2024-05-01 PROCEDURE — 93312 ECHO TRANSESOPHAGEAL: CPT

## 2024-05-01 PROCEDURE — 97530 THERAPEUTIC ACTIVITIES: CPT

## 2024-05-01 PROCEDURE — 99285 EMERGENCY DEPT VISIT HI MDM: CPT | Mod: 25

## 2024-05-01 PROCEDURE — 87102 FUNGUS ISOLATION CULTURE: CPT

## 2024-05-01 PROCEDURE — 87798 DETECT AGENT NOS DNA AMP: CPT

## 2024-05-01 PROCEDURE — 83615 LACTATE (LD) (LDH) ENZYME: CPT

## 2024-05-01 PROCEDURE — 80048 BASIC METABOLIC PNL TOTAL CA: CPT

## 2024-05-01 PROCEDURE — 84156 ASSAY OF PROTEIN URINE: CPT

## 2024-05-01 PROCEDURE — 93970 EXTREMITY STUDY: CPT

## 2024-05-01 PROCEDURE — 94640 AIRWAY INHALATION TREATMENT: CPT

## 2024-05-01 PROCEDURE — 87899 AGENT NOS ASSAY W/OPTIC: CPT

## 2024-05-01 PROCEDURE — 97116 GAIT TRAINING THERAPY: CPT

## 2024-05-01 PROCEDURE — 87594 PNEUMCYSTS JIROVECII AMP PRB: CPT

## 2024-05-01 PROCEDURE — 82784 ASSAY IGA/IGD/IGG/IGM EACH: CPT

## 2024-05-01 PROCEDURE — 86850 RBC ANTIBODY SCREEN: CPT

## 2024-05-01 PROCEDURE — 87040 BLOOD CULTURE FOR BACTERIA: CPT

## 2024-05-01 PROCEDURE — 83540 ASSAY OF IRON: CPT

## 2024-05-01 PROCEDURE — 85027 COMPLETE CBC AUTOMATED: CPT

## 2024-05-01 PROCEDURE — 88305 TISSUE EXAM BY PATHOLOGIST: CPT

## 2024-05-01 PROCEDURE — 84145 PROCALCITONIN (PCT): CPT

## 2024-05-01 PROCEDURE — 86334 IMMUNOFIX E-PHORESIS SERUM: CPT

## 2024-05-01 PROCEDURE — 93325 DOPPLER ECHO COLOR FLOW MAPG: CPT

## 2024-05-01 PROCEDURE — 76775 US EXAM ABDO BACK WALL LIM: CPT

## 2024-05-01 PROCEDURE — 85045 AUTOMATED RETICULOCYTE COUNT: CPT

## 2024-05-01 PROCEDURE — 84540 ASSAY OF URINE/UREA-N: CPT

## 2024-05-01 PROCEDURE — 89051 BODY FLUID CELL COUNT: CPT

## 2024-05-01 PROCEDURE — 80053 COMPREHEN METABOLIC PANEL: CPT

## 2024-05-01 PROCEDURE — 85025 COMPLETE CBC W/AUTO DIFF WBC: CPT

## 2024-05-01 PROCEDURE — 87476 LYME DIS DNA AMP PROBE: CPT

## 2024-05-01 PROCEDURE — 93320 DOPPLER ECHO COMPLETE: CPT

## 2024-05-01 PROCEDURE — 87077 CULTURE AEROBIC IDENTIFY: CPT

## 2024-05-01 PROCEDURE — 86235 NUCLEAR ANTIGEN ANTIBODY: CPT

## 2024-05-01 PROCEDURE — 87086 URINE CULTURE/COLONY COUNT: CPT

## 2024-05-01 PROCEDURE — 94760 N-INVAS EAR/PLS OXIMETRY 1: CPT

## 2024-05-01 PROCEDURE — 82164 ANGIOTENSIN I ENZYME TEST: CPT

## 2024-05-01 PROCEDURE — 82570 ASSAY OF URINE CREATININE: CPT

## 2024-05-01 PROCEDURE — 88312 SPECIAL STAINS GROUP 1: CPT

## 2024-05-01 PROCEDURE — 83605 ASSAY OF LACTIC ACID: CPT

## 2024-05-01 PROCEDURE — 84436 ASSAY OF TOTAL THYROXINE: CPT

## 2024-05-01 PROCEDURE — 85730 THROMBOPLASTIN TIME PARTIAL: CPT

## 2024-05-01 PROCEDURE — 86703 HIV-1/HIV-2 1 RESULT ANTBDY: CPT

## 2024-05-01 PROCEDURE — 93010 ELECTROCARDIOGRAM REPORT: CPT

## 2024-05-01 PROCEDURE — 80074 ACUTE HEPATITIS PANEL: CPT

## 2024-05-01 PROCEDURE — 87015 SPECIMEN INFECT AGNT CONCNTJ: CPT

## 2024-05-01 PROCEDURE — 84443 ASSAY THYROID STIM HORMONE: CPT

## 2024-05-01 RX ORDER — ALLOPURINOL 300 MG
1 TABLET ORAL
Qty: 30 | Refills: 2
Start: 2024-05-01 | End: 2024-07-29

## 2024-05-01 RX ORDER — FLUCONAZOLE 150 MG/1
800 TABLET ORAL ONCE
Refills: 0 | Status: DISCONTINUED | OUTPATIENT
Start: 2024-05-02 | End: 2024-05-01

## 2024-05-01 RX ORDER — BUDESONIDE AND FORMOTEROL FUMARATE DIHYDRATE 160; 4.5 UG/1; UG/1
2 AEROSOL RESPIRATORY (INHALATION)
Refills: 0 | Status: DISCONTINUED | OUTPATIENT
Start: 2024-05-01 | End: 2024-05-01

## 2024-05-01 RX ORDER — IPRATROPIUM/ALBUTEROL SULFATE 18-103MCG
3 AEROSOL WITH ADAPTER (GRAM) INHALATION EVERY 6 HOURS
Refills: 0 | Status: DISCONTINUED | OUTPATIENT
Start: 2024-05-01 | End: 2024-05-01

## 2024-05-01 RX ORDER — FLUCONAZOLE 150 MG/1
400 TABLET ORAL EVERY 24 HOURS
Refills: 0 | Status: CANCELLED | OUTPATIENT
Start: 2024-05-03 | End: 2024-05-01

## 2024-05-01 RX ADMIN — PANTOPRAZOLE SODIUM 40 MILLIGRAM(S): 20 TABLET, DELAYED RELEASE ORAL at 05:20

## 2024-05-01 RX ADMIN — AMLODIPINE BESYLATE 10 MILLIGRAM(S): 2.5 TABLET ORAL at 05:21

## 2024-05-01 RX ADMIN — Medication 1 TABLET(S): at 05:20

## 2024-05-01 RX ADMIN — Medication 3 MILLILITER(S): at 09:05

## 2024-05-01 RX ADMIN — Medication 3 MILLILITER(S): at 14:53

## 2024-05-01 RX ADMIN — CASPOFUNGIN ACETATE 260 MILLIGRAM(S): 7 INJECTION, POWDER, LYOPHILIZED, FOR SOLUTION INTRAVENOUS at 10:52

## 2024-05-01 RX ADMIN — Medication 100 MILLIGRAM(S): at 10:34

## 2024-05-01 RX ADMIN — Medication 25 MILLIGRAM(S): at 05:20

## 2024-05-01 RX ADMIN — Medication 40 MILLIGRAM(S): at 12:38

## 2024-05-01 RX ADMIN — Medication 0.6 MILLIGRAM(S): at 05:21

## 2024-05-01 RX ADMIN — Medication 3 MILLILITER(S): at 02:20

## 2024-05-01 NOTE — DISCHARGE NOTE PROVIDER - NSDCMRMEDTOKEN_GEN_ALL_CORE_FT
albuterol 2.5 mg/3 mL (0.083%) inhalation solution: 3 milliliter(s) inhaled every 6 hours  allopurinol 100 mg oral tablet: 1 tab(s) orally once a day  amLODIPine 10 mg oral tablet: 1 tab(s) orally once a day  amoxicillin-clavulanate 875 mg-125 mg oral tablet: 1 tab(s) orally every 12 hours  fluconazole 200 mg oral tablet: 2 tab(s) orally every 24 hours take 4 tablets on 5/2. then 2 tablets daily from 5/3-5/10  hydroCHLOROthiazide 25 mg oral tablet: 1 tab(s) orally once a day  losartan 100 mg oral tablet: 1 tab(s) orally once a day  Medrol Dosepak 4 mg oral tablet: 1 tab(s) orally once a day per package instruction  metFORMIN 500 mg oral tablet: 1 tab(s) orally 2 times a day  pantoprazole 40 mg oral delayed release tablet: 1 tab(s) orally once a day (before a meal)  Proventil HFA CFC free 90 mcg/inh inhalation aerosol: 2 puff(s) inhaled 4 times a day  Wixela Inhub 500 mcg-50 mcg inhalation powder: 1 puff(s) inhaled once a day   albuterol 2.5 mg/3 mL (0.083%) inhalation solution: 3 milliliter(s) inhaled every 6 hours  allopurinol 100 mg oral tablet: 1 tab(s) orally once a day  amLODIPine 10 mg oral tablet: 1 tab(s) orally once a day  amoxicillin-clavulanate 875 mg-125 mg oral tablet: 1 tab(s) orally every 12 hours  fluconazole 200 mg oral tablet: 2 tab(s) orally every 24 hours take 4 tablets on 5/2. then 2 tablets daily from 5/3-5/10  losartan 100 mg oral tablet: 1 tab(s) orally once a day  Medrol Dosepak 4 mg oral tablet: 1 tab(s) orally once a day per package instruction  metFORMIN 500 mg oral tablet: 1 tab(s) orally 2 times a day  pantoprazole 40 mg oral delayed release tablet: 1 tab(s) orally once a day (before a meal)  Proventil HFA CFC free 90 mcg/inh inhalation aerosol: 2 puff(s) inhaled 4 times a day  Wixela Inhub 500 mcg-50 mcg inhalation powder: 1 puff(s) inhaled once a day

## 2024-05-01 NOTE — PROGRESS NOTE ADULT - SUBJECTIVE AND OBJECTIVE BOX
Francis Luciano M.D.    Patient is a 59y old  Male who presents with a chief complaint of ARF (30 Apr 2024 11:31)      SUBJECTIVE / OVERNIGHT EVENTS: no event overnight. Reports some wheezes this AM.     Patient denies chest pain, SOB, abd pain, N/V, fever, chills, dysuria or any other complaints. All remainder ROS negative.     MEDICATIONS  (STANDING):  albuterol/ipratropium for Nebulization 3 milliLiter(s) Nebulizer every 6 hours  allopurinol 100 milliGRAM(s) Oral daily  amLODIPine   Tablet 10 milliGRAM(s) Oral daily  amoxicillin  875 milliGRAM(s)/clavulanate 1 Tablet(s) Oral every 12 hours  budesonide 160 MICROgram(s)/formoterol 4.5 MICROgram(s) Inhaler 2 Puff(s) Inhalation two times a day  caspofungin IVPB      caspofungin IVPB 50 milliGRAM(s) IV Intermittent every 24 hours  enoxaparin Injectable 40 milliGRAM(s) SubCutaneous every 24 hours  metoprolol tartrate 25 milliGRAM(s) Oral two times a day  pantoprazole    Tablet 40 milliGRAM(s) Oral before breakfast    MEDICATIONS  (PRN):  acetaminophen     Tablet .. 650 milliGRAM(s) Oral every 6 hours PRN Temp greater or equal to 38C (100.4F), Mild Pain (1 - 3)  albuterol    90 MICROgram(s) HFA Inhaler 2 Puff(s) Inhalation every 6 hours PRN Shortness of Breath and/or Wheezing  aluminum hydroxide/magnesium hydroxide/simethicone Suspension 30 milliLiter(s) Oral every 4 hours PRN Dyspepsia  melatonin 3 milliGRAM(s) Oral at bedtime PRN Insomnia  ondansetron Injectable 4 milliGRAM(s) IV Push every 8 hours PRN Nausea and/or Vomiting      I&O's Summary    30 Apr 2024 07:01  -  01 May 2024 07:00  --------------------------------------------------------  IN: 810 mL / OUT: 1000 mL / NET: -190 mL    01 May 2024 07:01  -  01 May 2024 10:36  --------------------------------------------------------  IN: 200 mL / OUT: 0 mL / NET: 200 mL        PHYSICAL EXAM:  Vital Signs Last 24 Hrs  T(C): 37.2 (01 May 2024 11:00), Max: 37.2 (01 May 2024 11:00)  T(F): 98.9 (01 May 2024 11:00), Max: 98.9 (01 May 2024 11:00)  HR: 100 (01 May 2024 11:00) (86 - 103)  BP: 116/77 (01 May 2024 11:00) (116/77 - 145/76)  BP(mean): --  RR: 18 (01 May 2024 11:00) (16 - 18)  SpO2: 96% (01 May 2024 11:00) (94% - 99%)    Parameters below as of 01 May 2024 11:00  Patient On (Oxygen Delivery Method): room air        CONSTITUTIONAL: NAD, well-groomed  ENMT: Moist oral mucosa, no pharyngeal injection or exudates; normal dentition  RESPIRATORY: Normal respiratory effort; mild exp wheezes noted  CARDIOVASCULAR: Regular rate and rhythm, no LE edema  ABDOMEN: Nontender to palpation, normoactive bowel sounds  PSYCH: A+O x3; affect appropriate  NEUROLOGY: CN 2-12 are intact and symmetric; no gross sensory deficits;   SKIN: No rashes; no palpable lesions    LABS:                        10.9   12.53 )-----------( 381      ( 01 May 2024 10:18 )             33.4                     CAPILLARY BLOOD GLUCOSE      POCT Blood Glucose.: 99 mg/dL (30 Apr 2024 21:09)  POCT Blood Glucose.: 105 mg/dL (30 Apr 2024 17:20)  POCT Blood Glucose.: 119 mg/dL (30 Apr 2024 12:31)      RADIOLOGY & ADDITIONAL TESTS:  Results Reviewed:   Imaging Personally Reviewed:  Electrocardiogram Personally Reviewed:

## 2024-05-01 NOTE — DISCHARGE NOTE PROVIDER - ATTENDING DISCHARGE PHYSICAL EXAMINATION:
VITALS:   T(C): 37.2 (05-01-24 @ 11:00), Max: 37.2 (05-01-24 @ 11:00)  HR: 100 (05-01-24 @ 11:00) (86 - 103)  BP: 116/77 (05-01-24 @ 11:00) (116/77 - 145/76)  RR: 18 (05-01-24 @ 11:00) (16 - 18)  SpO2: 96% (05-01-24 @ 11:00) (94% - 99%)    GENERAL: NAD, lying in bed comfortably  HEAD:  Atraumatic, Normocephalic  EYES: EOMI, PERRLA, conjunctiva and sclera clear  ENT: Moist mucous membranes  NECK: Supple, No JVD  CHEST/LUNG: Clear to auscultation bilaterally; No rales, rhonchi, wheezing, or rubs. Unlabored respirations  HEART: Regular rate and rhythm; No murmurs, rubs, or gallops  ABDOMEN: BSx4; Soft, nontender, nondistended  EXTREMITIES:  2+ Peripheral Pulses, brisk capillary refill. No clubbing, cyanosis, or edema  NERVOUS SYSTEM:  A&Ox3, no focal deficits   SKIN: No rashes or lesions  PSYCH: Normal affect, euthymic mood

## 2024-05-01 NOTE — PROGRESS NOTE ADULT - PROVIDER SPECIALTY LIST ADULT
Hospitalist
Infectious Disease
Internal Medicine
Thoracic Surgery
Hospitalist
Infectious Disease
Internal Medicine
Nephrology
Rheumatology
Cardiology
Hospitalist
Infectious Disease
Infectious Disease
Internal Medicine
Intervent Cardiology
Pulmonology
Pulmonology
Thoracic Surgery
Thoracic Surgery
Hospitalist
Infectious Disease
Infectious Disease
Internal Medicine
Cardiology
Infectious Disease
Pulmonology
Thoracic Surgery
Thoracic Surgery

## 2024-05-01 NOTE — DISCHARGE NOTE NURSING/CASE MANAGEMENT/SOCIAL WORK - NSDCPETBCESMAN_GEN_ALL_CORE
If you are a smoker, it is important for your health to stop smoking. Please be aware that second hand smoke is also harmful.
Patient with one or more new problems requiring additional work-up/treatment.

## 2024-05-01 NOTE — DISCHARGE NOTE PROVIDER - HOSPITAL COURSE
58M with PMHX Asthma, HTN, HLD, DM2, Gout presents to [facility] ER for abnormal lab work. Patient recently hospitalizated for acute asthma exacerbation and viral URI found to have uncontrolled HTN and new onset DM2 at that time discharged home with outpatient follow-up and sent back to [facility] ER today by PCP for abnormal kidney function. ROS +Fatigue and +Malaise. No other complaints. Denies CVAT or back pain. No urinary complaints. Compliant with meds. Recently started on Losartan, HCTZ, and Metformin on discharge and by PCP. +Tylenol use at home. Denies NSAIDs. Initially admitted for TYESHA, possibly 2/2 medication induced ATN. Renal US negative, nephro followed, and SCr normalized. Course c/b gout started on colchicine and then allopurinol. Course further c/b Fungemia and multifocal pneumonia s/p antibiotics. Bcx on 04/10 grew Solange  YASMEEN negative for vegetation. s/p BAL 4/26, AFB negative. C/w Caspofungin per ID and Bronch cx +H Flu, added Augmentin. F/u ID, plan to discharge with Augmetin and anti fungal changed to Fluconazole. Now stable for discharge.

## 2024-05-01 NOTE — DISCHARGE NOTE PROVIDER - NSDCCPCAREPLAN_GEN_ALL_CORE_FT
PRINCIPAL DISCHARGE DIAGNOSIS  Diagnosis: ARF (acute renal failure)  Assessment and Plan of Treatment:       SECONDARY DISCHARGE DIAGNOSES  Diagnosis: Fungemia  Assessment and Plan of Treatment:

## 2024-05-01 NOTE — PROGRESS NOTE ADULT - ASSESSMENT
1y  Male with a h/o Asthma, HTN, HLD, DM2, Gout. Patient presented to [facility] ER for abnormal lab work, elevated Cr from his PMD's office. Patient recently hospitalized for acute asthma exacerbation and viral URI found to have uncontrolled HTN and new onset DM type 2 at that time discharged home with outpatient follow-up and sent back to [facility] ER today by PCP for abnormal kidney function. During his hospital course patient developed fever to 102.3F on 3/31, was a code sepsis, was administered Azithromycin x1 and Zosyn since 4/1.      Fungemia  Fever  TYESHA  Transaminitis   Thrombocytosis         - Blood cultures  3/31, 4/3, 4/6 no growth   - blood cultures 4/10 reporting Candida  - Repeat blood cultures  4/13, 4/16, 4/19 no growth   - RVP/COVID 19 PCR 3/31, 4/4 negative   - Urine for legionella and strep negative   - CT C/A/P 4/1 reporting multifocal PNA   - RUE CT with contrast concerning for Rt elbow OM and Advanced right wrist arthrosis without significant effusion or convincing CT evidence for osteomyelitis/septic arthritis.  - Rt Elbow MRI with no OM.   - Rheumatology consult noted  - Babesia PCR is negative   - Lyme PCR  is negative   - HIV negative   - viral hepatitis profile negative  - ERON negative   - LFTs improved  - RF is 11  - Procalcitonin 0.30-->0.36 -> 0.09  -  --> 7  - Continue Caspofungin, pending bronch Cx  - TTE with no veg  - CT A/P with contrast 4/13 reporting no acute findings  - YASMEEN did not report vegetations  - Hold off on PICC/Midline for now unless needed for reasons other than infectious diseases  - started on colchicine for suspected gout, fever likely from gout, improved with colchine  - s/p Bronch 4/26  - Bronch Cx reporting Haemophilus parainfluenzae  - Continue  7 day course of Augmentin  - Ophthalmology eval on D/C  - Trend Fever  - Trend WBC      Will Follow      Discussed treatment plan with: Ann Marie HARPER 1y  Male with a h/o Asthma, HTN, HLD, DM2, Gout. Patient presented to [facility] ER for abnormal lab work, elevated Cr from his PMD's office. Patient recently hospitalized for acute asthma exacerbation and viral URI found to have uncontrolled HTN and new onset DM type 2 at that time discharged home with outpatient follow-up and sent back to [facility] ER today by PCP for abnormal kidney function. During his hospital course patient developed fever to 102.3F on 3/31, was a code sepsis, was administered Azithromycin x1 and Zosyn since 4/1.      Fungemia  Fever  TYESHA  Transaminitis   Thrombocytosis       - Blood cultures  3/31, 4/3, 4/6 no growth   - blood cultures 4/10 reporting Candida  - Repeat blood cultures  4/13, 4/16, 4/19 no growth   - RVP/COVID 19 PCR 3/31, 4/4 negative   - Urine for legionella and strep negative   - CT C/A/P 4/1 reporting multifocal PNA   - RUE CT with contrast concerning for Rt elbow OM and Advanced right wrist arthrosis without significant effusion or convincing CT evidence for osteomyelitis/septic arthritis.  - Rt Elbow MRI with no OM.   - Rheumatology consult noted  - Babesia PCR is negative   - Lyme PCR  is negative   - HIV negative   - viral hepatitis profile negative  - ERON negative   - LFTs improved  - RF is 11  - Procalcitonin 0.30-->0.36 -> 0.09  -  --> 7  - D/C Caspofungin after today's dose.   - Start Fluconazole 800mg x1 on 5/2 followed by 400mg daily till 5/10  - TTE with no veg  - CT A/P with contrast 4/13 reporting no acute findings  - YASMEEN did not report vegetations  - Hold off on PICC/Midline for now unless needed for reasons other than infectious diseases  - started on colchicine for suspected gout, fever likely from gout, improved with colchine, now discontinued   - s/p Bronch 4/26  - Bronch Cx reporting Haemophilus parainfluenza  - Continue  7 day course of Augmentin  - Ophthalmology eval on D/C  - Trend Fever  - Trend WBC      Will Follow      Discussed treatment plan with: Ann Marie HARPER

## 2024-05-01 NOTE — DISCHARGE NOTE PROVIDER - INSTRUCTIONS
Diet, Consistent Carbohydrate/No Snacks:   DASH/TLC {Sodium & Cholesterol Restricted} (DASH)  Supplement Feeding Modality:  Oral  Glucerna Shake Cans or Servings Per Day:  1       Frequency:  Two Times a day (04-23-24 @ 13:42) [Active]

## 2024-05-01 NOTE — PROGRESS NOTE ADULT - SUBJECTIVE AND OBJECTIVE BOX
[facility]  INFECTIOUS DISEASES at [place] / [place] / [place]  =======================================================                               Serg Almanza MD#   Rony Guillory MD*                             Lisy Sandhu MD*   Laila Bell MD*                              Professor Emeritus:  Dr Josr Chatman MD^            Diplomates American Board of Internal Medicine & Infectious Diseases                # [place] Office - Appt - Tel  [phone] Fax [phone]                * [place] Office - Appt - Tel [phone] Fax [phone]                      ^[place] Office - Tel  [phone] Fax [phone]                                  Hospital Consult line:  [phone]  =======================================================    ZAINA MILLER [id number]    Follow up: Fever, fungemia    afebrile  feels better  s/p bronch 4/26      Allergies:  No Known Allergies       REVIEW OF SYSTEMS:  CONSTITUTIONAL:  No Fever or chills  HEENT:  No diplopia or blurred vision.  No earache, sore throat or runny nose.  CARDIOVASCULAR:  No chest pain  RESPIRATORY:  No cough, shortness of breath  GASTROINTESTINAL:  No nausea, vomiting or diarrhea.  GENITOURINARY:  No dysuria, frequency or urgency. No Blood in urine  MUSCULOSKELETAL:  no joint pains   SKIN:  No change in skin, hair or nails.  NEUROLOGIC:  No Headaches      Physical Exam:  GEN: NAD  HEENT: normocephalic and atraumatic.   NECK: Supple.   LUNGS: CTA B/L.  HEART: RRR  ABDOMEN: Soft, NT, ND.  +BS.    : No CVA tenderness  EXTREMITIES: No edema   MSK: No joint swelling  NEUROLOGIC: No Focal Deficits   SKIN: No rash      Vitals:  T(F): 98 (30 Apr 2024 07:54), Max: 98.5 (29 Apr 2024 16:40)  HR: 88 (30 Apr 2024 07:54)  BP: 128/75 (30 Apr 2024 07:54)  RR: 18 (30 Apr 2024 07:54)  SpO2: 95% (30 Apr 2024 07:54) (95% - 98%)  temp max in last 48H T(F): , Max: 98.5 (04-29-24 @ 16:40)    Current Antibiotics:  amoxicillin  875 milliGRAM(s)/clavulanate 1 Tablet(s) Oral every 12 hours  caspofungin IVPB      caspofungin IVPB 50 milliGRAM(s) IV Intermittent every 24 hours    Other medications:  allopurinol 100 milliGRAM(s) Oral daily  amLODIPine   Tablet 10 milliGRAM(s) Oral daily  colchicine 0.6 milliGRAM(s) Oral two times a day  dextrose 5%. 1000 milliLiter(s) IV Continuous <Continuous>  dextrose 5%. 1000 milliLiter(s) IV Continuous <Continuous>  dextrose 50% Injectable 12.5 Gram(s) IV Push once  dextrose 50% Injectable 25 Gram(s) IV Push once  dextrose 50% Injectable 25 Gram(s) IV Push once  enoxaparin Injectable 40 milliGRAM(s) SubCutaneous every 24 hours  glucagon  Injectable 1 milliGRAM(s) IntraMuscular once  insulin lispro (ADMELOG) corrective regimen sliding scale   SubCutaneous Before meals and at bedtime  metoprolol tartrate 25 milliGRAM(s) Oral two times a day  pantoprazole    Tablet 40 milliGRAM(s) Oral before breakfast                            10.2   14.01 )-----------( 463      ( 29 Apr 2024 05:20 )             31.8     04-29    138  |  100  |  11.5  ----------------------------<  100<H>  3.5   |  25.0  |  0.80    Ca    8.9      29 Apr 2024 05:20      RECENT CULTURES:  04-26 @ 15:35 .Bronchial RLL BAL     Testing in progress  No polymorphonuclear cells seen per low power field  No squamous epithelial cells per low power field  No organisms seen per oil power field    04-26 @ 15:30 .Bronchial LLL BAL     Testing in progress  No polymorphonuclear cells seen per low power field  No squamous epithelial cells seen per low power field  No organisms seen per oil power field    04-19 @ 17:35    RVP  NotDetec    04-19 @ 11:30 .Blood Blood-Venous     No growth at 5 days    04-19 @ 11:25 .Blood Blood-Peripheral     No growth at 5 days     04-16 @ 23:50    RVP  NotDetec    04-16 @ 23:11 .Blood Blood-Venous     No growth at 5 days    04-16 @ 23:00 .Blood Blood-Venous     No growth at 5 days      WBC Count: 14.01 K/uL (04-29-24 @ 05:20)  WBC Count: 12.44 K/uL (04-28-24 @ 06:45)  WBC Count: 13.14 K/uL (04-27-24 @ 04:22)  WBC Count: 23.77 K/uL (04-26-24 @ 06:07)    Creatinine: 0.80 mg/dL (04-29-24 @ 05:20)  Creatinine: 0.80 mg/dL (04-28-24 @ 06:45)  Creatinine: 1.03 mg/dL (04-26-24 @ 04:46)    C-Reactive Protein: 7 mg/L (04-30-24 @ 04:12)  C-Reactive Protein, Serum: 218 mg/L (04-17-24 @ 05:14)    Procalcitonin: 0.09 ng/mL (04-30-24 @ 04:12)     SARS-CoV-2: NotDetec (04-19-24 @ 17:35)  SARS-CoV-2: NotDetec (04-16-24 @ 23:50)  SARS-CoV-2: NotDetec (04-04-24 @ 13:21)  COVID-19 PCR: NotDetec (03-31-24 @ 22:32)  SARS-CoV-2: NotDetec (03-31-24 @ 22:32)            < from: YASMEEN W or WO Ultrasound Enhancing Agent (04.17.24 @ 11:34) >  CONCLUSIONS:      1. Left ventricular systolic function is normal with an ejection fraction visually estimated at 60 to 65 %.   2. Normal right ventricular cavity size and normal systolic function.   3. No evidence of left atrial or left atrial appendage thrombus. The left atrial appendage emptying velocity is normal.   4. No significant valvular disease.   5. No pericardial effusion seen.   6. Agitated saline injection was negative for intracardiac shunt.   7. Compared to the transthoracic echocardiogram performed on 4/12/2024, there have been no significant interval changes.   8. No echocardiographic evidence of vegetations.    < end of copied text >

## 2024-05-01 NOTE — DISCHARGE NOTE PROVIDER - NSDCFUSCHEDAPPT_GEN_ALL_CORE_FT
Antonino Rene  [facility]  [facility] [address]  Scheduled Appointment: 05/17/2024

## 2024-05-01 NOTE — PROGRESS NOTE ADULT - ASSESSMENT
58M with PMHX Asthma, HTN, HLD, DM2, Gout presents to [facility] ER for abnormal lab work. Patient recently hospitalizated for acute asthma exacerbation and viral URI found to have uncontrolled HTN and new onset DM2 at that time discharged home with outpatient follow-up and sent back to [facility] ER today by PCP for abnormal kidney function. ROS +Fatigue and +Malaise. No other complaints. Denies CVAT or back pain. No urinary complaints. Compliant with meds. Recently started on Losartan, HCTZ, and Metformin on discharge and by PCP. +Tylenol use at home. Denies NSAIDs.     #Sepsis due to Fungemia  Multifocal pneumonia s/p antibiotics   Bcx on 04/10 grew Solange  YASMEEN negative for vegetation  Concern for fungal pneumonia given patient was intermittently on steroid before admission  repeat ct chest with slightly increased mass compared to previous  s/p BAL 4/26, AFB negative  Bronch cx +H Flu, added Augmentin   PJP PCR negative   Bronch culture still pending   Caspofungin since 04/12/2024  ID following  Rheumatology recs noted     #Right Elbow Pain   Ruled out Osteomyelitis      #Right Hand & Arm / Right Knee swelling and pain - suspected gout flare  s/p Solumedrol 40 mg IVP x 1  Improving     #TYESHA (now resolved)   Possibly 2/2 medication induced ATN   Hold ARB/HCTZ/Metformin   Renal US negative  Avoid Nephrotoxins  Nephro follow up noted     #HTN, HLD  Amlodipine 10mg q24  Increased Metoprolol to 25 mg BID   Not currently on statin therapy    #DM2  A1c 7.1  Hold Metformin 500mg BID  ISS    #Asthma  Finished steroid taper. Gave Solumedrol 40 mg x 3 due to wheezing on 4/24  No longer taking Montelukast  on ATC Symbicort and Duoneb     #Gout  Uric Acid level 12.2  Resumed Allopurinol     #Leukocytosis  Likely reactive from steroids   Monitor     DVT Prophylaxis -- Lovenox SQ  Dispo: Home pending ID recs for dispo

## 2024-05-01 NOTE — CHART NOTE - NSCHARTNOTEFT_GEN_A_CORE
Date 5/1/2024    [facility]  [address], [place], [address]      To Whom It May Concern,   Mr. Erick Smith was admitted on 3/29/2024 treated and discharged on 5/1/2024 from [facility]. He may return to work on 5/6/2024 without any restriction activity.    If any questions or concerns please call.     Thanks     Francis Luciano MD  [phone]

## 2024-05-01 NOTE — PROGRESS NOTE ADULT - REASON FOR ADMISSION
ARF
pleural effusion
ARF
ARF and fever of unknown origin
ARF

## 2024-05-01 NOTE — DISCHARGE NOTE NURSING/CASE MANAGEMENT/SOCIAL WORK - PATIENT PORTAL LINK FT
You can access the FollowMyHealth Patient Portal offered by [facility] by registering at the following website: [email or web address]. By joining [employer]’s FollowMyHealth portal, you will also be able to view your health information using other applications (apps) compatible with our system.

## 2024-05-01 NOTE — PROGRESS NOTE ADULT - TIME BILLING
This includes chart review, patient assessment, discussion with Dr Centeno. Antibiotic dosing and management with clinical pharmacy.
reviewing labs, radiology, other provider's notes,   d/w pt
This includes chart review, patient assessment, discussion with Dr Gomez. Antibiotic dosing and management with clinical pharmacy.
This includes chart review, patient assessment, discussion with Dr Smith. Antibiotic dosing and management with clinical pharmacy.
This includes chart review, patient assessment, discussion with Dr Gomez. Antibiotic dosing and management with clinical pharmacy.
This includes chart review, patient assessment, discussion with Dr Smith. Antibiotic dosing and management with clinical pharmacy.
This includes chart review, patient assessment, discussion with Dr Smith. Antibiotic dosing and management with clinical pharmacy.
For chart review, face to face evaluation, counselling and care coordination
This includes chart review, patient assessment, discussion with Thoracic surgery. Antibiotic dosing and management with clinical pharmacy.
This includes chart review, patient assessment, discussion with cardiology. Antibiotic dosing and management with clinical pharmacy.
reviewing labs, radiology, other provider's notes, office notes, d/w pt
This includes chart review, patient assessment, discussion with Ann Marie NP. Antibiotic dosing and management with clinical pharmacy.
This includes chart review, patient assessment, discussion with Dr Gomez. Antibiotic dosing and management with clinical pharmacy.
For chart review, face to face evaluation, counselling and care coordination
This includes chart review, patient assessment, discussion with Dr Centeno. Antibiotic dosing and management with clinical pharmacy.
This includes chart review, patient assessment, discussion with Dr Gomez. Antibiotic dosing and management with clinical pharmacy.
This includes chart review, patient assessment, discussion with Thoracic surgery. Antibiotic dosing and management with clinical pharmacy.
For chart review, face to face evaluation, counselling and care coordination
This includes chart/hospital course review, patient assessment, discussion with hospitalist, pharmacy
chart review, patient eval
patient  chart  reviewed  and patient care
fungemia

## 2024-05-02 RX ORDER — FLUCONAZOLE 150 MG/1
2 TABLET ORAL
Qty: 18 | Refills: 0
Start: 2024-05-02 | End: 2024-05-08

## 2024-05-02 RX ORDER — FLUCONAZOLE 150 MG/1
2 TABLET ORAL
Qty: 14 | Refills: 0
Start: 2024-05-02 | End: 2024-05-08

## 2024-05-17 ENCOUNTER — APPOINTMENT (OUTPATIENT)
Dept: PULMONOLOGY | Facility: CLINIC | Age: 59
End: 2024-05-17
Payer: COMMERCIAL

## 2024-05-17 VITALS
SYSTOLIC BLOOD PRESSURE: 138 MMHG | OXYGEN SATURATION: 97 % | RESPIRATION RATE: 16 BRPM | HEART RATE: 91 BPM | DIASTOLIC BLOOD PRESSURE: 74 MMHG

## 2024-05-17 VITALS — BODY MASS INDEX: 30.45 KG/M2 | HEIGHT: 67 IN | WEIGHT: 194 LBS

## 2024-05-17 DIAGNOSIS — J18.9 PNEUMONIA, UNSPECIFIED ORGANISM: ICD-10-CM

## 2024-05-17 PROCEDURE — G2211 COMPLEX E/M VISIT ADD ON: CPT | Mod: NC,1L

## 2024-05-17 PROCEDURE — 99214 OFFICE O/P EST MOD 30 MIN: CPT

## 2024-05-17 RX ORDER — LOSARTAN POTASSIUM 100 MG/1
100 TABLET, FILM COATED ORAL
Refills: 0 | Status: ACTIVE | COMMUNITY

## 2024-05-17 RX ORDER — FLUTICASONE PROPIONATE AND SALMETEROL 500; 50 UG/1; UG/1
500-50 POWDER RESPIRATORY (INHALATION)
Qty: 1 | Refills: 5 | Status: ACTIVE | COMMUNITY
Start: 1900-01-01 | End: 1900-01-01

## 2024-05-17 RX ORDER — ALBUTEROL SULFATE 2.5 MG/3ML
(2.5 MG/3ML) SOLUTION RESPIRATORY (INHALATION)
Qty: 270 | Refills: 3 | Status: ACTIVE | COMMUNITY
Start: 1900-01-01 | End: 1900-01-01

## 2024-05-17 RX ORDER — PREDNISONE 10 MG/1
10 TABLET ORAL
Qty: 30 | Refills: 1 | Status: DISCONTINUED | COMMUNITY
Start: 2023-03-06 | End: 2024-05-17

## 2024-05-17 RX ORDER — TEZEPELUMAB-EKKO 210 MG/1.9ML
210 INJECTION, SOLUTION SUBCUTANEOUS
Qty: 1 | Refills: 11 | Status: DISCONTINUED | COMMUNITY
Start: 2023-04-10 | End: 2024-05-17

## 2024-05-17 RX ORDER — MONTELUKAST SODIUM 10 MG/1
TABLET, FILM COATED ORAL
Refills: 0 | Status: DISCONTINUED | COMMUNITY
End: 2024-05-17

## 2024-05-17 RX ORDER — AMLODIPINE BESYLATE 10 MG/1
10 TABLET ORAL
Refills: 0 | Status: ACTIVE | COMMUNITY

## 2024-05-17 NOTE — ASSESSMENT
[FreeTextEntry1] : 59M PMH never a smoker, severe persistent asthma, severe SHERI, HTN, HLD, DM2, gout who presents for f/u visit.   - Hospital records reviewed, pt was at [facility] for candidemia, multifocal H. flu PNA, discharged with Augmentin and Fluconazole. Had bronch with BAL on 4/26 with negative cultures. - Repeat CT 06/2024 - ordered - to evaluate resolution of PNA - Refilled Wixela 500-50, albuterol neb - If symptoms worsen will increase to Trelegy 200 - Lungs clear on exam - F/u in 4 mo time with spirometry  The patient expressed understanding and agreement with the plan as outlined above and accepts responsibility to be compliant with any recommended testing, treatment, and follow-up visits.  All relevant questions and concerns were addressed.  30 minutes of time were spent on the encounter. Medical records were reviewed, including but not limited to hospital records, outpatient records, laboratory data, and diagnostic imaging studies. Greater than 50% of the face-to-face encounter time was spent on counseling and/or coordination of care.  Antonino Rene MD, [facility] Pulmonary & Critical Care Medicine [facility] Pulmonary and Sleep Medicine at [place] [address] [place] [address] T: [phone] F: [phone]

## 2024-05-17 NOTE — RESULTS/DATA
[TextEntry] : [facility] ACC: [id number] EXAM: CT CHEST ORDERED BY: AMITA MOTA  PROCEDURE DATE: 2024    INTERPRETATION: . Patient: ZAINA MILLER : 1965 MRN: [id number] ACC: [id number] Order Date: 2024 9:58 AM Exam: CT CHEST  INDICATION: eval for pneumonia, fungemia, fever, asthma TECHNIQUE: Noncontrast CT of the chest. Maximum intensity projection images were generated.  COMPARISON: comparison chest CT. 2024, 3/15/2024, 2014  FINDINGS:  AIRWAYS, LUNGS and PLEURA: Patent central airways. Right upper lobe masslike consolidation is slightly enlarged or not significantly changed.. Left upper lobe and bilateral lower lobe/basilar nodular opacities, some mostly decreased, some unchanged. No pleural effusion.  MEDIASTINUM AND ELIECER: Stable mildly enlarged mediastinal and right hilar lymph nodes.  HEART AND VESSELS: Heart size is normal. No pericardial effusion. Thoracic aorta and pulmonary artery normal in diameter.  VISUALIZED UPPER ABDOMEN: Hepatic steatosis.  CHEST WALL AND BONES: No aggressive osseous lesion. Mild degenerative changes.  LOWER NECK: Heterogeneously attenuating thyroid, mildly enlarged right lobe.  IMPRESSION:  Masslike opacity within right upper lobe is slightly enlarged or not significantly changed.  Some of the scattered nodular opacities are decreased and some of them are unchanged.  Stable mildly enlarged mediastinal and right hilar lymph nodes.  --- End of Report ---     ALLA COOPER MD; Resident Radiologist This document has been electronically signed. KWASI PIERCE MD; Attending Radiologist This document has been electronically signed. 2024 11:03AM  ~~~~~~~~~~~~~~~~~~~~~~~~~~~~~~~~~~~~~~~~~~~~~~~~~~~~~~~~~~~~~~~~~~~~~~~~

## 2024-05-17 NOTE — HISTORY OF PRESENT ILLNESS
[Never] : never [TextBox_4] : 59M PMH never a smoker, severe persistent asthma, severe SHERI, HTN, HLD, DM2, gout who presents for f/u visit. Previously seen by Dr. Benavidez. Recently at [facility] for candidemia, multifocal H. flu PNA, discharged with Augmentin and Fluconazole. Had bronch with BAL on 4/26 with negative cultures. He reports feeling much better, back to baseline. No fevers or chills or worsening cough.

## 2024-05-25 LAB
CULTURE RESULTS: SIGNIFICANT CHANGE UP
CULTURE RESULTS: SIGNIFICANT CHANGE UP
SPECIMEN SOURCE: SIGNIFICANT CHANGE UP
SPECIMEN SOURCE: SIGNIFICANT CHANGE UP

## 2024-08-30 ENCOUNTER — NON-APPOINTMENT (OUTPATIENT)
Age: 59
End: 2024-08-30

## 2024-09-20 ENCOUNTER — APPOINTMENT (OUTPATIENT)
Dept: PULMONOLOGY | Facility: CLINIC | Age: 59
End: 2024-09-20

## 2024-09-20 VITALS
SYSTOLIC BLOOD PRESSURE: 134 MMHG | OXYGEN SATURATION: 96 % | DIASTOLIC BLOOD PRESSURE: 82 MMHG | RESPIRATION RATE: 16 BRPM | HEART RATE: 80 BPM

## 2024-09-20 VITALS — BODY MASS INDEX: 34.62 KG/M2 | WEIGHT: 218 LBS | HEIGHT: 66.5 IN

## 2024-09-20 DIAGNOSIS — R91.8 OTHER NONSPECIFIC ABNORMAL FINDING OF LUNG FIELD: ICD-10-CM

## 2024-09-20 DIAGNOSIS — J45.909 UNSPECIFIED ASTHMA, UNCOMPLICATED: ICD-10-CM

## 2024-09-20 PROCEDURE — 99214 OFFICE O/P EST MOD 30 MIN: CPT | Mod: 25

## 2024-09-20 PROCEDURE — 94010 BREATHING CAPACITY TEST: CPT

## 2024-09-20 RX ORDER — ATORVASTATIN CALCIUM 80 MG/1
TABLET, FILM COATED ORAL
Refills: 0 | Status: ACTIVE | COMMUNITY

## 2024-09-20 RX ORDER — ALBUTEROL SULFATE 90 UG/1
108 (90 BASE) INHALANT RESPIRATORY (INHALATION)
Qty: 1 | Refills: 5 | Status: ACTIVE | COMMUNITY
Start: 2024-09-20 | End: 1900-01-01

## 2024-09-20 NOTE — ASSESSMENT
[FreeTextEntry1] : 59M PMH never a smoker, severe persistent asthma, severe SHERI, HTN, HLD, DM2, gout who presents for f/u visit.   - Will c/w Wixela 500-50 - refilled - Refills also sent of albuterol HFA + neb - Mass-like RUL opacity in 04/2024 CT scan reviewed - likely infection but needs f/u - Repeat CT chest ordered - Spirometry today showed FEV1/FVC 73%, FEV1 67%, FVC 71%, MMEF 75/25 57%. - This is an improvement in FEV1 compared to prior - Lungs clear on exam - Will set up telephonic visit to discuss results of CT  The patient expressed understanding and agreement with the plan as outlined above and accepts responsibility to be compliant with any recommended testing, treatment, and follow-up visits.  All relevant questions and concerns were addressed.  30 minutes of time were spent on the encounter. Medical records were reviewed, including but not limited to hospital records, outpatient records, laboratory data, and diagnostic imaging studies. Greater than 50% of the face-to-face encounter time was spent on counseling and/or coordination of care.  Antonino Rene MD, [facility] Pulmonary & Critical Care Medicine [facility] Pulmonary and Sleep Medicine at [place] [address] [place], [place] [address] T: [phone] F: [phone]

## 2024-09-20 NOTE — HISTORY OF PRESENT ILLNESS
[Never] : never [TextBox_4] : 59M PMH never a smoker, severe persistent asthma, severe SHERI, HTN, HLD, DM2, gout who presents for f/u visit. He is on Wixela 500-50. Spirometry today showed FEV1/FVC 73%, FEV1 67%, FVC 71%, MMEF 75/25 57%. In 04/2024 was treated for RUL PNA. No fevers or chills. A couple of weeks ago had cold-like symptoms which resolved. Overall he is feeling well. Not using albuterol regularly.

## 2024-10-14 ENCOUNTER — APPOINTMENT (OUTPATIENT)
Dept: CT IMAGING | Facility: CLINIC | Age: 59
End: 2024-10-14

## 2024-10-28 ENCOUNTER — APPOINTMENT (OUTPATIENT)
Dept: PULMONOLOGY | Facility: CLINIC | Age: 59
End: 2024-10-28

## 2024-11-13 NOTE — ED ADULT NURSE NOTE - CHIEF COMPLAINT
"Pediatric Therapy at [facility]  Pediatric Speech Language Treatment Note    Patient: Farhat France Today's Date: 24   MRN: [id number] Time:  Start Time: 1309  Stop Time: 1345  Total time in clinic (min): 36 minutes   : 2024 Therapist: SHEA Ross   Age: 1 y.o. Referring Provider: Kavya Diaz CRNP     Diagnosis:  Encounter Diagnosis     ICD-10-CM    1. Speech delay  F80.9       2. Language delay  F80.1           SUBJECTIVE  Farhat France arrived to therapy session with Mother and Father who reported the following medical/social updates: Parents shared that Farhat is using more variety of words to communicate her wants/needs at home.    Others present in the treatment area include: cotreatment with occupational therapist.    Patient Observations:  Required frequent redirection back to tasks and Signs of dysregulation observed: picking up items and throwing them or knocking items over.  Benefits from the following behavior strategies for successful participation: OT presented Farhat with heavy work activities, such as bowling using a small medicine ball.        OBJECTIVE    Short Term Goals:   Complete administration of PLS-5. POC subject to change pending analysis of results. -- GOAL MET    In order to improve expressive language skills, Farhat will communicate her wants/needs using a total communication approach (gestures, verbal speech, ASL, etc) x5 throughout a session across 3 consecutive treatment sessions. -- GOAL NOT MET; CONTINUE  Targeted communication of wants/needs using verbal speech throughout a hidden object game. Farhat requested stating \"open\" x3 independently when given a clinician model of \"I need keys\", she imitated these models across opportunities. Clinician provided modeling of 1-2 word requests throughout tasks which she attended well to throughout tasks.    In order to improve her expressive language skills, Farhat will imitate early-developing speech " sounds (b, p, m, w, n, etc) within CV, VC, CVC, exclamations/environmental sounds in 4/5 opportunities across 3 sessions. -- GOAL MET  In order to improve her play skills, Farhat will engage in cause-effect activities x5 given minimal prompting. -- GOAL MET      In order to improve receptive language skills, Farhat will follow simple directions (come here, sit down, give me, etc) in 4/5 opportunities given minimal prompting. -- GOAL NOT MET; CONTINUE  Targeted simple direction following throughout a structured play-based activities. Farhat  followed various directions throughout tasks given minimal prompting such as: find the cow, put it in, clean up, put it down.     6. In order to improve expressive language skills, Farhat will use novel 2-word combinations (pronoun+verb, verb+noun, etc) to communicate a variety of pragmatic functions in 4/5 opportunities throughout play-based activities. -- GOAL NOT MET; CONTINUE  Targeted use of novel word combinations throughout play-based activities. Farhat imitated the following word combinations throughout play: up the slide, need keys, thank you, where are you?. Clinician provided various modeling throughout targeted activities with Farhat attending well to these models. She was observed commenting with increased frequency throughout play using animal sounds: wooo (elephant), baa (sheep), moo (cow).        Patient and Family Training and Education:  Topics: Home Exercise Program and Goals  Methods: Discussion  Response: Verbalized understanding  Recipient: Mother and Father    ASSESSMENT  Farhat France participated in the treatment session well.  Barriers to engagement include: dysregulation.  Skilled pediatric speech language therapy intervention continues to be required at the recommended frequency due to deficits in expressive/receptive language skills and decreased functional communication skills.  During today’s treatment session, Farhat France  demonstrated progress in the areas of use of novel word combinations, use of verbal speech to label/comment, and direction following. Farhat continues to demonstrate use of verbal speech to label and comment throughout tasks benefiting from clinician modeling to utilize verbal speech to make requests.     PLAN  Continue per plan of care. 1-2x/weekly       The patient is a 58y Male complaining of abnormal lab result.

## 2024-11-17 ENCOUNTER — EMERGENCY (EMERGENCY)
Facility: HOSPITAL | Age: 59
LOS: 1 days | Discharge: HOME | End: 2024-11-17
Attending: STUDENT IN AN ORGANIZED HEALTH CARE EDUCATION/TRAINING PROGRAM
Payer: COMMERCIAL

## 2024-11-17 ENCOUNTER — NON-APPOINTMENT (OUTPATIENT)
Age: 59
End: 2024-11-17

## 2024-11-17 VITALS
TEMPERATURE: 98 F | RESPIRATION RATE: 20 BRPM | DIASTOLIC BLOOD PRESSURE: 77 MMHG | WEIGHT: 223.33 LBS | HEART RATE: 87 BPM | OXYGEN SATURATION: 96 % | SYSTOLIC BLOOD PRESSURE: 141 MMHG | HEIGHT: 68 IN

## 2024-11-17 PROCEDURE — 73564 X-RAY EXAM KNEE 4 OR MORE: CPT | Mod: 26,RT

## 2024-11-17 PROCEDURE — 73564 X-RAY EXAM KNEE 4 OR MORE: CPT

## 2024-11-17 PROCEDURE — 96372 THER/PROPH/DIAG INJ SC/IM: CPT

## 2024-11-17 PROCEDURE — 99284 EMERGENCY DEPT VISIT MOD MDM: CPT

## 2024-11-17 PROCEDURE — 99283 EMERGENCY DEPT VISIT LOW MDM: CPT | Mod: 25

## 2024-11-17 RX ORDER — KETOROLAC TROMETHAMINE 30 MG/ML
15 INJECTION INTRAMUSCULAR; INTRAVENOUS ONCE
Refills: 0 | Status: DISCONTINUED | OUTPATIENT
Start: 2024-11-17 | End: 2024-11-17

## 2024-11-17 RX ADMIN — KETOROLAC TROMETHAMINE 15 MILLIGRAM(S): 30 INJECTION INTRAMUSCULAR; INTRAVENOUS at 15:35

## 2024-11-17 RX ADMIN — KETOROLAC TROMETHAMINE 15 MILLIGRAM(S): 30 INJECTION INTRAMUSCULAR; INTRAVENOUS at 14:31

## 2024-11-17 NOTE — ED ADULT NURSE NOTE - NSFALLHARMRISKINTERV_ED_ALL_ED

## 2024-11-17 NOTE — ED PROVIDER NOTE - NSFOLLOWUPINSTRUCTIONS_ED_ALL_ED_FT
Strain    A strain is a stretch or tear in one of the muscles in your body. This is caused by an injury to the area such as a twisting mechanism. Symptoms include pain, swelling, or bruising. Rest that area over the next several days and slowly resume activity when tolerated. Ice can help with swelling and pain.     SEEK IMMEDIATE MEDICAL CARE IF YOU HAVE ANY OF THE FOLLOWING SYMPTOMS: worsening pain, inability to move that body part, numbness or tingling.     FOLLOW UP WITH ORTHOPEDICS WITHIN 1 WEEK.

## 2024-11-17 NOTE — ED PROVIDER NOTE - CARE PROVIDER_API CALL
Zbigniew Gomez  Joint Reconstruction  [address] [place] [address]  Phone: [phone]  Fax: [phone]  Follow Up Time:

## 2024-11-17 NOTE — ED PROVIDER NOTE - CLINICAL SUMMARY MEDICAL DECISION MAKING FREE TEXT BOX
Dr. Castano: See attending attestation. Dr. Castano: See attending attestation.    Patient resting comfortably in no acute distress at this time, reports moderate relief of presenting symptoms after medication.  Patient able to stand and ambulate in the ED.  X-rays reviewed shows osteoarthritis with no acute pathology.  Patient stable for discharge outpatient orthopedic follow-up.  Return precautions discussed with patient.

## 2024-11-17 NOTE — ED PROVIDER NOTE - CARE PROVIDERS DIRECT ADDRESSES
[email or web address]

## 2024-11-17 NOTE — ED PROVIDER NOTE - PATIENT PORTAL LINK FT
You can access the FollowMyHealth Patient Portal offered by [facility] by registering at the following website: [email or web address]. By joining [employer]’s FollowMyHealth portal, you will also be able to view your health information using other applications (apps) compatible with our system.

## 2024-11-17 NOTE — ED ADULT NURSE NOTE - OBJECTIVE STATEMENT
pt came to the ED for c/o right knee pain and swelling x 3 days ago. pt also Denies any fall or trauma.

## 2024-11-17 NOTE — ED PROVIDER NOTE - ATTENDING APP SHARED VISIT CONTRIBUTION OF CARE
I have personally performed a history and physical examination of the patient and discussed management with the OLINDA as well as the patient.  I reviewed the OLINDA's note and agree with the documented findings and plan of care.  I have authored and modified critical sections of the Provider Note, including but not limited to HPI, Physical Exam and MDM.    HPI: 59-year-old male past medical history of prediabetes, hypertension presents complaining of spontaneous right knee pain over the last 3 days.  Denies fall or injury to the knee.  Endorsing pain anteriorly and laterally.  Denies any redness or recent fevers.  Patient able to ambulate however pain worse with flexion of the knee.  No loss of function sensation.  No recurrent complaints at this time.    ROS:   General: No fever, no chills, no malaise, no fatigue  ENT: No earache, no coryza, no sore throat  Musculoskeletal: See HPI  Neurologic: No headache, no vertigo, no paresthesia, no focal deficits, no diplopia  Skin: No rash, no evidence of trauma  All other ROS are negative    PE:  General: NAD; well appearing; A&O x3   Head: NC/AT  Eyes: PERRL, EOMI  ENT: Airway patent, mmm  Extremities: FROM, symmetric pulses, capillary refill < 2 seconds, no edema, 5/5 strength in b/l UE and LE  R Knee : Anterior/Lachman's/posterior drawer tests negative, no pain or laxity on valgus or varus pressure, knee not ballotable, tenderness at inferior insertion of patella, tenderness at lateral joint line, No tenderness at medial joint line,  No tenderness with meniscal compression. Mild edema of the knee without erythema or induration.  Skin: no rash or bruising  Neurologic: alert, speech clear, no focal deficits, CN II-XII grossly intact, normal gait, sensation grossly intact  Psych: nl mood/affect, nl insight.    MDM: 59-year-old male past medical history of prediabetes, hypertension presents complaining of spontaneous right knee pain over the last 3 days.  Possible strain versus sprain versus low suspicion infectious etiology versus possible arthritis.  Will obtain x-rays of the knee and provide symptomatic control as needed.  Likely outpatient orthopedic follow-up.

## 2024-11-20 ENCOUNTER — APPOINTMENT (OUTPATIENT)
Dept: ORTHOPEDIC SURGERY | Facility: CLINIC | Age: 59
End: 2024-11-20
Payer: COMMERCIAL

## 2024-11-20 VITALS
HEART RATE: 98 BPM | DIASTOLIC BLOOD PRESSURE: 81 MMHG | HEIGHT: 66.5 IN | BODY MASS INDEX: 34.62 KG/M2 | SYSTOLIC BLOOD PRESSURE: 144 MMHG | WEIGHT: 218 LBS

## 2024-11-20 DIAGNOSIS — M17.11 UNILATERAL PRIMARY OSTEOARTHRITIS, RIGHT KNEE: ICD-10-CM

## 2024-11-20 PROCEDURE — 73564 X-RAY EXAM KNEE 4 OR MORE: CPT | Mod: RT

## 2024-11-20 PROCEDURE — 20610 DRAIN/INJ JOINT/BURSA W/O US: CPT | Mod: RT

## 2024-11-20 PROCEDURE — 99214 OFFICE O/P EST MOD 30 MIN: CPT | Mod: 25

## 2024-11-20 RX ORDER — MELOXICAM 15 MG/1
15 TABLET ORAL
Qty: 30 | Refills: 0 | Status: ACTIVE | COMMUNITY
Start: 2024-11-20 | End: 1900-01-01

## 2024-12-02 ENCOUNTER — NON-APPOINTMENT (OUTPATIENT)
Age: 59
End: 2024-12-02

## 2024-12-15 ENCOUNTER — NON-APPOINTMENT (OUTPATIENT)
Age: 59
End: 2024-12-15

## 2024-12-20 NOTE — PATIENT PROFILE ADULT - FUNCTIONAL ASSESSMENT - DAILY ACTIVITY 6.
Sent pt over to [facility]  she may need a new .    
4 = No assist / stand by assistance

## 2024-12-23 ENCOUNTER — NON-APPOINTMENT (OUTPATIENT)
Age: 59
End: 2024-12-23

## 2024-12-31 ENCOUNTER — NON-APPOINTMENT (OUTPATIENT)
Age: 59
End: 2024-12-31

## 2025-01-09 ENCOUNTER — NON-APPOINTMENT (OUTPATIENT)
Age: 60
End: 2025-01-09

## 2025-01-29 ENCOUNTER — APPOINTMENT (OUTPATIENT)
Dept: ORTHOPEDIC SURGERY | Facility: CLINIC | Age: 60
End: 2025-01-29

## 2025-02-09 ENCOUNTER — INPATIENT (INPATIENT)
Facility: HOSPITAL | Age: 60
LOS: 7 days | Discharge: HOME | DRG: 189 | End: 2025-02-17
Attending: STUDENT IN AN ORGANIZED HEALTH CARE EDUCATION/TRAINING PROGRAM | Admitting: INTERNAL MEDICINE
Payer: COMMERCIAL

## 2025-02-09 VITALS
OXYGEN SATURATION: 91 % | TEMPERATURE: 99 F | HEART RATE: 140 BPM | WEIGHT: 225.09 LBS | RESPIRATION RATE: 32 BRPM | DIASTOLIC BLOOD PRESSURE: 97 MMHG | SYSTOLIC BLOOD PRESSURE: 178 MMHG

## 2025-02-09 LAB
ALBUMIN SERPL ELPH-MCNC: 3.7 G/DL — SIGNIFICANT CHANGE UP (ref 3.3–5.2)
ALP SERPL-CCNC: 68 U/L — SIGNIFICANT CHANGE UP (ref 40–120)
ALT FLD-CCNC: 23 U/L — SIGNIFICANT CHANGE UP
ANION GAP SERPL CALC-SCNC: 10 MMOL/L — SIGNIFICANT CHANGE UP (ref 5–17)
AST SERPL-CCNC: 35 U/L — SIGNIFICANT CHANGE UP
BASOPHILS # BLD AUTO: 0.04 K/UL — SIGNIFICANT CHANGE UP (ref 0–0.2)
BASOPHILS NFR BLD AUTO: 0.4 % — SIGNIFICANT CHANGE UP (ref 0–2)
BILIRUB SERPL-MCNC: 0.3 MG/DL — LOW (ref 0.4–2)
BUN SERPL-MCNC: 11.2 MG/DL — SIGNIFICANT CHANGE UP (ref 8–20)
CALCIUM SERPL-MCNC: 8.8 MG/DL — SIGNIFICANT CHANGE UP (ref 8.4–10.5)
CHLORIDE SERPL-SCNC: 103 MMOL/L — SIGNIFICANT CHANGE UP (ref 96–108)
CO2 SERPL-SCNC: 30 MMOL/L — HIGH (ref 22–29)
CREAT SERPL-MCNC: 1.21 MG/DL — SIGNIFICANT CHANGE UP (ref 0.5–1.3)
EGFR: 68 ML/MIN/1.73M2 — SIGNIFICANT CHANGE UP
EOSINOPHIL # BLD AUTO: 0.07 K/UL — SIGNIFICANT CHANGE UP (ref 0–0.5)
EOSINOPHIL NFR BLD AUTO: 0.6 % — SIGNIFICANT CHANGE UP (ref 0–6)
FLUAV AG NPH QL: DETECTED
FLUBV AG NPH QL: SIGNIFICANT CHANGE UP
GLUCOSE SERPL-MCNC: 92 MG/DL — SIGNIFICANT CHANGE UP (ref 70–99)
HCT VFR BLD CALC: 43.3 % — SIGNIFICANT CHANGE UP (ref 39–50)
HGB BLD-MCNC: 13.7 G/DL — SIGNIFICANT CHANGE UP (ref 13–17)
IMM GRANULOCYTES NFR BLD AUTO: 1 % — HIGH (ref 0–0.9)
LYMPHOCYTES # BLD AUTO: 1.27 K/UL — SIGNIFICANT CHANGE UP (ref 1–3.3)
LYMPHOCYTES # BLD AUTO: 11.5 % — LOW (ref 13–44)
MCHC RBC-ENTMCNC: 28.6 PG — SIGNIFICANT CHANGE UP (ref 27–34)
MCHC RBC-ENTMCNC: 31.6 G/DL — LOW (ref 32–36)
MCV RBC AUTO: 90.4 FL — SIGNIFICANT CHANGE UP (ref 80–100)
MONOCYTES # BLD AUTO: 1.41 K/UL — HIGH (ref 0–0.9)
MONOCYTES NFR BLD AUTO: 12.7 % — SIGNIFICANT CHANGE UP (ref 2–14)
NEUTROPHILS # BLD AUTO: 8.18 K/UL — HIGH (ref 1.8–7.4)
NEUTROPHILS NFR BLD AUTO: 73.8 % — SIGNIFICANT CHANGE UP (ref 43–77)
PLATELET # BLD AUTO: 213 K/UL — SIGNIFICANT CHANGE UP (ref 150–400)
POTASSIUM SERPL-MCNC: 3.6 MMOL/L — SIGNIFICANT CHANGE UP (ref 3.5–5.3)
POTASSIUM SERPL-SCNC: 3.6 MMOL/L — SIGNIFICANT CHANGE UP (ref 3.5–5.3)
PROT SERPL-MCNC: 6.8 G/DL — SIGNIFICANT CHANGE UP (ref 6.6–8.7)
RBC # BLD: 4.79 M/UL — SIGNIFICANT CHANGE UP (ref 4.2–5.8)
RBC # FLD: 14.3 % — SIGNIFICANT CHANGE UP (ref 10.3–14.5)
RSV RNA NPH QL NAA+NON-PROBE: SIGNIFICANT CHANGE UP
SARS-COV-2 RNA SPEC QL NAA+PROBE: SIGNIFICANT CHANGE UP
SODIUM SERPL-SCNC: 143 MMOL/L — SIGNIFICANT CHANGE UP (ref 135–145)
TROPONIN T, HIGH SENSITIVITY RESULT: 18 NG/L — SIGNIFICANT CHANGE UP (ref 0–51)
TROPONIN T, HIGH SENSITIVITY RESULT: 25 NG/L — SIGNIFICANT CHANGE UP (ref 0–51)
WBC # BLD: 11.08 K/UL — HIGH (ref 3.8–10.5)
WBC # FLD AUTO: 11.08 K/UL — HIGH (ref 3.8–10.5)

## 2025-02-09 PROCEDURE — 71045 X-RAY EXAM CHEST 1 VIEW: CPT | Mod: 26

## 2025-02-09 PROCEDURE — 99223 1ST HOSP IP/OBS HIGH 75: CPT

## 2025-02-09 RX ORDER — METHYLPREDNISOLONE ACETATE 80 MG/ML
125 INJECTION, SUSPENSION INTRA-ARTICULAR; INTRALESIONAL; INTRAMUSCULAR; SOFT TISSUE ONCE
Refills: 0 | Status: COMPLETED | OUTPATIENT
Start: 2025-02-09 | End: 2025-02-09

## 2025-02-09 RX ORDER — IPRATROPIUM BROMIDE AND ALBUTEROL SULFATE .5; 2.5 MG/3ML; MG/3ML
3 SOLUTION RESPIRATORY (INHALATION) ONCE
Refills: 0 | Status: COMPLETED | OUTPATIENT
Start: 2025-02-09 | End: 2025-02-09

## 2025-02-09 RX ORDER — DEXTROSE 50 % IN WATER 50 %
25 SYRINGE (ML) INTRAVENOUS ONCE
Refills: 0 | Status: DISCONTINUED | OUTPATIENT
Start: 2025-02-09 | End: 2025-02-17

## 2025-02-09 RX ORDER — METHYLPREDNISOLONE ACETATE 80 MG/ML
40 INJECTION, SUSPENSION INTRA-ARTICULAR; INTRALESIONAL; INTRAMUSCULAR; SOFT TISSUE EVERY 8 HOURS
Refills: 0 | Status: DISCONTINUED | OUTPATIENT
Start: 2025-02-09 | End: 2025-02-13

## 2025-02-09 RX ORDER — DEXTROMETHORPHAN HBR, GUAIFENESIN 200 MG/10ML
100 LIQUID ORAL EVERY 6 HOURS
Refills: 0 | Status: DISCONTINUED | OUTPATIENT
Start: 2025-02-09 | End: 2025-02-10

## 2025-02-09 RX ORDER — LOSARTAN POTASSIUM 100 MG/1
100 TABLET, FILM COATED ORAL DAILY
Refills: 0 | Status: DISCONTINUED | OUTPATIENT
Start: 2025-02-09 | End: 2025-02-17

## 2025-02-09 RX ORDER — ACETAMINOPHEN 500 MG/5ML
975 LIQUID (ML) ORAL ONCE
Refills: 0 | Status: COMPLETED | OUTPATIENT
Start: 2025-02-09 | End: 2025-02-09

## 2025-02-09 RX ORDER — METFORMIN HYDROCHLORIDE 850 MG/1
500 TABLET ORAL
Refills: 0 | Status: DISCONTINUED | OUTPATIENT
Start: 2025-02-09 | End: 2025-02-10

## 2025-02-09 RX ORDER — IBUPROFEN 200 MG
600 TABLET ORAL EVERY 6 HOURS
Refills: 0 | Status: DISCONTINUED | OUTPATIENT
Start: 2025-02-09 | End: 2025-02-17

## 2025-02-09 RX ORDER — MAGNESIUM SULFATE 500 MG/ML
2 SYRINGE (ML) INJECTION ONCE
Refills: 0 | Status: COMPLETED | OUTPATIENT
Start: 2025-02-09 | End: 2025-02-09

## 2025-02-09 RX ORDER — IPRATROPIUM BROMIDE AND ALBUTEROL SULFATE .5; 2.5 MG/3ML; MG/3ML
3 SOLUTION RESPIRATORY (INHALATION) EVERY 6 HOURS
Refills: 0 | Status: DISCONTINUED | OUTPATIENT
Start: 2025-02-09 | End: 2025-02-17

## 2025-02-09 RX ORDER — ATORVASTATIN CALCIUM 80 MG/1
40 TABLET, FILM COATED ORAL AT BEDTIME
Refills: 0 | Status: DISCONTINUED | OUTPATIENT
Start: 2025-02-09 | End: 2025-02-17

## 2025-02-09 RX ORDER — ACETAMINOPHEN 500 MG/5ML
650 LIQUID (ML) ORAL EVERY 6 HOURS
Refills: 0 | Status: DISCONTINUED | OUTPATIENT
Start: 2025-02-09 | End: 2025-02-17

## 2025-02-09 RX ORDER — DEXTROSE 50 % IN WATER 50 %
12.5 SYRINGE (ML) INTRAVENOUS ONCE
Refills: 0 | Status: DISCONTINUED | OUTPATIENT
Start: 2025-02-09 | End: 2025-02-17

## 2025-02-09 RX ORDER — DEXTROSE 50 % IN WATER 50 %
15 SYRINGE (ML) INTRAVENOUS ONCE
Refills: 0 | Status: DISCONTINUED | OUTPATIENT
Start: 2025-02-09 | End: 2025-02-17

## 2025-02-09 RX ORDER — GLUCAGON 3 MG/1
1 POWDER NASAL ONCE
Refills: 0 | Status: DISCONTINUED | OUTPATIENT
Start: 2025-02-09 | End: 2025-02-17

## 2025-02-09 RX ORDER — SODIUM CHLORIDE 9 G/1000ML
1000 INJECTION, SOLUTION INTRAVENOUS
Refills: 0 | Status: DISCONTINUED | OUTPATIENT
Start: 2025-02-09 | End: 2025-02-17

## 2025-02-09 RX ORDER — BENZONATATE 100 MG
100 CAPSULE ORAL ONCE
Refills: 0 | Status: COMPLETED | OUTPATIENT
Start: 2025-02-09 | End: 2025-02-09

## 2025-02-09 RX ORDER — AMLODIPINE BESYLATE 10 MG/1
10 TABLET ORAL DAILY
Refills: 0 | Status: DISCONTINUED | OUTPATIENT
Start: 2025-02-09 | End: 2025-02-17

## 2025-02-09 RX ADMIN — IPRATROPIUM BROMIDE AND ALBUTEROL SULFATE 3 MILLILITER(S): .5; 2.5 SOLUTION RESPIRATORY (INHALATION) at 06:03

## 2025-02-09 RX ADMIN — Medication 100 MILLIGRAM(S): at 08:46

## 2025-02-09 RX ADMIN — Medication 150 GRAM(S): at 06:15

## 2025-02-09 RX ADMIN — IPRATROPIUM BROMIDE AND ALBUTEROL SULFATE 3 MILLILITER(S): .5; 2.5 SOLUTION RESPIRATORY (INHALATION) at 07:57

## 2025-02-09 RX ADMIN — Medication 975 MILLIGRAM(S): at 09:02

## 2025-02-09 RX ADMIN — IPRATROPIUM BROMIDE AND ALBUTEROL SULFATE 3 MILLILITER(S): .5; 2.5 SOLUTION RESPIRATORY (INHALATION) at 20:05

## 2025-02-09 RX ADMIN — Medication 1 LOZENGE: at 17:44

## 2025-02-09 RX ADMIN — METHYLPREDNISOLONE ACETATE 125 MILLIGRAM(S): 80 INJECTION, SUSPENSION INTRA-ARTICULAR; INTRALESIONAL; INTRAMUSCULAR; SOFT TISSUE at 06:14

## 2025-02-09 RX ADMIN — METHYLPREDNISOLONE ACETATE 40 MILLIGRAM(S): 80 INJECTION, SUSPENSION INTRA-ARTICULAR; INTRALESIONAL; INTRAMUSCULAR; SOFT TISSUE at 13:25

## 2025-02-09 RX ADMIN — IPRATROPIUM BROMIDE AND ALBUTEROL SULFATE 3 MILLILITER(S): .5; 2.5 SOLUTION RESPIRATORY (INHALATION) at 07:56

## 2025-02-09 RX ADMIN — IPRATROPIUM BROMIDE AND ALBUTEROL SULFATE 3 MILLILITER(S): .5; 2.5 SOLUTION RESPIRATORY (INHALATION) at 13:25

## 2025-02-09 RX ADMIN — METFORMIN HYDROCHLORIDE 500 MILLIGRAM(S): 850 TABLET ORAL at 17:44

## 2025-02-09 RX ADMIN — ATORVASTATIN CALCIUM 40 MILLIGRAM(S): 80 TABLET, FILM COATED ORAL at 21:54

## 2025-02-09 RX ADMIN — Medication 1 LOZENGE: at 21:54

## 2025-02-09 RX ADMIN — METHYLPREDNISOLONE ACETATE 40 MILLIGRAM(S): 80 INJECTION, SUSPENSION INTRA-ARTICULAR; INTRALESIONAL; INTRAMUSCULAR; SOFT TISSUE at 21:54

## 2025-02-09 RX ADMIN — Medication 975 MILLIGRAM(S): at 10:02

## 2025-02-09 NOTE — ED ADULT TRIAGE NOTE - CHIEF COMPLAINT QUOTE
patient complaining of shortness of breath, hx of asthma, x 2 days, attempted nebulizers at home without improvement, wheezing, tachpneic in triage

## 2025-02-09 NOTE — ED PROVIDER NOTE - PHYSICAL EXAMINATION
Gen: NAD  Head: NC/AT  Neck: trachea midline  Card: tachycardic  Resp:  b/l wheezing, mild tachypnea  Abd: soft, non-distended, non-tender  Ext: no deformities  Neuro: A&Ox3, DELEON spontaneously, sensation intact and symmetrical b/l extremities, no facial droop, no slurred speech, EOMI  Skin:  Warm and dry as visualized  Psych:  Normal affect and mood

## 2025-02-09 NOTE — ED PROVIDER NOTE - ATTENDING CONTRIBUTION TO CARE
Recurrent exacerbation of asthma in the setting of influenza now rebounding after finishing steroids. Increased work of breathing with wheezing on exam, responding to bronchodilators, will monitor response to treatment, consider ED obs vs admission for continued treatment.

## 2025-02-09 NOTE — ED CDU PROVIDER INITIAL DAY NOTE - PHYSICAL EXAMINATION
Gen: Well appearing in NAD  Head: NC/AT  Neck: trachea midline  Resp:  No distress, diffuse expiratory wheeze   card: tachycardia   abd soft nttp   Ext: no deformities  Neuro:  A&O appears non focal  Skin:  Warm and dry as visualized  Psych:  Normal affect and mood

## 2025-02-09 NOTE — ED CDU PROVIDER INITIAL DAY NOTE - OBJECTIVE STATEMENT
59-year-old male history of prediabetes on metformin, hypertension, asthma with previous admissions no intubations presenting to the ED for 3 days of URI symptoms with associated shortness of breath wheezing, no relief with nebulizing treatments at home as well as a recent course of steroids.  Patient found to be flu a positive positive sick contacts within the household.  Patient denies recent travel chest pain abdominal pain nausea vomiting diarrhea.  Patient has received magnesium nebulizing treatments as well as steroids prior to observation admission.  Reports some mild symptomatic improvement since being medicated.  Patient to be placed in observation under asthma exacerbation protocol with continued nebulizing treatments steroids and reevaluation.  Patient has followed with Dr. Lr from pulmonology.

## 2025-02-09 NOTE — ED CDU PROVIDER INITIAL DAY NOTE - CLINICAL SUMMARY MEDICAL DECISION MAKING FREE TEXT BOX
59-year-old male history of prediabetes on metformin, hypertension, asthma with previous admissions no intubations presenting to the ED for 3 days of URI symptoms with associated shortness of breath wheezing, no relief with nebulizing treatments at home as well as a recent course of steroids.  Patient found to be flu a positive sick contacts within the household.  Patient denies recent travel chest pain abdominal pain nausea vomiting diarrhea.  Patient has received magnesium nebulizing treatments as well as steroids prior to observation admission.  Reports some mild symptomatic improvement since being medicated.  Patient to be placed in observation under asthma exacerbation protocol with continued nebulizing treatments steroids and reevaluation.  Patient has followed with Dr. Lr from pulmonology.    pt tachycardic after receiving mag/stx/nebs- will receive ofrimev and serial nebs/stx   pt aware of plan of care 59-year-old male history of prediabetes on metformin, hypertension, asthma with previous admissions no intubations presenting to the ED for 3 days of URI symptoms with associated shortness of breath wheezing, no relief with nebulizing treatments at home as well as a recent course of steroids.  Patient found to be flu a positive sick contacts within the household.  Patient denies recent travel chest pain abdominal pain nausea vomiting diarrhea.  Patient has received magnesium nebulizing treatments as well as steroids prior to observation admission.  Reports some mild symptomatic improvement since being medicated.  Patient to be placed in observation under asthma exacerbation protocol with continued nebulizing treatments steroids and reevaluation.  Patient has followed with Dr. Lr from pulmonology.    pt tachycardic after receiving mag/stx/nebs- will receive ofrimev and serial nebs/stx   pt aware of plan of care    pt with improved oxygenation, saturating well on RA without distress, speaking in clear sentences on cell phone.

## 2025-02-09 NOTE — ED ADULT NURSE REASSESSMENT NOTE - NS ED NURSE REASSESS COMMENT FT1
Assumed care from night RN. Pt is A&O X 4 on 3L NC. On CM Sinus tachycardia. Given albuterol treatment, pt denies any complaints at this time. Pt is aware of POC, verbally understood.

## 2025-02-09 NOTE — ED ADULT NURSE REASSESSMENT NOTE - NS ED NURSE REASSESS COMMENT FT1
Assumed care from RN SAMMY. Patient is alert and oriented x4. Resting comfortably in bed. No acute distress noted. Patient has no complaints at this time. IV access intact. LS reveal bilateral wheezing. SPO2 93% on RA. Patient is Flu+ Patient to receive duonebs and IV ABX and be observed overnight.

## 2025-02-09 NOTE — ED PROVIDER NOTE - PROGRESS NOTE DETAILS
Pt seen and assessed at bedside. Pt tachycardic to 125, saturating 95% on 2.5L. Pt reports improvement in shortness of breath s/p duoneb, ordered more duonebs. hermelinda:  pt signed out to me by dr. gloria pending reassessment. Pt flu A positive.  pt feeling better but still requiring oxygen. will put in obs for nebs and steroids.

## 2025-02-09 NOTE — ED ADULT NURSE NOTE - OBJECTIVE STATEMENT
Patient AAOx4, presents to ED c/o shortness of breath x 2 days. PMHx, asthma. Associated with cough and audible wheeze. Patient states he was on steroids last week for exacerbation of asthma, completed yesterday. Nebulizers and inhaler ineffective .Cardiac monitor in sinus tachycardia   @ 95% on 2L of o2 via nasal cannula.

## 2025-02-09 NOTE — ED ADULT NURSE NOTE - IN ACCORDANCE WITH [place] LAW, WE OFFER EVERY PATIENT A HEPATITIS C TEST. WOULD YOU LIKE TO BE TESTED TODAY?
4 Eyes Skin Assessment     NAME:  Najma Fitzpatrick  YOB: 1953  MEDICAL RECORD NUMBER:  [id number]    The patient is being assessed for  Admission    I agree that at least one RN has performed a thorough Head to Toe Skin Assessment on the patient. ALL assessment sites listed below have been assessed.      Areas assessed by both nurses:    Head, Face, Ears, Shoulders, Back, Chest, Arms, Elbows, Hands, Sacrum. Buttock, Coccyx, Ischium, Legs. Feet and Heels, and Under Medical Devices         Does the Patient have a Wound? No noted wound(s)       Emmanuel Prevention initiated by RN: No  Wound Care Orders initiated by RN: No    Pressure Injury (Stage 3,4, Unstageable, DTI, NWPT, and Complex wounds) if present, place Wound referral order by RN under : No    New Ostomies, if present place, Ostomy referral order under : No     Nurse 1 eSignature: Electronically signed by Isela Zuñiga RN on 4/2/24 at 9:21 AM EDT    **SHARE this note so that the co-signing nurse can place an eSignature**    Nurse 2 eSignature: {Esignature:[id number]}     Opt out

## 2025-02-09 NOTE — ED ADULT NURSE REASSESSMENT NOTE - NS ED NURSE REASSESS COMMENT FT1
Pt care assumed @ this time. Report received from Alexis REED. Pt resting in bed. vss. resp even and unlabored.

## 2025-02-09 NOTE — ED CDU PROVIDER INITIAL DAY NOTE - ATTENDING APP SHARED VISIT CONTRIBUTION OF CARE
Pt with sob and cough. diagnosed with influenza. found to be hypoxic.  given treatments. will put in obs overnight for nebs and steroids.

## 2025-02-09 NOTE — ED PROVIDER NOTE - OBJECTIVE STATEMENT
58 y/o M pt w/ PMHx of asthma presenting w/ SOB. tried nebs at home with no relief. recent course of steroids outpatient that helped but stopped yesterday and became SOB again. He denies chest pain, abd pain, back pain, N/V/D, leg swelling, numbness, tingling, focal weakness, recent travel, fever, chills, or any other complaints.

## 2025-02-10 DIAGNOSIS — J45.901 UNSPECIFIED ASTHMA WITH (ACUTE) EXACERBATION: ICD-10-CM

## 2025-02-10 LAB
ANION GAP SERPL CALC-SCNC: 18 MMOL/L — HIGH (ref 5–17)
ANISOCYTOSIS BLD QL: SLIGHT — SIGNIFICANT CHANGE UP
BASOPHILS # BLD AUTO: 0 K/UL — SIGNIFICANT CHANGE UP (ref 0–0.2)
BASOPHILS NFR BLD AUTO: 0 % — SIGNIFICANT CHANGE UP (ref 0–2)
BUN SERPL-MCNC: 22.9 MG/DL — HIGH (ref 8–20)
CALCIUM SERPL-MCNC: 8.6 MG/DL — SIGNIFICANT CHANGE UP (ref 8.4–10.5)
CHLORIDE SERPL-SCNC: 99 MMOL/L — SIGNIFICANT CHANGE UP (ref 96–108)
CO2 SERPL-SCNC: 22 MMOL/L — SIGNIFICANT CHANGE UP (ref 22–29)
CREAT SERPL-MCNC: 1.2 MG/DL — SIGNIFICANT CHANGE UP (ref 0.5–1.3)
EGFR: 69 ML/MIN/1.73M2 — SIGNIFICANT CHANGE UP
EOSINOPHIL # BLD AUTO: 0.32 K/UL — SIGNIFICANT CHANGE UP (ref 0–0.5)
EOSINOPHIL NFR BLD AUTO: 1.7 % — SIGNIFICANT CHANGE UP (ref 0–6)
GAS PNL BLDV: SIGNIFICANT CHANGE UP
GIANT PLATELETS BLD QL SMEAR: PRESENT — SIGNIFICANT CHANGE UP
GLUCOSE BLDC GLUCOMTR-MCNC: 144 MG/DL — HIGH (ref 70–99)
GLUCOSE BLDC GLUCOMTR-MCNC: 168 MG/DL — HIGH (ref 70–99)
GLUCOSE BLDC GLUCOMTR-MCNC: 214 MG/DL — HIGH (ref 70–99)
GLUCOSE SERPL-MCNC: 174 MG/DL — HIGH (ref 70–99)
HCT VFR BLD CALC: 44 % — SIGNIFICANT CHANGE UP (ref 39–50)
HGB BLD-MCNC: 13.8 G/DL — SIGNIFICANT CHANGE UP (ref 13–17)
LACTATE SERPL-SCNC: 3.9 MMOL/L — HIGH (ref 0.5–2)
LYMPHOCYTES # BLD AUTO: 0.32 K/UL — LOW (ref 1–3.3)
LYMPHOCYTES # BLD AUTO: 1.7 % — LOW (ref 13–44)
MANUAL SMEAR VERIFICATION: SIGNIFICANT CHANGE UP
MCHC RBC-ENTMCNC: 28.2 PG — SIGNIFICANT CHANGE UP (ref 27–34)
MCHC RBC-ENTMCNC: 31.4 G/DL — LOW (ref 32–36)
MCV RBC AUTO: 90 FL — SIGNIFICANT CHANGE UP (ref 80–100)
METAMYELOCYTES # FLD: 0.9 % — HIGH (ref 0–0)
METAMYELOCYTES NFR BLD: 0.9 % — HIGH (ref 0–0)
MONOCYTES # BLD AUTO: 0.84 K/UL — SIGNIFICANT CHANGE UP (ref 0–0.9)
MONOCYTES NFR BLD AUTO: 4.4 % — SIGNIFICANT CHANGE UP (ref 2–14)
NEUTROPHILS # BLD AUTO: 15.96 K/UL — HIGH (ref 1.8–7.4)
NEUTROPHILS NFR BLD AUTO: 83.5 % — HIGH (ref 43–77)
PLAT MORPH BLD: NORMAL — SIGNIFICANT CHANGE UP
PLATELET # BLD AUTO: 246 K/UL — SIGNIFICANT CHANGE UP (ref 150–400)
POLYCHROMASIA BLD QL SMEAR: SLIGHT — SIGNIFICANT CHANGE UP
POTASSIUM SERPL-MCNC: 4.3 MMOL/L — SIGNIFICANT CHANGE UP (ref 3.5–5.3)
POTASSIUM SERPL-SCNC: 4.3 MMOL/L — SIGNIFICANT CHANGE UP (ref 3.5–5.3)
RBC # BLD: 4.89 M/UL — SIGNIFICANT CHANGE UP (ref 4.2–5.8)
RBC # FLD: 14.4 % — SIGNIFICANT CHANGE UP (ref 10.3–14.5)
RBC BLD AUTO: ABNORMAL
SMUDGE CELLS # BLD: PRESENT — SIGNIFICANT CHANGE UP
SODIUM SERPL-SCNC: 139 MMOL/L — SIGNIFICANT CHANGE UP (ref 135–145)
VARIANT LYMPHS # BLD: 7.8 % — HIGH (ref 0–6)
VARIANT LYMPHS NFR BLD MANUAL: 7.8 % — HIGH (ref 0–6)
WBC # BLD: 19.11 K/UL — HIGH (ref 3.8–10.5)
WBC # FLD AUTO: 19.11 K/UL — HIGH (ref 3.8–10.5)

## 2025-02-10 PROCEDURE — 99223 1ST HOSP IP/OBS HIGH 75: CPT

## 2025-02-10 PROCEDURE — 99222 1ST HOSP IP/OBS MODERATE 55: CPT

## 2025-02-10 PROCEDURE — 93010 ELECTROCARDIOGRAM REPORT: CPT

## 2025-02-10 PROCEDURE — 99233 SBSQ HOSP IP/OBS HIGH 50: CPT

## 2025-02-10 RX ORDER — SODIUM CHLORIDE 9 G/1000ML
1000 INJECTION, SOLUTION INTRAVENOUS ONCE
Refills: 0 | Status: COMPLETED | OUTPATIENT
Start: 2025-02-10 | End: 2025-02-10

## 2025-02-10 RX ORDER — INSULIN LISPRO 100 U/ML
INJECTION, SOLUTION INTRAVENOUS; SUBCUTANEOUS
Refills: 0 | Status: DISCONTINUED | OUTPATIENT
Start: 2025-02-10 | End: 2025-02-17

## 2025-02-10 RX ORDER — ENOXAPARIN SODIUM 100 MG/ML
40 INJECTION SUBCUTANEOUS EVERY 24 HOURS
Refills: 0 | Status: DISCONTINUED | OUTPATIENT
Start: 2025-02-10 | End: 2025-02-17

## 2025-02-10 RX ORDER — ATORVASTATIN CALCIUM 80 MG/1
1 TABLET, FILM COATED ORAL
Refills: 0 | DISCHARGE

## 2025-02-10 RX ORDER — ALBUTEROL SULFATE 2.5 MG/3ML
2 VIAL, NEBULIZER (ML) INHALATION EVERY 6 HOURS
Refills: 0 | Status: DISCONTINUED | OUTPATIENT
Start: 2025-02-10 | End: 2025-02-17

## 2025-02-10 RX ORDER — DEXTROMETHORPHAN HBR, GUAIFENESIN 200 MG/10ML
1200 LIQUID ORAL EVERY 12 HOURS
Refills: 0 | Status: DISCONTINUED | OUTPATIENT
Start: 2025-02-10 | End: 2025-02-17

## 2025-02-10 RX ORDER — OSELTAMIVIR PHOSPHATE 75 MG/1
75 CAPSULE ORAL
Refills: 0 | Status: COMPLETED | OUTPATIENT
Start: 2025-02-10 | End: 2025-02-15

## 2025-02-10 RX ADMIN — METFORMIN HYDROCHLORIDE 500 MILLIGRAM(S): 850 TABLET ORAL at 06:15

## 2025-02-10 RX ADMIN — Medication 1 LOZENGE: at 11:26

## 2025-02-10 RX ADMIN — OSELTAMIVIR PHOSPHATE 75 MILLIGRAM(S): 75 CAPSULE ORAL at 21:19

## 2025-02-10 RX ADMIN — ENOXAPARIN SODIUM 40 MILLIGRAM(S): 100 INJECTION SUBCUTANEOUS at 13:51

## 2025-02-10 RX ADMIN — IPRATROPIUM BROMIDE AND ALBUTEROL SULFATE 3 MILLILITER(S): .5; 2.5 SOLUTION RESPIRATORY (INHALATION) at 13:54

## 2025-02-10 RX ADMIN — INSULIN LISPRO 2: 100 INJECTION, SOLUTION INTRAVENOUS; SUBCUTANEOUS at 18:13

## 2025-02-10 RX ADMIN — AMLODIPINE BESYLATE 10 MILLIGRAM(S): 10 TABLET ORAL at 06:15

## 2025-02-10 RX ADMIN — DEXTROMETHORPHAN HBR, GUAIFENESIN 1200 MILLIGRAM(S): 200 LIQUID ORAL at 13:46

## 2025-02-10 RX ADMIN — IPRATROPIUM BROMIDE AND ALBUTEROL SULFATE 3 MILLILITER(S): .5; 2.5 SOLUTION RESPIRATORY (INHALATION) at 08:06

## 2025-02-10 RX ADMIN — Medication 300 MILLIGRAM(S): at 18:12

## 2025-02-10 RX ADMIN — IPRATROPIUM BROMIDE AND ALBUTEROL SULFATE 3 MILLILITER(S): .5; 2.5 SOLUTION RESPIRATORY (INHALATION) at 01:04

## 2025-02-10 RX ADMIN — LOSARTAN POTASSIUM 100 MILLIGRAM(S): 100 TABLET, FILM COATED ORAL at 06:15

## 2025-02-10 RX ADMIN — SODIUM CHLORIDE 1000 MILLILITER(S): 9 INJECTION, SOLUTION INTRAVENOUS at 09:34

## 2025-02-10 RX ADMIN — METHYLPREDNISOLONE ACETATE 40 MILLIGRAM(S): 80 INJECTION, SUSPENSION INTRA-ARTICULAR; INTRALESIONAL; INTRAMUSCULAR; SOFT TISSUE at 21:20

## 2025-02-10 RX ADMIN — DEXTROMETHORPHAN HBR, GUAIFENESIN 1200 MILLIGRAM(S): 200 LIQUID ORAL at 21:19

## 2025-02-10 RX ADMIN — IPRATROPIUM BROMIDE AND ALBUTEROL SULFATE 3 MILLILITER(S): .5; 2.5 SOLUTION RESPIRATORY (INHALATION) at 21:47

## 2025-02-10 RX ADMIN — INSULIN LISPRO 1: 100 INJECTION, SOLUTION INTRAVENOUS; SUBCUTANEOUS at 13:53

## 2025-02-10 RX ADMIN — METHYLPREDNISOLONE ACETATE 40 MILLIGRAM(S): 80 INJECTION, SUSPENSION INTRA-ARTICULAR; INTRALESIONAL; INTRAMUSCULAR; SOFT TISSUE at 06:15

## 2025-02-10 RX ADMIN — Medication 1 LOZENGE: at 06:15

## 2025-02-10 RX ADMIN — METHYLPREDNISOLONE ACETATE 40 MILLIGRAM(S): 80 INJECTION, SUSPENSION INTRA-ARTICULAR; INTRALESIONAL; INTRAMUSCULAR; SOFT TISSUE at 13:46

## 2025-02-10 RX ADMIN — ATORVASTATIN CALCIUM 40 MILLIGRAM(S): 80 TABLET, FILM COATED ORAL at 21:19

## 2025-02-10 RX ADMIN — Medication 1 LOZENGE: at 01:04

## 2025-02-10 NOTE — H&P ADULT - TIME BILLING
ASSESSMENT:  59M with PMHX Asthma, HTN, HLD, Gout, Pre-DM presented to [facility] ER c/o SOB/MCCLURE admitted for Acute Hypoxic Respiratory Failure 2/2 Acute Asthma Exacerbation 2/2 Influenza.    PLAN:  Acute Hypoxic Respiratory Failure 2/2 Acute Asthma Exacerbation 2/2 Influenza   -Admit to   -EKG Sinus Tachycardia likely duoneb induced  -RVP +Influenza  -CXR +hyperinflted but no obvious infiltrates  -Solumedrol 40mg IV q8 -> titrate to q12 as wheezing improves  -Tamiflu 75mg PO BID  -Advair 500/50mg BID for Wixela  -Duoneb q6  -Titrate O2 via NC for SPO2 >92%  -Pulm Consulted     Leukocytosis  -Likely steroid induced. Trend CBC.    Lactic Acidosis  -Likely 2/2 diaphragmatic effort, beta agonism from duonebs, and from metformin use in obs  -No septic physiology present at this time  -Discontinue further Lactates    Pre-DM  -Hold Metformin 500mg BID with lactic acidosis  -Add ISS while on steroids inpt    HTN, HLD  -Atorvastatin 40mg q24  -Losartan 100mg q24  -Amlodipine 10mg q24    Gout  -Allopurinol 300mg q24    VTE PPX: SCD/LMWH  DISPO: Admit for acute hypoxic resp failure 2/2 flu and asthma ex. Wean O2 via NC and plan for dc home in 24-48 hours.

## 2025-02-10 NOTE — ED CDU PROVIDER DISPOSITION NOTE - CLINICAL COURSE
60 yo male with pmhx of prediabetes on metformin, HTN, asthma (hx of previous admissions, no intubations) presented to the ED for 3 days of cough and congestion with associated SOB and wheezing. Last week, pt was experiencing an asthma exacerbation with chest tightness, SOB and wheezing. F/u outpatient and recently finished a course a 5 day course of steroid tx on 2/7. States symptoms never improved and was starting to developed URI symptoms. Was not findig any relief with nebulizer and inhaler tx's at home. Was found to be flu A +. Received magnesium, steroids and neb tx's and was then placed into obs for asthma exacerbation in the setting of a viral infection. Despite continuous tx for asthma exacerbation, pt has been having persistent diffuse wheezing, SOB, and chest tightness. Pt has now been requiring oxygen via nasal cannula due to low saturation. Ambulatory sats were 91% on 4L NC. Pulmonology consulted. Tachycardia likely secondary to neb tx's. Denies CP. Will admit patient to medicine on tele given new oxygen requirements and asthma exacerbation.

## 2025-02-10 NOTE — PATIENT PROFILE ADULT - FUNCTIONAL ASSESSMENT - BASIC MOBILITY 6.
4-calculated by average/Not able to assess (calculate score using [facility] averaging method)

## 2025-02-10 NOTE — H&P ADULT - ASSESSMENT
ASSESSMENT:  59M with PMHX Asthma, HTN, HLD, Gout, Pre-DM presented to [facility] ER c/o SOB/MCCLURE admitted for Acute Hypoxic Respiratory Failure 2/2 Acute Asthma Exacerbation 2/2 Influenza.    PLAN:  Acute Hypoxic Respiratory Failure 2/2 Acute Asthma Exacerbation 2/2 Influenza   -Admit to   -EKG Sinus Tachycardia likely duoneb induced  -RVP +Influenza  -CXR +hyperinflted but no obvious infiltrates  -Solumedrol 40mg IV q8 -> titrate to q12 as wheezing improves  -Tamiflu 75mg PO BID  -Advair 500/50mg BID for Wixela  -Duoneb q6  -Titrate O2 via NC for SPO2 >92%  -Pulm Consulted     Leukocytosis  -Likely steroid induced. Trend CBC.    Lactic Acidosis  -Likely 2/2 diaphragmatic effort, beta agonism from duonebs, and from metformin use in obs  -No septic physiology present at this time  -Discontinue further Lactates    Pre-DM  -Hold Metformin 500mg BID with lactic acidosis  -Add ISS while on steroids inpt    HTN, HLD  -Atorvastatin 40mg q24  -Losartan 100mg q24  -Amlodipine 10mg q24    Gout  -Allopurinol 300mg q24    VTE PPX: SCD/LMWH  DISPO: Admit for acute hypoxic resp failure 2/2 flu and asthma ex. Wean O2 via NC and plan for dc home in 24-48 hours.

## 2025-02-10 NOTE — ED CDU PROVIDER SUBSEQUENT DAY NOTE - CLINICAL SUMMARY MEDICAL DECISION MAKING FREE TEXT BOX
59-year-old male history of prediabetes on metformin, hypertension, asthma with previous admissions no intubations presenting to the ED for 3 days of URI symptoms found to be positive for Flu. Pt remained stable however with spo2 dropping at times, pt admits to persistent symptoms with little relief. pulm consulted

## 2025-02-10 NOTE — ED CDU PROVIDER DISPOSITION NOTE - ATTENDING CONTRIBUTION TO CARE
59-year-old male.  Agree with above.  Please see same-day ED CDU subsequent day note for full assessment.  Patient admitted for asthma exacerbation in the setting of flu positive.  Now requiring O2

## 2025-02-10 NOTE — ED ADULT NURSE REASSESSMENT NOTE - NS ED NURSE REASSESS COMMENT FT1
Patient resting comfortably in bed. No acute distress noted. Patient has no complaints at this time. NSR lead II. SPO2 94%on NC @2L/min. Awaiting pulm consult.

## 2025-02-10 NOTE — ED CDU PROVIDER SUBSEQUENT DAY NOTE - ATTENDING APP SHARED VISIT CONTRIBUTION OF CARE
59-year-old male seen and evaluated on bedside rounds this morning.  Saturating 92% on 2 L nasal cannula at rest.  Does not normally use oxygen.  On exam lungs with diffuse coarse breath sounds.  Labs reviewed found to be flu a positive, given previous pulmonary history (can be considered high risk) will start on Tamiflu despite duration of symptoms.  Will likely admit

## 2025-02-10 NOTE — ED CDU PROVIDER SUBSEQUENT DAY NOTE - HISTORY
no acute events or complaints   spo2 dropped remained stable, pt with continued symptoms.   pulm consulted

## 2025-02-10 NOTE — ED ADULT NURSE REASSESSMENT NOTE - NS ED NURSE REASSESS COMMENT FT1
Assumed care of pt at 07:15 as stated in report from RN KELSIE. Charting as noted. Patient A&O x4, denies pain/discomfort, denies CP complains of mild SOB on 3L NC receiving treatment. Updated on the plan of care. Call bell within reach, bed locked in lowest position. IV site flushed w/ NS. No redness, swelling or pain noted to site. No signs of acute distress noted, safety maintained. Pt remains on CM in NSR to sinus tach.

## 2025-02-10 NOTE — H&P ADULT - NSHPPHYSICALEXAM_GEN_ALL_CORE
Vital Signs Last 24 Hrs  T(C): 36.8 (10 Feb 2025 08:48), Max: 37.1 (09 Feb 2025 11:17)  T(F): 98.2 (10 Feb 2025 08:48), Max: 98.8 (09 Feb 2025 11:17)  HR: 117 (10 Feb 2025 08:48) (97 - 117)  BP: 161/84 (10 Feb 2025 08:48) (157/84 - 176/94)  BP(mean): --  RR: 16 (10 Feb 2025 05:22) (16 - 19)  SpO2: 91% (10 Feb 2025 09:08) (91% - 95%)    Parameters below as of 10 Feb 2025 09:08  Patient On (Oxygen Delivery Method): nasal cannula  O2 Flow (L/min): 4    Constitutional: Well-appearing, NAD, VSS  Head: NC/AT  Eyes: PERRL, EOMI, anicteric sclera, conjunctiva WNL  ENT: Normal Pharynx, MMM, No tonsillar exudate/erythema  Neck: Supple, Non-tender  Chest: Non-tender, no rashes  Cardio: +Tachycardia, s1/s2, no appreciable murmurs/rubs/gallops  Resp: +BL Wheezing +Tachypnea  Abd: Soft, Non-tender, Non-distended, no rebound/guarding/rigidity  : not examined  Rectal: not examined  MSK: moving all extremities, no motor weakness, full ROM x4  Ext: palpable distal pulses, good capillary refill, no clubbing/cyanosis/edema  Psych: appropriate, cooperative  Neuro: CN II-XII grossly intact, no focal deficits  Skin: Warm/Dry. No rashes.

## 2025-02-10 NOTE — ED ADULT NURSE REASSESSMENT NOTE - NS ED NURSE REASSESS COMMENT FT1
Patient resting comfortably in bed. No acute distress noted. Patient has no complaints at this time. NSR lead II. FQR162%on RA. TEODORO Parkinson notified of BP with no additional orders at this time. Approved patients next duoneb treatment. Patient resting comfortably in bed. No acute distress noted. Patient has no complaints at this time. NSR lead II. SPO2 94%on NC @2L/min. TEODORO Parkinson notified of BP with no additional orders at this time. Approved patients next duoneb treatment.

## 2025-02-11 LAB
A1C WITH ESTIMATED AVERAGE GLUCOSE RESULT: 6.6 % — HIGH (ref 4–5.6)
ANION GAP SERPL CALC-SCNC: 12 MMOL/L — SIGNIFICANT CHANGE UP (ref 5–17)
BASOPHILS # BLD AUTO: 0.02 K/UL — SIGNIFICANT CHANGE UP (ref 0–0.2)
BASOPHILS NFR BLD AUTO: 0.1 % — SIGNIFICANT CHANGE UP (ref 0–2)
BUN SERPL-MCNC: 21.3 MG/DL — HIGH (ref 8–20)
CALCIUM SERPL-MCNC: 8.5 MG/DL — SIGNIFICANT CHANGE UP (ref 8.4–10.5)
CHLORIDE SERPL-SCNC: 104 MMOL/L — SIGNIFICANT CHANGE UP (ref 96–108)
CO2 SERPL-SCNC: 27 MMOL/L — SIGNIFICANT CHANGE UP (ref 22–29)
CREAT SERPL-MCNC: 1.11 MG/DL — SIGNIFICANT CHANGE UP (ref 0.5–1.3)
EGFR: 76 ML/MIN/1.73M2 — SIGNIFICANT CHANGE UP
EOSINOPHIL # BLD AUTO: 0 K/UL — SIGNIFICANT CHANGE UP (ref 0–0.5)
EOSINOPHIL NFR BLD AUTO: 0 % — SIGNIFICANT CHANGE UP (ref 0–6)
ESTIMATED AVERAGE GLUCOSE: 143 MG/DL — HIGH (ref 68–114)
GLUCOSE BLDC GLUCOMTR-MCNC: 149 MG/DL — HIGH (ref 70–99)
GLUCOSE BLDC GLUCOMTR-MCNC: 161 MG/DL — HIGH (ref 70–99)
GLUCOSE BLDC GLUCOMTR-MCNC: 193 MG/DL — HIGH (ref 70–99)
GLUCOSE BLDC GLUCOMTR-MCNC: 215 MG/DL — HIGH (ref 70–99)
GLUCOSE SERPL-MCNC: 163 MG/DL — HIGH (ref 70–99)
HCT VFR BLD CALC: 40 % — SIGNIFICANT CHANGE UP (ref 39–50)
HGB BLD-MCNC: 12.9 G/DL — LOW (ref 13–17)
IMM GRANULOCYTES NFR BLD AUTO: 0.9 % — SIGNIFICANT CHANGE UP (ref 0–0.9)
LYMPHOCYTES # BLD AUTO: 0.91 K/UL — LOW (ref 1–3.3)
LYMPHOCYTES # BLD AUTO: 5.6 % — LOW (ref 13–44)
MCHC RBC-ENTMCNC: 28.5 PG — SIGNIFICANT CHANGE UP (ref 27–34)
MCHC RBC-ENTMCNC: 32.3 G/DL — SIGNIFICANT CHANGE UP (ref 32–36)
MCV RBC AUTO: 88.5 FL — SIGNIFICANT CHANGE UP (ref 80–100)
MONOCYTES # BLD AUTO: 0.78 K/UL — SIGNIFICANT CHANGE UP (ref 0–0.9)
MONOCYTES NFR BLD AUTO: 4.8 % — SIGNIFICANT CHANGE UP (ref 2–14)
NEUTROPHILS # BLD AUTO: 14.5 K/UL — HIGH (ref 1.8–7.4)
NEUTROPHILS NFR BLD AUTO: 88.6 % — HIGH (ref 43–77)
PLATELET # BLD AUTO: 230 K/UL — SIGNIFICANT CHANGE UP (ref 150–400)
POTASSIUM SERPL-MCNC: 4.5 MMOL/L — SIGNIFICANT CHANGE UP (ref 3.5–5.3)
POTASSIUM SERPL-SCNC: 4.5 MMOL/L — SIGNIFICANT CHANGE UP (ref 3.5–5.3)
PROCALCITONIN SERPL-MCNC: 0.08 NG/ML — SIGNIFICANT CHANGE UP (ref 0.02–0.1)
RBC # BLD: 4.52 M/UL — SIGNIFICANT CHANGE UP (ref 4.2–5.8)
RBC # FLD: 14.1 % — SIGNIFICANT CHANGE UP (ref 10.3–14.5)
SODIUM SERPL-SCNC: 143 MMOL/L — SIGNIFICANT CHANGE UP (ref 135–145)
WBC # BLD: 16.35 K/UL — HIGH (ref 3.8–10.5)
WBC # FLD AUTO: 16.35 K/UL — HIGH (ref 3.8–10.5)

## 2025-02-11 PROCEDURE — 99233 SBSQ HOSP IP/OBS HIGH 50: CPT

## 2025-02-11 PROCEDURE — 99232 SBSQ HOSP IP/OBS MODERATE 35: CPT

## 2025-02-11 RX ADMIN — OSELTAMIVIR PHOSPHATE 75 MILLIGRAM(S): 75 CAPSULE ORAL at 17:34

## 2025-02-11 RX ADMIN — DEXTROMETHORPHAN HBR, GUAIFENESIN 1200 MILLIGRAM(S): 200 LIQUID ORAL at 05:46

## 2025-02-11 RX ADMIN — ATORVASTATIN CALCIUM 40 MILLIGRAM(S): 80 TABLET, FILM COATED ORAL at 21:19

## 2025-02-11 RX ADMIN — Medication 300 MILLIGRAM(S): at 13:15

## 2025-02-11 RX ADMIN — METHYLPREDNISOLONE ACETATE 40 MILLIGRAM(S): 80 INJECTION, SUSPENSION INTRA-ARTICULAR; INTRALESIONAL; INTRAMUSCULAR; SOFT TISSUE at 05:46

## 2025-02-11 RX ADMIN — IPRATROPIUM BROMIDE AND ALBUTEROL SULFATE 3 MILLILITER(S): .5; 2.5 SOLUTION RESPIRATORY (INHALATION) at 19:55

## 2025-02-11 RX ADMIN — ENOXAPARIN SODIUM 40 MILLIGRAM(S): 100 INJECTION SUBCUTANEOUS at 13:19

## 2025-02-11 RX ADMIN — Medication 1 LOZENGE: at 21:19

## 2025-02-11 RX ADMIN — INSULIN LISPRO 1: 100 INJECTION, SOLUTION INTRAVENOUS; SUBCUTANEOUS at 13:14

## 2025-02-11 RX ADMIN — AMLODIPINE BESYLATE 10 MILLIGRAM(S): 10 TABLET ORAL at 05:47

## 2025-02-11 RX ADMIN — OSELTAMIVIR PHOSPHATE 75 MILLIGRAM(S): 75 CAPSULE ORAL at 05:47

## 2025-02-11 RX ADMIN — DEXTROMETHORPHAN HBR, GUAIFENESIN 1200 MILLIGRAM(S): 200 LIQUID ORAL at 17:34

## 2025-02-11 RX ADMIN — METHYLPREDNISOLONE ACETATE 40 MILLIGRAM(S): 80 INJECTION, SUSPENSION INTRA-ARTICULAR; INTRALESIONAL; INTRAMUSCULAR; SOFT TISSUE at 13:14

## 2025-02-11 RX ADMIN — INSULIN LISPRO 2: 100 INJECTION, SOLUTION INTRAVENOUS; SUBCUTANEOUS at 21:22

## 2025-02-11 RX ADMIN — IPRATROPIUM BROMIDE AND ALBUTEROL SULFATE 3 MILLILITER(S): .5; 2.5 SOLUTION RESPIRATORY (INHALATION) at 02:26

## 2025-02-11 RX ADMIN — LOSARTAN POTASSIUM 100 MILLIGRAM(S): 100 TABLET, FILM COATED ORAL at 05:47

## 2025-02-11 RX ADMIN — Medication 1 DOSE(S): at 19:55

## 2025-02-11 RX ADMIN — IPRATROPIUM BROMIDE AND ALBUTEROL SULFATE 3 MILLILITER(S): .5; 2.5 SOLUTION RESPIRATORY (INHALATION) at 14:58

## 2025-02-11 RX ADMIN — METHYLPREDNISOLONE ACETATE 40 MILLIGRAM(S): 80 INJECTION, SUSPENSION INTRA-ARTICULAR; INTRALESIONAL; INTRAMUSCULAR; SOFT TISSUE at 21:19

## 2025-02-11 RX ADMIN — IPRATROPIUM BROMIDE AND ALBUTEROL SULFATE 3 MILLILITER(S): .5; 2.5 SOLUTION RESPIRATORY (INHALATION) at 08:46

## 2025-02-11 NOTE — PROGRESS NOTE ADULT - ASSESSMENT
59M never smoker with hx of severe persistent asthma, SHERI, DM, gout, HTN, HLD, arthritis, admission 3/29-5/1/24 for TYESHA, fungemia, multifocal PNA s/p bronch w/ H flu presented to the ED 2/9 found to be influenza positive     Acute hypoxic respiratory failure secondary to influenza pneumonia/asthma exacerbation   complete course of Tamiflu   no consolidation to suggest superimposed bacterial PNA   c/w Duoneb   c/w Advair   albuterol prn   c/w Solumedrol 40 q8h   c/w Mucinex   wean supplemental O2 as tolerated for goal sat >92%  no hypercapnia noted on VBG   if remains symptomatic, would check CT chest   incentive spirometry   OOB   will need f/u w/ Dr. Rene on discharge

## 2025-02-11 NOTE — PROGRESS NOTE ADULT - ASSESSMENT
ASSESSMENT:  59M with PMHX Asthma, HTN, HLD, Gout, Pre-DM presented to [facility] ER c/o SOB/MCCLURE admitted for Acute Hypoxic Respiratory Failure 2/2 Acute Asthma Exacerbation 2/2 Influenza.      Acute Hypoxic Respiratory Failure 2/2 Acute Asthma Exacerbation 2/2 Influenza   Afebrile, elevated WBC   -CXR +hyperinflted but no obvious infiltrates  c/w Solumedrol 40mg IV q8 -> titrate to q12 as wheezing improves  -Tamiflu 75mg PO BID  -Advair 500/50mg BID for Wixela  -Duoneb q6  -Titrate O2 via NC for SPO2 >92%      Leukocytosis  -Likely steroid induced. Trend CBC.    Lactic Acidosis  -Likely 2/2 diaphragmatic effort, beta agonism from duonebs, and from metformin use in obs  -No septic physiology present at this time  -Discontinue further Lactates    Pre-DM  -Hold Metformin 500mg BID with lactic acidosis  -Add ISS while on steroids inpt    HTN, HLD  -Atorvastatin 40mg q24  -Losartan 100mg q24  -Amlodipine 10mg q24    Gout  -Allopurinol 300mg q24    VTE PPX: SCD/LMWH  DISPO: Likely tomorrow

## 2025-02-12 LAB
GLUCOSE BLDC GLUCOMTR-MCNC: 146 MG/DL — HIGH (ref 70–99)
GLUCOSE BLDC GLUCOMTR-MCNC: 177 MG/DL — HIGH (ref 70–99)
GLUCOSE BLDC GLUCOMTR-MCNC: 240 MG/DL — HIGH (ref 70–99)
GLUCOSE BLDC GLUCOMTR-MCNC: 263 MG/DL — HIGH (ref 70–99)

## 2025-02-12 PROCEDURE — 99233 SBSQ HOSP IP/OBS HIGH 50: CPT

## 2025-02-12 PROCEDURE — 99231 SBSQ HOSP IP/OBS SF/LOW 25: CPT

## 2025-02-12 PROCEDURE — 71275 CT ANGIOGRAPHY CHEST: CPT | Mod: 26

## 2025-02-12 RX ORDER — ACETYLCYSTEINE 200 MG/ML
4 INHALANT RESPIRATORY (INHALATION)
Refills: 0 | Status: DISCONTINUED | OUTPATIENT
Start: 2025-02-12 | End: 2025-02-17

## 2025-02-12 RX ADMIN — METHYLPREDNISOLONE ACETATE 40 MILLIGRAM(S): 80 INJECTION, SUSPENSION INTRA-ARTICULAR; INTRALESIONAL; INTRAMUSCULAR; SOFT TISSUE at 21:32

## 2025-02-12 RX ADMIN — IPRATROPIUM BROMIDE AND ALBUTEROL SULFATE 3 MILLILITER(S): .5; 2.5 SOLUTION RESPIRATORY (INHALATION) at 15:08

## 2025-02-12 RX ADMIN — Medication 1 LOZENGE: at 01:34

## 2025-02-12 RX ADMIN — OSELTAMIVIR PHOSPHATE 75 MILLIGRAM(S): 75 CAPSULE ORAL at 17:20

## 2025-02-12 RX ADMIN — DEXTROMETHORPHAN HBR, GUAIFENESIN 1200 MILLIGRAM(S): 200 LIQUID ORAL at 17:20

## 2025-02-12 RX ADMIN — METHYLPREDNISOLONE ACETATE 40 MILLIGRAM(S): 80 INJECTION, SUSPENSION INTRA-ARTICULAR; INTRALESIONAL; INTRAMUSCULAR; SOFT TISSUE at 15:09

## 2025-02-12 RX ADMIN — DEXTROMETHORPHAN HBR, GUAIFENESIN 1200 MILLIGRAM(S): 200 LIQUID ORAL at 05:16

## 2025-02-12 RX ADMIN — INSULIN LISPRO 1: 100 INJECTION, SOLUTION INTRAVENOUS; SUBCUTANEOUS at 17:19

## 2025-02-12 RX ADMIN — Medication 300 MILLIGRAM(S): at 11:13

## 2025-02-12 RX ADMIN — Medication 1 DOSE(S): at 09:03

## 2025-02-12 RX ADMIN — OSELTAMIVIR PHOSPHATE 75 MILLIGRAM(S): 75 CAPSULE ORAL at 05:16

## 2025-02-12 RX ADMIN — METHYLPREDNISOLONE ACETATE 40 MILLIGRAM(S): 80 INJECTION, SUSPENSION INTRA-ARTICULAR; INTRALESIONAL; INTRAMUSCULAR; SOFT TISSUE at 05:16

## 2025-02-12 RX ADMIN — Medication 1 LOZENGE: at 05:16

## 2025-02-12 RX ADMIN — IPRATROPIUM BROMIDE AND ALBUTEROL SULFATE 3 MILLILITER(S): .5; 2.5 SOLUTION RESPIRATORY (INHALATION) at 09:03

## 2025-02-12 RX ADMIN — Medication 1 LOZENGE: at 15:09

## 2025-02-12 RX ADMIN — Medication 1 LOZENGE: at 10:01

## 2025-02-12 RX ADMIN — ATORVASTATIN CALCIUM 40 MILLIGRAM(S): 80 TABLET, FILM COATED ORAL at 21:33

## 2025-02-12 RX ADMIN — LOSARTAN POTASSIUM 100 MILLIGRAM(S): 100 TABLET, FILM COATED ORAL at 05:16

## 2025-02-12 RX ADMIN — Medication 1 LOZENGE: at 21:32

## 2025-02-12 RX ADMIN — INSULIN LISPRO 2: 100 INJECTION, SOLUTION INTRAVENOUS; SUBCUTANEOUS at 22:12

## 2025-02-12 RX ADMIN — IPRATROPIUM BROMIDE AND ALBUTEROL SULFATE 3 MILLILITER(S): .5; 2.5 SOLUTION RESPIRATORY (INHALATION) at 03:06

## 2025-02-12 RX ADMIN — INSULIN LISPRO 3: 100 INJECTION, SOLUTION INTRAVENOUS; SUBCUTANEOUS at 11:12

## 2025-02-12 RX ADMIN — AMLODIPINE BESYLATE 10 MILLIGRAM(S): 10 TABLET ORAL at 11:14

## 2025-02-12 RX ADMIN — Medication 1 LOZENGE: at 17:20

## 2025-02-12 RX ADMIN — ENOXAPARIN SODIUM 40 MILLIGRAM(S): 100 INJECTION SUBCUTANEOUS at 11:15

## 2025-02-12 RX ADMIN — Medication 1 DOSE(S): at 22:12

## 2025-02-12 NOTE — PROGRESS NOTE ADULT - ASSESSMENT
59M never smoker with hx of severe persistent asthma, SHERI, DM, gout, HTN, HLD, arthritis, admission 3/29-5/1/24 for TYESHA, fungemia, multifocal PNA s/p bronch w/ H flu presented to the ED 2/9 found to be influenza positive     Acute hypoxic respiratory failure secondary to influenza pneumonia/asthma exacerbation   complete course of Tamiflu   no consolidation to suggest superimposed bacterial PNA   c/w Duoneb   c/w Advair   albuterol prn   c/w Solumedrol 40 q8h   c/w Mucinex   added mucomyst   wean supplemental O2 as tolerated for goal sat >92%  no hypercapnia noted on VBG   CT chest angio obtained given persistent hypoxia/dyspnea and tachycardia, neg for PE   CT chest w/ suggestion of tracheomalacia; will need CT surgery eval if still symptomatic post influenza tx   incentive spirometry   OOB   declines nocturnal CPAP while in hospital given coughing   will need f/u w/ Dr. Rene on discharge   will need f/u of nodule noted on CT chest

## 2025-02-12 NOTE — PROGRESS NOTE ADULT - ASSESSMENT
ASSESSMENT:  59M with PMHX Asthma, HTN, HLD, Gout, Pre-DM presented to [facility] ER c/o SOB/MCCLURE admitted for Acute Hypoxic Respiratory Failure 2/2 Acute Asthma Exacerbation 2/2 Influenza.      Acute Hypoxic Respiratory Failure 2/2 Acute Asthma Exacerbation 2/2 Influenza   Afebrile, elevated WBC   -CXR +hyperinflted but no obvious infiltrates  c/w Solumedrol 40mg IV q8 -> titrate to q12 as wheezing improves  -Tamiflu 75mg PO BID  -Advair 500/50mg BID for Wixela  -Duoneb q6  -Mucinex BID   -Titrate O2 via NC for SPO2 >92%      Leukocytosis  -Likely steroid induced. Trend CBC.    Lactic Acidosis  -Likely 2/2 diaphragmatic effort, beta agonism from duonebs, and from metformin use in obs  -No septic physiology present at this time  -Discontinue further Lactates    Pre-DM  -Hold Metformin 500mg BID with lactic acidosis  -Add ISS while on steroids inpt    HTN, HLD  -Atorvastatin 40mg q24  -Losartan 100mg q24  -Amlodipine 10mg q24    Gout  -Allopurinol 300mg q24    VTE PPX: SCD/LMWH  DISPO: Likely tomorrow

## 2025-02-13 LAB
B PERT DNA SPEC QL NAA+PROBE: SIGNIFICANT CHANGE UP
C PNEUM DNA SPEC QL NAA+PROBE: SIGNIFICANT CHANGE UP
FLUAV H1 2009 PAND RNA SPEC QL NAA+PROBE: SIGNIFICANT CHANGE UP
FLUAV H1 RNA SPEC QL NAA+PROBE: SIGNIFICANT CHANGE UP
FLUAV H3 RNA SPEC QL NAA+PROBE: DETECTED
FLUAV SUBTYP SPEC NAA+PROBE: DETECTED
FLUBV RNA SPEC QL NAA+PROBE: SIGNIFICANT CHANGE UP
GLUCOSE BLDC GLUCOMTR-MCNC: 147 MG/DL — HIGH (ref 70–99)
GLUCOSE BLDC GLUCOMTR-MCNC: 159 MG/DL — HIGH (ref 70–99)
GLUCOSE BLDC GLUCOMTR-MCNC: 166 MG/DL — HIGH (ref 70–99)
GLUCOSE BLDC GLUCOMTR-MCNC: 176 MG/DL — HIGH (ref 70–99)
GLUCOSE BLDC GLUCOMTR-MCNC: 297 MG/DL — HIGH (ref 70–99)
HADV DNA SPEC QL NAA+PROBE: SIGNIFICANT CHANGE UP
HCOV PNL SPEC NAA+PROBE: SIGNIFICANT CHANGE UP
HMPV RNA SPEC QL NAA+PROBE: SIGNIFICANT CHANGE UP
HPIV1 RNA SPEC QL NAA+PROBE: SIGNIFICANT CHANGE UP
HPIV2 RNA SPEC QL NAA+PROBE: SIGNIFICANT CHANGE UP
HPIV3 RNA SPEC QL NAA+PROBE: SIGNIFICANT CHANGE UP
HPIV4 RNA SPEC QL NAA+PROBE: SIGNIFICANT CHANGE UP
RAPID RVP RESULT: DETECTED
RSV RNA SPEC QL NAA+PROBE: SIGNIFICANT CHANGE UP
RV+EV RNA SPEC QL NAA+PROBE: SIGNIFICANT CHANGE UP
SARS-COV-2 RNA SPEC QL NAA+PROBE: SIGNIFICANT CHANGE UP

## 2025-02-13 PROCEDURE — 99232 SBSQ HOSP IP/OBS MODERATE 35: CPT

## 2025-02-13 RX ORDER — LEVALBUTEROL HYDROCHLORIDE 1.25 MG/3ML
0.63 SOLUTION RESPIRATORY (INHALATION) EVERY 6 HOURS
Refills: 0 | Status: DISCONTINUED | OUTPATIENT
Start: 2025-02-13 | End: 2025-02-17

## 2025-02-13 RX ORDER — METOPROLOL SUCCINATE 50 MG/1
25 TABLET, EXTENDED RELEASE ORAL DAILY
Refills: 0 | Status: DISCONTINUED | OUTPATIENT
Start: 2025-02-13 | End: 2025-02-17

## 2025-02-13 RX ORDER — LABETALOL HYDROCHLORIDE 200 MG/1
10 TABLET, FILM COATED ORAL ONCE
Refills: 0 | Status: COMPLETED | OUTPATIENT
Start: 2025-02-13 | End: 2025-02-13

## 2025-02-13 RX ORDER — LABETALOL HYDROCHLORIDE 200 MG/1
5 TABLET, FILM COATED ORAL ONCE
Refills: 0 | Status: COMPLETED | OUTPATIENT
Start: 2025-02-13 | End: 2025-02-13

## 2025-02-13 RX ORDER — AZITHROMYCIN 250 MG
500 CAPSULE ORAL DAILY
Refills: 0 | Status: COMPLETED | OUTPATIENT
Start: 2025-02-13 | End: 2025-02-17

## 2025-02-13 RX ADMIN — Medication 500 MILLIGRAM(S): at 12:55

## 2025-02-13 RX ADMIN — LABETALOL HYDROCHLORIDE 5 MILLIGRAM(S): 200 TABLET, FILM COATED ORAL at 21:06

## 2025-02-13 RX ADMIN — OSELTAMIVIR PHOSPHATE 75 MILLIGRAM(S): 75 CAPSULE ORAL at 05:26

## 2025-02-13 RX ADMIN — DEXTROMETHORPHAN HBR, GUAIFENESIN 1200 MILLIGRAM(S): 200 LIQUID ORAL at 17:21

## 2025-02-13 RX ADMIN — LOSARTAN POTASSIUM 100 MILLIGRAM(S): 100 TABLET, FILM COATED ORAL at 12:55

## 2025-02-13 RX ADMIN — Medication 1 LOZENGE: at 21:20

## 2025-02-13 RX ADMIN — INSULIN LISPRO 1: 100 INJECTION, SOLUTION INTRAVENOUS; SUBCUTANEOUS at 09:11

## 2025-02-13 RX ADMIN — Medication 10 MILLIGRAM(S): at 12:56

## 2025-02-13 RX ADMIN — Medication 1 DOSE(S): at 08:58

## 2025-02-13 RX ADMIN — ENOXAPARIN SODIUM 40 MILLIGRAM(S): 100 INJECTION SUBCUTANEOUS at 12:54

## 2025-02-13 RX ADMIN — Medication 1 LOZENGE: at 17:21

## 2025-02-13 RX ADMIN — INSULIN LISPRO 1: 100 INJECTION, SOLUTION INTRAVENOUS; SUBCUTANEOUS at 17:22

## 2025-02-13 RX ADMIN — METHYLPREDNISOLONE ACETATE 40 MILLIGRAM(S): 80 INJECTION, SUSPENSION INTRA-ARTICULAR; INTRALESIONAL; INTRAMUSCULAR; SOFT TISSUE at 05:26

## 2025-02-13 RX ADMIN — Medication 1 LOZENGE: at 05:26

## 2025-02-13 RX ADMIN — INSULIN LISPRO 3: 100 INJECTION, SOLUTION INTRAVENOUS; SUBCUTANEOUS at 12:54

## 2025-02-13 RX ADMIN — Medication 1 LOZENGE: at 09:13

## 2025-02-13 RX ADMIN — Medication 650 MILLIGRAM(S): at 23:46

## 2025-02-13 RX ADMIN — Medication 1 LOZENGE: at 13:41

## 2025-02-13 RX ADMIN — Medication 1 LOZENGE: at 01:06

## 2025-02-13 RX ADMIN — ACETYLCYSTEINE 4 MILLILITER(S): 200 INHALANT RESPIRATORY (INHALATION) at 09:13

## 2025-02-13 RX ADMIN — LEVALBUTEROL HYDROCHLORIDE 0.63 MILLIGRAM(S): 1.25 SOLUTION RESPIRATORY (INHALATION) at 21:06

## 2025-02-13 RX ADMIN — OSELTAMIVIR PHOSPHATE 75 MILLIGRAM(S): 75 CAPSULE ORAL at 17:21

## 2025-02-13 RX ADMIN — DEXTROMETHORPHAN HBR, GUAIFENESIN 1200 MILLIGRAM(S): 200 LIQUID ORAL at 05:25

## 2025-02-13 RX ADMIN — LABETALOL HYDROCHLORIDE 10 MILLIGRAM(S): 200 TABLET, FILM COATED ORAL at 01:07

## 2025-02-13 RX ADMIN — Medication 20 MILLIGRAM(S): at 12:55

## 2025-02-13 RX ADMIN — ATORVASTATIN CALCIUM 40 MILLIGRAM(S): 80 TABLET, FILM COATED ORAL at 21:06

## 2025-02-13 RX ADMIN — IPRATROPIUM BROMIDE AND ALBUTEROL SULFATE 3 MILLILITER(S): .5; 2.5 SOLUTION RESPIRATORY (INHALATION) at 14:06

## 2025-02-13 RX ADMIN — IPRATROPIUM BROMIDE AND ALBUTEROL SULFATE 3 MILLILITER(S): .5; 2.5 SOLUTION RESPIRATORY (INHALATION) at 08:56

## 2025-02-13 RX ADMIN — Medication 300 MILLIGRAM(S): at 12:55

## 2025-02-13 RX ADMIN — AMLODIPINE BESYLATE 10 MILLIGRAM(S): 10 TABLET ORAL at 12:55

## 2025-02-13 RX ADMIN — Medication 1 DOSE(S): at 23:54

## 2025-02-13 NOTE — PROVIDER CONTACT NOTE (OTHER) - ASSESSMENT
Pt resting on 2L NC, O2 96%, RR 22, /95, .
Patient denies any complaints of pain or discomfort at this time. Patient endorsing cough and congestion. Vitals as charted in flowsheet

## 2025-02-13 NOTE — CHART NOTE - NSCHARTNOTEFT_GEN_A_CORE
Called by RN to report pt's manual BP of 168/104 w/ .  Pt denies HA, visual changes, dizziness  In no distress  Labetalol 10mg IVP x1 ordered.  RN to repeat vitals and escalate prn

## 2025-02-13 NOTE — PROGRESS NOTE ADULT - ASSESSMENT
59M with PMHX Asthma, HTN, HLD, Gout, Pre-DM presented to [facility] ER c/o SOB/MCCLURE admitted for Acute Hypoxic Respiratory Failure 2/2 Acute Asthma Exacerbation 2/2 Influenza.      1-Acute Hypoxic Respiratory Failure 2/2 Acute Asthma Exacerbation 2/2 Influenza infection   bronchitis   cont iv steroid will taper , tamiflu   cont nebulizer , add azithro   Afebrile, elevated WBC   -Mucinex BID   -Titrate O2 via NC for SPO2 >92%    2-HTN   cont amlodipine and losartan     3-Pre-DM  -Hold Metformin 500mg BID with lactic acidosis  -BG monitoring ,  ISS while on steroids     4-Gout  -Allopurinol 300mg q24        VTE PPX: SCD/LMWH    DISPO: DC home in 1-2 days of cont to improve      59M with PMHX Asthma, HTN, HLD, Gout, Pre-DM presented to [facility] ER c/o SOB/MCCLURE admitted for Acute Hypoxic Respiratory Failure 2/2 Acute Asthma Exacerbation 2/2 Influenza.      1-Acute Hypoxic Respiratory Failure 2/2 Acute Asthma Exacerbation 2/2 Influenza infection   bronchitis   cont iv steroid will taper , tamiflu   cont nebulizer , add azithro   Afebrile, elevated WBC   -Mucinex BID   -Titrate O2 via NC for SPO2 >92%    2-HTN   cont amlodipine and losartan     3-Pre-DM  -Hold Metformin 500mg BID with lactic acidosis  -BG monitoring ,  ISS while on steroids     4-Gout  -Allopurinol 300mg q24    5- Lung nodule   need outpatient follow up     VTE PPX: SCD/LMWH    DISPO: DC home in 1-2 days of cont to improve

## 2025-02-14 ENCOUNTER — TRANSCRIPTION ENCOUNTER (OUTPATIENT)
Age: 60
End: 2025-02-14

## 2025-02-14 DIAGNOSIS — J39.8 OTHER SPECIFIED DISEASES OF UPPER RESPIRATORY TRACT: ICD-10-CM

## 2025-02-14 LAB
ANION GAP SERPL CALC-SCNC: 16 MMOL/L — SIGNIFICANT CHANGE UP (ref 5–17)
B PERT DNA SPEC QL NAA+PROBE: SIGNIFICANT CHANGE UP
BASOPHILS # BLD AUTO: 0.02 K/UL — SIGNIFICANT CHANGE UP (ref 0–0.2)
BASOPHILS NFR BLD AUTO: 0.1 % — SIGNIFICANT CHANGE UP (ref 0–2)
BUN SERPL-MCNC: 21.7 MG/DL — HIGH (ref 8–20)
C PNEUM DNA SPEC QL NAA+PROBE: SIGNIFICANT CHANGE UP
CALCIUM SERPL-MCNC: 8.9 MG/DL — SIGNIFICANT CHANGE UP (ref 8.4–10.5)
CHLORIDE SERPL-SCNC: 99 MMOL/L — SIGNIFICANT CHANGE UP (ref 96–108)
CHOLEST SERPL-MCNC: 165 MG/DL — SIGNIFICANT CHANGE UP
CO2 SERPL-SCNC: 23 MMOL/L — SIGNIFICANT CHANGE UP (ref 22–29)
CREAT SERPL-MCNC: 1.11 MG/DL — SIGNIFICANT CHANGE UP (ref 0.5–1.3)
EGFR: 76 ML/MIN/1.73M2 — SIGNIFICANT CHANGE UP
EOSINOPHIL # BLD AUTO: 0 K/UL — SIGNIFICANT CHANGE UP (ref 0–0.5)
EOSINOPHIL NFR BLD AUTO: 0 % — SIGNIFICANT CHANGE UP (ref 0–6)
FLUAV H1 2009 PAND RNA SPEC QL NAA+PROBE: SIGNIFICANT CHANGE UP
FLUAV H1 RNA SPEC QL NAA+PROBE: SIGNIFICANT CHANGE UP
FLUAV H3 RNA SPEC QL NAA+PROBE: DETECTED
FLUAV SUBTYP SPEC NAA+PROBE: DETECTED
FLUBV RNA SPEC QL NAA+PROBE: SIGNIFICANT CHANGE UP
GLUCOSE BLDC GLUCOMTR-MCNC: 150 MG/DL — HIGH (ref 70–99)
GLUCOSE BLDC GLUCOMTR-MCNC: 199 MG/DL — HIGH (ref 70–99)
GLUCOSE BLDC GLUCOMTR-MCNC: 230 MG/DL — HIGH (ref 70–99)
GLUCOSE BLDC GLUCOMTR-MCNC: 256 MG/DL — HIGH (ref 70–99)
GLUCOSE SERPL-MCNC: 165 MG/DL — HIGH (ref 70–99)
HADV DNA SPEC QL NAA+PROBE: SIGNIFICANT CHANGE UP
HCOV PNL SPEC NAA+PROBE: SIGNIFICANT CHANGE UP
HCT VFR BLD CALC: 40.5 % — SIGNIFICANT CHANGE UP (ref 39–50)
HCT VFR BLD CALC: 45.1 % — SIGNIFICANT CHANGE UP (ref 39–50)
HDLC SERPL-MCNC: 94 MG/DL — SIGNIFICANT CHANGE UP
HGB BLD-MCNC: 12.9 G/DL — LOW (ref 13–17)
HGB BLD-MCNC: 14 G/DL — SIGNIFICANT CHANGE UP (ref 13–17)
HMPV RNA SPEC QL NAA+PROBE: SIGNIFICANT CHANGE UP
HPIV1 RNA SPEC QL NAA+PROBE: SIGNIFICANT CHANGE UP
HPIV2 RNA SPEC QL NAA+PROBE: SIGNIFICANT CHANGE UP
HPIV3 RNA SPEC QL NAA+PROBE: SIGNIFICANT CHANGE UP
HPIV4 RNA SPEC QL NAA+PROBE: SIGNIFICANT CHANGE UP
IMM GRANULOCYTES # BLD AUTO: 0.1 K/UL — HIGH (ref 0–0.07)
IMM GRANULOCYTES NFR BLD AUTO: 0.7 % — SIGNIFICANT CHANGE UP (ref 0–0.9)
LACTATE BLDV-MCNC: 1.5 MMOL/L — SIGNIFICANT CHANGE UP (ref 0.5–2)
LIPID PNL WITH DIRECT LDL SERPL: 46 MG/DL — SIGNIFICANT CHANGE UP
LYMPHOCYTES # BLD AUTO: 2.55 K/UL — SIGNIFICANT CHANGE UP (ref 1–3.3)
LYMPHOCYTES NFR BLD AUTO: 19.1 % — SIGNIFICANT CHANGE UP (ref 13–44)
MCHC RBC-ENTMCNC: 28.5 PG — SIGNIFICANT CHANGE UP (ref 27–34)
MCHC RBC-ENTMCNC: 28.6 PG — SIGNIFICANT CHANGE UP (ref 27–34)
MCHC RBC-ENTMCNC: 31 G/DL — LOW (ref 32–36)
MCHC RBC-ENTMCNC: 31.9 G/DL — LOW (ref 32–36)
MCV RBC AUTO: 89.4 FL — SIGNIFICANT CHANGE UP (ref 80–100)
MCV RBC AUTO: 92 FL — SIGNIFICANT CHANGE UP (ref 80–100)
MONOCYTES # BLD AUTO: 1.41 K/UL — HIGH (ref 0–0.9)
MONOCYTES NFR BLD AUTO: 10.5 % — SIGNIFICANT CHANGE UP (ref 2–14)
NEUTROPHILS # BLD AUTO: 9.3 K/UL — HIGH (ref 1.8–7.4)
NEUTROPHILS NFR BLD AUTO: 69.6 % — SIGNIFICANT CHANGE UP (ref 43–77)
NON HDL CHOLESTEROL: 71 MG/DL — SIGNIFICANT CHANGE UP
NRBC # BLD AUTO: 0 K/UL — SIGNIFICANT CHANGE UP (ref 0–0)
NRBC # BLD AUTO: 0 K/UL — SIGNIFICANT CHANGE UP (ref 0–0)
NRBC # FLD: 0 K/UL — SIGNIFICANT CHANGE UP (ref 0–0)
NRBC # FLD: 0 K/UL — SIGNIFICANT CHANGE UP (ref 0–0)
NRBC BLD AUTO-RTO: 0 /100 WBCS — SIGNIFICANT CHANGE UP (ref 0–0)
NRBC BLD AUTO-RTO: 0 /100 WBCS — SIGNIFICANT CHANGE UP (ref 0–0)
NT-PROBNP SERPL-SCNC: 39 PG/ML — SIGNIFICANT CHANGE UP (ref 0–300)
PLATELET # BLD AUTO: 205 K/UL — SIGNIFICANT CHANGE UP (ref 150–400)
PLATELET # BLD AUTO: 224 K/UL — SIGNIFICANT CHANGE UP (ref 150–400)
PMV BLD: 10.9 FL — SIGNIFICANT CHANGE UP (ref 7–13)
PMV BLD: 10.9 FL — SIGNIFICANT CHANGE UP (ref 7–13)
POTASSIUM SERPL-MCNC: 4.7 MMOL/L — SIGNIFICANT CHANGE UP (ref 3.5–5.3)
POTASSIUM SERPL-SCNC: 4.7 MMOL/L — SIGNIFICANT CHANGE UP (ref 3.5–5.3)
PROCALCITONIN SERPL-MCNC: 0.12 NG/ML — HIGH (ref 0.02–0.1)
RAPID RVP RESULT: DETECTED
RBC # BLD: 4.53 M/UL — SIGNIFICANT CHANGE UP (ref 4.2–5.8)
RBC # BLD: 4.9 M/UL — SIGNIFICANT CHANGE UP (ref 4.2–5.8)
RBC # FLD: 14.2 % — SIGNIFICANT CHANGE UP (ref 10.3–14.5)
RBC # FLD: 14.3 % — SIGNIFICANT CHANGE UP (ref 10.3–14.5)
RSV RNA SPEC QL NAA+PROBE: SIGNIFICANT CHANGE UP
RV+EV RNA SPEC QL NAA+PROBE: SIGNIFICANT CHANGE UP
SARS-COV-2 RNA SPEC QL NAA+PROBE: SIGNIFICANT CHANGE UP
SODIUM SERPL-SCNC: 138 MMOL/L — SIGNIFICANT CHANGE UP (ref 135–145)
TRIGL SERPL-MCNC: 125 MG/DL — SIGNIFICANT CHANGE UP
WBC # BLD: 13.38 K/UL — HIGH (ref 3.8–10.5)
WBC # BLD: 14.58 K/UL — HIGH (ref 3.8–10.5)
WBC # FLD AUTO: 13.38 K/UL — HIGH (ref 3.8–10.5)
WBC # FLD AUTO: 14.58 K/UL — HIGH (ref 3.8–10.5)

## 2025-02-14 PROCEDURE — 99221 1ST HOSP IP/OBS SF/LOW 40: CPT

## 2025-02-14 PROCEDURE — 71045 X-RAY EXAM CHEST 1 VIEW: CPT | Mod: 26

## 2025-02-14 PROCEDURE — 99233 SBSQ HOSP IP/OBS HIGH 50: CPT

## 2025-02-14 PROCEDURE — 99232 SBSQ HOSP IP/OBS MODERATE 35: CPT

## 2025-02-14 RX ORDER — DOXYCYCLINE HYCLATE 100 MG
TABLET ORAL
Refills: 0 | Status: DISCONTINUED | OUTPATIENT
Start: 2025-02-14 | End: 2025-02-14

## 2025-02-14 RX ORDER — PREDNISONE 20 MG/1
40 TABLET ORAL DAILY
Refills: 0 | Status: DISCONTINUED | OUTPATIENT
Start: 2025-02-14 | End: 2025-02-14

## 2025-02-14 RX ORDER — MONTELUKAST SODIUM 10 MG/1
10 TABLET ORAL AT BEDTIME
Refills: 0 | Status: DISCONTINUED | OUTPATIENT
Start: 2025-02-14 | End: 2025-02-17

## 2025-02-14 RX ORDER — PIPERACILLIN-TAZO-DEXTROSE,ISO 3.375G/5
3.38 IV SOLUTION, PIGGYBACK PREMIX FROZEN(ML) INTRAVENOUS EVERY 8 HOURS
Refills: 0 | Status: DISCONTINUED | OUTPATIENT
Start: 2025-02-14 | End: 2025-02-15

## 2025-02-14 RX ORDER — METHYLPREDNISOLONE ACETATE 80 MG/ML
40 INJECTION, SUSPENSION INTRA-ARTICULAR; INTRALESIONAL; INTRAMUSCULAR; SOFT TISSUE EVERY 12 HOURS
Refills: 0 | Status: DISCONTINUED | OUTPATIENT
Start: 2025-02-14 | End: 2025-02-15

## 2025-02-14 RX ADMIN — INSULIN LISPRO 3: 100 INJECTION, SOLUTION INTRAVENOUS; SUBCUTANEOUS at 17:09

## 2025-02-14 RX ADMIN — Medication 1 DOSE(S): at 07:40

## 2025-02-14 RX ADMIN — IPRATROPIUM BROMIDE AND ALBUTEROL SULFATE 3 MILLILITER(S): .5; 2.5 SOLUTION RESPIRATORY (INHALATION) at 13:33

## 2025-02-14 RX ADMIN — LOSARTAN POTASSIUM 100 MILLIGRAM(S): 100 TABLET, FILM COATED ORAL at 05:22

## 2025-02-14 RX ADMIN — OSELTAMIVIR PHOSPHATE 75 MILLIGRAM(S): 75 CAPSULE ORAL at 05:22

## 2025-02-14 RX ADMIN — LEVALBUTEROL HYDROCHLORIDE 0.63 MILLIGRAM(S): 1.25 SOLUTION RESPIRATORY (INHALATION) at 04:59

## 2025-02-14 RX ADMIN — IPRATROPIUM BROMIDE AND ALBUTEROL SULFATE 3 MILLILITER(S): .5; 2.5 SOLUTION RESPIRATORY (INHALATION) at 21:44

## 2025-02-14 RX ADMIN — Medication 25 GRAM(S): at 21:27

## 2025-02-14 RX ADMIN — DEXTROMETHORPHAN HBR, GUAIFENESIN 1200 MILLIGRAM(S): 200 LIQUID ORAL at 18:26

## 2025-02-14 RX ADMIN — IPRATROPIUM BROMIDE AND ALBUTEROL SULFATE 3 MILLILITER(S): .5; 2.5 SOLUTION RESPIRATORY (INHALATION) at 07:41

## 2025-02-14 RX ADMIN — ENOXAPARIN SODIUM 40 MILLIGRAM(S): 100 INJECTION SUBCUTANEOUS at 12:19

## 2025-02-14 RX ADMIN — Medication 1 DOSE(S): at 21:28

## 2025-02-14 RX ADMIN — INSULIN LISPRO 1: 100 INJECTION, SOLUTION INTRAVENOUS; SUBCUTANEOUS at 12:31

## 2025-02-14 RX ADMIN — ACETYLCYSTEINE 4 MILLILITER(S): 200 INHALANT RESPIRATORY (INHALATION) at 07:41

## 2025-02-14 RX ADMIN — METOPROLOL SUCCINATE 25 MILLIGRAM(S): 50 TABLET, EXTENDED RELEASE ORAL at 05:22

## 2025-02-14 RX ADMIN — MONTELUKAST SODIUM 10 MILLIGRAM(S): 10 TABLET ORAL at 21:28

## 2025-02-14 RX ADMIN — Medication 20 MILLIGRAM(S): at 12:19

## 2025-02-14 RX ADMIN — AMLODIPINE BESYLATE 10 MILLIGRAM(S): 10 TABLET ORAL at 05:22

## 2025-02-14 RX ADMIN — Medication 1 LOZENGE: at 18:26

## 2025-02-14 RX ADMIN — Medication 1 LOZENGE: at 21:27

## 2025-02-14 RX ADMIN — ATORVASTATIN CALCIUM 40 MILLIGRAM(S): 80 TABLET, FILM COATED ORAL at 21:28

## 2025-02-14 RX ADMIN — DEXTROMETHORPHAN HBR, GUAIFENESIN 1200 MILLIGRAM(S): 200 LIQUID ORAL at 05:23

## 2025-02-14 RX ADMIN — METHYLPREDNISOLONE ACETATE 40 MILLIGRAM(S): 80 INJECTION, SUSPENSION INTRA-ARTICULAR; INTRALESIONAL; INTRAMUSCULAR; SOFT TISSUE at 18:26

## 2025-02-14 RX ADMIN — Medication 1 LOZENGE: at 05:25

## 2025-02-14 RX ADMIN — Medication 25 GRAM(S): at 12:16

## 2025-02-14 RX ADMIN — Medication 1 LOZENGE: at 12:18

## 2025-02-14 RX ADMIN — Medication 300 MILLIGRAM(S): at 12:19

## 2025-02-14 RX ADMIN — INSULIN LISPRO 2: 100 INJECTION, SOLUTION INTRAVENOUS; SUBCUTANEOUS at 21:34

## 2025-02-14 RX ADMIN — Medication 500 MILLIGRAM(S): at 12:18

## 2025-02-14 RX ADMIN — OSELTAMIVIR PHOSPHATE 75 MILLIGRAM(S): 75 CAPSULE ORAL at 18:26

## 2025-02-14 RX ADMIN — Medication 1 LOZENGE: at 02:24

## 2025-02-14 RX ADMIN — Medication 10 MILLIGRAM(S): at 12:19

## 2025-02-14 RX ADMIN — Medication 650 MILLIGRAM(S): at 00:46

## 2025-02-14 NOTE — CONSULT NOTE ADULT - ASSESSMENT
59M with PMHX Asthma, HTN, HLD, Gout, Pre-DM presented to [facility] ER c/o SOB/MCCLURE. Found to be hypoxic satting 92% on 2L O2 NC at rest. Placed in observation but remained wheezing and de-satted with effort/ambulation requiring 4L NC. RVP +Influenza. CTA Chest with No pulmonary embolism, Peribronchial thickening and mucous plugging in the bilateral lower lobes which may reflect bronchitis, New 5 mm nodule in the lingula- Recommend follow-up in 3 months, Tracheomalacia.
59M never smoker with hx of severe persistent asthma, SHERI, DM, gout, HTN, HLD, arthritis, admission 3/29-5/1/24 for TYESHA, fungemia, multifocal PNA s/p bronch w/ H flu presented to the ED 2/9 found to be influenza positive     Acute hypoxic respiratory failure secondary to influenza pneumonia/asthma exacerbation   complete course of Tamiflu   no consolidation to suggest superimposed bacterial PNA   c/w Duoneb  if remains tachycardiac, can switch to Xopenex    c/w Advair   albuterol prn   c/w Solumedrol 40 q8h   will add Mucinex   wean supplemental O2 as tolerated for goal sat >92%  if remains symptomatic, would check CT chest   trend lactate   will need f/u w/ Dr. Rene on discharge

## 2025-02-14 NOTE — CONSULT NOTE ADULT - PROBLEM SELECTOR RECOMMENDATION 9
No acute intervention given current +FLU  Supportive care as per primary team   Pulm following  Patient to follow up with Dr. Albrecht within 1-2 weeks   D/W Dr. Albrecht

## 2025-02-14 NOTE — DISCHARGE NOTE PROVIDER - CARE PROVIDER_API CALL
Antonino Rene  Pulmonary Disease  [address] [facility] [address]  [place] [address]  Phone: [phone]  Fax: [phone]  Follow Up Time: 1 week

## 2025-02-14 NOTE — PROGRESS NOTE ADULT - ASSESSMENT
59M never smoker with hx of severe persistent asthma, SHERI, DM, gout, HTN, HLD, arthritis, admission 3/29-5/1/24 for TYESHA, fungemia, multifocal PNA s/p bronch w/ H flu presented to the ED 2/9 found to be influenza positive     Acute hypoxic respiratory failure secondary to influenza pneumonia/asthma exacerbation   complete course of Tamiflu   no consolidation to suggest superimposed bacterial PNA   on empiric zosyn due to new fever   f/u cultures   c/w Duoneb   c/w Advair   albuterol prn   c/w Solumedrol 40 q12h   c/w Mucinex   c/w  Mucomyst   will add singular   wean supplemental O2 as tolerated for goal sat >92%; add humidification if still needed   no hypercapnia noted on VBG   CT chest angio neg for PE   CT chest w/ suggestion of tracheomalacia; will need CT surgery eval if still symptomatic post influenza tx   incentive spirometry   OOB   declines nocturnal CPAP while in hospital given coughing   will need f/u w/ Dr. Rene on discharge   will need f/u of nodule noted on CT chest

## 2025-02-14 NOTE — DISCHARGE NOTE PROVIDER - NSDCFUSCHEDAPPT_GEN_ALL_CORE_FT
[facility] [address] [facility]   Scheduled Appointment: 03/07/2025    Antonino Rene  [facility] Physician [facility]  PULED [address]  Scheduled Appointment: 03/25/2025

## 2025-02-14 NOTE — DISCHARGE NOTE PROVIDER - NSDCCPCAREPLAN_GEN_ALL_CORE_FT
PRINCIPAL DISCHARGE DIAGNOSIS  Diagnosis: Acute respiratory failure with hypoxia  Assessment and Plan of Treatment: improving      SECONDARY DISCHARGE DIAGNOSES  Diagnosis: Influenza A  Assessment and Plan of Treatment: tamiflu    Diagnosis: Acute asthma exacerbation  Assessment and Plan of Treatment: continue nebulizer ,    Diagnosis: Tracheomalacia  Assessment and Plan of Treatment: follow up with DR Albrecht  details here, e.g., 'Noted on CXR 5/1.'    Diagnosis: Lung nodule  Assessment and Plan of Treatment: follow up with pulmonologist and thoracic surgery     PRINCIPAL DISCHARGE DIAGNOSIS  Diagnosis: Acute respiratory failure with hypoxia  Assessment and Plan of Treatment: - take steroids as prescribed and continue with nebulizers at home  - follow up with your pulmonologist in 1 week  - return to the hospital if symptoms worsen      SECONDARY DISCHARGE DIAGNOSES  Diagnosis: Influenza A  Assessment and Plan of Treatment: tamiflu    Diagnosis: Acute asthma exacerbation  Assessment and Plan of Treatment: continue nebulizer ,    Diagnosis: Tracheomalacia  Assessment and Plan of Treatment: follow up with DR Albrecht  details here, e.g., 'Noted on CXR 5/1.'    Diagnosis: Lung nodule  Assessment and Plan of Treatment: follow up with pulmonologist and thoracic surgery

## 2025-02-14 NOTE — CONSULT NOTE ADULT - SUBJECTIVE AND OBJECTIVE BOX
Patient is a 59y old  Male who presents with a chief complaint of Acute Hypoxic Resp Failure 2/2 Asthma Exac 2/2 Influenza (10 Feb 2025 10:50)    HPI:  59M never smoker with hx of severe persistent asthma, SHERI, DM, gout, HTN, HLD, arthritis, admission 3/29-5/1/24 for TYESHA, fungemia, multifocal PNA s/p bronch w/ H flu presented to the ED 2/9 with progressively worsening dyspnea over the last 3 days. Pt reports he had taken course of prednisone outpatient without significant relief. Denies associated fevers/chills. Endorses cough but has been dry as has had difficulty expectorating. Denies chest pain. Endorses wheezing. Denies LE edema/pain.   Pt follows with Dr. Rene and is on Wixela/albuterol as outpatient. Pt reports interval improvement in breathing. Still has ongoing cough and feels congested. Remains on 4L O2.     PAST MEDICAL & SURGICAL HISTORY:  Gout      Asthma      Arthritis      HTN (hypertension)      No significant past surgical history      Medications:  oseltamivir 75 milliGRAM(s) Oral two times a day    amLODIPine   Tablet 10 milliGRAM(s) Oral daily  losartan 100 milliGRAM(s) Oral daily    albuterol    90 MICROgram(s) HFA Inhaler 2 Puff(s) Inhalation every 6 hours PRN  albuterol/ipratropium for Nebulization 3 milliLiter(s) Nebulizer every 6 hours  fluticasone propionate/ salmeterol 500-50 MICROgram(s) Diskus 1 Dose(s) Inhalation two times a day  guaiFENesin ER 1200 milliGRAM(s) Oral every 12 hours  guaiFENesin Oral Liquid (Sugar-Free) 100 milliGRAM(s) Oral every 6 hours PRN    acetaminophen     Tablet .. 650 milliGRAM(s) Oral every 6 hours PRN  ibuprofen  Tablet. 600 milliGRAM(s) Oral every 6 hours PRN      enoxaparin Injectable 40 milliGRAM(s) SubCutaneous every 24 hours        allopurinol 300 milliGRAM(s) Oral daily  atorvastatin 40 milliGRAM(s) Oral at bedtime  dextrose 50% Injectable 25 Gram(s) IV Push once  dextrose 50% Injectable 12.5 Gram(s) IV Push once  dextrose 50% Injectable 25 Gram(s) IV Push once  dextrose Oral Gel 15 Gram(s) Oral once PRN  glucagon  Injectable 1 milliGRAM(s) IntraMuscular once  insulin lispro (ADMELOG) corrective regimen sliding scale   SubCutaneous Before meals and at bedtime  methylPREDNISolone sodium succinate Injectable 40 milliGRAM(s) IV Push every 8 hours    dextrose 5%. 1000 milliLiter(s) IV Continuous <Continuous>  dextrose 5%. 1000 milliLiter(s) IV Continuous <Continuous>      benzocaine/menthol Lozenge 1 Lozenge Oral every 4 hours      Allergies    No Known Allergies    Intolerances      Social History:  Denies smoking hx. (10 Feb 2025 10:50)    FAMILY HISTORY:  Family history of diabetes mellitus type II    Family history of lung cancer    ICU Vital Signs Last 24 Hrs  T(C): 36.4 (10 Feb 2025 15:58), Max: 37.1 (09 Feb 2025 19:53)  T(F): 97.5 (10 Feb 2025 15:58), Max: 98.7 (09 Feb 2025 19:53)  HR: 107 (10 Feb 2025 15:58) (97 - 117)  BP: 160/50 (10 Feb 2025 15:58) (147/89 - 176/94)  BP(mean): 100 (10 Feb 2025 11:36) (100 - 100)  ABP: --  ABP(mean): --  RR: 19 (10 Feb 2025 15:58) (16 - 20)  SpO2: 94% (10 Feb 2025 15:58) (91% - 96%)    O2 Parameters below as of 10 Feb 2025 15:58  Patient On (Oxygen Delivery Method): nasal cannula  O2 Flow (L/min): 3    LABS:                        13.8   19.11 )-----------( 246      ( 10 Feb 2025 09:10 )             44.0     02-10    139  |  99  |  22.9[H]  ----------------------------<  174[H]  4.3   |  22.0  |  1.20    Ca    8.6      10 Feb 2025 09:10    TPro  6.8  /  Alb  3.7  /  TBili  0.3[L]  /  DBili  x   /  AST  35  /  ALT  23  /  AlkPhos  68  02-09          CAPILLARY BLOOD GLUCOSE      POCT Blood Glucose.: 214 mg/dL (10 Feb 2025 17:09)      Urinalysis Basic - ( 10 Feb 2025 09:10 )    Color: x / Appearance: x / SG: x / pH: x  Gluc: 174 mg/dL / Ketone: x  / Bili: x / Urobili: x   Blood: x / Protein: x / Nitrite: x   Leuk Esterase: x / RBC: x / WBC x   Sq Epi: x / Non Sq Epi: x / Bacteria: x    Physical Examination:    General: awake, alert, in NAD     HEENT: NC/AT, anicteric, MMM    PULM: rhonchi with expiratory wheezing bilaterally, no accessory muscle use     NECK: Supple, trachea midline    CVS: S1S2, RRR    ABD: Soft, nondistended, nontender, normoactive bowel sounds    EXT: No edema, nontender    SKIN: Warm and well perfused, no rashes noted    NEURO: Alert, oriented, interactive, nonfocal    ROS: negative except as per HPI     RADIOLOGY:   < from: Xray Chest 1 View- PORTABLE-Urgent (02.09.25 @ 04:39) >  INTERPRETATION:  CLINICAL STATEMENT: Shortness of breath    TECHNIQUE: AP view of the chest.      COMPARISON: 4/26/2024    Thecardiomediastinal silhouette is normal and the josef are not enlarged.   The trachea is midline. There is no focal lung consolidation or sizable   pleural effusion. No significant osseous abnormality.    < end of copied text >    
HPI:  59M with PMHX Asthma, HTN, HLD, Gout, Pre-DM presented to [facility] ER c/o SOB/MCCLURE. Found to be hypoxic satting 92% on 2L O2 NC at rest. Placed in observation but remained wheezing and de-satted with effort/ambulation requiring 4L NC. RVP +Influenza. CXR hyperinflated but otherwise negative. Labs reviewed. Treated with steroids/duonebs/tamiflu. Agreeable to plan for admission. Denies F/C. No N/V. No other complaints.    Ct Chest with tracheomalacia. Thoracic consulted. Patient saturating well on 2L NC however still with wheeze. FLU A+. Wife also with FLU at home. +SOB and MCCLURE. No chest pain, cough, fever, chills, palpitations.       PAST MEDICAL & SURGICAL HISTORY:  Gout      Asthma      Arthritis      HTN (hypertension)      No significant past surgical history          REVIEW OF SYSTEMS      General:No Weight change/ Fatigue/ HA/Dizzy	    Skin/Breast: No Rashes/ Lesions/ Masses  	  Ophthalmologic: No Blurry vision/ Glaucoma/ Blindness  	  ENMT: No Hearing loss/ Drainage/ Lesions	    Respiratory and Thorax: +Wheezing, +SOB  	  Cardiovascular: No Chest pain/ Palpitations/ Diaphoresis	    Gastrointestinal: No Nausea/ Vomiting/ Constipation/ Appetite Change	    Genitourinary: No Heamturia/ Dysuria/ Frequency change	    Musculoskeletal: No Pain/ Weakness/ Claudication	    Neurological: No Seizures/ TIA/CVA/ Parastesias	    Psychiatric: No Dementia/ Depression/ SI/HI	    Hematology/Lymphatics: No hx of bleeding/ Edema	    Endocrine:	No Hyperglycemia/ Hypoglycemia    Allergic/Immunologic:	 No Anaphylaxis/ Intolerance/ Recent illnesses    MEDICATIONS  (STANDING):  acetylcysteine 10%  Inhalation 4 milliLiter(s) Inhalation two times a day  albuterol/ipratropium for Nebulization 3 milliLiter(s) Nebulizer every 6 hours  allopurinol 300 milliGRAM(s) Oral daily  amLODIPine   Tablet 10 milliGRAM(s) Oral daily  atorvastatin 40 milliGRAM(s) Oral at bedtime  azithromycin   Tablet 500 milliGRAM(s) Oral daily  benzocaine/menthol Lozenge 1 Lozenge Oral every 4 hours  cetirizine 10 milliGRAM(s) Oral daily  dextrose 5%. 1000 milliLiter(s) (100 mL/Hr) IV Continuous <Continuous>  dextrose 5%. 1000 milliLiter(s) (50 mL/Hr) IV Continuous <Continuous>  dextrose 50% Injectable 25 Gram(s) IV Push once  dextrose 50% Injectable 12.5 Gram(s) IV Push once  dextrose 50% Injectable 25 Gram(s) IV Push once  enoxaparin Injectable 40 milliGRAM(s) SubCutaneous every 24 hours  famotidine    Tablet 20 milliGRAM(s) Oral daily  fluticasone propionate/ salmeterol 250-50 MICROgram(s) Diskus 1 Dose(s) Inhalation two times a day  glucagon  Injectable 1 milliGRAM(s) IntraMuscular once  guaiFENesin ER 1200 milliGRAM(s) Oral every 12 hours  insulin lispro (ADMELOG) corrective regimen sliding scale   SubCutaneous Before meals and at bedtime  losartan 100 milliGRAM(s) Oral daily  methylPREDNISolone sodium succinate Injectable 40 milliGRAM(s) IV Push every 12 hours  metoprolol succinate ER 25 milliGRAM(s) Oral daily  montelukast 10 milliGRAM(s) Oral at bedtime  oseltamivir 75 milliGRAM(s) Oral two times a day  piperacillin/tazobactam IVPB.. 3.375 Gram(s) IV Intermittent every 8 hours    MEDICATIONS  (PRN):  acetaminophen     Tablet .. 650 milliGRAM(s) Oral every 6 hours PRN Temp greater or equal to 38C (100.4F), Mild Pain (1 - 3)  albuterol    90 MICROgram(s) HFA Inhaler 2 Puff(s) Inhalation every 6 hours PRN Bronchospasm  dextrose Oral Gel 15 Gram(s) Oral once PRN Blood Glucose LESS THAN 70 milliGRAM(s)/deciliter  ibuprofen  Tablet. 600 milliGRAM(s) Oral every 6 hours PRN Temp greater or equal to 38C (100.4F), Moderate Pain (4 - 6)  levalbuterol Inhalation 0.63 milliGRAM(s) Inhalation every 6 hours PRN HR > 100      Allergies    No Known Allergies            SOCIAL HISTORY:  Smoking Hx: denies  Etoh Hx: denies  IVDA Hx: denies    FAMILY HISTORY:  Family history of diabetes mellitus type II    Family history of lung cancer        Vital Signs Last 24 Hrs  T(C): 37.2 (14 Feb 2025 12:27), Max: 38 (13 Feb 2025 23:35)  T(F): 98.9 (14 Feb 2025 12:27), Max: 100.4 (13 Feb 2025 23:35)  HR: 94 (14 Feb 2025 13:34) (83 - 112)  BP: 148/82 (14 Feb 2025 12:27) (135/66 - 168/95)  BP(mean): 101 (14 Feb 2025 12:27) (85 - 101)  RR: 18 (14 Feb 2025 12:27) (18 - 19)  SpO2: 97% (14 Feb 2025 13:34) (93% - 97%)    Parameters below as of 14 Feb 2025 13:34  Patient On (Oxygen Delivery Method): nasal cannula        General: NAD  Neurology: Awake, nonfocal, DELEON x 4  Eyes: Scleras clear, EOMI, Gross vision intact  ENT:Gross hearing intact, grossly patent pharynx, no stridor  Neck: Neck supple, trachea midline, No JVD  Respiratory: Wheeze B/L  CV: S1S2, no murmurs, rubs or gallops  Abdominal: Soft, NT, ND  Extremities: No edema    LABS:                        14.0   14.58 )-----------( 205      ( 14 Feb 2025 08:03 )             45.1     02-14    138  |  99  |  21.7[H]  ----------------------------<  165[H]  4.7   |  23.0  |  1.11    Ca    8.9      14 Feb 2025 08:03        Urinalysis Basic - ( 14 Feb 2025 08:03 )    Color: x / Appearance: x / SG: x / pH: x  Gluc: 165 mg/dL / Ketone: x  / Bili: x / Urobili: x   Blood: x / Protein: x / Nitrite: x   Leuk Esterase: x / RBC: x / WBC x   Sq Epi: x / Non Sq Epi: x / Bacteria: x        RADIOLOGY & ADDITIONAL STUDIES:  < from: CT Angio Chest PE Protocol w/ IV Cont (02.12.25 @ 14:41) >  1.  No pulmonary embolism.  2.  Peribronchial thickening and mucous plugging in the bilateral lower   lobes which may reflect bronchitis.  3.  New 5 mm nodule in the lingula. Recommend follow-up in 3 months.  4.  Tracheomalacia.    < end of copied text >    ASSESSMENT:   59yMalePAST MEDICAL & SURGICAL HISTORY:  Gout      Asthma      Arthritis      HTN (hypertension)      No significant past surgical history

## 2025-02-14 NOTE — DISCHARGE NOTE PROVIDER - ATTENDING DISCHARGE PHYSICAL EXAMINATION:
T(C): 36.6 (02-17-25 @ 08:43), Max: 36.8 (02-17-25 @ 00:16)  HR: 88 (02-17-25 @ 08:52) (74 - 98)  BP: 151/84 (02-17-25 @ 08:43) (149/88 - 158/82)  RR: 18 (02-17-25 @ 08:43) (18 - 18)  SpO2: 94% (02-17-25 @ 08:52) (92% - 99%)    CONSTITUTIONAL: no apparent distress  EYES: PERRLA, EOMI, non-icteric  ENMT: Oral mucosa with moist membranes  RESP: No respiratory distress, clear to auscultation bilaterally, no wheezes or rales  CV: RRR, +S1S2, no peripheral edema  GI: Soft, NT, ND  PSYCH: A+O x 3, mood and affect appropriate  NEURO: Cooperative, upper and lower motor function grossly intact bilaterally, sensation grossly intact throughout

## 2025-02-14 NOTE — DISCHARGE NOTE PROVIDER - HOSPITAL COURSE
59M with PMHX Asthma, HTN, HLD, Gout, Pre-DM presented to [facility] ER c/o SOB/MCCLURE. Found to be hypoxic satting 92% on 2L O2 NC at rest. Placed in observation but remained wheezing and de-satted with effort/ambulation requiring 4L NC. RVP +Influenza. CTA Chest with No pulmonary embolism, Peribronchial thickening and mucous plugging in the bilateral lower lobes which may reflect bronchitis, New 5 mm nodule in the lingula- Recommend follow-up in 3 months, Tracheomalacia     Pt is treated with iv steroid , nebulizer oxygen weaning off slow improvement added empirical iv zosyn , azitro   seen by TS team re tracheolmalasia pt need outpatient follow up after DC also for lung nodule    59M with PMHX Asthma, HTN, HLD, Gout, Pre-DM presented to [facility] ER c/o SOB/MCCLURE. Found to be hypoxic satting 92% on 2L O2 NC at rest. Placed in observation but remained wheezing and de-satted with effort/ambulation requiring 4L NC. RVP +Influenza. CTA Chest with No pulmonary embolism, Peribronchial thickening and mucous plugging in the bilateral lower lobes which may reflect bronchitis, New 5 mm nodule in the lingula- Recommend follow-up in 3 months, Tracheomalacia   Patient was treated with IV steroids and around the clock nebulizers with slow improvement but eventually was able to be weaned off of supplemental oxygen. Pulmonology following and assisted in management. He is stable for discharge today with close follow up with pulmonology.

## 2025-02-14 NOTE — CHART NOTE - NSCHARTNOTEFT_GEN_A_CORE
Called by RN to report pt's rectal temp of 100.4.    Pt admitted w/ asthma exacerbation 2/2 Flu  Pt was started on po Zithromax for bronchitis 2/13  Pt had leukocytosis, trending down from 19>16 2/11  Pt on Called by RN to report pt's rectal temp of 100.4.    Pt admitted w/ asthma exacerbation 2/2 Flu  Pt was started on po Zithromax for bronchitis 2/13  Pt had leukocytosis, trending down from 19>16 2/11  Pt was on IV steroids until 2/13    ICU Vital Signs Last 24 Hrs  T(C): 38 (13 Feb 2025 23:35), Max: 38 (13 Feb 2025 23:35)  T(F): 100.4 (13 Feb 2025 23:35), Max: 100.4 (13 Feb 2025 23:35)  HR: 104 (13 Feb 2025 23:35) (88 - 112)  BP: 156/88 (13 Feb 2025 23:35) (140/93 - 168/95)  BP(mean): 106 (13 Feb 2025 11:53) (106 - 117)  ABP: --  ABP(mean): --  RR: 19 (13 Feb 2025 23:35) (19 - 23)  SpO2: 97% (13 Feb 2025 23:35) (92% - 97%)    O2 Parameters below as of 13 Feb 2025 23:35  Patient On (Oxygen Delivery Method): nasal cannula  O2 Flow (L/min): 2    Stat fever w/u ordered; CBC w/ diff, lactate, procalcitonin, BCx2, UA, CXR, RVP  Tylenol for fever  RN to monitor and escalate if any change in pt's status. Called by RN to report pt's rectal temp of 100.4.    Pt admitted w/ asthma exacerbation 2/2 Flu  Pt was started on po Zithromax for bronchitis 2/13  Pt had leukocytosis, trending down from 19>16 2/11  Pt was on IV steroids until 2/13    ICU Vital Signs Last 24 Hrs  T(C): 38 (13 Feb 2025 23:35), Max: 38 (13 Feb 2025 23:35)  T(F): 100.4 (13 Feb 2025 23:35), Max: 100.4 (13 Feb 2025 23:35)  HR: 104 (13 Feb 2025 23:35) (88 - 112)  BP: 156/88 (13 Feb 2025 23:35) (140/93 - 168/95)  BP(mean): 106 (13 Feb 2025 11:53) (106 - 117)  ABP: --  ABP(mean): --  RR: 19 (13 Feb 2025 23:35) (19 - 23)  SpO2: 97% (13 Feb 2025 23:35) (92% - 97%)    O2 Parameters below as of 13 Feb 2025 23:35  Patient On (Oxygen Delivery Method): nasal cannula  O2 Flow (L/min): 2    Low grade Fever    Stat fever w/u ordered; CBC w/ diff, lactate, procalcitonin, BCx2, UA, CXR, RVP  Tylenol for fever  RN to monitor and escalate if any change in pt's status.

## 2025-02-14 NOTE — DISCHARGE NOTE PROVIDER - NSDCFUADDAPPT_GEN_ALL_CORE_FT
APPTS ARE READY TO BE MADE: [x] YES    Best Family or Patient Contact (if needed):    Additional Information about above appointments (if needed):    1: Dr Albrecht thoracic surgery in 2 weeks   2:   3:     Other comments or requests:    APPTS ARE READY TO BE MADE: [x] YES    Best Family or Patient Contact (if needed):    Additional Information about above appointments (if needed):    1: Dr Albrecht thoracic surgery in 2 weeks   2:   3:     Other comments or requests:     Appointment was scheduled in arian Dr. Rene on 3/25 AT 8:45AM sent task for sooner appt    Appointment was scheduled by our team on the patient's behalf through the provider's office. Dr. Dubon 3/7 at 9:15am  [address] [place] [address]

## 2025-02-14 NOTE — PROGRESS NOTE ADULT - ASSESSMENT
59M with PMHX Asthma, HTN, HLD, Gout, Pre-DM presented to [facility] ER c/o SOB/MCCLURE admitted for Acute Hypoxic Respiratory Failure 2/2 Acute Asthma Exacerbation 2/2 Influenza.CT of chest no PE , lingular lung nodule , trachaemalasia       1-Acute Hypoxic Respiratory Failure 2/2 Acute Asthma Exacerbation 2/2 Influenza infection   acute bronchitis   nebulizer , azitho , iv steroid   re spikes   start iv zosyn cover possible bacterial infection PNA   cont iv steroid   oxygen as needed     2-HTN   cont amlodipine and losartan   monitor     3-Pre-DM  -Hold Metformin 500mg BID with lactic acidosis  -BG monitoring ,  ISS while on steroids     4-Gout  -Allopurinol 300mg q24    5- Lung nodule   need outpatient follow up with TS   pt is informed about CT result nodule by me this am     6- Tracheomalasia on CT of chest       VTE PPX: SCD/LMWH    DISPO: DC home in 2-3 days if improves

## 2025-02-14 NOTE — CONSULT NOTE ADULT - REASON FOR ADMISSION
Acute Hypoxic Resp Failure 2/2 Asthma Exac 2/2 Influenza
Acute Hypoxic Resp Failure 2/2 Asthma Exac 2/2 Influenza

## 2025-02-14 NOTE — DISCHARGE NOTE PROVIDER - NSDCMRMEDTOKEN_GEN_ALL_CORE_FT
albuterol 2.5 mg/3 mL (0.083%) inhalation solution: 3 milliliter(s) inhaled every 6 hours  allopurinol 300 mg oral tablet: 1 tab(s) orally once a day  amLODIPine 10 mg oral tablet: 1 tab(s) orally once a day  atorvastatin 40 mg oral tablet: 1 tab(s) orally once a day (at bedtime)  losartan 100 mg oral tablet: 1 tab(s) orally once a day  metFORMIN 500 mg oral tablet: 1 tab(s) orally 2 times a day  Proventil HFA CFC free 90 mcg/inh inhalation aerosol: 2 puff(s) inhaled 4 times a day  Wixela Inhub 500 mcg-50 mcg inhalation powder: 1 puff(s) inhaled once a day   albuterol 2.5 mg/3 mL (0.083%) inhalation solution: 3 milliliter(s) inhaled every 6 hours  allopurinol 300 mg oral tablet: 1 tab(s) orally once a day  amLODIPine 10 mg oral tablet: 1 tab(s) orally once a day  atorvastatin 40 mg oral tablet: 1 tab(s) orally once a day (at bedtime)  ipratropium-albuterol 0.5 mg-2.5 mg/3 mL inhalation solution: 3 milliliter(s) inhaled every 6 hours  losartan 100 mg oral tablet: 1 tab(s) orally once a day  metFORMIN 500 mg oral tablet: 1 tab(s) orally 2 times a day  metoprolol succinate 25 mg oral tablet, extended release: 1 tab(s) orally once a day  montelukast 10 mg oral tablet: 1 tab(s) orally once a day (at bedtime)  predniSONE 10 mg oral tablet: 1 tab(s) orally once a day take 40mg (4 tablets) for 3 days then take 30mg (3 tablets) for 3 days then take 20mg (2 tablets) for 3 days then take 10mg (1 tablet) for 3 days  Proventil HFA CFC free 90 mcg/inh inhalation aerosol: 2 puff(s) inhaled 4 times a day  Wixela Inhub 500 mcg-50 mcg inhalation powder: 1 puff(s) inhaled once a day

## 2025-02-15 LAB
GLUCOSE BLDC GLUCOMTR-MCNC: 151 MG/DL — HIGH (ref 70–99)
GLUCOSE BLDC GLUCOMTR-MCNC: 170 MG/DL — HIGH (ref 70–99)
GLUCOSE BLDC GLUCOMTR-MCNC: 184 MG/DL — HIGH (ref 70–99)
GLUCOSE BLDC GLUCOMTR-MCNC: 272 MG/DL — HIGH (ref 70–99)

## 2025-02-15 PROCEDURE — 99233 SBSQ HOSP IP/OBS HIGH 50: CPT

## 2025-02-15 PROCEDURE — 99232 SBSQ HOSP IP/OBS MODERATE 35: CPT

## 2025-02-15 RX ORDER — METHYLPREDNISOLONE ACETATE 80 MG/ML
40 INJECTION, SUSPENSION INTRA-ARTICULAR; INTRALESIONAL; INTRAMUSCULAR; SOFT TISSUE EVERY 8 HOURS
Refills: 0 | Status: DISCONTINUED | OUTPATIENT
Start: 2025-02-15 | End: 2025-02-17

## 2025-02-15 RX ADMIN — INSULIN LISPRO 3: 100 INJECTION, SOLUTION INTRAVENOUS; SUBCUTANEOUS at 17:39

## 2025-02-15 RX ADMIN — DEXTROMETHORPHAN HBR, GUAIFENESIN 1200 MILLIGRAM(S): 200 LIQUID ORAL at 04:57

## 2025-02-15 RX ADMIN — MONTELUKAST SODIUM 10 MILLIGRAM(S): 10 TABLET ORAL at 21:52

## 2025-02-15 RX ADMIN — METHYLPREDNISOLONE ACETATE 40 MILLIGRAM(S): 80 INJECTION, SUSPENSION INTRA-ARTICULAR; INTRALESIONAL; INTRAMUSCULAR; SOFT TISSUE at 21:53

## 2025-02-15 RX ADMIN — Medication 1 LOZENGE: at 17:40

## 2025-02-15 RX ADMIN — Medication 1 DOSE(S): at 10:30

## 2025-02-15 RX ADMIN — OSELTAMIVIR PHOSPHATE 75 MILLIGRAM(S): 75 CAPSULE ORAL at 04:57

## 2025-02-15 RX ADMIN — ACETYLCYSTEINE 4 MILLILITER(S): 200 INHALANT RESPIRATORY (INHALATION) at 20:36

## 2025-02-15 RX ADMIN — METHYLPREDNISOLONE ACETATE 40 MILLIGRAM(S): 80 INJECTION, SUSPENSION INTRA-ARTICULAR; INTRALESIONAL; INTRAMUSCULAR; SOFT TISSUE at 11:52

## 2025-02-15 RX ADMIN — DEXTROMETHORPHAN HBR, GUAIFENESIN 1200 MILLIGRAM(S): 200 LIQUID ORAL at 17:39

## 2025-02-15 RX ADMIN — Medication 300 MILLIGRAM(S): at 11:53

## 2025-02-15 RX ADMIN — ACETYLCYSTEINE 4 MILLILITER(S): 200 INHALANT RESPIRATORY (INHALATION) at 10:20

## 2025-02-15 RX ADMIN — Medication 1 LOZENGE: at 08:27

## 2025-02-15 RX ADMIN — ATORVASTATIN CALCIUM 40 MILLIGRAM(S): 80 TABLET, FILM COATED ORAL at 21:52

## 2025-02-15 RX ADMIN — METOPROLOL SUCCINATE 25 MILLIGRAM(S): 50 TABLET, EXTENDED RELEASE ORAL at 04:56

## 2025-02-15 RX ADMIN — Medication 20 MILLIGRAM(S): at 11:52

## 2025-02-15 RX ADMIN — IPRATROPIUM BROMIDE AND ALBUTEROL SULFATE 3 MILLILITER(S): .5; 2.5 SOLUTION RESPIRATORY (INHALATION) at 14:51

## 2025-02-15 RX ADMIN — IPRATROPIUM BROMIDE AND ALBUTEROL SULFATE 3 MILLILITER(S): .5; 2.5 SOLUTION RESPIRATORY (INHALATION) at 20:36

## 2025-02-15 RX ADMIN — INSULIN LISPRO 1: 100 INJECTION, SOLUTION INTRAVENOUS; SUBCUTANEOUS at 08:26

## 2025-02-15 RX ADMIN — INSULIN LISPRO 1: 100 INJECTION, SOLUTION INTRAVENOUS; SUBCUTANEOUS at 12:02

## 2025-02-15 RX ADMIN — METHYLPREDNISOLONE ACETATE 40 MILLIGRAM(S): 80 INJECTION, SUSPENSION INTRA-ARTICULAR; INTRALESIONAL; INTRAMUSCULAR; SOFT TISSUE at 04:56

## 2025-02-15 RX ADMIN — ENOXAPARIN SODIUM 40 MILLIGRAM(S): 100 INJECTION SUBCUTANEOUS at 13:53

## 2025-02-15 RX ADMIN — Medication 1 DOSE(S): at 20:36

## 2025-02-15 RX ADMIN — Medication 1 LOZENGE: at 02:12

## 2025-02-15 RX ADMIN — ACETYLCYSTEINE 4 MILLILITER(S): 200 INHALANT RESPIRATORY (INHALATION) at 05:03

## 2025-02-15 RX ADMIN — Medication 1 LOZENGE: at 21:52

## 2025-02-15 RX ADMIN — Medication 1 LOZENGE: at 04:56

## 2025-02-15 RX ADMIN — IPRATROPIUM BROMIDE AND ALBUTEROL SULFATE 3 MILLILITER(S): .5; 2.5 SOLUTION RESPIRATORY (INHALATION) at 10:20

## 2025-02-15 RX ADMIN — AMLODIPINE BESYLATE 10 MILLIGRAM(S): 10 TABLET ORAL at 04:57

## 2025-02-15 RX ADMIN — Medication 10 MILLIGRAM(S): at 11:53

## 2025-02-15 RX ADMIN — Medication 500 MILLIGRAM(S): at 11:52

## 2025-02-15 RX ADMIN — LOSARTAN POTASSIUM 100 MILLIGRAM(S): 100 TABLET, FILM COATED ORAL at 04:57

## 2025-02-15 RX ADMIN — Medication 1 LOZENGE: at 13:52

## 2025-02-15 RX ADMIN — Medication 25 GRAM(S): at 04:57

## 2025-02-15 RX ADMIN — IPRATROPIUM BROMIDE AND ALBUTEROL SULFATE 3 MILLILITER(S): .5; 2.5 SOLUTION RESPIRATORY (INHALATION) at 05:03

## 2025-02-15 RX ADMIN — INSULIN LISPRO 1: 100 INJECTION, SOLUTION INTRAVENOUS; SUBCUTANEOUS at 21:39

## 2025-02-15 NOTE — PROGRESS NOTE ADULT - ASSESSMENT
59M with PMHX Asthma, HTN, HLD, Gout, Pre-DM presented to [facility] ER c/o SOB/MCCLURE admitted for Acute Hypoxic Respiratory Failure 2/2 Acute Asthma Exacerbation 2/2 Influenza.CT of chest no PE , lingular lung nodule , trachaemalasia       1-Acute Hypoxic Respiratory Failure 2/2 Acute Asthma Exacerbation 2/2 Influenza infection   acute bronchitis   - duoneb q6hr  - solumedrol q8hr  - incentive spirometry   - procal low, d/c zosyn  - c/w azithro for atypical coverage    HTN   - c/w amlodipine and losartan     Pre-DM  - Hold Metformin 500mg BID  - BG monitoring ,  ISS while on steroids     4-Gout  -Allopurinol 300mg q24    5- Lung nodule   need outpatient follow up with CT sx    VTE PPX: SCD/LMWH

## 2025-02-15 NOTE — PROGRESS NOTE ADULT - ASSESSMENT
59M never smoker with hx of severe persistent asthma, SHERI, DM, gout, HTN, HLD, arthritis, admission 3/29-5/1/24 for TYESHA, fungemia, multifocal PNA s/p bronch w/ H flu presented to the ED 2/9 found to be influenza positive     Acute hypoxic respiratory failure secondary to influenza pneumonia/asthma exacerbation   complete course of Tamiflu    procal neg; cultures NGTD; bibasilar infiltrates possibly atelectasis but will need to follow  c/w Duoneb q6h  c/w Advair   albuterol prn   c/w Solumedrol 40 q12h   c/w Mucinex   c/w  Mucomyst   c/w Singular   weaned off O2   no hypercapnia noted on VBG   CT chest angio neg for PE   CT chest w/ suggestion of tracheomalacia; CT surgery planning for outpatient eval   incentive spirometry   OOB   declines nocturnal CPAP while in hospital given coughing   will need f/u w/ Dr. Rene on discharge   will need f/u of nodule noted on CT chest

## 2025-02-16 LAB
GLUCOSE BLDC GLUCOMTR-MCNC: 145 MG/DL — HIGH (ref 70–99)
GLUCOSE BLDC GLUCOMTR-MCNC: 184 MG/DL — HIGH (ref 70–99)
GLUCOSE BLDC GLUCOMTR-MCNC: 198 MG/DL — HIGH (ref 70–99)
GLUCOSE BLDC GLUCOMTR-MCNC: 321 MG/DL — HIGH (ref 70–99)

## 2025-02-16 PROCEDURE — 99231 SBSQ HOSP IP/OBS SF/LOW 25: CPT

## 2025-02-16 PROCEDURE — 99232 SBSQ HOSP IP/OBS MODERATE 35: CPT

## 2025-02-16 RX ADMIN — DEXTROMETHORPHAN HBR, GUAIFENESIN 1200 MILLIGRAM(S): 200 LIQUID ORAL at 18:23

## 2025-02-16 RX ADMIN — MONTELUKAST SODIUM 10 MILLIGRAM(S): 10 TABLET ORAL at 22:24

## 2025-02-16 RX ADMIN — Medication 1 DOSE(S): at 20:55

## 2025-02-16 RX ADMIN — IPRATROPIUM BROMIDE AND ALBUTEROL SULFATE 3 MILLILITER(S): .5; 2.5 SOLUTION RESPIRATORY (INHALATION) at 04:11

## 2025-02-16 RX ADMIN — METHYLPREDNISOLONE ACETATE 40 MILLIGRAM(S): 80 INJECTION, SUSPENSION INTRA-ARTICULAR; INTRALESIONAL; INTRAMUSCULAR; SOFT TISSUE at 22:24

## 2025-02-16 RX ADMIN — IPRATROPIUM BROMIDE AND ALBUTEROL SULFATE 3 MILLILITER(S): .5; 2.5 SOLUTION RESPIRATORY (INHALATION) at 07:47

## 2025-02-16 RX ADMIN — METOPROLOL SUCCINATE 25 MILLIGRAM(S): 50 TABLET, EXTENDED RELEASE ORAL at 06:38

## 2025-02-16 RX ADMIN — LOSARTAN POTASSIUM 100 MILLIGRAM(S): 100 TABLET, FILM COATED ORAL at 06:38

## 2025-02-16 RX ADMIN — INSULIN LISPRO 1: 100 INJECTION, SOLUTION INTRAVENOUS; SUBCUTANEOUS at 18:22

## 2025-02-16 RX ADMIN — INSULIN LISPRO 4: 100 INJECTION, SOLUTION INTRAVENOUS; SUBCUTANEOUS at 13:35

## 2025-02-16 RX ADMIN — INSULIN LISPRO 1: 100 INJECTION, SOLUTION INTRAVENOUS; SUBCUTANEOUS at 09:18

## 2025-02-16 RX ADMIN — IPRATROPIUM BROMIDE AND ALBUTEROL SULFATE 3 MILLILITER(S): .5; 2.5 SOLUTION RESPIRATORY (INHALATION) at 20:56

## 2025-02-16 RX ADMIN — Medication 1 LOZENGE: at 01:46

## 2025-02-16 RX ADMIN — Medication 10 MILLIGRAM(S): at 13:34

## 2025-02-16 RX ADMIN — Medication 500 MILLIGRAM(S): at 13:35

## 2025-02-16 RX ADMIN — Medication 1 LOZENGE: at 22:24

## 2025-02-16 RX ADMIN — Medication 1 DOSE(S): at 07:47

## 2025-02-16 RX ADMIN — DEXTROMETHORPHAN HBR, GUAIFENESIN 1200 MILLIGRAM(S): 200 LIQUID ORAL at 06:37

## 2025-02-16 RX ADMIN — IPRATROPIUM BROMIDE AND ALBUTEROL SULFATE 3 MILLILITER(S): .5; 2.5 SOLUTION RESPIRATORY (INHALATION) at 15:14

## 2025-02-16 RX ADMIN — Medication 300 MILLIGRAM(S): at 13:34

## 2025-02-16 RX ADMIN — Medication 1 LOZENGE: at 13:35

## 2025-02-16 RX ADMIN — Medication 20 MILLIGRAM(S): at 13:35

## 2025-02-16 RX ADMIN — ACETYLCYSTEINE 4 MILLILITER(S): 200 INHALANT RESPIRATORY (INHALATION) at 20:55

## 2025-02-16 RX ADMIN — ACETYLCYSTEINE 4 MILLILITER(S): 200 INHALANT RESPIRATORY (INHALATION) at 07:47

## 2025-02-16 RX ADMIN — AMLODIPINE BESYLATE 10 MILLIGRAM(S): 10 TABLET ORAL at 06:38

## 2025-02-16 RX ADMIN — ATORVASTATIN CALCIUM 40 MILLIGRAM(S): 80 TABLET, FILM COATED ORAL at 22:24

## 2025-02-16 RX ADMIN — METHYLPREDNISOLONE ACETATE 40 MILLIGRAM(S): 80 INJECTION, SUSPENSION INTRA-ARTICULAR; INTRALESIONAL; INTRAMUSCULAR; SOFT TISSUE at 06:40

## 2025-02-16 RX ADMIN — ENOXAPARIN SODIUM 40 MILLIGRAM(S): 100 INJECTION SUBCUTANEOUS at 13:35

## 2025-02-16 RX ADMIN — Medication 1 LOZENGE: at 06:37

## 2025-02-16 NOTE — PROGRESS NOTE ADULT - ASSESSMENT
59M never smoker with hx of severe persistent asthma, SHERI, DM, gout, HTN, HLD, arthritis, admission 3/29-5/1/24 for TYESHA, fungemia, multifocal PNA s/p bronch w/ H flu presented to the ED 2/9 found to be influenza positive     Acute hypoxic respiratory failure secondary to influenza pneumonia/asthma exacerbation   complete course of Tamiflu    procal neg; cultures NGTD; bibasilar infiltrates possibly atelectatic; f/u repeat CXR  c/w Duoneb q6h  c/w Advair   albuterol prn   prednisone taper in AM if continued improvement   c/w Mucinex   c/w  Mucomyst   c/w Singular   weaned off O2   no hypercapnia noted on VBG   CT chest angio neg for PE   CT chest w/ suggestion of tracheomalacia; CT surgery planning for outpatient eval   incentive spirometry   OOB   declines nocturnal CPAP while in hospital given coughing; resume on discharge    will need f/u w/ Dr. Rene on discharge   will need f/u of nodule noted on CT chest

## 2025-02-16 NOTE — PROGRESS NOTE ADULT - TIME BILLING
review of chart notes, labs, imaging, bedside assessment and documentation
chart review, patient evaluation, documentation, coordination of care and discussion with nurse.
review of chart notes, labs, independent review of imaging, bedside assessment and documentation

## 2025-02-16 NOTE — PROGRESS NOTE ADULT - PROVIDER SPECIALTY LIST ADULT
Hospitalist
Pulmonology
Hospitalist
Pulmonology

## 2025-02-16 NOTE — PROGRESS NOTE ADULT - SUBJECTIVE AND OBJECTIVE BOX
[facility] Division of Medicine    SUBJECTIVE / OVERNIGHT EVENTS: No overnight events as per RN. Pt seen at the bedside. Endorses feeling better compared to yesterday but still with chest tightness and wheezing today  . Denies any new complaints. All other systems reviewed and are negative.    MEDICATIONS  (STANDING):  acetylcysteine 10%  Inhalation 4 milliLiter(s) Inhalation two times a day  albuterol/ipratropium for Nebulization 3 milliLiter(s) Nebulizer every 6 hours  allopurinol 300 milliGRAM(s) Oral daily  amLODIPine   Tablet 10 milliGRAM(s) Oral daily  atorvastatin 40 milliGRAM(s) Oral at bedtime  azithromycin   Tablet 500 milliGRAM(s) Oral daily  benzocaine/menthol Lozenge 1 Lozenge Oral every 4 hours  cetirizine 10 milliGRAM(s) Oral daily  dextrose 5%. 1000 milliLiter(s) (100 mL/Hr) IV Continuous <Continuous>  dextrose 5%. 1000 milliLiter(s) (50 mL/Hr) IV Continuous <Continuous>  dextrose 50% Injectable 25 Gram(s) IV Push once  dextrose 50% Injectable 12.5 Gram(s) IV Push once  dextrose 50% Injectable 25 Gram(s) IV Push once  enoxaparin Injectable 40 milliGRAM(s) SubCutaneous every 24 hours  famotidine    Tablet 20 milliGRAM(s) Oral daily  fluticasone propionate/ salmeterol 250-50 MICROgram(s) Diskus 1 Dose(s) Inhalation two times a day  glucagon  Injectable 1 milliGRAM(s) IntraMuscular once  guaiFENesin ER 1200 milliGRAM(s) Oral every 12 hours  insulin lispro (ADMELOG) corrective regimen sliding scale   SubCutaneous Before meals and at bedtime  losartan 100 milliGRAM(s) Oral daily  methylPREDNISolone sodium succinate Injectable 40 milliGRAM(s) IV Push every 8 hours  metoprolol succinate ER 25 milliGRAM(s) Oral daily  montelukast 10 milliGRAM(s) Oral at bedtime    MEDICATIONS  (PRN):  acetaminophen     Tablet .. 650 milliGRAM(s) Oral every 6 hours PRN Temp greater or equal to 38C (100.4F), Mild Pain (1 - 3)  albuterol    90 MICROgram(s) HFA Inhaler 2 Puff(s) Inhalation every 6 hours PRN Bronchospasm  dextrose Oral Gel 15 Gram(s) Oral once PRN Blood Glucose LESS THAN 70 milliGRAM(s)/deciliter  ibuprofen  Tablet. 600 milliGRAM(s) Oral every 6 hours PRN Temp greater or equal to 38C (100.4F), Moderate Pain (4 - 6)  levalbuterol Inhalation 0.63 milliGRAM(s) Inhalation every 6 hours PRN HR > 100      I&O's Summary      acetaminophen     Tablet .. 650 milliGRAM(s) Oral every 6 hours PRN  acetylcysteine 10%  Inhalation 4 milliLiter(s) Inhalation two times a day  albuterol    90 MICROgram(s) HFA Inhaler 2 Puff(s) Inhalation every 6 hours PRN  albuterol/ipratropium for Nebulization 3 milliLiter(s) Nebulizer every 6 hours  allopurinol 300 milliGRAM(s) Oral daily  amLODIPine   Tablet 10 milliGRAM(s) Oral daily  atorvastatin 40 milliGRAM(s) Oral at bedtime  azithromycin   Tablet 500 milliGRAM(s) Oral daily  benzocaine/menthol Lozenge 1 Lozenge Oral every 4 hours  cetirizine 10 milliGRAM(s) Oral daily  dextrose 5%. 1000 milliLiter(s) IV Continuous <Continuous>  dextrose 5%. 1000 milliLiter(s) IV Continuous <Continuous>  dextrose 50% Injectable 25 Gram(s) IV Push once  dextrose 50% Injectable 12.5 Gram(s) IV Push once  dextrose 50% Injectable 25 Gram(s) IV Push once  dextrose Oral Gel 15 Gram(s) Oral once PRN  enoxaparin Injectable 40 milliGRAM(s) SubCutaneous every 24 hours  famotidine    Tablet 20 milliGRAM(s) Oral daily  fluticasone propionate/ salmeterol 250-50 MICROgram(s) Diskus 1 Dose(s) Inhalation two times a day  glucagon  Injectable 1 milliGRAM(s) IntraMuscular once  guaiFENesin ER 1200 milliGRAM(s) Oral every 12 hours  ibuprofen  Tablet. 600 milliGRAM(s) Oral every 6 hours PRN  insulin lispro (ADMELOG) corrective regimen sliding scale   SubCutaneous Before meals and at bedtime  levalbuterol Inhalation 0.63 milliGRAM(s) Inhalation every 6 hours PRN  losartan 100 milliGRAM(s) Oral daily  methylPREDNISolone sodium succinate Injectable 40 milliGRAM(s) IV Push every 8 hours  metoprolol succinate ER 25 milliGRAM(s) Oral daily  montelukast 10 milliGRAM(s) Oral at bedtime      PHYSICAL EXAM:  Vital Signs Last 24 Hrs  T(C): 36.7 (16 Feb 2025 08:48), Max: 37.1 (15 Feb 2025 22:57)  T(F): 98.1 (16 Feb 2025 08:48), Max: 98.7 (15 Feb 2025 22:57)  HR: 84 (16 Feb 2025 08:48) (84 - 101)  BP: 165/84 (16 Feb 2025 08:48) (137/77 - 165/84)  BP(mean): --  RR: 18 (16 Feb 2025 08:48) (16 - 18)  SpO2: 93% (16 Feb 2025 08:48) (93% - 96%)    Parameters below as of 16 Feb 2025 08:48  Patient On (Oxygen Delivery Method): room air          CONSTITUTIONAL: no apparent distress  RESP: No respiratory distress, diffuse expiratory wheezing  CV: RRR, +S1S2, no peripheral edema  GI: Soft, NT, ND  PSYCH: A+O x 3, mood and affect appropriate  NEURO: Cooperative, upper and lower motor function grossly intact bilaterally, sensation grossly intact throughout      LABS:                    Culture - Blood (collected 14 Feb 2025 00:00)  Source: .Blood BLOOD  Preliminary Report (16 Feb 2025 07:02):    No growth at 48 Hours      CAPILLARY BLOOD GLUCOSE      POCT Blood Glucose.: 198 mg/dL (16 Feb 2025 08:59)  POCT Blood Glucose.: 184 mg/dL (15 Feb 2025 20:43)  POCT Blood Glucose.: 272 mg/dL (15 Feb 2025 16:54)  POCT Blood Glucose.: 151 mg/dL (15 Feb 2025 11:51)      IMAGING:                                  
Patient is a 59y old  Male who presents with a chief complaint of Acute Hypoxic Resp Failure 2/2 Asthma Exac 2/2 Influenza     Pt is seen in am overnight events noted   had fever m pt is feeling worse more congested today some SOB ,cough     chart reviewed              ALLERGIES:  No Known Allergies    MEDICATIONS  (STANDING):  acetylcysteine 10%  Inhalation 4 milliLiter(s) Inhalation two times a day  albuterol/ipratropium for Nebulization 3 milliLiter(s) Nebulizer every 6 hours  allopurinol 300 milliGRAM(s) Oral daily  amLODIPine   Tablet 10 milliGRAM(s) Oral daily  atorvastatin 40 milliGRAM(s) Oral at bedtime  azithromycin   Tablet 500 milliGRAM(s) Oral daily  benzocaine/menthol Lozenge 1 Lozenge Oral every 4 hours  cetirizine 10 milliGRAM(s) Oral daily  dextrose 5%. 1000 milliLiter(s) (50 mL/Hr) IV Continuous <Continuous>  dextrose 5%. 1000 milliLiter(s) (100 mL/Hr) IV Continuous <Continuous>  dextrose 50% Injectable 25 Gram(s) IV Push once  dextrose 50% Injectable 12.5 Gram(s) IV Push once  dextrose 50% Injectable 25 Gram(s) IV Push once  enoxaparin Injectable 40 milliGRAM(s) SubCutaneous every 24 hours  famotidine    Tablet 20 milliGRAM(s) Oral daily  fluticasone propionate/ salmeterol 250-50 MICROgram(s) Diskus 1 Dose(s) Inhalation two times a day  glucagon  Injectable 1 milliGRAM(s) IntraMuscular once  guaiFENesin ER 1200 milliGRAM(s) Oral every 12 hours  insulin lispro (ADMELOG) corrective regimen sliding scale   SubCutaneous Before meals and at bedtime  losartan 100 milliGRAM(s) Oral daily  metoprolol succinate ER 25 milliGRAM(s) Oral daily  oseltamivir 75 milliGRAM(s) Oral two times a day    MEDICATIONS  (PRN):  acetaminophen     Tablet .. 650 milliGRAM(s) Oral every 6 hours PRN Temp greater or equal to 38C (100.4F), Mild Pain (1 - 3)  albuterol    90 MICROgram(s) HFA Inhaler 2 Puff(s) Inhalation every 6 hours PRN Bronchospasm  dextrose Oral Gel 15 Gram(s) Oral once PRN Blood Glucose LESS THAN 70 milliGRAM(s)/deciliter  ibuprofen  Tablet. 600 milliGRAM(s) Oral every 6 hours PRN Temp greater or equal to 38C (100.4F), Moderate Pain (4 - 6)      Vital Signs Last 24 Hrs  T(C): 36.9 (14 Feb 2025 08:31), Max: 38 (13 Feb 2025 23:35)  T(F): 98.5 (14 Feb 2025 08:31), Max: 100.4 (13 Feb 2025 23:35)  HR: 101 (14 Feb 2025 08:31) (83 - 112)  BP: 147/83 (14 Feb 2025 08:31) (135/66 - 168/95)  BP(mean): 100 (14 Feb 2025 08:31) (85 - 106)  RR: 18 (14 Feb 2025 08:31) (18 - 19)  SpO2: 94% (14 Feb 2025 08:31) (92% - 97%)    Parameters below as of 14 Feb 2025 08:31  Patient On (Oxygen Delivery Method): nasal cannula  O2 Flow (L/min): 2      General: awake no distress  Lungs: exp rhonchi , congestion   Cardio: RRR, S1/S2, No murmur  Abdomen: Soft, Nontender, Nondistended; Bowel sounds present  Extremities: No calf tenderness, No pitting edema                            14.0   14.58 )-----------( 205      ( 14 Feb 2025 08:03 )             45.1       02-14    138  |  99  |  21.7[H]  ----------------------------<  165[H]  4.7   |  23.0  |  1.11    Ca    8.9      14 Feb 2025 08:03              Lactate, Blood: 3.9 mmol/L (02-10 @ 11:47)    Procalcitonin: 0.08 ng/mL (02-11-25 @ 06:14)                CAPILLARY BLOOD GLUCOSE      POCT Blood Glucose.: 150 mg/dL (14 Feb 2025 08:31)  POCT Blood Glucose.: 147 mg/dL (13 Feb 2025 21:21)  POCT Blood Glucose.: 166 mg/dL (13 Feb 2025 17:01)  POCT Blood Glucose.: 297 mg/dL (13 Feb 2025 11:58)  Urinalysis Basic - ( 11 Feb 2025 06:14 )              RADIOLOGY & ADDITIONAL TESTS:    < from: TTE W or WO Ultrasound Enhancing Agent (04.12.24 @ 19:33) >      1. Left ventricular systolic function is normal with an ejection fraction visually estimated at 65 to 70 %.   2. Normal right ventricular cavity size and normal systolic function.   3. Normal left and right atrial size.   4. Aortic valve was not well visualized.   5. Mild mitral valve leaflet calcification.   6. Thickened mitral valve leaflets.   7. Tricuspid valve was not well visualized.   8. Estimated pulmonary artery systolic pressure is 8 mmHg, consistent with normal pulmonary artery pressure.   9. No pericardial effusion seen.          
Patient is a 59y old  Male who presents with a chief complaint of Acute Hypoxic Resp Failure 2/2 Asthma Exac 2/2 Influenza (11 Feb 2025 15:44)    Interval hx:  No acute events overnight   feels improved   ambulating to restroom and tolerating okay   wheezing improved   still feels some congestion but mucinex helping   denies chest pain   denies LE edema/pain     PAST MEDICAL & SURGICAL HISTORY:  Gout      Asthma      Arthritis      HTN (hypertension)      No significant past surgical history            Medications:  oseltamivir 75 milliGRAM(s) Oral two times a day    amLODIPine   Tablet 10 milliGRAM(s) Oral daily  losartan 100 milliGRAM(s) Oral daily    albuterol    90 MICROgram(s) HFA Inhaler 2 Puff(s) Inhalation every 6 hours PRN  albuterol/ipratropium for Nebulization 3 milliLiter(s) Nebulizer every 6 hours  fluticasone propionate/ salmeterol 250-50 MICROgram(s) Diskus 1 Dose(s) Inhalation two times a day  guaiFENesin ER 1200 milliGRAM(s) Oral every 12 hours    acetaminophen     Tablet .. 650 milliGRAM(s) Oral every 6 hours PRN  ibuprofen  Tablet. 600 milliGRAM(s) Oral every 6 hours PRN      enoxaparin Injectable 40 milliGRAM(s) SubCutaneous every 24 hours        allopurinol 300 milliGRAM(s) Oral daily  atorvastatin 40 milliGRAM(s) Oral at bedtime  dextrose 50% Injectable 25 Gram(s) IV Push once  dextrose 50% Injectable 12.5 Gram(s) IV Push once  dextrose 50% Injectable 25 Gram(s) IV Push once  dextrose Oral Gel 15 Gram(s) Oral once PRN  glucagon  Injectable 1 milliGRAM(s) IntraMuscular once  insulin lispro (ADMELOG) corrective regimen sliding scale   SubCutaneous Before meals and at bedtime  methylPREDNISolone sodium succinate Injectable 40 milliGRAM(s) IV Push every 8 hours    dextrose 5%. 1000 milliLiter(s) IV Continuous <Continuous>  dextrose 5%. 1000 milliLiter(s) IV Continuous <Continuous>      benzocaine/menthol Lozenge 1 Lozenge Oral every 4 hours            ICU Vital Signs Last 24 Hrs  T(C): 36.7 (11 Feb 2025 20:04), Max: 36.9 (11 Feb 2025 05:55)  T(F): 98.1 (11 Feb 2025 20:04), Max: 98.4 (11 Feb 2025 05:55)  HR: 102 (11 Feb 2025 20:05) (84 - 111)  BP: 160/82 (11 Feb 2025 20:04) (149/94 - 170/86)  BP(mean): --  ABP: --  ABP(mean): --  RR: 20 (11 Feb 2025 20:04) (16 - 20)  SpO2: 93% (11 Feb 2025 20:05) (93% - 96%)    O2 Parameters below as of 11 Feb 2025 20:05  Patient On (Oxygen Delivery Method): room air                I&O's Detail        LABS:                        12.9   16.35 )-----------( 230      ( 11 Feb 2025 06:14 )             40.0     02-11    143  |  104  |  21.3[H]  ----------------------------<  163[H]  4.5   |  27.0  |  1.11    Ca    8.5      11 Feb 2025 06:14            CAPILLARY BLOOD GLUCOSE      POCT Blood Glucose.: 215 mg/dL (11 Feb 2025 20:31)      Urinalysis Basic - ( 11 Feb 2025 06:14 )    Color: x / Appearance: x / SG: x / pH: x  Gluc: 163 mg/dL / Ketone: x  / Bili: x / Urobili: x   Blood: x / Protein: x / Nitrite: x   Leuk Esterase: x / RBC: x / WBC x   Sq Epi: x / Non Sq Epi: x / Bacteria: x      CULTURES:      Physical Examination:    General: awake. alert, in NAD     HEENT: NC/AT, anicteric, MMM    PULM: improved expiratory wheezes, no accessory muscle use     NECK: Supple, trachea midline    CVS: S1S2, RRR    ABD: Soft, nondistended, nontender, normoactive bowel sounds    EXT: No edema, nontender    SKIN: Warm and well perfused, no rashes noted    NEURO: Alert, oriented, interactive, nonfocal    ROS: negative except as per HPI     RADIOLOGY:   < from: Xray Chest 1 View- PORTABLE-Urgent (02.09.25 @ 04:39) >  COMPARISON: 4/26/2024    Thecardiomediastinal silhouette is normal and the josef are not enlarged.   The trachea is midline. There is no focal lung consolidation or sizable   pleural effusion. No significant osseous abnormality.    < end of copied text >        
Patient is a 59y old  Male who presents with a chief complaint of Acute Hypoxic Resp Failure 2/2 Asthma Exac 2/2 Influenza (10 Feb 2025 17:27)      INTERVAL HPI/OVERNIGHT EVENTS: seen and examined in ED. Reports feeling slightly better, still on supplemental O2. Cough is becoming more productive   MEDICATIONS  (STANDING):  albuterol/ipratropium for Nebulization 3 milliLiter(s) Nebulizer every 6 hours  allopurinol 300 milliGRAM(s) Oral daily  amLODIPine   Tablet 10 milliGRAM(s) Oral daily  atorvastatin 40 milliGRAM(s) Oral at bedtime  benzocaine/menthol Lozenge 1 Lozenge Oral every 4 hours  dextrose 5%. 1000 milliLiter(s) (100 mL/Hr) IV Continuous <Continuous>  dextrose 5%. 1000 milliLiter(s) (50 mL/Hr) IV Continuous <Continuous>  dextrose 50% Injectable 25 Gram(s) IV Push once  dextrose 50% Injectable 12.5 Gram(s) IV Push once  dextrose 50% Injectable 25 Gram(s) IV Push once  enoxaparin Injectable 40 milliGRAM(s) SubCutaneous every 24 hours  fluticasone propionate/ salmeterol 500-50 MICROgram(s) Diskus 1 Dose(s) Inhalation two times a day  glucagon  Injectable 1 milliGRAM(s) IntraMuscular once  guaiFENesin ER 1200 milliGRAM(s) Oral every 12 hours  insulin lispro (ADMELOG) corrective regimen sliding scale   SubCutaneous Before meals and at bedtime  losartan 100 milliGRAM(s) Oral daily  methylPREDNISolone sodium succinate Injectable 40 milliGRAM(s) IV Push every 8 hours  oseltamivir 75 milliGRAM(s) Oral two times a day    MEDICATIONS  (PRN):  acetaminophen     Tablet .. 650 milliGRAM(s) Oral every 6 hours PRN Temp greater or equal to 38C (100.4F), Mild Pain (1 - 3)  albuterol    90 MICROgram(s) HFA Inhaler 2 Puff(s) Inhalation every 6 hours PRN Bronchospasm  dextrose Oral Gel 15 Gram(s) Oral once PRN Blood Glucose LESS THAN 70 milliGRAM(s)/deciliter  ibuprofen  Tablet. 600 milliGRAM(s) Oral every 6 hours PRN Temp greater or equal to 38C (100.4F), Moderate Pain (4 - 6)      Allergies    No Known Allergies    Intolerances        REVIEW OF SYSTEMS:  CONSTITUTIONAL: No fever, weight loss, or fatigue  RESPIRATORY: No cough, wheezing, chills or hemoptysis; No shortness of breath  CARDIOVASCULAR: No chest pain, palpitations, dizziness, or leg swelling  GASTROINTESTINAL: No abdominal or epigastric pain. No nausea, vomiting, or hematemesis; No diarrhea or constipation. No melena or hematochezia.  NEUROLOGICAL: No headaches, memory loss, loss of strength, numbness, or tremors  MUSCULOSKELETAL: No joint pain or swelling; No muscle, back, or extremity pain      Vital Signs Last 24 Hrs  T(C): 36.8 (11 Feb 2025 15:33), Max: 36.9 (10 Feb 2025 19:36)  T(F): 98.2 (11 Feb 2025 15:33), Max: 98.5 (10 Feb 2025 19:36)  HR: 84 (11 Feb 2025 15:33) (84 - 111)  BP: 158/56 (11 Feb 2025 15:33) (149/94 - 176/81)  BP(mean): --  RR: 20 (11 Feb 2025 15:33) (16 - 20)  SpO2: 94% (11 Feb 2025 15:33) (94% - 97%)    Parameters below as of 11 Feb 2025 11:24  Patient On (Oxygen Delivery Method): nasal cannula  O2 Flow (L/min): 3      PHYSICAL EXAM:  GENERAL: NAD,   HEAD:  Atraumatic, Normocephalic  EYES: EOMI, PERRLA, conjunctiva and sclera clear  NECK: Supple, No JVD, Normal thyroid  NERVOUS SYSTEM:  Alert & Oriented X3, No gross focal deficits  CHEST/LUNG: mild wheezing   HEART: Regular rate and rhythm; No murmurs, rubs, or gallops  ABDOMEN: Soft, Nontender, Nondistended; Bowel sounds present  EXTREMITIES:  No clubbing, cyanosis, or edema  SKIN: No rashes or lesions    LABS:                        12.9   16.35 )-----------( 230      ( 11 Feb 2025 06:14 )             40.0     02-11    143  |  104  |  21.3[H]  ----------------------------<  163[H]  4.5   |  27.0  |  1.11    Ca    8.5      11 Feb 2025 06:14        Urinalysis Basic - ( 11 Feb 2025 06:14 )    Color: x / Appearance: x / SG: x / pH: x  Gluc: 163 mg/dL / Ketone: x  / Bili: x / Urobili: x   Blood: x / Protein: x / Nitrite: x   Leuk Esterase: x / RBC: x / WBC x   Sq Epi: x / Non Sq Epi: x / Bacteria: x      CAPILLARY BLOOD GLUCOSE      POCT Blood Glucose.: 193 mg/dL (11 Feb 2025 12:14)  POCT Blood Glucose.: 149 mg/dL (11 Feb 2025 08:37)  POCT Blood Glucose.: 144 mg/dL (10 Feb 2025 21:13)  POCT Blood Glucose.: 214 mg/dL (10 Feb 2025 17:09)      RADIOLOGY & ADDITIONAL TESTS:    Imaging Personally Reviewed:  [x ] YES  [ ] NO    Consultant(s) Notes Reviewed:  [x ] YES  [ ] NO    Care Discussed with Consultants/Other Providers [ ] YES  [ ] NO    Plan of Care discussed with Housestaff [ x]YES [ ] NO
Patient is a 59y old  Male who presents with a chief complaint of Acute Hypoxic Resp Failure 2/2 Asthma Exac 2/2 Influenza (10 Feb 2025 17:27)      INTERVAL HPI/OVERNIGHT EVENTS: seen and examined in ED. Reports feeling slightly better, wheezing improving. Still on supplemental O2     MEDICATIONS  (STANDING):  albuterol/ipratropium for Nebulization 3 milliLiter(s) Nebulizer every 6 hours  allopurinol 300 milliGRAM(s) Oral daily  amLODIPine   Tablet 10 milliGRAM(s) Oral daily  atorvastatin 40 milliGRAM(s) Oral at bedtime  benzocaine/menthol Lozenge 1 Lozenge Oral every 4 hours  dextrose 5%. 1000 milliLiter(s) (100 mL/Hr) IV Continuous <Continuous>  dextrose 5%. 1000 milliLiter(s) (50 mL/Hr) IV Continuous <Continuous>  dextrose 50% Injectable 25 Gram(s) IV Push once  dextrose 50% Injectable 12.5 Gram(s) IV Push once  dextrose 50% Injectable 25 Gram(s) IV Push once  enoxaparin Injectable 40 milliGRAM(s) SubCutaneous every 24 hours  fluticasone propionate/ salmeterol 500-50 MICROgram(s) Diskus 1 Dose(s) Inhalation two times a day  glucagon  Injectable 1 milliGRAM(s) IntraMuscular once  guaiFENesin ER 1200 milliGRAM(s) Oral every 12 hours  insulin lispro (ADMELOG) corrective regimen sliding scale   SubCutaneous Before meals and at bedtime  losartan 100 milliGRAM(s) Oral daily  methylPREDNISolone sodium succinate Injectable 40 milliGRAM(s) IV Push every 8 hours  oseltamivir 75 milliGRAM(s) Oral two times a day    MEDICATIONS  (PRN):  acetaminophen     Tablet .. 650 milliGRAM(s) Oral every 6 hours PRN Temp greater or equal to 38C (100.4F), Mild Pain (1 - 3)  albuterol    90 MICROgram(s) HFA Inhaler 2 Puff(s) Inhalation every 6 hours PRN Bronchospasm  dextrose Oral Gel 15 Gram(s) Oral once PRN Blood Glucose LESS THAN 70 milliGRAM(s)/deciliter  ibuprofen  Tablet. 600 milliGRAM(s) Oral every 6 hours PRN Temp greater or equal to 38C (100.4F), Moderate Pain (4 - 6)      Allergies    No Known Allergies    Intolerances        REVIEW OF SYSTEMS:  CONSTITUTIONAL: No fever, weight loss, or fatigue  RESPIRATORY: No cough, wheezing, chills or hemoptysis; No shortness of breath  CARDIOVASCULAR: No chest pain, palpitations, dizziness, or leg swelling  GASTROINTESTINAL: No abdominal or epigastric pain. No nausea, vomiting, or hematemesis; No diarrhea or constipation. No melena or hematochezia.  NEUROLOGICAL: No headaches, memory loss, loss of strength, numbness, or tremors  MUSCULOSKELETAL: No joint pain or swelling; No muscle, back, or extremity pain      Vital Signs Last 24 Hrs  T(C): 36.8 (11 Feb 2025 15:33), Max: 36.9 (10 Feb 2025 19:36)  T(F): 98.2 (11 Feb 2025 15:33), Max: 98.5 (10 Feb 2025 19:36)  HR: 84 (11 Feb 2025 15:33) (84 - 111)  BP: 158/56 (11 Feb 2025 15:33) (149/94 - 176/81)  BP(mean): --  RR: 20 (11 Feb 2025 15:33) (16 - 20)  SpO2: 94% (11 Feb 2025 15:33) (94% - 97%)    Parameters below as of 11 Feb 2025 11:24  Patient On (Oxygen Delivery Method): nasal cannula  O2 Flow (L/min): 3      PHYSICAL EXAM:  GENERAL: NAD,   HEAD:  Atraumatic, Normocephalic  EYES: EOMI, PERRLA, conjunctiva and sclera clear  NECK: Supple, No JVD, Normal thyroid  NERVOUS SYSTEM:  Alert & Oriented X3, No gross focal deficits  CHEST/LUNG: mild wheezing   HEART: Regular rate and rhythm; No murmurs, rubs, or gallops  ABDOMEN: Soft, Nontender, Nondistended; Bowel sounds present  EXTREMITIES:  No clubbing, cyanosis, or edema  SKIN: No rashes or lesions    LABS:                        12.9   16.35 )-----------( 230      ( 11 Feb 2025 06:14 )             40.0     02-11    143  |  104  |  21.3[H]  ----------------------------<  163[H]  4.5   |  27.0  |  1.11    Ca    8.5      11 Feb 2025 06:14        Urinalysis Basic - ( 11 Feb 2025 06:14 )    Color: x / Appearance: x / SG: x / pH: x  Gluc: 163 mg/dL / Ketone: x  / Bili: x / Urobili: x   Blood: x / Protein: x / Nitrite: x   Leuk Esterase: x / RBC: x / WBC x   Sq Epi: x / Non Sq Epi: x / Bacteria: x      CAPILLARY BLOOD GLUCOSE      POCT Blood Glucose.: 193 mg/dL (11 Feb 2025 12:14)  POCT Blood Glucose.: 149 mg/dL (11 Feb 2025 08:37)  POCT Blood Glucose.: 144 mg/dL (10 Feb 2025 21:13)  POCT Blood Glucose.: 214 mg/dL (10 Feb 2025 17:09)      RADIOLOGY & ADDITIONAL TESTS:    Imaging Personally Reviewed:  [x ] YES  [ ] NO    Consultant(s) Notes Reviewed:  [x ] YES  [ ] NO    Care Discussed with Consultants/Other Providers [ ] YES  [ ] NO    Plan of Care discussed with Housestaff [ x]YES [ ] NO
Patient is a 59y old  Male who presents with a chief complaint of Acute Hypoxic Resp Failure 2/2 Asthma Exac 2/2 Influenza (14 Feb 2025 16:36)      Interval hx;   reports sore throat  endorses congestion   shortness of breath improved  remains on O2 but has been taking off and maintaining 90s   febrile overnight w/ cultures sent   denies chest pain   denies LE pain/edema    PAST MEDICAL & SURGICAL HISTORY:  Gout      Asthma      Arthritis      HTN (hypertension)      No significant past surgical history            Medications:  azithromycin   Tablet 500 milliGRAM(s) Oral daily  oseltamivir 75 milliGRAM(s) Oral two times a day  piperacillin/tazobactam IVPB.. 3.375 Gram(s) IV Intermittent every 8 hours    amLODIPine   Tablet 10 milliGRAM(s) Oral daily  losartan 100 milliGRAM(s) Oral daily  metoprolol succinate ER 25 milliGRAM(s) Oral daily    acetylcysteine 10%  Inhalation 4 milliLiter(s) Inhalation two times a day  albuterol    90 MICROgram(s) HFA Inhaler 2 Puff(s) Inhalation every 6 hours PRN  albuterol/ipratropium for Nebulization 3 milliLiter(s) Nebulizer every 6 hours  cetirizine 10 milliGRAM(s) Oral daily  fluticasone propionate/ salmeterol 250-50 MICROgram(s) Diskus 1 Dose(s) Inhalation two times a day  guaiFENesin ER 1200 milliGRAM(s) Oral every 12 hours  levalbuterol Inhalation 0.63 milliGRAM(s) Inhalation every 6 hours PRN  montelukast 10 milliGRAM(s) Oral at bedtime    acetaminophen     Tablet .. 650 milliGRAM(s) Oral every 6 hours PRN  ibuprofen  Tablet. 600 milliGRAM(s) Oral every 6 hours PRN      enoxaparin Injectable 40 milliGRAM(s) SubCutaneous every 24 hours    famotidine    Tablet 20 milliGRAM(s) Oral daily      allopurinol 300 milliGRAM(s) Oral daily  atorvastatin 40 milliGRAM(s) Oral at bedtime  dextrose 50% Injectable 25 Gram(s) IV Push once  dextrose 50% Injectable 12.5 Gram(s) IV Push once  dextrose 50% Injectable 25 Gram(s) IV Push once  dextrose Oral Gel 15 Gram(s) Oral once PRN  glucagon  Injectable 1 milliGRAM(s) IntraMuscular once  insulin lispro (ADMELOG) corrective regimen sliding scale   SubCutaneous Before meals and at bedtime  methylPREDNISolone sodium succinate Injectable 40 milliGRAM(s) IV Push every 12 hours    dextrose 5%. 1000 milliLiter(s) IV Continuous <Continuous>  dextrose 5%. 1000 milliLiter(s) IV Continuous <Continuous>      benzocaine/menthol Lozenge 1 Lozenge Oral every 4 hours            ICU Vital Signs Last 24 Hrs  T(C): 36.7 (14 Feb 2025 19:30), Max: 38 (13 Feb 2025 23:35)  T(F): 98 (14 Feb 2025 19:30), Max: 100.4 (13 Feb 2025 23:35)  HR: 86 (14 Feb 2025 19:30) (83 - 104)  BP: 146/86 (14 Feb 2025 19:30) (135/66 - 157/81)  BP(mean): 101 (14 Feb 2025 12:27) (85 - 101)  ABP: --  ABP(mean): --  RR: 19 (14 Feb 2025 19:30) (18 - 19)  SpO2: 93% (14 Feb 2025 19:30) (93% - 97%)    O2 Parameters below as of 14 Feb 2025 19:30  Patient On (Oxygen Delivery Method): nasal cannula  O2 Flow (L/min): 2              I&O's Detail    13 Feb 2025 07:01  -  14 Feb 2025 07:00  --------------------------------------------------------  IN:  Total IN: 0 mL    OUT:    Voided (mL): 820 mL  Total OUT: 820 mL    Total NET: -820 mL            LABS:                        14.0   14.58 )-----------( 205      ( 14 Feb 2025 08:03 )             45.1     02-14    138  |  99  |  21.7[H]  ----------------------------<  165[H]  4.7   |  23.0  |  1.11    Ca    8.9      14 Feb 2025 08:03            CAPILLARY BLOOD GLUCOSE      POCT Blood Glucose.: 230 mg/dL (14 Feb 2025 21:32)      Urinalysis Basic - ( 14 Feb 2025 08:03 )    Color: x / Appearance: x / SG: x / pH: x  Gluc: 165 mg/dL / Ketone: x  / Bili: x / Urobili: x   Blood: x / Protein: x / Nitrite: x   Leuk Esterase: x / RBC: x / WBC x   Sq Epi: x / Non Sq Epi: x / Bacteria: x      CULTURES:  Rapid RVP Result: Detected (02-14 @ 01:11)  Rapid RVP Result: Detected (02-09 @ 06:20)      Physical Examination:    General: awake, alert, in NAD    HEENT: NC/AT, anicteric, mmm, no thrush    PULM: rhonchi with bilateral expiratory wheeze     NECK: Supple, trachea midline    CVS: S1S2, RRR    ABD: Soft, nondistended, nontender, normoactive bowel sounds    EXT: No edema, nontender    SKIN: Warm and well perfused, no rashes noted    NEURO: Alert, oriented, interactive, nonfocal    ROS: negative except as per HPI      RADIOLOGY:   < from: CT Angio Chest PE Protocol w/ IV Cont (02.12.25 @ 14:41) >  FINDINGS:    AIRWAYS/LUNGS/PLEURA: Severely narrowed AP diameter of the trachea on   expiration suggestive of tracheomalacia. Mosaicism in the bilateral upper   lobes, likely due to air trapping. 2.7 cm right upper lobe pneumatocele   with adjacent atelectasis/scarring and pleural thickening, at site of   prior pneumonia. Lingular 5 mm nodule, new from prior. Unchanged slight   nodularity along the pleura overlying the right upper lobe (5-51). New   peribronchial thickening and mucous plugging in the lower lobes. No   pleural effusion.    MEDIASTINUM AND ELIECER: No lymphadenopathy.    PULMONARY ANGIOGRAM: Adequate opacification of the pulmonary arteries.   Pulmonary arteries are patent bilaterally without filling defects.    HEART: Heart size is normal. No pericardial effusion.    VISUALIZED UPPER ABDOMEN: Diverticulosis. Normal limits.    CHEST WALL: Unchanged right thyroid nodule.    BONES: Degenerative changes of the spine with some anterior   osteophytosis. No aggressive lytic or blastic osseous lesions.    < end of copied text >      < from: Xray Chest 1 View- PORTABLE-Urgent (Xray Chest 1 View- PORTABLE-Urgent .) (02.14.25 @ 00:46) >  INTERPRETATION:  AP semierect chest on February 14, 2025 at 12:38 AM.   Patient has fever.    Heart magnified by technique.    Present film shows scattered bibasilar infiltrates which are new since   February 9.    IMPRESSION: Scattered bibasilar infiltrates are new since prior.    --- End of Report ---    < end of copied text >    
[facility]    SUBJECTIVE / OVERNIGHT EVENTS: No overnight events as per RN. Pt seen at the bedside. Endorses still having tightness in his chest and SOB especially with walking. Denies any new complaints. All other systems reviewed and are negative.    MEDICATIONS  (STANDING):  acetylcysteine 10%  Inhalation 4 milliLiter(s) Inhalation two times a day  albuterol/ipratropium for Nebulization 3 milliLiter(s) Nebulizer every 6 hours  allopurinol 300 milliGRAM(s) Oral daily  amLODIPine   Tablet 10 milliGRAM(s) Oral daily  atorvastatin 40 milliGRAM(s) Oral at bedtime  azithromycin   Tablet 500 milliGRAM(s) Oral daily  benzocaine/menthol Lozenge 1 Lozenge Oral every 4 hours  cetirizine 10 milliGRAM(s) Oral daily  dextrose 5%. 1000 milliLiter(s) (50 mL/Hr) IV Continuous <Continuous>  dextrose 5%. 1000 milliLiter(s) (100 mL/Hr) IV Continuous <Continuous>  dextrose 50% Injectable 25 Gram(s) IV Push once  dextrose 50% Injectable 12.5 Gram(s) IV Push once  dextrose 50% Injectable 25 Gram(s) IV Push once  enoxaparin Injectable 40 milliGRAM(s) SubCutaneous every 24 hours  famotidine    Tablet 20 milliGRAM(s) Oral daily  fluticasone propionate/ salmeterol 250-50 MICROgram(s) Diskus 1 Dose(s) Inhalation two times a day  glucagon  Injectable 1 milliGRAM(s) IntraMuscular once  guaiFENesin ER 1200 milliGRAM(s) Oral every 12 hours  insulin lispro (ADMELOG) corrective regimen sliding scale   SubCutaneous Before meals and at bedtime  losartan 100 milliGRAM(s) Oral daily  methylPREDNISolone sodium succinate Injectable 40 milliGRAM(s) IV Push every 8 hours  metoprolol succinate ER 25 milliGRAM(s) Oral daily  montelukast 10 milliGRAM(s) Oral at bedtime    MEDICATIONS  (PRN):  acetaminophen     Tablet .. 650 milliGRAM(s) Oral every 6 hours PRN Temp greater or equal to 38C (100.4F), Mild Pain (1 - 3)  albuterol    90 MICROgram(s) HFA Inhaler 2 Puff(s) Inhalation every 6 hours PRN Bronchospasm  dextrose Oral Gel 15 Gram(s) Oral once PRN Blood Glucose LESS THAN 70 milliGRAM(s)/deciliter  ibuprofen  Tablet. 600 milliGRAM(s) Oral every 6 hours PRN Temp greater or equal to 38C (100.4F), Moderate Pain (4 - 6)  levalbuterol Inhalation 0.63 milliGRAM(s) Inhalation every 6 hours PRN HR > 100      I&O's Summary      acetaminophen     Tablet .. 650 milliGRAM(s) Oral every 6 hours PRN  acetylcysteine 10%  Inhalation 4 milliLiter(s) Inhalation two times a day  albuterol    90 MICROgram(s) HFA Inhaler 2 Puff(s) Inhalation every 6 hours PRN  albuterol/ipratropium for Nebulization 3 milliLiter(s) Nebulizer every 6 hours  allopurinol 300 milliGRAM(s) Oral daily  amLODIPine   Tablet 10 milliGRAM(s) Oral daily  atorvastatin 40 milliGRAM(s) Oral at bedtime  azithromycin   Tablet 500 milliGRAM(s) Oral daily  benzocaine/menthol Lozenge 1 Lozenge Oral every 4 hours  cetirizine 10 milliGRAM(s) Oral daily  dextrose 5%. 1000 milliLiter(s) IV Continuous <Continuous>  dextrose 5%. 1000 milliLiter(s) IV Continuous <Continuous>  dextrose 50% Injectable 25 Gram(s) IV Push once  dextrose 50% Injectable 12.5 Gram(s) IV Push once  dextrose 50% Injectable 25 Gram(s) IV Push once  dextrose Oral Gel 15 Gram(s) Oral once PRN  enoxaparin Injectable 40 milliGRAM(s) SubCutaneous every 24 hours  famotidine    Tablet 20 milliGRAM(s) Oral daily  fluticasone propionate/ salmeterol 250-50 MICROgram(s) Diskus 1 Dose(s) Inhalation two times a day  glucagon  Injectable 1 milliGRAM(s) IntraMuscular once  guaiFENesin ER 1200 milliGRAM(s) Oral every 12 hours  ibuprofen  Tablet. 600 milliGRAM(s) Oral every 6 hours PRN  insulin lispro (ADMELOG) corrective regimen sliding scale   SubCutaneous Before meals and at bedtime  levalbuterol Inhalation 0.63 milliGRAM(s) Inhalation every 6 hours PRN  losartan 100 milliGRAM(s) Oral daily  methylPREDNISolone sodium succinate Injectable 40 milliGRAM(s) IV Push every 8 hours  metoprolol succinate ER 25 milliGRAM(s) Oral daily  montelukast 10 milliGRAM(s) Oral at bedtime      PHYSICAL EXAM:  Vital Signs Last 24 Hrs  T(C): 36.8 (15 Feb 2025 11:54), Max: 36.9 (15 Feb 2025 04:27)  T(F): 98.2 (15 Feb 2025 11:54), Max: 98.4 (15 Feb 2025 04:27)  HR: 101 (15 Feb 2025 14:53) (86 - 101)  BP: 142/84 (15 Feb 2025 11:54) (137/77 - 162/94)  BP(mean): --  RR: 18 (15 Feb 2025 11:54) (16 - 19)  SpO2: 95% (15 Feb 2025 14:53) (93% - 95%)    Parameters below as of 15 Feb 2025 14:53  Patient On (Oxygen Delivery Method): room air          CONSTITUTIONAL: no apparent distress  RESP: No respiratory distress, bilateral wheezing appreciated   CV: RRR, +S1S2, no peripheral edema  GI: Soft, NT, ND  PSYCH: A+O x 3, mood and affect appropriate  NEURO: Cooperative, upper and lower motor function grossly intact bilaterally, sensation grossly intact throughout      LABS:                        14.0   14.58 )-----------( 205      ( 14 Feb 2025 08:03 )             45.1     02-14    138  |  99  |  21.7[H]  ----------------------------<  165[H]  4.7   |  23.0  |  1.11    Ca    8.9      14 Feb 2025 08:03            Urinalysis Basic - ( 14 Feb 2025 08:03 )    Color: x / Appearance: x / SG: x / pH: x  Gluc: 165 mg/dL / Ketone: x  / Bili: x / Urobili: x   Blood: x / Protein: x / Nitrite: x   Leuk Esterase: x / RBC: x / WBC x   Sq Epi: x / Non Sq Epi: x / Bacteria: x        Culture - Blood (collected 14 Feb 2025 00:00)  Source: .Blood BLOOD  Preliminary Report (15 Feb 2025 07:01):    No growth at 24 hours      CAPILLARY BLOOD GLUCOSE      POCT Blood Glucose.: 151 mg/dL (15 Feb 2025 11:51)  POCT Blood Glucose.: 170 mg/dL (15 Feb 2025 07:42)  POCT Blood Glucose.: 230 mg/dL (14 Feb 2025 21:32)  POCT Blood Glucose.: 256 mg/dL (14 Feb 2025 16:49)      IMAGING:                                  
Patient is a 59y old  Male who presents with a chief complaint of Acute Hypoxic Resp Failure 2/2 Asthma Exac 2/2 Influenza     Pt is seen in am , c/o upper chest throat congestion , voice hoarse . some cough         ALLERGIES:  No Known Allergies    MEDICATIONS  (STANDING):  acetylcysteine 10%  Inhalation 4 milliLiter(s) Inhalation two times a day  albuterol/ipratropium for Nebulization 3 milliLiter(s) Nebulizer every 6 hours  allopurinol 300 milliGRAM(s) Oral daily  amLODIPine   Tablet 10 milliGRAM(s) Oral daily  atorvastatin 40 milliGRAM(s) Oral at bedtime  azithromycin   Tablet 500 milliGRAM(s) Oral daily  benzocaine/menthol Lozenge 1 Lozenge Oral every 4 hours  cetirizine 10 milliGRAM(s) Oral daily  dextrose 5%. 1000 milliLiter(s) (50 mL/Hr) IV Continuous <Continuous>  dextrose 5%. 1000 milliLiter(s) (100 mL/Hr) IV Continuous <Continuous>  dextrose 50% Injectable 25 Gram(s) IV Push once  dextrose 50% Injectable 12.5 Gram(s) IV Push once  dextrose 50% Injectable 25 Gram(s) IV Push once  enoxaparin Injectable 40 milliGRAM(s) SubCutaneous every 24 hours  famotidine    Tablet 20 milliGRAM(s) Oral daily  fluticasone propionate/ salmeterol 250-50 MICROgram(s) Diskus 1 Dose(s) Inhalation two times a day  glucagon  Injectable 1 milliGRAM(s) IntraMuscular once  guaiFENesin ER 1200 milliGRAM(s) Oral every 12 hours  insulin lispro (ADMELOG) corrective regimen sliding scale   SubCutaneous Before meals and at bedtime  losartan 100 milliGRAM(s) Oral daily  metoprolol succinate ER 25 milliGRAM(s) Oral daily  oseltamivir 75 milliGRAM(s) Oral two times a day    MEDICATIONS  (PRN):  acetaminophen     Tablet .. 650 milliGRAM(s) Oral every 6 hours PRN Temp greater or equal to 38C (100.4F), Mild Pain (1 - 3)  albuterol    90 MICROgram(s) HFA Inhaler 2 Puff(s) Inhalation every 6 hours PRN Bronchospasm  dextrose Oral Gel 15 Gram(s) Oral once PRN Blood Glucose LESS THAN 70 milliGRAM(s)/deciliter  ibuprofen  Tablet. 600 milliGRAM(s) Oral every 6 hours PRN Temp greater or equal to 38C (100.4F), Moderate Pain (4 - 6)    Vital Signs Last 24 Hrs  T(F): 98.6 (13 Feb 2025 07:41), Max: 99.6 (13 Feb 2025 04:10)  HR: 88 (13 Feb 2025 09:21) (88 - 108)  BP: 158/98 (13 Feb 2025 07:41) (140/93 - 168/104)  RR: 20 (13 Feb 2025 09:40) (19 - 23)  SpO2: 95% (13 Feb 2025 09:40) (91% - 97%)  I&O's Summary      PHYSICAL EXAM:  General: awake no distress  Neck: supple   Lungs: exp rhonchi , congestion   Cardio: RRR, S1/S2, No murmur  Abdomen: Soft, Nontender, Nondistended; Bowel sounds present  Extremities: No calf tenderness, No pitting edema    LABS:                        12.9   16.35 )-----------( 230      ( 11 Feb 2025 06:14 )             40.0     02-11    143  |  104  |  21.3  ----------------------------<  163  4.5   |  27.0  |  1.11    Ca    8.5      11 Feb 2025 06:14              Lactate, Blood: 3.9 mmol/L (02-10 @ 11:47)    Procalcitonin: 0.08 ng/mL (02-11-25 @ 06:14)                      POCT Blood Glucose.: 159 mg/dL (13 Feb 2025 09:09)  POCT Blood Glucose.: 176 mg/dL (13 Feb 2025 07:43)  POCT Blood Glucose.: 240 mg/dL (12 Feb 2025 21:32)  POCT Blood Glucose.: 177 mg/dL (12 Feb 2025 16:28)      Urinalysis Basic - ( 11 Feb 2025 06:14 )    Color: x / Appearance: x / SG: x / pH: x  Gluc: 163 mg/dL / Ketone: x  / Bili: x / Urobili: x   Blood: x / Protein: x / Nitrite: x   Leuk Esterase: x / RBC: x / WBC x   Sq Epi: x / Non Sq Epi: x / Bacteria: x          RADIOLOGY & ADDITIONAL TESTS:    < from: TTE W or WO Ultrasound Enhancing Agent (04.12.24 @ 19:33) >      1. Left ventricular systolic function is normal with an ejection fraction visually estimated at 65 to 70 %.   2. Normal right ventricular cavity size and normal systolic function.   3. Normal left and right atrial size.   4. Aortic valve was not well visualized.   5. Mild mitral valve leaflet calcification.   6. Thickened mitral valve leaflets.   7. Tricuspid valve was not well visualized.   8. Estimated pulmonary artery systolic pressure is 8 mmHg, consistent with normal pulmonary artery pressure.   9. No pericardial effusion seen.      < end of copied text >    
Patient is a 59y old  Male who presents with a chief complaint of Acute Hypoxic Resp Failure 2/2 Asthma Exac 2/2 Influenza (12 Feb 2025 13:03)      Interval hx:  No acute events overnight   reports his dyspnea is overall improving but still has episodes of difficult inspiration   still feels congested   wheezing overall improved   denies chest pain   remains afebrile   remains on 2L O2   intermittently tachycardiac   denies LE edema/pain     PAST MEDICAL & SURGICAL HISTORY:  Gout      Asthma      Arthritis      HTN (hypertension)      No significant past surgical history      Medications:  oseltamivir 75 milliGRAM(s) Oral two times a day    amLODIPine   Tablet 10 milliGRAM(s) Oral daily  losartan 100 milliGRAM(s) Oral daily    acetylcysteine 10%  Inhalation 4 milliLiter(s) Inhalation two times a day  albuterol    90 MICROgram(s) HFA Inhaler 2 Puff(s) Inhalation every 6 hours PRN  albuterol/ipratropium for Nebulization 3 milliLiter(s) Nebulizer every 6 hours  fluticasone propionate/ salmeterol 250-50 MICROgram(s) Diskus 1 Dose(s) Inhalation two times a day  guaiFENesin ER 1200 milliGRAM(s) Oral every 12 hours    acetaminophen     Tablet .. 650 milliGRAM(s) Oral every 6 hours PRN  ibuprofen  Tablet. 600 milliGRAM(s) Oral every 6 hours PRN      enoxaparin Injectable 40 milliGRAM(s) SubCutaneous every 24 hours        allopurinol 300 milliGRAM(s) Oral daily  atorvastatin 40 milliGRAM(s) Oral at bedtime  dextrose 50% Injectable 25 Gram(s) IV Push once  dextrose 50% Injectable 12.5 Gram(s) IV Push once  dextrose 50% Injectable 25 Gram(s) IV Push once  dextrose Oral Gel 15 Gram(s) Oral once PRN  glucagon  Injectable 1 milliGRAM(s) IntraMuscular once  insulin lispro (ADMELOG) corrective regimen sliding scale   SubCutaneous Before meals and at bedtime  methylPREDNISolone sodium succinate Injectable 40 milliGRAM(s) IV Push every 8 hours    dextrose 5%. 1000 milliLiter(s) IV Continuous <Continuous>  dextrose 5%. 1000 milliLiter(s) IV Continuous <Continuous>      benzocaine/menthol Lozenge 1 Lozenge Oral every 4 hours            ICU Vital Signs Last 24 Hrs  T(C): 37.2 (13 Feb 2025 00:22), Max: 37.2 (13 Feb 2025 00:22)  T(F): 98.9 (13 Feb 2025 00:22), Max: 98.9 (13 Feb 2025 00:22)  HR: 100 (13 Feb 2025 00:22) (74 - 108)  BP: 168/104 (13 Feb 2025 00:22) (152/79 - 193/93)  BP(mean): --  ABP: --  ABP(mean): --  RR: 20 (13 Feb 2025 00:22) (18 - 20)  SpO2: 92% (13 Feb 2025 00:22) (91% - 97%)    O2 Parameters below as of 13 Feb 2025 00:22  Patient On (Oxygen Delivery Method): nasal cannula  O2 Flow (L/min): 2      LABS:                        12.9   16.35 )-----------( 230      ( 11 Feb 2025 06:14 )             40.0     02-11    143  |  104  |  21.3[H]  ----------------------------<  163[H]  4.5   |  27.0  |  1.11    Ca    8.5      11 Feb 2025 06:14            CAPILLARY BLOOD GLUCOSE      POCT Blood Glucose.: 240 mg/dL (12 Feb 2025 21:32)      Urinalysis Basic - ( 11 Feb 2025 06:14 )    Color: x / Appearance: x / SG: x / pH: x  Gluc: 163 mg/dL / Ketone: x  / Bili: x / Urobili: x   Blood: x / Protein: x / Nitrite: x   Leuk Esterase: x / RBC: x / WBC x   Sq Epi: x / Non Sq Epi: x / Bacteria: x      Physical Examination:    General: awake, alert, in NAD     HEENT: NC/AT, anicteric, MMM    PULM: expiratory wheezing, improved, no accessory muscle use     NECK: Supple, trachea midline    CVS: S1S2, RRR    ABD: Soft, nondistended, nontender, normoactive bowel sounds    EXT: No edema, nontender    SKIN: Warm and well perfused, no rashes noted    NEURO: Alert, oriented, interactive, nonfocal    ROS: negative except as per HPI     RADIOLOGY:   < from: CT Angio Chest PE Protocol w/ IV Cont (02.12.25 @ 14:41) >  FINDINGS:    AIRWAYS/LUNGS/PLEURA: Severely narrowed AP diameter of the trachea on   expiration suggestive of tracheomalacia. Mosaicism in the bilateral upper   lobes, likely due to air trapping. 2.7 cm right upper lobe pneumatocele   with adjacent atelectasis/scarring and pleural thickening, at site of   prior pneumonia. Lingular 5 mm nodule, new from prior. Unchanged slight   nodularity along the pleura overlying the right upper lobe (5-51). New   peribronchial thickening and mucous plugging in the lower lobes. No   pleural effusion.    MEDIASTINUM AND ELIECER: No lymphadenopathy.    PULMONARY ANGIOGRAM: Adequate opacification of the pulmonary arteries.   Pulmonary arteries are patent bilaterally without filling defects.    HEART: Heart size is normal. No pericardial effusion.    VISUALIZED UPPER ABDOMEN: Diverticulosis. Normal limits.    CHEST WALL: Unchanged right thyroid nodule.    BONES: Degenerative changes of the spine with some anterior   osteophytosis. No aggressive lytic or blastic osseous lesions.    < end of copied text >    
Patient is a 59y old  Male who presents with a chief complaint of Acute Hypoxic Resp Failure 2/2 Asthma Exac 2/2 Influenza (16 Feb 2025 11:24)    Interval hx:  No acute events overnight   Reports he feels better  less sob   congestion improving   denies cp   denies n/v/abd pain  denies LE edema/pain  afebrile today  on RA     PAST MEDICAL & SURGICAL HISTORY:  Gout      Asthma      Arthritis      HTN (hypertension)      No significant past surgical history    Medications:  azithromycin   Tablet 500 milliGRAM(s) Oral daily    amLODIPine   Tablet 10 milliGRAM(s) Oral daily  losartan 100 milliGRAM(s) Oral daily  metoprolol succinate ER 25 milliGRAM(s) Oral daily    acetylcysteine 10%  Inhalation 4 milliLiter(s) Inhalation two times a day  albuterol    90 MICROgram(s) HFA Inhaler 2 Puff(s) Inhalation every 6 hours PRN  albuterol/ipratropium for Nebulization 3 milliLiter(s) Nebulizer every 6 hours  cetirizine 10 milliGRAM(s) Oral daily  fluticasone propionate/ salmeterol 250-50 MICROgram(s) Diskus 1 Dose(s) Inhalation two times a day  guaiFENesin ER 1200 milliGRAM(s) Oral every 12 hours  levalbuterol Inhalation 0.63 milliGRAM(s) Inhalation every 6 hours PRN  montelukast 10 milliGRAM(s) Oral at bedtime    acetaminophen     Tablet .. 650 milliGRAM(s) Oral every 6 hours PRN  ibuprofen  Tablet. 600 milliGRAM(s) Oral every 6 hours PRN      enoxaparin Injectable 40 milliGRAM(s) SubCutaneous every 24 hours    famotidine    Tablet 20 milliGRAM(s) Oral daily      allopurinol 300 milliGRAM(s) Oral daily  atorvastatin 40 milliGRAM(s) Oral at bedtime  dextrose 50% Injectable 25 Gram(s) IV Push once  dextrose 50% Injectable 12.5 Gram(s) IV Push once  dextrose 50% Injectable 25 Gram(s) IV Push once  dextrose Oral Gel 15 Gram(s) Oral once PRN  glucagon  Injectable 1 milliGRAM(s) IntraMuscular once  insulin lispro (ADMELOG) corrective regimen sliding scale   SubCutaneous Before meals and at bedtime  methylPREDNISolone sodium succinate Injectable 40 milliGRAM(s) IV Push every 8 hours    dextrose 5%. 1000 milliLiter(s) IV Continuous <Continuous>  dextrose 5%. 1000 milliLiter(s) IV Continuous <Continuous>      benzocaine/menthol Lozenge 1 Lozenge Oral every 4 hours            ICU Vital Signs Last 24 Hrs  T(C): 36.6 (16 Feb 2025 16:04), Max: 37.1 (15 Feb 2025 22:57)  T(F): 97.9 (16 Feb 2025 16:04), Max: 98.7 (15 Feb 2025 22:57)  HR: 98 (16 Feb 2025 16:04) (80 - 98)  BP: 152/78 (16 Feb 2025 16:04) (149/88 - 165/84)  BP(mean): --  ABP: --  ABP(mean): --  RR: 18 (16 Feb 2025 16:04) (16 - 18)  SpO2: 92% (16 Feb 2025 16:04) (92% - 99%)    O2 Parameters below as of 16 Feb 2025 16:04  Patient On (Oxygen Delivery Method): room air    POCT Blood Glucose.: 184 mg/dL (16 Feb 2025 17:44)    CULTURES:  Rapid RVP Result: Detected (02-14 @ 01:11)  Culture Results:   No growth at 48 Hours (02-14 @ 00:00)      Physical Examination:    General: awake, alert, in NAD       HEENT: NC/AT, anicteric, MMM    PULM: improved expiratory wheezing, no accessory muscle use     NECK: Supple, trachea midline    CVS: S1S2, RRR    ABD: Soft, nondistended, nontender, normoactive bowel sounds    EXT: No edema, nontender    SKIN: Warm and well perfused, no rashes noted    NEURO: Alert, oriented, interactive, nonfocal    ROS: negative except as per HPI     RADIOLOGY:   < from: Xray Chest 1 View- PORTABLE-Urgent (Xray Chest 1 View- PORTABLE-Urgent .) (02.14.25 @ 00:46) >  INTERPRETATION:  AP semierect chest on February 14, 2025 at 12:38 AM.   Patient has fever.    Heart magnified by technique.    Present film shows scattered bibasilar infiltrates which are new since   February 9.    IMPRESSION: Scattered bibasilar infiltrates are new since prior.    < end of copied text >    
Patient is a 59y old  Male who presents with a chief complaint of Acute Hypoxic Resp Failure 2/2 Asthma Exac 2/2 Influenza (15 Feb 2025 15:20)    Interval hx:  reports he feels less congested   shortness of breath improved  sore throat better   off supplemental O2   no further fevers  denies chest pain   denies LE edema/pain      PAST MEDICAL & SURGICAL HISTORY:  Gout      Asthma      Arthritis      HTN (hypertension)      No significant past surgical history            Medications:  azithromycin   Tablet 500 milliGRAM(s) Oral daily    amLODIPine   Tablet 10 milliGRAM(s) Oral daily  losartan 100 milliGRAM(s) Oral daily  metoprolol succinate ER 25 milliGRAM(s) Oral daily    acetylcysteine 10%  Inhalation 4 milliLiter(s) Inhalation two times a day  albuterol    90 MICROgram(s) HFA Inhaler 2 Puff(s) Inhalation every 6 hours PRN  albuterol/ipratropium for Nebulization 3 milliLiter(s) Nebulizer every 6 hours  cetirizine 10 milliGRAM(s) Oral daily  fluticasone propionate/ salmeterol 250-50 MICROgram(s) Diskus 1 Dose(s) Inhalation two times a day  guaiFENesin ER 1200 milliGRAM(s) Oral every 12 hours  levalbuterol Inhalation 0.63 milliGRAM(s) Inhalation every 6 hours PRN  montelukast 10 milliGRAM(s) Oral at bedtime    acetaminophen     Tablet .. 650 milliGRAM(s) Oral every 6 hours PRN  ibuprofen  Tablet. 600 milliGRAM(s) Oral every 6 hours PRN      enoxaparin Injectable 40 milliGRAM(s) SubCutaneous every 24 hours    famotidine    Tablet 20 milliGRAM(s) Oral daily      allopurinol 300 milliGRAM(s) Oral daily  atorvastatin 40 milliGRAM(s) Oral at bedtime  dextrose 50% Injectable 25 Gram(s) IV Push once  dextrose 50% Injectable 12.5 Gram(s) IV Push once  dextrose 50% Injectable 25 Gram(s) IV Push once  dextrose Oral Gel 15 Gram(s) Oral once PRN  glucagon  Injectable 1 milliGRAM(s) IntraMuscular once  insulin lispro (ADMELOG) corrective regimen sliding scale   SubCutaneous Before meals and at bedtime  methylPREDNISolone sodium succinate Injectable 40 milliGRAM(s) IV Push every 8 hours    dextrose 5%. 1000 milliLiter(s) IV Continuous <Continuous>  dextrose 5%. 1000 milliLiter(s) IV Continuous <Continuous>      benzocaine/menthol Lozenge 1 Lozenge Oral every 4 hours            ICU Vital Signs Last 24 Hrs  T(C): 36.8 (15 Feb 2025 15:28), Max: 36.9 (15 Feb 2025 04:27)  T(F): 98.2 (15 Feb 2025 15:28), Max: 98.4 (15 Feb 2025 04:27)  HR: 100 (15 Feb 2025 15:28) (86 - 101)  BP: 137/77 (15 Feb 2025 15:28) (137/77 - 162/94)  BP(mean): --  ABP: --  ABP(mean): --  RR: 18 (15 Feb 2025 15:28) (16 - 19)  SpO2: 95% (15 Feb 2025 15:28) (93% - 95%)    O2 Parameters below as of 15 Feb 2025 15:28  Patient On (Oxygen Delivery Method): room air                I&O's Detail        LABS:                        14.0   14.58 )-----------( 205      ( 14 Feb 2025 08:03 )             45.1     02-14    138  |  99  |  21.7[H]  ----------------------------<  165[H]  4.7   |  23.0  |  1.11    Ca    8.9      14 Feb 2025 08:03            CAPILLARY BLOOD GLUCOSE      POCT Blood Glucose.: 272 mg/dL (15 Feb 2025 16:54)      Urinalysis Basic - ( 14 Feb 2025 08:03 )    Color: x / Appearance: x / SG: x / pH: x  Gluc: 165 mg/dL / Ketone: x  / Bili: x / Urobili: x   Blood: x / Protein: x / Nitrite: x   Leuk Esterase: x / RBC: x / WBC x   Sq Epi: x / Non Sq Epi: x / Bacteria: x      CULTURES:  Rapid RVP Result: Detected (02-14 @ 01:11)  Culture Results:   No growth at 24 hours (02-14 @ 00:00)  Rapid RVP Result: Detected (02-09 @ 06:20)      Physical Examination:    General: awake, alert, in NAD       HEENT: NC/AT, anicteric, MMM, no thrush     PULM: expiratory wheezes/rhonchi, no accessory muscle use     NECK: Supple, trachea midline    CVS: S1S2, RRR    ABD: Soft, nondistended, nontender, normoactive bowel sounds    EXT: No edema, nontender    SKIN: Warm and well perfused, no rashes noted    NEURO: Alert, oriented, interactive, nonfocal    ROS: negative except as per HPI     RADIOLOGY:   < from: Xray Chest 1 View- PORTABLE-Urgent (Xray Chest 1 View- PORTABLE-Urgent .) (02.14.25 @ 00:46) >  INTERPRETATION:  AP semierect chest on February 14, 2025 at 12:38 AM.   Patient has fever.    Heart magnified by technique.    Present film shows scattered bibasilar infiltrates which are new since   February 9.    IMPRESSION: Scattered bibasilar infiltrates are new since prior.    < end of copied text >

## 2025-02-16 NOTE — PROGRESS NOTE ADULT - ASSESSMENT
59M with PMHX Asthma, HTN, HLD, Gout, Pre-DM presented to [facility] ER c/o SOB/MCCLURE admitted for Acute Hypoxic Respiratory Failure 2/2 Acute Asthma Exacerbation 2/2 Influenza.CT of chest no PE , lingular lung nodule , trachaemalasia       1-Acute Hypoxic Respiratory Failure 2/2 Acute Asthma Exacerbation 2/2 Influenza infection   acute bronchitis   - completed 5 days tamiflu  - c/w duoneb q6hr  - c/w solumedrol q8hr  - incentive spirometry   - procal low, zosyn stopped  - c/w azithro for atypical coverage    HTN   - c/w amlodipine and losartan     Pre-DM  - Hold Metformin 500mg BID  - BG monitoring ,  ISS while on steroids     4-Gout  -Allopurinol 300mg q24    5- Lung nodule   need outpatient follow up with CT sx    VTE PPX: SCD/LMWH

## 2025-02-16 NOTE — PROGRESS NOTE ADULT - REASON FOR ADMISSION
Acute Hypoxic Resp Failure 2/2 Asthma Exac 2/2 Influenza

## 2025-02-17 ENCOUNTER — TRANSCRIPTION ENCOUNTER (OUTPATIENT)
Age: 60
End: 2025-02-17

## 2025-02-17 VITALS
SYSTOLIC BLOOD PRESSURE: 148 MMHG | RESPIRATION RATE: 16 BRPM | OXYGEN SATURATION: 96 % | HEART RATE: 76 BPM | DIASTOLIC BLOOD PRESSURE: 81 MMHG | TEMPERATURE: 98 F

## 2025-02-17 LAB — GLUCOSE BLDC GLUCOMTR-MCNC: 182 MG/DL — HIGH (ref 70–99)

## 2025-02-17 PROCEDURE — 96374 THER/PROPH/DIAG INJ IV PUSH: CPT

## 2025-02-17 PROCEDURE — 94760 N-INVAS EAR/PLS OXIMETRY 1: CPT

## 2025-02-17 PROCEDURE — 93005 ELECTROCARDIOGRAM TRACING: CPT

## 2025-02-17 PROCEDURE — 71045 X-RAY EXAM CHEST 1 VIEW: CPT | Mod: 26

## 2025-02-17 PROCEDURE — 85025 COMPLETE CBC W/AUTO DIFF WBC: CPT

## 2025-02-17 PROCEDURE — 82435 ASSAY OF BLOOD CHLORIDE: CPT

## 2025-02-17 PROCEDURE — 84484 ASSAY OF TROPONIN QUANT: CPT

## 2025-02-17 PROCEDURE — 36415 COLL VENOUS BLD VENIPUNCTURE: CPT

## 2025-02-17 PROCEDURE — 87040 BLOOD CULTURE FOR BACTERIA: CPT

## 2025-02-17 PROCEDURE — 80053 COMPREHEN METABOLIC PANEL: CPT

## 2025-02-17 PROCEDURE — 71045 X-RAY EXAM CHEST 1 VIEW: CPT

## 2025-02-17 PROCEDURE — 80048 BASIC METABOLIC PNL TOTAL CA: CPT

## 2025-02-17 PROCEDURE — 83036 HEMOGLOBIN GLYCOSYLATED A1C: CPT

## 2025-02-17 PROCEDURE — 0225U NFCT DS DNA&RNA 21 SARSCOV2: CPT

## 2025-02-17 PROCEDURE — 85027 COMPLETE CBC AUTOMATED: CPT

## 2025-02-17 PROCEDURE — G0378: CPT

## 2025-02-17 PROCEDURE — 84145 PROCALCITONIN (PCT): CPT

## 2025-02-17 PROCEDURE — 85014 HEMATOCRIT: CPT

## 2025-02-17 PROCEDURE — 83605 ASSAY OF LACTIC ACID: CPT

## 2025-02-17 PROCEDURE — 82947 ASSAY GLUCOSE BLOOD QUANT: CPT

## 2025-02-17 PROCEDURE — 94640 AIRWAY INHALATION TREATMENT: CPT

## 2025-02-17 PROCEDURE — 96375 TX/PRO/DX INJ NEW DRUG ADDON: CPT

## 2025-02-17 PROCEDURE — 82962 GLUCOSE BLOOD TEST: CPT

## 2025-02-17 PROCEDURE — 82803 BLOOD GASES ANY COMBINATION: CPT

## 2025-02-17 PROCEDURE — 84132 ASSAY OF SERUM POTASSIUM: CPT

## 2025-02-17 PROCEDURE — 82330 ASSAY OF CALCIUM: CPT

## 2025-02-17 PROCEDURE — 99239 HOSP IP/OBS DSCHRG MGMT >30: CPT

## 2025-02-17 PROCEDURE — 96376 TX/PRO/DX INJ SAME DRUG ADON: CPT

## 2025-02-17 PROCEDURE — 85018 HEMOGLOBIN: CPT

## 2025-02-17 PROCEDURE — 84295 ASSAY OF SERUM SODIUM: CPT

## 2025-02-17 PROCEDURE — 80061 LIPID PANEL: CPT

## 2025-02-17 PROCEDURE — 71275 CT ANGIOGRAPHY CHEST: CPT | Mod: MC

## 2025-02-17 PROCEDURE — 99285 EMERGENCY DEPT VISIT HI MDM: CPT

## 2025-02-17 PROCEDURE — 87637 SARSCOV2&INF A&B&RSV AMP PRB: CPT

## 2025-02-17 PROCEDURE — 83880 ASSAY OF NATRIURETIC PEPTIDE: CPT

## 2025-02-17 RX ORDER — PREDNISONE 20 MG/1
1 TABLET ORAL
Qty: 30 | Refills: 0
Start: 2025-02-17 | End: 2025-03-18

## 2025-02-17 RX ORDER — METOPROLOL SUCCINATE 50 MG/1
1 TABLET, EXTENDED RELEASE ORAL
Qty: 30 | Refills: 0
Start: 2025-02-17 | End: 2025-03-18

## 2025-02-17 RX ORDER — MONTELUKAST SODIUM 10 MG/1
1 TABLET ORAL
Qty: 30 | Refills: 0
Start: 2025-02-17 | End: 2025-03-18

## 2025-02-17 RX ORDER — IPRATROPIUM BROMIDE AND ALBUTEROL SULFATE .5; 2.5 MG/3ML; MG/3ML
3 SOLUTION RESPIRATORY (INHALATION)
Qty: 48 | Refills: 0
Start: 2025-02-17 | End: 2025-02-20

## 2025-02-17 RX ADMIN — Medication 1 DOSE(S): at 08:52

## 2025-02-17 RX ADMIN — IPRATROPIUM BROMIDE AND ALBUTEROL SULFATE 3 MILLILITER(S): .5; 2.5 SOLUTION RESPIRATORY (INHALATION) at 04:11

## 2025-02-17 RX ADMIN — Medication 1 LOZENGE: at 06:36

## 2025-02-17 RX ADMIN — IPRATROPIUM BROMIDE AND ALBUTEROL SULFATE 3 MILLILITER(S): .5; 2.5 SOLUTION RESPIRATORY (INHALATION) at 08:52

## 2025-02-17 RX ADMIN — METHYLPREDNISOLONE ACETATE 40 MILLIGRAM(S): 80 INJECTION, SUSPENSION INTRA-ARTICULAR; INTRALESIONAL; INTRAMUSCULAR; SOFT TISSUE at 06:32

## 2025-02-17 RX ADMIN — Medication 300 MILLIGRAM(S): at 09:15

## 2025-02-17 RX ADMIN — LOSARTAN POTASSIUM 100 MILLIGRAM(S): 100 TABLET, FILM COATED ORAL at 06:03

## 2025-02-17 RX ADMIN — INSULIN LISPRO 1: 100 INJECTION, SOLUTION INTRAVENOUS; SUBCUTANEOUS at 09:16

## 2025-02-17 RX ADMIN — DEXTROMETHORPHAN HBR, GUAIFENESIN 1200 MILLIGRAM(S): 200 LIQUID ORAL at 06:03

## 2025-02-17 RX ADMIN — Medication 20 MILLIGRAM(S): at 09:16

## 2025-02-17 RX ADMIN — Medication 1 LOZENGE: at 09:17

## 2025-02-17 RX ADMIN — ACETYLCYSTEINE 4 MILLILITER(S): 200 INHALANT RESPIRATORY (INHALATION) at 08:52

## 2025-02-17 RX ADMIN — AMLODIPINE BESYLATE 10 MILLIGRAM(S): 10 TABLET ORAL at 06:02

## 2025-02-17 RX ADMIN — Medication 10 MILLIGRAM(S): at 09:15

## 2025-02-17 RX ADMIN — Medication 500 MILLIGRAM(S): at 09:16

## 2025-02-17 RX ADMIN — METOPROLOL SUCCINATE 25 MILLIGRAM(S): 50 TABLET, EXTENDED RELEASE ORAL at 06:03

## 2025-02-17 NOTE — DISCHARGE NOTE NURSING/CASE MANAGEMENT/SOCIAL WORK - NSDCFUADDAPPT_GEN_ALL_CORE_FT
APPTS ARE READY TO BE MADE: [x] YES    Best Family or Patient Contact (if needed):    Additional Information about above appointments (if needed):    1: Dr Albrecht thoracic surgery in 2 weeks   2:   3:     Other comments or requests:

## 2025-02-17 NOTE — DISCHARGE NOTE NURSING/CASE MANAGEMENT/SOCIAL WORK - FINANCIAL ASSISTANCE
[facility] provides services at a reduced cost to those who are determined to be eligible through [facility]’s financial assistance program. Information regarding [facility]’s financial assistance program can be found by going to [email or web address] or by calling [phone].

## 2025-02-17 NOTE — DISCHARGE NOTE NURSING/CASE MANAGEMENT/SOCIAL WORK - PATIENT PORTAL LINK FT
You can access the FollowMyHealth Patient Portal offered by [facility] by registering at the following website: [email or web address]. By joining [facility]’s FollowMyHealth portal, you will also be able to view your health information using other applications (apps) compatible with our system.

## 2025-02-17 NOTE — CHART NOTE - NSCHARTNOTEFT_GEN_A_CORE
[facility]  [address] [place] [address]    Date:             RE: ZAINA MILLER      To Whom It May Concern,     The above named patient was admitted on February 10th, treated and discharged on February 12th from  [facility]. Patient will need additional time for recovery after discharge but may return to work on February 21st.       Sincerely,    Akhil Carmona MD  Department of  Medicine

## 2025-02-19 LAB
CULTURE RESULTS: SIGNIFICANT CHANGE UP
SPECIMEN SOURCE: SIGNIFICANT CHANGE UP

## 2025-02-28 PROBLEM — J39.8 TRACHEOMALACIA: Status: ACTIVE | Noted: 2025-02-28

## 2025-02-28 PROBLEM — R91.1 LUNG NODULE: Status: ACTIVE | Noted: 2025-02-28

## 2025-02-28 RX ORDER — AMLODIPINE BESYLATE 10 MG/1
10 TABLET ORAL
Refills: 0 | Status: ACTIVE | COMMUNITY

## 2025-02-28 RX ORDER — MONTELUKAST 10 MG/1
10 TABLET, FILM COATED ORAL
Refills: 0 | Status: ACTIVE | COMMUNITY

## 2025-02-28 RX ORDER — LOSARTAN POTASSIUM 100 MG/1
100 TABLET, FILM COATED ORAL
Refills: 0 | Status: ACTIVE | COMMUNITY

## 2025-02-28 RX ORDER — ALLOPURINOL 300 MG/1
300 TABLET ORAL
Refills: 0 | Status: ACTIVE | COMMUNITY

## 2025-02-28 RX ORDER — ATORVASTATIN CALCIUM 40 MG/1
40 TABLET, FILM COATED ORAL
Refills: 0 | Status: ACTIVE | COMMUNITY

## 2025-02-28 RX ORDER — IPRATROPIUM BROMIDE AND ALBUTEROL SULFATE 2.5; .5 MG/3ML; MG/3ML
0.5-2.5 (3) SOLUTION RESPIRATORY (INHALATION)
Refills: 0 | Status: ACTIVE | COMMUNITY

## 2025-02-28 RX ORDER — METFORMIN HYDROCHLORIDE 500 MG/1
500 TABLET, COATED ORAL
Refills: 0 | Status: ACTIVE | COMMUNITY

## 2025-02-28 RX ORDER — PREDNISONE 10 MG/1
10 TABLET ORAL
Refills: 0 | Status: ACTIVE | COMMUNITY

## 2025-02-28 RX ORDER — METOPROLOL SUCCINATE 25 MG/1
25 TABLET, EXTENDED RELEASE ORAL
Refills: 0 | Status: ACTIVE | COMMUNITY

## 2025-03-07 ENCOUNTER — APPOINTMENT (OUTPATIENT)
Dept: THORACIC SURGERY | Facility: CLINIC | Age: 60
End: 2025-03-07

## 2025-03-07 DIAGNOSIS — R91.1 SOLITARY PULMONARY NODULE: ICD-10-CM

## 2025-03-07 DIAGNOSIS — J39.8 OTHER SPECIFIED DISEASES OF UPPER RESPIRATORY TRACT: ICD-10-CM

## 2025-03-25 ENCOUNTER — APPOINTMENT (OUTPATIENT)
Dept: PULMONOLOGY | Facility: CLINIC | Age: 60
End: 2025-03-25
Payer: COMMERCIAL

## 2025-03-25 VITALS
HEART RATE: 88 BPM | DIASTOLIC BLOOD PRESSURE: 84 MMHG | RESPIRATION RATE: 16 BRPM | OXYGEN SATURATION: 95 % | WEIGHT: 217 LBS | SYSTOLIC BLOOD PRESSURE: 128 MMHG | BODY MASS INDEX: 34.06 KG/M2 | HEIGHT: 67 IN

## 2025-03-25 PROCEDURE — G2211 COMPLEX E/M VISIT ADD ON: CPT | Mod: NC

## 2025-03-25 PROCEDURE — 99214 OFFICE O/P EST MOD 30 MIN: CPT

## 2025-03-25 RX ORDER — FLUTICASONE FUROATE, UMECLIDINIUM BROMIDE AND VILANTEROL TRIFENATATE 200; 62.5; 25 UG/1; UG/1; UG/1
200-62.5-25 POWDER RESPIRATORY (INHALATION) DAILY
Qty: 1 | Refills: 5 | Status: ACTIVE | COMMUNITY
Start: 2025-03-25 | End: 1900-01-01

## 2025-03-28 ENCOUNTER — APPOINTMENT (OUTPATIENT)
Dept: THORACIC SURGERY | Facility: CLINIC | Age: 60
End: 2025-03-28

## 2025-03-29 ENCOUNTER — NON-APPOINTMENT (OUTPATIENT)
Age: 60
End: 2025-03-29

## 2025-04-04 ENCOUNTER — APPOINTMENT (OUTPATIENT)
Dept: THORACIC SURGERY | Facility: CLINIC | Age: 60
End: 2025-04-04

## 2025-04-04 DIAGNOSIS — R91.1 SOLITARY PULMONARY NODULE: ICD-10-CM

## 2025-04-04 DIAGNOSIS — J39.8 OTHER SPECIFIED DISEASES OF UPPER RESPIRATORY TRACT: ICD-10-CM

## 2025-05-01 ENCOUNTER — OUTPATIENT (OUTPATIENT)
Dept: OUTPATIENT SERVICES | Facility: HOSPITAL | Age: 60
LOS: 1 days | End: 2025-05-01

## 2025-05-01 ENCOUNTER — APPOINTMENT (OUTPATIENT)
Dept: CT IMAGING | Facility: CLINIC | Age: 60
End: 2025-05-01

## 2025-05-01 DIAGNOSIS — J18.9 PNEUMONIA, UNSPECIFIED ORGANISM: ICD-10-CM

## 2025-05-01 PROCEDURE — 71250 CT THORAX DX C-: CPT | Mod: 26

## 2025-06-03 ENCOUNTER — RX RENEWAL (OUTPATIENT)
Age: 60
End: 2025-06-03

## 2025-06-13 ENCOUNTER — APPOINTMENT (OUTPATIENT)
Dept: PULMONOLOGY | Facility: CLINIC | Age: 60
End: 2025-06-13
Payer: COMMERCIAL

## 2025-06-13 VITALS
HEART RATE: 100 BPM | SYSTOLIC BLOOD PRESSURE: 140 MMHG | RESPIRATION RATE: 16 BRPM | OXYGEN SATURATION: 97 % | DIASTOLIC BLOOD PRESSURE: 74 MMHG

## 2025-06-13 VITALS — HEIGHT: 67 IN | BODY MASS INDEX: 33.43 KG/M2 | WEIGHT: 213 LBS

## 2025-06-13 PROBLEM — G47.33 SEVERE OBSTRUCTIVE SLEEP APNEA: Status: ACTIVE | Noted: 2025-06-13

## 2025-06-13 PROCEDURE — G2211 COMPLEX E/M VISIT ADD ON: CPT | Mod: NC

## 2025-06-13 PROCEDURE — 99214 OFFICE O/P EST MOD 30 MIN: CPT

## 2025-06-13 RX ORDER — PREDNISONE 50 MG/1
TABLET ORAL
Refills: 0 | Status: ACTIVE | COMMUNITY

## 2025-07-01 ENCOUNTER — APPOINTMENT (OUTPATIENT)
Dept: PULMONOLOGY | Facility: CLINIC | Age: 60
End: 2025-07-01

## 2025-08-09 ENCOUNTER — NON-APPOINTMENT (OUTPATIENT)
Age: 60
End: 2025-08-09

## 2025-08-11 ENCOUNTER — APPOINTMENT (OUTPATIENT)
Dept: PULMONOLOGY | Facility: CLINIC | Age: 60
End: 2025-08-11

## 2025-08-24 ENCOUNTER — NON-APPOINTMENT (OUTPATIENT)
Age: 60
End: 2025-08-24

## 2025-09-13 ENCOUNTER — NON-APPOINTMENT (OUTPATIENT)
Age: 60
End: 2025-09-13

## (undated) DEVICE — VISITEC 4X4

## (undated) DEVICE — VALVE SUCTION EVIS 160/200/240

## (undated) DEVICE — SOL IRR POUR NS 0.9% 1000ML

## (undated) DEVICE — SYR CONTROL LUER LOK 10CC

## (undated) DEVICE — GLV 7.5 PROTEXIS (WHITE)

## (undated) DEVICE — BRUSH CYTO DISP

## (undated) DEVICE — DRAPE 3/4 SHEET 52X76"

## (undated) DEVICE — VALVE BIOPSY BRONCHOVIDEOSCOPE

## (undated) DEVICE — TRAP SPECIMEN SPUTUM 40CC

## (undated) DEVICE — WARMING BLANKET LOWER ADULT

## (undated) DEVICE — TUBING CONNECTING 6MM 20FT

## (undated) DEVICE — DRAPE XL SHEET 77X98"

## (undated) DEVICE — VENODYNE/SCD SLEEVE CALF MEDIUM

## (undated) DEVICE — SOL IRR POUR H2O 1000ML